# Patient Record
Sex: FEMALE | Race: WHITE | NOT HISPANIC OR LATINO | Employment: OTHER | URBAN - METROPOLITAN AREA
[De-identification: names, ages, dates, MRNs, and addresses within clinical notes are randomized per-mention and may not be internally consistent; named-entity substitution may affect disease eponyms.]

---

## 2018-12-17 ENCOUNTER — TELEPHONE (OUTPATIENT)
Dept: NEUROLOGY | Facility: CLINIC | Age: 73
End: 2018-12-17

## 2018-12-17 NOTE — TELEPHONE ENCOUNTER
Called pt's daughter and schedule appointment for Feb. 20 at 9:20am. Patient has been placed on the wait list.

## 2018-12-17 NOTE — TELEPHONE ENCOUNTER
----- Message from Mary Ann Huerta sent at 12/17/2018  3:40 PM CST -----  Contact: Glenny Judge MD  Good afternoon, Dr. Judge would like to refer the following patient to Dr. Ponce in the Neurology department. The patients diagnosis is parkinsons. I have scanned the patients referral and records into media manager. I spoke with the patient's daughter and she confirmed that she does not want to see a different neurologist at an earlier time. She would prefer to see Dr. Ponce if possible. Please contact the patient's daughter at [phone] in order to schedule.   If there are any further questions in regards to the patient, please contact Dr. John referral coordinator, Antonietta, at [phone]. Also, my extension is [phone].   Please let me know if I can help schedule in any way.  Thank you,   Mary Ann

## 2019-01-30 ENCOUNTER — TELEPHONE (OUTPATIENT)
Dept: NEUROLOGY | Facility: CLINIC | Age: 74
End: 2019-01-30

## 2019-01-30 NOTE — TELEPHONE ENCOUNTER
----- Message from Sebastián Arita sent at 1/30/2019  8:26 AM CST -----  Needs Advice    Reason for call: Jens is calling to confirm if the doctor knows of a PT that does [facility] [employer] therapy services        Communication Preference: Jens chin/  Care @ [phone]    Additional Information:

## 2019-02-20 ENCOUNTER — OFFICE VISIT (OUTPATIENT)
Dept: NEUROLOGY | Facility: CLINIC | Age: 74
End: 2019-02-20
Payer: MEDICARE

## 2019-02-20 VITALS
DIASTOLIC BLOOD PRESSURE: 63 MMHG | HEART RATE: 74 BPM | HEIGHT: 63 IN | SYSTOLIC BLOOD PRESSURE: 106 MMHG | WEIGHT: 127.44 LBS | BODY MASS INDEX: 22.58 KG/M2

## 2019-02-20 DIAGNOSIS — F02.818 LEWY BODY DEMENTIA WITH BEHAVIORAL DISTURBANCE: Primary | ICD-10-CM

## 2019-02-20 DIAGNOSIS — G31.83 LEWY BODY DEMENTIA WITH BEHAVIORAL DISTURBANCE: Primary | ICD-10-CM

## 2019-02-20 PROCEDURE — 99204 OFFICE O/P NEW MOD 45 MIN: CPT | Mod: S$GLB,,, | Performed by: PSYCHIATRY & NEUROLOGY

## 2019-02-20 PROCEDURE — 99204 PR OFFICE/OUTPT VISIT, NEW, LEVL IV, 45-59 MIN: ICD-10-PCS | Mod: S$GLB,,, | Performed by: PSYCHIATRY & NEUROLOGY

## 2019-02-20 PROCEDURE — 99999 PR PBB SHADOW E&M-EST. PATIENT-LVL III: CPT | Mod: PBBFAC,,, | Performed by: PSYCHIATRY & NEUROLOGY

## 2019-02-20 PROCEDURE — 99999 PR PBB SHADOW E&M-EST. PATIENT-LVL III: ICD-10-PCS | Mod: PBBFAC,,, | Performed by: PSYCHIATRY & NEUROLOGY

## 2019-02-20 RX ORDER — ASPIRIN 81 MG/1
81 TABLET ORAL DAILY
Status: ON HOLD | COMMUNITY
End: 2024-02-16 | Stop reason: HOSPADM

## 2019-02-20 RX ORDER — PNV NO.95/FERROUS FUM/FOLIC AC 28MG-0.8MG
1000 TABLET ORAL 2 TIMES DAILY
Status: ON HOLD | COMMUNITY
End: 2024-01-26 | Stop reason: HOSPADM

## 2019-02-20 RX ORDER — CARBIDOPA AND LEVODOPA 25; 100 MG/1; MG/1
1 TABLET ORAL 3 TIMES DAILY
Status: ON HOLD | COMMUNITY
End: 2024-02-16 | Stop reason: HOSPADM

## 2019-02-20 RX ORDER — DONEPEZIL HYDROCHLORIDE 5 MG/1
5 TABLET, FILM COATED ORAL NIGHTLY
Qty: 30 TABLET | Refills: 11 | Status: SHIPPED | OUTPATIENT
Start: 2019-02-20 | End: 2020-05-07

## 2019-02-20 RX ORDER — LANOLIN ALCOHOL/MO/W.PET/CERES
100 CREAM (GRAM) TOPICAL DAILY
Status: ON HOLD | COMMUNITY
End: 2024-02-16 | Stop reason: HOSPADM

## 2019-02-20 RX ORDER — LORATADINE 10 MG/1
10 TABLET ORAL DAILY
COMMUNITY
End: 2024-02-14

## 2019-02-20 NOTE — LETTER
February 26, 2019      Margaret Judge MD  [address] [facility] [address]           [facility]  [address]  [place] [address]  Phone: [phone]  Fax: [phone]          Patient: Annmarie Gallardo   MR Number: [id number]   YOB: 1945   Date of Visit: 2/20/2019       Dear Dr. Margaret Judge:    Thank you for referring Annmarie Gallardo to me for evaluation. Attached you will find relevant portions of my assessment and plan of care.    If you have questions, please do not hesitate to call me. I look forward to following Annmarie Gallardo along with you.    Sincerely,    Bharat Ponce MD    Enclosure  CC:  No Recipients    If you would like to receive this communication electronically, please contact [email or web address] or [phone] to request more information on [employer] access.    For providers and/or their staff who would like to refer a patient to [facility], please contact us through our one-stop-shop provider referral line, [facility], at [phone].    If you feel you have received this communication in error or would no longer like to receive these types of communications, please e-mail [email or web address]

## 2019-02-20 NOTE — PROGRESS NOTES
"Name: Annmarie Gallardo  MRN: [id number]   CSN: [id number]      Date: 2019    Referring physician:  Margaret Judge MD  [facility] [facility]  [place] [id number]    Chief Complaint / Interval History: No chief complaint on file.      History of Present Illness (HPI):  74 yo with presumed PD, previously seen by Dr. Judge at [facility].  A circuitous history - she has trouble describing exactly what is going on.  Daughter (Yamileth) is here to help.    Symptoms started about 5 years ago, first noted cognitive issues.  Would be worse when anxious, and difficulty following conversation. Then started to "shuffle" and moving more slow in the last year.  Now more stiff in the last 6 months and was 'not using her left side".  Started on CD/LD in December with good results, but now plateaued.  Memory got worse and then she got even more confused on namenda.    Family says brain scans show more "mini-strokes" and "dementia".  No disc to review.      Memory has progressed significantly.  She worked until 2018, had started to develop issues at work, getting confused, having her hours cut, not able to drive to and from.        Had 5 children, one .    Nonmotor/Premotor ROS:  Hyposmia (HENT)?Yes  RBD/sleep issues (Constitutional)?Yes  Depression/anxiety (Psychiatric)?Yes  Fatigue (Constitutional)?Yes  Constipation (GI)?No  Urinary issues ()?Yes  Sexual dysfunction ()?N/A  Orthostasis (Cardiovascular)?No  Leg swelling (Cardiovascular)? No  Falls (Musculoskeletal)?No  Cognitive impairment (Neurologic)?Yes  Psychoses (Psychiatric)?Yes  Pain/Paresthesia (Neurologic)?No  Visual changes (Eyes)?No  Moles / skin changes (Skin)?No  Stridor / SOB (Pulm)?No  Bruising (Heme)?No    Past Medical History: The patient  has no past medical history on file.    Social History: The patient  reports that  has never smoked. she has never used smokeless tobacco. She reports that she does not drink alcohol or use drugs.    Family " "History: Their family history is not on file.    Allergies: Patient has no known allergies.     Meds:   Current Outpatient Medications on File Prior to Visit   Medication Sig Dispense Refill    aspirin (ECOTRIN) 81 MG EC tablet Take 81 mg by mouth once daily.      carbidopa-levodopa  mg (SINEMET)  mg per tablet Take 1 tablet by mouth 3 (three) times daily.      cyanocobalamin (VITAMIN B-12) 1000 MCG tablet Take 100 mcg by mouth once daily.      LISINOPRIL ORAL Take by mouth.      loratadine (CLARITIN) 10 mg tablet Take 10 mg by mouth once daily.      OMEPRAZOLE ORAL Take by mouth.      vitamin E 1000 UNIT capsule Take 1,000 Units by mouth 2 (two) times daily.       No current facility-administered medications on file prior to visit.        Exam:  /63   Pulse 74   Ht 5' 3" (1.6 m)   Wt 57.8 kg (127 lb 6.8 oz)   BMI 22.57 kg/m²     Constitutional  Well-developed, well-nourished, appears stated age   Ophthalmoscopic  No papilledema with no hemorrhages or exudates bilaterally   Cardiovascular  Radial pulses 2+ and symmetric, no LE edema bilaterally   Neurological    * Mental status  MOCA =      - Orientation  Oriented to person, place, time, and situation     - Memory   Intact recent and remote     - Attention/concentration  Attentive, vigilant during exam     - Language  Naming & repetition intact, +2-step commands     - Fund of knowledge  Aware of current events     - Executive  Well-organized thoughts     - Other     * Cranial nerves       - CN II  PERRL, visual fields full to confrontation     - CN III, IV, VI  Extraocular movements full, normal pursuits and saccades     - CN V  Sensation V1 - V3 intact     - CN VII  Face strong and symmetric bilaterally     - CN VIII  Hearing intact bilaterally     - CN IX, X  Palate raises midline and symmetric     - CN XI  SCM and trapezius 5/5 bilaterally     - CN XII  Tongue midline   * Motor  Muscle bulk normal, strength 5/5 throughout   * Sensory  "  Intact to temperature and vibration throughout   * Coordination  No dysmetria with finger-to-nose or heel-to-shin   * Gait  See below.   * Deep tendon reflexes  3+ and symmetric throughout   Babinski downgoing bilaterally   * Specialized movement exam  ++ hypophonic speech.    + facial masking.   Sym B mild cogwheel rigidity.     Sym B mod bradykinesia.   No tremor with rest, posture, kinesis, or intention.    No other dystonia, chorea, athetosis, myoclonus, or tics.   No motor impersistence.   Normal-based gait.   Minimally shortened stride length.   ++ B abnormal arm swing.     No postural instability.      Medical Record Review:  - Lab Results:  No visits with results within 3 Month(s) from this visit.   Latest known visit with results is:   No results found for any previous visit.       - Independent visualization and interpretation of radiological images: none available    - Independent review of consultant's notes: PCP      Diagnoses:          DLB > PDD.     Medical Decision Making:  - trial of donepezil, gi symptoms discussed  - continue cd/ld  - discus with  for more assistance at home with family            Bharat Ponce MD, MPH  Division of Movement and Memory Disorders  [facility]  [phone]

## 2019-02-26 DIAGNOSIS — F02.818 LEWY BODY DEMENTIA WITH BEHAVIORAL DISTURBANCE: Primary | ICD-10-CM

## 2019-02-26 DIAGNOSIS — G31.83 LEWY BODY DEMENTIA WITH BEHAVIORAL DISTURBANCE: Primary | ICD-10-CM

## 2019-02-26 NOTE — TELEPHONE ENCOUNTER
----- Message from Nazia Juaerz sent at 2/26/2019 11:22 AM CST -----  Contact: Yamileth (daughter) @ [phone]  Pt was in to see Dr Ponce on 2-20-19.  Pt was given a prescription for donepezil (ARICEPT) 5 MG tablet.  Pt took the medication for 4 days and started having cramping and nausea.  The next day, the 5th day she vomited for about an hour and did not take the medication again.  About 36 hours after not taking the medication she was back to normal.  Pls call.

## 2019-02-28 ENCOUNTER — SOCIAL WORK (OUTPATIENT)
Dept: NEUROLOGY | Facility: CLINIC | Age: 74
End: 2019-02-28
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
## 2019-04-20 RX ORDER — METHYLPHENIDATE HYDROCHLORIDE 5 MG/1
5 TABLET ORAL DAILY
Qty: 90 TABLET | Refills: 3 | OUTPATIENT
Start: 2019-04-20

## 2019-12-09 ENCOUNTER — TELEPHONE (OUTPATIENT)
Dept: NEUROLOGY | Facility: CLINIC | Age: 74
End: 2019-12-09

## 2019-12-09 NOTE — TELEPHONE ENCOUNTER
----- Message from Nazia Juarez sent at 12/9/2019  9:54 AM CST -----  Contact: Yamileth (daughter) @ [phone]  Calling to schedule a f/u appt at the end of February but there is nothing available.  Pls call.

## 2020-01-10 ENCOUNTER — TELEPHONE (OUTPATIENT)
Dept: NEUROLOGY | Facility: CLINIC | Age: 75
End: 2020-01-10

## 2020-01-10 DIAGNOSIS — G31.83 LEWY BODY DEMENTIA WITH BEHAVIORAL DISTURBANCE: Primary | ICD-10-CM

## 2020-01-10 DIAGNOSIS — G20.A1 PARKINSON DISEASE: ICD-10-CM

## 2020-01-10 DIAGNOSIS — F02.818 LEWY BODY DEMENTIA WITH BEHAVIORAL DISTURBANCE: Primary | ICD-10-CM

## 2020-01-10 NOTE — TELEPHONE ENCOUNTER
----- Message from Yoko Ruff sent at 1/10/2020  3:12 PM CST -----  Contact: Pt Yamileth  Reason: Calling to schedule appt and also would like a referral for [employer].    Communication: [phone]

## 2020-01-14 NOTE — TELEPHONE ENCOUNTER
Offered f/u 5/11/20 at 220 pm.  Daughter is requesting [employer] orders.  [facility] and also [facility] for approval.

## 2020-01-22 ENCOUNTER — CLINICAL SUPPORT (OUTPATIENT)
Dept: REHABILITATION | Facility: HOSPITAL | Age: 75
End: 2020-01-22
Attending: PSYCHIATRY & NEUROLOGY
Payer: MEDICARE

## 2020-01-22 DIAGNOSIS — Z74.09 IMPAIRED FUNCTIONAL MOBILITY, BALANCE, GAIT, AND ENDURANCE: ICD-10-CM

## 2020-01-22 PROCEDURE — 97162 PT EVAL MOD COMPLEX 30 MIN: CPT | Mod: PO

## 2020-01-22 NOTE — PLAN OF CARE
"[facility]  Physical Therapy Neurological Rehabilitation Initial Evaluation    Name: Annmarie Gallardo  Clinic Number: [id number]    Therapy Diagnosis:   Encounter Diagnosis   Name Primary?    Impaired functional mobility, balance, gait, and endurance      Physician: Bharat Ponce MD    Physician Orders: PT Eval and Treat "Big and Loud"  Medical Diagnosis from Referral: Lewy body dementia with behavioral disturbance; parkinson's disease  Evaluation Date: 1/22/2020  Authorization Period Expiration: 1/13/2021  Plan of Care Expiration: 3/22/2020  Visit # / Visits authorized: 1/ 1    Time In: 930  Time Out: 1015  Total Billable Time: 45 minutes    Precautions: Standard and Fall    Subjective   Date of onset: 6 mos  History of current condition - Annmarie reports: Some things have gotten difficult for her. Pt speaks amously from time to time and is a poor historian. Pt's daughter waits in the waiting room and pt reports that she is now living with her. They moved in a few months ago. She was diagnosed with parkinson's about 2 years ago. I don't like to turn quick. On cold days she does not like to move as much. She reports that writing has gotten worse. Pt's daugther that is present in the waiting room is the only family around to help; her other daughter is in [place].   Per patients daughter, at conclusion of session: Her mother has a history of stroke in 2018 and was also diagnosed with PD as well as dementia in late 2018. Since then she ebbs and flows with her abilities. She and her family moved in with her mother 6 mos ago and want to be sure that her mom is safe when she is not home. She thinks her mother's balance is fairly stable but could improve so that she is more confident in leaving her while at work.     Medical History:   No past medical history on file.    Surgical History:   Annmarie Gallardo  has no past surgical history on file.    Medications:   Annmarie has a current " "medication list which includes the following prescription(s): aspirin, carbidopa-levodopa  mg, cyanocobalamin, donepezil, lisinopril, loratadine, omeprazole, and vitamin e.    Allergies:   Review of patient's allergies indicates:  No Known Allergies     Imaging, none on file    Prior Therapy: yes, per patient, unsure what she was in therapy for  Social History:  lives with their family and lives with their daughter  Falls: none   DME: none    Home Environment: SSH, no EZIO   Exercise Routine / History: none   Family Present at time of Eval: daughter in waiting room, Sue   Occupation: retired; worked for a [employer]  Prior Level of Function: ind  Current Level of Function: mod I, with increased time and increased confusion, not able to drive    Pain:  Current 0/10, worst 0/10, best 0/10       Pts goals: "to feel better"    Objective     Observation: pleasant and cooperative   Speech: fluent though tangital    Mental status: alert, oriented to person, place, and time, though often tangential and requires redirection  Appearance: Casually dressed and Well groomed  Behavior:  calm, cooperative and adequate rapport can be established  Attention Span and Concentration:  Decreased and Easily distracted    Posture Alignment :slouched posture    Dominant hand:  right     Skin integrity:  Intact    Visual/Auditory: denies changes; wears reading glasses occaisionally      ROM:   GROSS AROM/PROM  UPPER EXTREMITY  (R) UE: WFLs  (L) UE: WFLs  LOWER EXTREMITY  (R) LE: WFLs  (L) LE: WFLs       Lower Extremity Strength  Right LE  Left LE    Hip flexion:  4/5 Hip flexion: 4/5   Knee extension: 5/5 Knee extension: 4/5   Knee flexion: 4+/5 Knee flexion: 4/5   Ankle dorsiflexion:  5/5 Ankle dorsiflexion: 5/5   Ankle plantarflexion:  4/5 Ankle plantarflexion: 4/5   Hip abduction: 4/5 Hip abduction: 4/5   Hip adduction: 4/5 Hip adduction 4/5   Hip extension: 4-/5 Hip extension: 4-/5     Upper extremity strength:     Right Left " "  Shoulder Flexion: 4+/5 4-/5   Shoulder Abduction: 4/5 4/5   Shoulder Extension: 4/5 4/5   Elbow Flexion: 4/5 4/5   Elbow Extension: 4/5 4/5   Wrist Extension: 4/5 4/5   Wrist Flexion: 4/5 4/5    4-/5 4-/5     Coordination:   Rapid Alternating Movements: intact  Point to Point:    -Finger to Nose: NT   -Heel to Shin: NT    Sensation: intact  Proprioception: NT    Tone/Spasticity: none noted, not formally tested, no tremor noted    Functional Mobility (Bed mobility, transfers)  Bed mobility: I  Supine to sit: I  Sit to supine: I  Rolling: I  Transfers to bed: I  Transfers to toilet: I  Sit to stand:  I  Stand pivot:  I  Car transfers: I     Evaluation   Single Limb Stance R LE 7 sec  (<10 sec = HIGH FALL RISK)   Single Limb Stance L LE 15 sec  (<10 sec = HIGH FALL RISK)   30 second Chair Rise 13 completed with no arms (occaisional cues to stand tall)   TUG 9.2 seconds   Self selected walking speed 1.25 m/sec (6m/4.8s)   Fast walking speed 1.5 m/sec (6m/4.0s)     30 second chair rise below average score:   Age  Men  Women  75-79  <11  <10  A below average score indicates a risk for fall.    Postural control:  KIMBERLEY SENSORY TESTING:  (P= Pass, F= Fail; note any sway; hold each position for 30")  Condition 1: (firm surface/feet together/eyes open) P  Condition 2: (firm surface/feet together/eyes closed) P  Condition 3: (firm surface/feet in tandem/eyes open) F; 15 sec  Condition 4: (firm surface/feet in tandem/eyes closed) NT  Condition 5: (soft surface/feet together/eyes open) P  Condition 6: (soft surface/feet together/eyes closed) F; 7 sec  Condition 7: (Fakuda step test), measure distance varied from center starting position NT    Gait Assessment:   - AD used: none  - Assistance: ind  - Stairs: Mod I    · GAIT DEVIATIONS:   Annmarie displays the following deviations with ambulation: slight forward flexion at the hips, decreased lila, decreased arm swing   Impairments contributing to deviations: impaired motor " control, impaired balance, decreased strength in B LE    Functional Gait Assessment:   1. Gait on level surface =  3   (3) Normal: less than 5.5 sec, no A.D., no imbalance, normal gait pattern, deviates< 6in   (2) Mild impairment: 7-5.6 sec, uses A.D., mild gait deviations, or deviates 6-10 in   (1) Moderate impairment: > 7 sec, slow speed, imbalance, deviates 10-15 in.   (0) Severe impairment: needs assist, deviates >15 in, reach/touch wall  2. Change in Gait Speed = 3   (3) Normal: smooth change w/o loss of balance or gait deviation, deviates < 6 in, significant difference between speeds   (2) Mild impairment: changes speed, but demonstrates mild gait deviations, deviates 6-10 in, OR no deviations but unable to significantly speed, OR uses A.D.   (1) Moderate impairment: minor changes to speed, OR changes speed w/ significant deviations, deviates 10-15 in, OR  Changes speed , but loses balance & recovers   (0) Severe impairment: cannot change speed, deviates >15 in, or loses balance & needs assist  3. Gait with horizontal head turns  = 3   (3) Normal: no change in gait, deviates <6 in   (2) Mild impairment: slight change in speed, deviates 6-10 in, OR uses A.D.   (1) Moderate impairment: moderate change in speed, deviates 10-15 in   (0) Severe impairment: severe disruption of gait, deviates >15in  4. Gait with vertical head turns = 3   (3) Normal: no change in gait, deviates <6 in   (2) Mild impairment: slight change in speed, deviates 6-10 in OR uses A.D.   (1) Moderate impairment: moderate change in speed, deviates 10-15 in   (0) Severe impairment: severe disruption of gait, deviates >15 in  5. Gait with pivot turns = 3   (3) Normal: performs safely in 3 sec, no LOB   (2) Mild impairment: performs in >3 sec & no LOB, OR turns safely & requires several steps to regain LOB   (1) Moderate impairment: turns slow, OR requires several small steps for balance following turn & stop   (0) Severe impairment: cannot turn  safely, needs assist  6. Step over obstacle = 2   (3) Normal: steps over 2 stacked boxes w/o change in speed or LOB   (2) Mild impairment: able to step over 1 box w/o change in speed or LOB   (1) Moderate impairment: steps over 1 box but must slow down, may require VC   (0) Severe impairment: cannot perform w/o assist  7. Gait with Narrow EDGARDO = 3   (3) Normal: 10 steps no staggering   (2) Mild impairment: 7-9 steps   (1) Moderate impairment: 4-7 steps   (0) Severe impairment: < 4 steps or cannot perform w/o assist  8. Gait with eyes closed = 2   (3) Normal: < 7 sec, no A.D., no LOB, normal gait pattern, deviates <6 in   (2) Mild impairment: 7.1-9 sec, mild gait deviations, deviates 6-10 in   (1) Moderate impairment: > 9 sec, abnormal pattern, LOB, deviates 10-15 in   (0) Severe impairment: cannot perform w/o assist, LOB, deviates >15in  9. Ambulating Backwards = 2   (3) Normal: no A.D., no LOB, normal gait pattern, deviates <6in   (2) Mild impairment: uses A.D., slower speed, mild gait deviations, deviates 6-10 in   (1) Moderate impairment: slow speed, abnormal gait pattern, LOB, deviates 10-15 in   (0) Severe impairment: severe gait deviations or LOB, deviates >15in  10. Steps = 2   (3) Normal: alternating feet, no rail   (2) Mild Impairment: alternating feet, uses rail   (1) Moderate impairment: step-to, uses rail   (0) Severe impairment: cannot perform safely    Score 26/30     Score:   <22/30 fall risk   <20/30 fall risk in older adults   <18/30 fall risk in Parkinsons       Endurance Deficit: yes; pt reports inability to walk as far as she would like     PT Evaluation Completed? Yes        CMS Impairment/Limitation/Restriction for FOTO Intake Survey    Therapist reviewed FOTO scores for Annmarie [employer] on 1/22/2020.   FOTO documents entered into [employer] - see Media section.    Limitation Score: 26%  Category: Mobility    Current : CJ = at least 20% but < 40% impaired, limited or restricted  Goal: CJ = at least  20% but < 40% impaired, limited or restricted         TREATMENT   Treatment not provided due to time restrictions.     Home Exercises and Patient Education Provided    Education provided:   - LSVT Big program  - role of PT  - POC      Assessment   Annmarie is a 74 y.o. female referred to outpatient Physical Therapy with a medical diagnosis of dementia and parkinson's disease. Pt presents to PT with the following impairments leading to his/her functional decline: decreased balance, decreased strength in B LE, decreased endurance, and decreased overall mobility. Per patients daughter she is not compliant with HEP and will not participate outside of sessions; therefore pt is not appropriate for formal LSVT Big program at this time. PT educated pt and her family on this program and pt would be appropriate to teach the Big exercises to as HEP to assist with keeping movements larger for as long as possible. Pt demonstrates deficits with balance vision eliminated most, and often feels uncomfortable with this activity. Pt's daughter reports that she closes her eyes for showering and other activities at home; therefore, it would be beneficial to train these activities most.     Pt prognosis is Good.   Pt will benefit from skilled outpatient Physical Therapy to address the deficits stated above and in the chart below, provide pt/family education, and to maximize pt's level of independence.     Plan of care discussed with patient: Yes  Pt's spiritual, cultural and educational needs considered and patient is agreeable to the plan of care and goals as stated below:     Anticipated Barriers for therapy: progressive nature of PD and dementia, poor carryover due to dementia    Medical Necessity is demonstrated by the following  History  Co-morbidities and personal factors that may impact the plan of care Co-morbidities:   history of CVA and dementia    Personal Factors:   age     moderate   Examination  Body Structures and Functions,  "activity limitations and participation restrictions that may impact the plan of care Body Regions:   back  lower extremities  upper extremities  trunk    Body Systems:    gross symmetry  strength  balance  gait  transfers  transitions  motor control    Participation Restrictions:   Pt unable to be left alone at this time.    Activity limitations:   Learning and applying knowledge  no deficits    General Tasks and Commands  undertaking multiple tasks    Communication  no deficits    Mobility  lifting and carrying objects  walking  driving (bike, car, motorcycle)    Self care  washing oneself (bathing, drying, washing hands)    Domestic Life  cooking  doing house work (cleaning house, washing dishes, laundry)    Interactions/Relationships  no deficits    Life Areas  no deficits    Community and Social Life  no deficits         moderate   Clinical Presentation evolving clinical presentation with changing clinical characteristics moderate   Decision Making/ Complexity Score: moderate     Goals:  Short Term Goals = Long Term Goals: 4 weeks   1. Pt will improve SLS time to 20 seconds bilaterally in order to decrease risk for fall.   2. Pt will be able to demonstrate proper completion of LSVT Big maximal daily exercises in order to continue "big" movements without difficulty.  3. Pt will improve score on FGA to at least 29/30 in order to demonstrate improving balance.   4. Pt will improve B LE strength to at least 4+/5 in order to demonstrate improved strength.  5. Pt will improve condition 6 on [employer] sensory testing to at least 15 sec in order to demonstrate improved balance with vision eliminated such as when in the shower.    Plan   Plan of care Certification: 1/22/2020 to 3/22/2020.    Outpatient Physical Therapy 2 times weekly for 8 weeks to include the following interventions: Gait Training, Manual Therapy, Moist Heat/ Ice, Neuromuscular Re-ed, Orthotic Management and Training, Patient Education, Therapeutic Activites " and Therapeutic Exercise.     Luis Field, PT

## 2020-01-30 ENCOUNTER — CLINICAL SUPPORT (OUTPATIENT)
Dept: REHABILITATION | Facility: HOSPITAL | Age: 75
End: 2020-01-30
Attending: PSYCHIATRY & NEUROLOGY
Payer: MEDICARE

## 2020-01-30 DIAGNOSIS — Z74.09 IMPAIRED FUNCTIONAL MOBILITY, BALANCE, GAIT, AND ENDURANCE: ICD-10-CM

## 2020-01-30 PROCEDURE — 97110 THERAPEUTIC EXERCISES: CPT | Mod: PO,CQ

## 2020-01-30 NOTE — PROGRESS NOTES
"  Physical Therapy Daily Treatment Note     Name: Annmarie Gallardo  Clinic Number: [id number]    Therapy Diagnosis: No diagnosis found.  Physician: Bharat Ponce MD    Visit Date: 1/30/2020    Physician Orders: PT Eval and Treat "Big and Loud"  Medical Diagnosis from Referral: Lewy body dementia with behavioral disturbance; parkinson's disease  Evaluation Date: 1/22/2020  Authorization Period Expiration: 1/13/2021  Plan of Care Expiration: 3/22/2020  Visit # / Visits authorized: 2/ 6    Time In: 1105  Time Out: 1150  Total Billable Time: 45 minutes    Precautions: Standard and Fall    Subjective     Pt reports: " I paid my bill, I am ready to start."   She will be given an HEP today   Response to previous treatment: first appointment following initial evaluation  Functional change: ongoing    Pain: 0/10  Location: N/A     Objective     Annmarie received therapeutic exercises to develop strength, endurance, ROM, flexibility, posture and core stabilization for 45 minutes including:  X 9 min on Nu Step Recumbent stepper.  B UE/B LE on level 2.0    Sitting EOM:   2 x 30 sec of B LE HS stretches   2 x 10 reps of B LE LAQ, 3 sec hold.  VC's to hold longer and complete full ROM    Standing by ballet bar:   3 x 10 reps of B LE exercises including:    Heel raises    Toe raises    Hip abd/add    Hs curls    Hip flexion    Mini squats    Annmarie participated in neuromuscular re-education activities to improve: Balance, Coordination, Kinesthetic, Sense, Proprioception and Posture for 0 minutes. The following activities were included:      Annmarie participated in dynamic functional therapeutic activities to improve functional performance for 0  minutes, including:      Annmarie participated in gait training to improve functional mobility and safety for 0  minutes, including:        Home Exercises Provided and Patient Education Provided     Education provided:   HEP    Written Home Exercises Provided: yes.  Exercises were reviewed " "and Annmarie was able to demonstrate them prior to the end of the session.  Annmarie demonstrated fair  understanding of the education provided.     See EMR under Patient Instructions for exercises provided 1/30/2020.    Assessment     Annmarie tolerated her first tx session following initial evaluation fairly well and did not have any complaints.  Pt began cardiovascular endurance on the stepper and was educated on sitting and standing HEP.  Pt was able to demonstrate understanding, but had a hard time with carry over in the gym with instructions, so may have a hard time with HEP.  Pt requires constant VC's for exercises.  No loss of balance noted.   Annmarie is progressing well towards her goals.   Pt prognosis is Good.     Pt will continue to benefit from skilled outpatient physical therapy to address the deficits listed in the problem list box on initial evaluation, provide pt/family education and to maximize pt's level of independence in the home and community environment.     Pt's spiritual, cultural and educational needs considered and pt agreeable to plan of care and goals.     Anticipated barriers to physical therapy: progressive nature of PD and dementia, poor carryover due to dementia    Goals:  Short Term Goals = Long Term Goals: 4 weeks   1. Pt will improve SLS time to 20 seconds bilaterally in order to decrease risk for fall.   2. Pt will be able to demonstrate proper completion of LSVT Big maximal daily exercises in order to continue "big" movements without difficulty.  3. Pt will improve score on FGA to at least 29/30 in order to demonstrate improving balance.   4. Pt will improve B LE strength to at least 4+/5 in order to demonstrate improved strength.  5. Pt will improve condition 6 on [employer] sensory testing to at least 15 sec in order to demonstrate improved balance with vision eliminated such as when in the shower.       Plan   Cont with endurance, strengthening, stretching, balance and amplitude    Daphney " Tim Orosco, PTA

## 2020-01-30 NOTE — PATIENT INSTRUCTIONS
Ankle Plantar Flexion / Dorsiflexion, Standing          Stand while holding a stable object.    Rise up on toes x 20 reps.   Then rock back on heels x 20 reps.    Repeat 2 rounds of 20 each. 1-2 sessions per day.  (If this becomes easy, start to try heel raises and toe raises without holding but have support within reach.)       Partial Knee Bend        Holding on to stable object, slightly bend knees and slowly straighten.  Repeat 10 times. Do 2 sets. 1-2 sessions per day.     [email or web address]             Knee Flexion (Standing)        Stand with support. Squeeze buttocks and hold. Bend right knee without moving hip. Hold for 1-2 seconds.  Repeat with other leg.   Repeat 10 times. Do 2 sets. 1-2 sessions per day.     Hp Side Kick        Holding a chair for balance if needed, keep legs shoulder width apart and toes pointed forward. Swing a leg out to side, keeping knee straight. Do not lean. Repeat using other leg.  Repeat 10 times. Do 2 sets. 1-2 sessions per day.         KNEE: Extension, Long Arc Quads - Sitting        Raise leg until knee is straight. Hold 3-5 seconds.   Repeat 10 times. Do 2 sets. 1-2 sessions per day.    HIP: Hamstrings - Short Sitting        Rest leg on raised surface. Keep knee straight. Lift chest and lean forwards towards toes. Hold 30 seconds.  Repeat 3 times. 1-2 sessions per day.     Copyright © [employer]. All rights reserved.

## 2020-02-03 ENCOUNTER — DOCUMENTATION ONLY (OUTPATIENT)
Dept: REHABILITATION | Facility: HOSPITAL | Age: 75
End: 2020-02-03

## 2020-02-03 NOTE — PROGRESS NOTES
Face to face meeting completed with Luis Field  PT regarding current status and progress of   Annmarie Gallardo .  Raymond Aguilar, PTA

## 2020-02-03 NOTE — PROGRESS NOTES
PT/PTA met face to face to discuss pt's treatment plan and progress towards established goals.  Continue with current PT POC with focus on balance, strengthening, amplitude, and endurance.  Patient will be seen by physical therapist at least every sixth treatment or 30 days, whichever occurs first.    Daphney Orosco, PTA  02/03/2020

## 2020-02-04 ENCOUNTER — CLINICAL SUPPORT (OUTPATIENT)
Dept: REHABILITATION | Facility: HOSPITAL | Age: 75
End: 2020-02-04
Attending: PSYCHIATRY & NEUROLOGY
Payer: MEDICARE

## 2020-02-04 DIAGNOSIS — Z74.09 IMPAIRED FUNCTIONAL MOBILITY, BALANCE, GAIT, AND ENDURANCE: Primary | ICD-10-CM

## 2020-02-04 PROCEDURE — 97110 THERAPEUTIC EXERCISES: CPT | Mod: PO

## 2020-02-04 PROCEDURE — 97112 NEUROMUSCULAR REEDUCATION: CPT | Mod: PO

## 2020-02-04 NOTE — PROGRESS NOTES
"  Physical Therapy Daily Treatment Note     Name: Annmarie SADLER [facility] Number: [id number]    Therapy Diagnosis:   Encounter Diagnosis   Name Primary?    Impaired functional mobility, balance, gait, and endurance Yes     Physician: Bharat Ponce MD    Visit Date: 2/4/2020    Physician Orders: PT Eval and Treat "Big and Loud"  Medical Diagnosis from Referral: Lewy body dementia with behavioral disturbance; parkinson's disease  Evaluation Date: 1/22/2020  Authorization Period Expiration: 1/13/2021  Plan of Care Expiration: 3/22/2020  Visit # / Visits authorized: 3/ 6    Time In: 1100  Time Out: 1143  Total Billable Time: 43 minutes    Precautions: Standard and Fall    Subjective     Pt reports: I have so much other stuff to do, I didn't do any of those exercise.     She was not compliant with HEP.   Response to previous treatment: first appointment following initial evaluation  Functional change: ongoing    Pain: 0/10  Location: N/A     Objective     Annmarie received therapeutic exercises to develop strength, endurance, ROM, flexibility, posture and core stabilization for 20 minutes including:  X 8 min on Recumbent bike. level 5.0    // bars:   3 x 10 reps of B LE exercises including:    Heel raises    Toe raises    Mini squats    Annmarie participated in neuromuscular re-education activities to improve: Balance, Coordination, Kinesthetic, Sense, Proprioception and Posture for 25 minutes. The following activities were included:    Exercises for increased amplitude of movements:   1. Floor to ceiling (10x with 10 sec hold)  2. Side to side (10x with 10 sec hold)  3. Forward step and reach (8x ea side)  4. Sideways step and reach (8x ea side)  5. Backward step and reach (8x ea side)    3 laps side stepping between // bars, CGA and cues  3 laps backward walking between // bars, CGA and cues  3 laps marching with 3 sec hold between // bars, SBA  3 laps tandem ambulation with no UE support, CGA and cues    x5 " "sit<>stand from low EOM with 1 UE support, standing onto airex pad    Annmarie participated in dynamic functional therapeutic activities to improve functional performance for 0  minutes, including:      Annmarie participated in gait training to improve functional mobility and safety for 0  minutes, including:        Home Exercises Provided and Patient Education Provided     Education provided:   HEP    Written Home Exercises Provided: yes.  Exercises were reviewed and Annmarie was able to demonstrate them prior to the end of the session.  Annmarie demonstrated fair  understanding of the education provided.     See EMR under Patient Instructions for exercises provided 1/30/2020.    Assessment     Annmarie tolerated session well. She was able to complete amplitude exercises well though pt unable to correct with cues from PT once she begins attempting exercises. Pt reports non-compliance with HEP despite written instructions provided last session. She remains appropriate for skilled PT services in order to improve balance, endurance, and tolerance to activity.    Annmarie is progressing well towards her goals.   Pt prognosis is Good.     Pt will continue to benefit from skilled outpatient physical therapy to address the deficits listed in the problem list box on initial evaluation, provide pt/family education and to maximize pt's level of independence in the home and community environment.     Pt's spiritual, cultural and educational needs considered and pt agreeable to plan of care and goals.     Anticipated barriers to physical therapy: progressive nature of PD and dementia, poor carryover due to dementia    Goals:  Short Term Goals = Long Term Goals: 4 weeks   1. Pt will improve SLS time to 20 seconds bilaterally in order to decrease risk for fall. ongoing  2. Pt will be able to demonstrate proper completion of LSVT Big maximal daily exercises in order to continue "big" movements without difficulty. ongoing  3. Pt will improve " score on FGA to at least 29/30 in order to demonstrate improving balance. ongoing  4. Pt will improve B LE strength to at least 4+/5 in order to demonstrate improved strength. ongoing  5. Pt will improve condition 6 on [place] sensory testing to at least 15 sec in order to demonstrate improved balance with vision eliminated such as when in the shower. ongoing       Plan   Cont with endurance, strengthening, stretching, balance and amplitude    Luis Field, PT

## 2020-02-05 ENCOUNTER — DOCUMENTATION ONLY (OUTPATIENT)
Dept: REHABILITATION | Facility: HOSPITAL | Age: 75
End: 2020-02-05

## 2020-02-05 NOTE — PROGRESS NOTES
"  Physical Therapy Daily Treatment Note     Name: Annmarie SADLER [facility] Number: [id number]    Therapy Diagnosis:   Encounter Diagnosis   Name Primary?    Impaired functional mobility, balance, gait, and endurance Yes     Physician: Bhaart Ponec MD    Visit Date: 2/6/2020    Physician Orders: PT Eval and Treat "Big and Loud"  Medical Diagnosis from Referral: Lewy body dementia with behavioral disturbance; parkinson's disease  Evaluation Date: 1/22/2020  Authorization Period Expiration: 1/13/2021  Plan of Care Expiration: 3/22/2020  Visit # / Visits authorized: 4/ 6    Time In: 1100  Time Out: 1145  Total Billable Time: 45 minutes    Precautions: Standard and Fall    Subjective     Pt reports: "They put me in one of those machines and when I came out they told me I had Parkinson's Disease."     She was not compliant with HEP.   Response to previous treatment: first appointment following initial evaluation  Functional change: ongoing    Pain: 0/10  Location: N/A     Objective     Annmarie received therapeutic exercises to develop strength, endurance, ROM, flexibility, posture and core stabilization for 20 minutes including:  X 8 min on Recumbent bike. level 5.0    // bars:  x10 ea LE side stepping over foam roll, struggles with directions  x10 ea LE fwd stepping over foam roll, no UE support, CGA    Annmarie participated in neuromuscular re-education activities to improve: Balance, Coordination, Kinesthetic, Sense, Proprioception and Posture for 25 minutes. The following activities were included:    Exercises for increased amplitude of movements:   1. Floor to ceiling (10x with 10 sec hold)  2. Side to side (10x with 10 sec hold)  3. Forward step and reach (8x ea side)  4. Sideways step and reach (8x ea side)  5. Backward step and reach (8x ea side)  Big walking with emphasis on arm swing and larger steps x100', pt very easily distracted during this task    3 laps marching with 3 sec hold between // bars, SBA  3 " laps tandem ambulation with no UE support, CGA and cues    x5 sit<>stand from low EOM with 1 UE support, standing onto airex pad, CGA-Mamadou    Annmarie participated in dynamic functional therapeutic activities to improve functional performance for 0  minutes, including:      Annmarie participated in gait training to improve functional mobility and safety for 0  minutes, including:        Home Exercises Provided and Patient Education Provided     Education provided:   HEP    Written Home Exercises Provided: yes.  Exercises were reviewed and Annmarie was able to demonstrate them prior to the end of the session.  Annmarie demonstrated fair  understanding of the education provided.     See EMR under Patient Instructions for exercises provided 1/30/2020.    Assessment     Annmarie tolerated session well. She requires more cues and struggles more with direction following today, but able to complete all tasks with verbal and tactile cues. Pt continues to endorse no home exercise although she is a poor historian. Pt continues to tolerate exercises for bigger movements well. She remains appropriate for skilled PT services in order to improve balance, endurance, and tolerance to activity.    Annmarei is progressing well towards her goals.   Pt prognosis is Good.     Pt will continue to benefit from skilled outpatient physical therapy to address the deficits listed in the problem list box on initial evaluation, provide pt/family education and to maximize pt's level of independence in the home and community environment.     Pt's spiritual, cultural and educational needs considered and pt agreeable to plan of care and goals.     Anticipated barriers to physical therapy: progressive nature of PD and dementia, poor carryover due to dementia    Goals:  Short Term Goals = Long Term Goals: 4 weeks   1. Pt will improve SLS time to 20 seconds bilaterally in order to decrease risk for fall. ongoing  2. Pt will be able to demonstrate proper  "completion of LSVT Big maximal daily exercises in order to continue "big" movements without difficulty. ongoing  3. Pt will improve score on FGA to at least 29/30 in order to demonstrate improving balance. ongoing  4. Pt will improve B LE strength to at least 4+/5 in order to demonstrate improved strength. ongoing  5. Pt will improve condition 6 on [place] sensory testing to at least 15 sec in order to demonstrate improved balance with vision eliminated such as when in the shower. ongoing       Plan   Cont with endurance, strengthening, stretching, balance and amplitude    Luis Field, PT   "

## 2020-02-05 NOTE — PROGRESS NOTES
PT/PTA met face to face to discuss pt's treatment plan and progress towards established goals. Continue with current PT POC, including: balance, strengthening, amplitude, and endurance. Pt will be seen by physical therapist at least every 6th treatment day or every 30 days, whichever occurs first.      Gian Cabrera, PTA  2/03/20      Face to face completed on 2/3/2020.  Luis Field, PT

## 2020-02-06 ENCOUNTER — CLINICAL SUPPORT (OUTPATIENT)
Dept: REHABILITATION | Facility: HOSPITAL | Age: 75
End: 2020-02-06
Attending: PSYCHIATRY & NEUROLOGY
Payer: MEDICARE

## 2020-02-06 DIAGNOSIS — Z74.09 IMPAIRED FUNCTIONAL MOBILITY, BALANCE, GAIT, AND ENDURANCE: Primary | ICD-10-CM

## 2020-02-06 PROCEDURE — 97112 NEUROMUSCULAR REEDUCATION: CPT | Mod: PO

## 2020-02-06 PROCEDURE — 97110 THERAPEUTIC EXERCISES: CPT | Mod: PO

## 2020-02-11 ENCOUNTER — DOCUMENTATION ONLY (OUTPATIENT)
Dept: REHABILITATION | Facility: HOSPITAL | Age: 75
End: 2020-02-11

## 2020-02-11 NOTE — PROGRESS NOTES
Physical Therapy: No show/Cancellation of Visit  Date: 02/11/2020    Patient was a no show to today's PT appointment. Patient's next scheduled appointment is 2/13/2020.     Cancel: 0  No show: 1    Therapist: Luis Field, PT          
ROBERTA Bustos RN

## 2020-02-13 ENCOUNTER — DOCUMENTATION ONLY (OUTPATIENT)
Dept: REHABILITATION | Facility: HOSPITAL | Age: 75
End: 2020-02-13

## 2020-02-13 NOTE — PROGRESS NOTES
Documentation Only/ No Show      Patient: Annmarie Gallardo  Date of Session: 2/13/2020  Diagnosis: No diagnosis found.  MRN: [id number]  Annmarie Gallardo did not attend his/her scheduled therapy appointment today. Annmarie did not call to cancel nor reschedule. This is the 2nd appointment that she has not attended. Next appointment is scheduled for 2/18/20 will follow up with patient at that time. No charges have been posted today.         Daphney Orosco, PTA  02/13/2020

## 2020-02-18 ENCOUNTER — DOCUMENTATION ONLY (OUTPATIENT)
Dept: REHABILITATION | Facility: HOSPITAL | Age: 75
End: 2020-02-18

## 2020-02-18 ENCOUNTER — TELEPHONE (OUTPATIENT)
Dept: REHABILITATION | Facility: HOSPITAL | Age: 75
End: 2020-02-18

## 2020-02-18 NOTE — PROGRESS NOTES
Documentation Only/ No Show      Patient: Annmarie Gallardo  Date of Session: 2/18/2020  Diagnosis: No diagnosis found.  MRN: [id number]  Annmarie Gallardo did not attend his/her scheduled therapy appointment today. Annmarie did not call to cancel nor reschedule. This is the 3rd appointment that she has not attended. Next appointment is scheduled for 2/20/2020 will follow up with patient at that time. No charges have been posted today.     Daphney Orosco, PTA  02/18/2020

## 2020-02-20 ENCOUNTER — CLINICAL SUPPORT (OUTPATIENT)
Dept: REHABILITATION | Facility: HOSPITAL | Age: 75
End: 2020-02-20
Attending: PSYCHIATRY & NEUROLOGY
Payer: MEDICARE

## 2020-02-20 DIAGNOSIS — Z74.09 IMPAIRED FUNCTIONAL MOBILITY, BALANCE, GAIT, AND ENDURANCE: Primary | ICD-10-CM

## 2020-02-20 PROCEDURE — 97110 THERAPEUTIC EXERCISES: CPT | Mod: PO

## 2020-02-20 PROCEDURE — 97112 NEUROMUSCULAR REEDUCATION: CPT | Mod: PO

## 2020-02-20 NOTE — PLAN OF CARE
"  Physical Therapy Daily Treatment Note     Name: Annmarie Gallardo  Clinic Number: [id number]    Therapy Diagnosis:   Encounter Diagnosis   Name Primary?    Impaired functional mobility, balance, gait, and endurance Yes     Physician: Bharat Ponce MD    Visit Date: 2/20/2020    Physician Orders: PT Eval and Treat "Big and Loud"  Medical Diagnosis from Referral: Lewy body dementia with behavioral disturbance; parkinson's disease  Evaluation Date: 1/22/2020  Authorization Period Expiration: 1/13/2021  Plan of Care Expiration: 3/22/2020  Visit # / Visits authorized: 5/ 6    Time In: 1100  Time Out: 1145  Total Billable Time: 45 minutes    Precautions: Standard and Fall    Subjective     Pt reports: "This weather and all those people who paid all that money."     She was not compliant with HEP.   Response to previous treatment: first appointment following initial evaluation  Functional change: ongoing    Pain: 0/10  Location: N/A     Objective     Annmarie received therapeutic exercises to develop strength, endurance, ROM, flexibility, posture and core stabilization for 15 minutes including:  X 10 min on Recumbent bike. level 5.0    Lower Extremity Strength  Right LE  eval 2/20 Left LE  eval 2/20   Hip flexion:  4/5 4/5 Hip flexion: 4/5 4+/5   Knee extension: 5/5 5/5 Knee extension: 4/5 5/5   Knee flexion: 4+/5 5/5 Knee flexion: 4/5 5/5   Ankle dorsiflexion:  5/5 5/5 Ankle dorsiflexion: 5/5 5/5   Ankle plantarflexion:  4/5 5/5 Ankle plantarflexion: 4/5 5/5   Hip abduction: 4/5 5/5 Hip abduction: 4/5 5/5   Hip adduction: 4/5 5/5 Hip adduction 4/5 5/5   Hip extension: 4-/5 4/5 Hip extension: 4-/5 4/5       Annmarie participated in neuromuscular re-education activities to improve: Balance, Coordination, Kinesthetic, Sense, Proprioception and Posture for 30 minutes. The following activities were included:       Evaluation 2/20/2020   Single Limb Stance R LE 7 sec  (<10 sec = HIGH FALL RISK) 10 s   Single Limb Stance L LE " "15 sec  (<10 sec = HIGH FALL RISK) 14 s   30 second Chair Rise 13 completed with no arms (occaisional cues to stand tall) 13 completed with no UE support   TUG 9.2 seconds 7.3 s   Self selected walking speed 1.25 m/sec (6m/4.8s) 1.22 m/s (6m/4.9s)   Fast walking speed 1.5 m/sec (6m/4.0s) 1.3 m/s (6m/4.6s)   [facility] 26/30 27/30      [place] SENSORY TESTING:  (P= Pass, F= Fail; note any sway; hold each position for 30")  Condition 1: (firm surface/feet together/eyes open) P  Condition 2: (firm surface/feet together/eyes closed) P  Condition 3: (firm surface/feet in tandem/eyes open) P  Condition 4: (firm surface/feet in tandem/eyes closed) F; 10 sec  Condition 5: (soft surface/feet together/eyes open) P  Condition 6: (soft surface/feet together/eyes closed) P  Condition 7: (Fakuda step test), measure distance varied from center starting position NT    Functional Gait Assessment:   1. Gait on level surface =  3   (3) Normal: less than 5.5 sec, no A.D., no imbalance, normal gait pattern, deviates< 6in   (2) Mild impairment: 7-5.6 sec, uses A.D., mild gait deviations, or deviates 6-10 in   (1) Moderate impairment: > 7 sec, slow speed, imbalance, deviates 10-15 in.   (0) Severe impairment: needs assist, deviates >15 in, reach/touch wall  2. Change in Gait Speed = 3   (3) Normal: smooth change w/o loss of balance or gait deviation, deviates < 6 in, significant difference between speeds   (2) Mild impairment: changes speed, but demonstrates mild gait deviations, deviates 6-10 in, OR no deviations but unable to significantly speed, OR uses A.D.   (1) Moderate impairment: minor changes to speed, OR changes speed w/ significant deviations, deviates 10-15 in, OR  Changes speed , but loses balance & recovers   (0) Severe impairment: cannot change speed, deviates >15 in, or loses balance & needs assist  3. Gait with horizontal head turns  = 3   (3) Normal: no change in gait, deviates <6 in   (2) Mild impairment: slight change in speed, " deviates 6-10 in, OR uses A.D.   (1) Moderate impairment: moderate change in speed, deviates 10-15 in   (0) Severe impairment: severe disruption of gait, deviates >15in  4. Gait with vertical head turns = 3   (3) Normal: no change in gait, deviates <6 in   (2) Mild impairment: slight change in speed, deviates 6-10 in OR uses A.D.   (1) Moderate impairment: moderate change in speed, deviates 10-15 in   (0) Severe impairment: severe disruption of gait, deviates >15 in  5. Gait with pivot turns = 3   (3) Normal: performs safely in 3 sec, no LOB   (2) Mild impairment: performs in >3 sec & no LOB, OR turns safely & requires several steps to regain LOB   (1) Moderate impairment: turns slow, OR requires several small steps for balance following turn & stop   (0) Severe impairment: cannot turn safely, needs assist  6. Step over obstacle = 3   (3) Normal: steps over 2 stacked boxes w/o change in speed or LOB   (2) Mild impairment: able to step over 1 box w/o change in speed or LOB   (1) Moderate impairment: steps over 1 box but must slow down, may require VC   (0) Severe impairment: cannot perform w/o assist  7. Gait with Narrow EDGARDO = 2   (3) Normal: 10 steps no staggering   (2) Mild impairment: 7-9 steps   (1) Moderate impairment: 4-7 steps   (0) Severe impairment: < 4 steps or cannot perform w/o assist  8. Gait with eyes closed = 2   (3) Normal: < 7 sec, no A.D., no LOB, normal gait pattern, deviates <6 in   (2) Mild impairment: 7.1-9 sec, mild gait deviations, deviates 6-10 in   (1) Moderate impairment: > 9 sec, abnormal pattern, LOB, deviates 10-15 in   (0) Severe impairment: cannot perform w/o assist, LOB, deviates >15in  9. Ambulating Backwards = 2   (3) Normal: no A.D., no LOB, normal gait pattern, deviates <6in   (2) Mild impairment: uses A.D., slower speed, mild gait deviations, deviates 6-10 in   (1) Moderate impairment: slow speed, abnormal gait pattern, LOB, deviates 10-15 in   (0) Severe impairment: severe gait  deviations or LOB, deviates >15in  10. Steps = 3   (3) Normal: alternating feet, no rail   (2) Mild Impairment: alternating feet, uses rail   (1) Moderate impairment: step-to, uses rail   (0) Severe impairment: cannot perform safely    Score 27/30     Score:   <22/30 fall risk   <20/30 fall risk in older adults   <18/30 fall risk in Parkinsons     Annmarie participated in dynamic functional therapeutic activities to improve functional performance for 0  minutes, including:      Annmarie participated in gait training to improve functional mobility and safety for 0  minutes, including:        Home Exercises Provided and Patient Education Provided     Education provided:   HEP    Written Home Exercises Provided: yes.  Exercises were reviewed and Annmarie was able to demonstrate them prior to the end of the session.  Annmarie demonstrated fair  understanding of the education provided.     See EMR under Patient Instructions for exercises provided 1/30/2020.    Assessment     Annmarie demonstrates minimal progress during her first month of skilled PT services. She was able to improve FGA and [place] sensory testing but overall remains stable from the start of care. Pt's comorbidity of dementia is a serious limitation of her care as she has little carry over between sessions. That being said, pt is a safe ambulator and overall has good balance. Pt to continue with skilled PT services through currently established POC in hopes of achieving established goals and assisting in establishing a home program.     Annmarie is progressing well towards her goals.   Pt prognosis is Good.     Pt will continue to benefit from skilled outpatient physical therapy to address the deficits listed in the problem list box on initial evaluation, provide pt/family education and to maximize pt's level of independence in the home and community environment.     Pt's spiritual, cultural and educational needs considered and pt agreeable to plan of care and  "goals.     Anticipated barriers to physical therapy: progressive nature of PD and dementia, poor carryover due to dementia    Goals:  Short Term Goals = Long Term Goals: 4 weeks   1. Pt will improve SLS time to 20 seconds bilaterally in order to decrease risk for fall. ongoing  2. Pt will be able to demonstrate proper completion of LSVT Big maximal daily exercises in order to continue "big" movements without difficulty. Ongoing, improving  3. Pt will improve score on FGA to at least 29/30 in order to demonstrate improving balance. Ongoing, improving  4. Pt will improve B LE strength to at least 4+/5 in order to demonstrate improved strength. Ongoing, improving  5. Pt will improve condition 6 on [place] sensory testing to at least 15 sec in order to demonstrate improved balance with vision eliminated such as when in the shower. Met 2/20/2020       Plan     Work on SLS, endurance, stretching, balance, and increasing amplitude.     Luis Field, PT     "

## 2020-02-20 NOTE — PROGRESS NOTES
"  Physical Therapy Daily Treatment Note     Name: Annmarie Gallardo  Clinic Number: [id number]    Therapy Diagnosis:   Encounter Diagnosis   Name Primary?    Impaired functional mobility, balance, gait, and endurance Yes     Physician: Bharat Ponce MD    Visit Date: 2/20/2020    Physician Orders: PT Eval and Treat "Big and Loud"  Medical Diagnosis from Referral: Lewy body dementia with behavioral disturbance; parkinson's disease  Evaluation Date: 1/22/2020  Authorization Period Expiration: 1/13/2021  Plan of Care Expiration: 3/22/2020  Visit # / Visits authorized: 5/ 6    Time In: 1100  Time Out: 1145  Total Billable Time: 45 minutes    Precautions: Standard and Fall    Subjective     Pt reports: "This weather and all those people who paid all that money."     She was not compliant with HEP.   Response to previous treatment: first appointment following initial evaluation  Functional change: ongoing    Pain: 0/10  Location: N/A     Objective     Annmarie received therapeutic exercises to develop strength, endurance, ROM, flexibility, posture and core stabilization for 15 minutes including:  X 10 min on Recumbent bike. level 5.0    Lower Extremity Strength  Right LE  eval 2/20 Left LE  eval 2/20   Hip flexion:  4/5 4/5 Hip flexion: 4/5 4+/5   Knee extension: 5/5 5/5 Knee extension: 4/5 5/5   Knee flexion: 4+/5 5/5 Knee flexion: 4/5 5/5   Ankle dorsiflexion:  5/5 5/5 Ankle dorsiflexion: 5/5 5/5   Ankle plantarflexion:  4/5 5/5 Ankle plantarflexion: 4/5 5/5   Hip abduction: 4/5 5/5 Hip abduction: 4/5 5/5   Hip adduction: 4/5 5/5 Hip adduction 4/5 5/5   Hip extension: 4-/5 4/5 Hip extension: 4-/5 4/5       Annmarie participated in neuromuscular re-education activities to improve: Balance, Coordination, Kinesthetic, Sense, Proprioception and Posture for 30 minutes. The following activities were included:       Evaluation 2/20/2020   Single Limb Stance R LE 7 sec  (<10 sec = HIGH FALL RISK) 10 s   Single Limb Stance L LE " "15 sec  (<10 sec = HIGH FALL RISK) 14 s   30 second Chair Rise 13 completed with no arms (occaisional cues to stand tall) 13 completed with no UE support   TUG 9.2 seconds 7.3 s   Self selected walking speed 1.25 m/sec (6m/4.8s) 1.22 m/s (6m/4.9s)   Fast walking speed 1.5 m/sec (6m/4.0s) 1.3 m/s (6m/4.6s)   [facility] 26/30 27/30      [place] SENSORY TESTING:  (P= Pass, F= Fail; note any sway; hold each position for 30")  Condition 1: (firm surface/feet together/eyes open) P  Condition 2: (firm surface/feet together/eyes closed) P  Condition 3: (firm surface/feet in tandem/eyes open) P  Condition 4: (firm surface/feet in tandem/eyes closed) F; 10 sec  Condition 5: (soft surface/feet together/eyes open) P  Condition 6: (soft surface/feet together/eyes closed) P  Condition 7: (Fakuda step test), measure distance varied from center starting position NT    Functional Gait Assessment:   1. Gait on level surface =  3   (3) Normal: less than 5.5 sec, no A.D., no imbalance, normal gait pattern, deviates< 6in   (2) Mild impairment: 7-5.6 sec, uses A.D., mild gait deviations, or deviates 6-10 in   (1) Moderate impairment: > 7 sec, slow speed, imbalance, deviates 10-15 in.   (0) Severe impairment: needs assist, deviates >15 in, reach/touch wall  2. Change in Gait Speed = 3   (3) Normal: smooth change w/o loss of balance or gait deviation, deviates < 6 in, significant difference between speeds   (2) Mild impairment: changes speed, but demonstrates mild gait deviations, deviates 6-10 in, OR no deviations but unable to significantly speed, OR uses A.D.   (1) Moderate impairment: minor changes to speed, OR changes speed w/ significant deviations, deviates 10-15 in, OR  Changes speed , but loses balance & recovers   (0) Severe impairment: cannot change speed, deviates >15 in, or loses balance & needs assist  3. Gait with horizontal head turns  = 3   (3) Normal: no change in gait, deviates <6 in   (2) Mild impairment: slight change in speed, " deviates 6-10 in, OR uses A.D.   (1) Moderate impairment: moderate change in speed, deviates 10-15 in   (0) Severe impairment: severe disruption of gait, deviates >15in  4. Gait with vertical head turns = 3   (3) Normal: no change in gait, deviates <6 in   (2) Mild impairment: slight change in speed, deviates 6-10 in OR uses A.D.   (1) Moderate impairment: moderate change in speed, deviates 10-15 in   (0) Severe impairment: severe disruption of gait, deviates >15 in  5. Gait with pivot turns = 3   (3) Normal: performs safely in 3 sec, no LOB   (2) Mild impairment: performs in >3 sec & no LOB, OR turns safely & requires several steps to regain LOB   (1) Moderate impairment: turns slow, OR requires several small steps for balance following turn & stop   (0) Severe impairment: cannot turn safely, needs assist  6. Step over obstacle = 3   (3) Normal: steps over 2 stacked boxes w/o change in speed or LOB   (2) Mild impairment: able to step over 1 box w/o change in speed or LOB   (1) Moderate impairment: steps over 1 box but must slow down, may require VC   (0) Severe impairment: cannot perform w/o assist  7. Gait with Narrow EDGARDO = 2   (3) Normal: 10 steps no staggering   (2) Mild impairment: 7-9 steps   (1) Moderate impairment: 4-7 steps   (0) Severe impairment: < 4 steps or cannot perform w/o assist  8. Gait with eyes closed = 2   (3) Normal: < 7 sec, no A.D., no LOB, normal gait pattern, deviates <6 in   (2) Mild impairment: 7.1-9 sec, mild gait deviations, deviates 6-10 in   (1) Moderate impairment: > 9 sec, abnormal pattern, LOB, deviates 10-15 in   (0) Severe impairment: cannot perform w/o assist, LOB, deviates >15in  9. Ambulating Backwards = 2   (3) Normal: no A.D., no LOB, normal gait pattern, deviates <6in   (2) Mild impairment: uses A.D., slower speed, mild gait deviations, deviates 6-10 in   (1) Moderate impairment: slow speed, abnormal gait pattern, LOB, deviates 10-15 in   (0) Severe impairment: severe gait  deviations or LOB, deviates >15in  10. Steps = 3   (3) Normal: alternating feet, no rail   (2) Mild Impairment: alternating feet, uses rail   (1) Moderate impairment: step-to, uses rail   (0) Severe impairment: cannot perform safely    Score 27/30     Score:   <22/30 fall risk   <20/30 fall risk in older adults   <18/30 fall risk in Parkinsons     Annmarie participated in dynamic functional therapeutic activities to improve functional performance for 0  minutes, including:      Annmarie participated in gait training to improve functional mobility and safety for 0  minutes, including:        Home Exercises Provided and Patient Education Provided     Education provided:   HEP    Written Home Exercises Provided: yes.  Exercises were reviewed and Annmarie was able to demonstrate them prior to the end of the session.  Annmarie demonstrated fair  understanding of the education provided.     See EMR under Patient Instructions for exercises provided 1/30/2020.    Assessment     Annmarie demonstrates minimal progress during her first month of skilled PT services. She was able to improve FGA and [place] sensory testing but overall remains stable from the start of care. Pt's comorbidity of dementia is a serious limitation of her care as she has little carry over between sessions. That being said, pt is a safe ambulator and overall has good balance. Pt to continue with skilled PT services through currently established POC in hopes of achieving established goals and assisting in establishing a home program.     Annmarie is progressing well towards her goals.   Pt prognosis is Good.     Pt will continue to benefit from skilled outpatient physical therapy to address the deficits listed in the problem list box on initial evaluation, provide pt/family education and to maximize pt's level of independence in the home and community environment.     Pt's spiritual, cultural and educational needs considered and pt agreeable to plan of care and  "goals.     Anticipated barriers to physical therapy: progressive nature of PD and dementia, poor carryover due to dementia    Goals:  Short Term Goals = Long Term Goals: 4 weeks   1. Pt will improve SLS time to 20 seconds bilaterally in order to decrease risk for fall. ongoing  2. Pt will be able to demonstrate proper completion of LSVT Big maximal daily exercises in order to continue "big" movements without difficulty. Ongoing, improving  3. Pt will improve score on FGA to at least 29/30 in order to demonstrate improving balance. Ongoing, improving  4. Pt will improve B LE strength to at least 4+/5 in order to demonstrate improved strength. Ongoing, improving  5. Pt will improve condition 6 on [place] sensory testing to at least 15 sec in order to demonstrate improved balance with vision eliminated such as when in the shower. Met 2/20/2020       Plan     Work on SLS, endurance, stretching, balance, and increasing amplitude.     Luis Field, PT   "

## 2020-02-26 NOTE — PROGRESS NOTES
"  Physical Therapy Daily Treatment Note     Name: Annmarie SADLER [facility] Number: [id number]    Therapy Diagnosis:   Encounter Diagnosis   Name Primary?    Impaired functional mobility, balance, gait, and endurance Yes     Physician: Bharat Ponce MD    Visit Date: 2/27/2020    Physician Orders: PT Eval and Treat "Big and Loud"  Medical Diagnosis from Referral: Lewy body dementia with behavioral disturbance; parkinson's disease  Evaluation Date: 1/22/2020  Authorization Period Expiration: 1/13/2021  Plan of Care Expiration: 3/22/2020  Visit # / Visits authorized: 6/ 6    Time In: 1107 (pt arrives late, PT unable to accommodate)   Time Out: 1145  Total Billable Time: 38 minutes    Precautions: Standard and Fall    Subjective     Pt reports: "I was at the therapist before this, sorry I am a few minutes late."     She was not compliant with HEP.   Response to previous treatment: first appointment following initial evaluation  Functional change: ongoing    Pain: 0/10  Location: N/A     Objective     Annmarie received therapeutic exercises to develop strength, endurance, ROM, flexibility, posture and core stabilization for 10 minutes including:  X 10 min on Recumbent stepper, level 2.5    Annmarie participated in neuromuscular re-education activities to improve: Balance, Coordination, Kinesthetic, Sense, Proprioception and Posture for 28 minutes. The following activities were included:    Pilates box, white springs on lv 2 and black springs on lv 1, 2 x 10 ea LE with UE support    x20 reps of tapping lg cone, pt struggles with instructions for this activity, she requires step by step instruction to complete  2 x 30 sec modified SLS with opposing foot on lg cone, more difficulty when standing on R LE    Exercises for increased amplitude in standing: (8 reps ea side)  1. Forward reach and step with B arms fwd  2. Side step and reach with B arms extended  3. Backwards step and reach with B arms extended  4. Rock and " reach fwd, greatly struggles to follow activity  5. Sideways reach alternating sides, greatly struggles to follow activity    2 lengths of parallel bars, tandem ambulation, no UE support, SBA      Annmarie participated in dynamic functional therapeutic activities to improve functional performance for 0  minutes, including:      Annmarie participated in gait training to improve functional mobility and safety for 0  minutes, including:        Home Exercises Provided and Patient Education Provided     Education provided:   HEP    Written Home Exercises Provided: yes.  Exercises were reviewed and Annmarie was able to demonstrate them prior to the end of the session.  Annmarie demonstrated fair  understanding of the education provided.     See EMR under Patient Instructions for exercises provided 1/30/2020.    Assessment     Annmarie tolerated session well though she did demonstrate increased confusion today. She required increased time to understand what was being asked of her, but once she had an understanding was able to complete with CGA only. Pt does not take verbal cues when performing exercises well as she gets confused easily. She remains appropriate for skilled PT services.     nAnmarie is progressing well towards her goals.   Pt prognosis is Good.     Pt will continue to benefit from skilled outpatient physical therapy to address the deficits listed in the problem list box on initial evaluation, provide pt/family education and to maximize pt's level of independence in the home and community environment.     Pt's spiritual, cultural and educational needs considered and pt agreeable to plan of care and goals.     Anticipated barriers to physical therapy: progressive nature of PD and dementia, poor carryover due to dementia    Goals:  Short Term Goals = Long Term Goals: 4 weeks   1. Pt will improve SLS time to 20 seconds bilaterally in order to decrease risk for fall. ongoing  2. Pt will be able to demonstrate proper  "completion of LSVT Big maximal daily exercises in order to continue "big" movements without difficulty. Ongoing, improving  3. Pt will improve score on FGA to at least 29/30 in order to demonstrate improving balance. Ongoing, improving  4. Pt will improve B LE strength to at least 4+/5 in order to demonstrate improved strength. Ongoing, improving  5. Pt will improve condition 6 on [place] sensory testing to at least 15 sec in order to demonstrate improved balance with vision eliminated such as when in the shower. Met 2/20/2020       Plan     Work on SLS, endurance, stretching, balance, and increasing amplitude.     Luis Field, PT   "

## 2020-02-27 ENCOUNTER — CLINICAL SUPPORT (OUTPATIENT)
Dept: REHABILITATION | Facility: HOSPITAL | Age: 75
End: 2020-02-27
Attending: PSYCHIATRY & NEUROLOGY
Payer: MEDICARE

## 2020-02-27 DIAGNOSIS — Z74.09 IMPAIRED FUNCTIONAL MOBILITY, BALANCE, GAIT, AND ENDURANCE: Primary | ICD-10-CM

## 2020-02-27 PROCEDURE — 97112 NEUROMUSCULAR REEDUCATION: CPT | Mod: PO

## 2020-02-27 PROCEDURE — 97110 THERAPEUTIC EXERCISES: CPT | Mod: PO

## 2020-03-02 NOTE — PROGRESS NOTES
"  Physical Therapy Daily Treatment Note     Name: Annmarie SADLER [facility] Number: [id number]    Therapy Diagnosis:   Encounter Diagnosis   Name Primary?    Impaired functional mobility, balance, gait, and endurance Yes     Physician: Bharat Ponce MD    Visit Date: 3/3/2020    Physician Orders: PT Eval and Treat "Big and Loud"  Medical Diagnosis from Referral: Lewy body dementia with behavioral disturbance; parkinson's disease  Evaluation Date: 1/22/2020  Authorization Period Expiration: 3/19/2020  Plan of Care Expiration: 3/22/2020  Visit # / Visits authorized: 4/12    Time In: 1145   Time Out: 1230  Total Billable Time: 45 minutes    Precautions: Standard and Fall    Subjective     Pt reports: "Someone told me that when you have Parkinson's you always have a runny nose. Can I have a tissue?"     She was not compliant with HEP.   Response to previous treatment: first appointment following initial evaluation  Functional change: ongoing    Pain: 0/10  Location: N/A     Objective     Annmarie received therapeutic exercises to develop strength, endurance, ROM, flexibility, posture and core stabilization for 10 minutes including:  X 10 min on Recumbent stepper, level 3.0, pt requires cues to continue with large amplitude movements, screen turns off once    Annmarie participated in neuromuscular re-education activities to improve: Balance, Coordination, Kinesthetic, Sense, Proprioception and Posture for 35 minutes. The following activities were included:  4 laps fwd walking 1 LE on each therapad including 2 blue, 2 green, and 1 black therapad  4 laps side stepping on 5 therapads as above described colors, pt struggles with this task and requires step by step instruction  x10 reps of resisted walking with white sports cord, CGA-Mamadou, requires a visual cue of how far out to walk  x10 reps sit to stand from EOM with 4 pt fitter under B LE  Pilates box, white springs on lv 2 and black springs on lv 1, 2 x 10 ea LE with UE " "support    Annmarie participated in dynamic functional therapeutic activities to improve functional performance for 0  minutes, including:      Annmarie participated in gait training to improve functional mobility and safety for 0  minutes, including:        Home Exercises Provided and Patient Education Provided     Education provided:   HEP    Written Home Exercises Provided: yes.  Exercises were reviewed and Annmarie was able to demonstrate them prior to the end of the session.  Annmarie demonstrated fair  understanding of the education provided.     See EMR under Patient Instructions for exercises provided 1/30/2020.    Assessment     Annmarie tolerated session fairly. She continues to be greatly limited by her dementia and understanding of the tasks at hand. She continually get easily confused and requires step by step instructions on both therapad activities for balance today. PT progress limited by this dementia. She remains appropriate for skilled PT services.     Annmarie is progressing well towards her goals.   Pt prognosis is Good.     Pt will continue to benefit from skilled outpatient physical therapy to address the deficits listed in the problem list box on initial evaluation, provide pt/family education and to maximize pt's level of independence in the home and community environment.     Pt's spiritual, cultural and educational needs considered and pt agreeable to plan of care and goals.     Anticipated barriers to physical therapy: progressive nature of PD and dementia, poor carryover due to dementia    Goals:  Short Term Goals = Long Term Goals: 4 weeks   1. Pt will improve SLS time to 20 seconds bilaterally in order to decrease risk for fall. ongoing  2. Pt will be able to demonstrate proper completion of LSVT Big maximal daily exercises in order to continue "big" movements without difficulty. Ongoing, improving  3. Pt will improve score on FGA to at least 29/30 in order to demonstrate improving balance. " Ongoing, improving  4. Pt will improve B LE strength to at least 4+/5 in order to demonstrate improved strength. Ongoing, improving  5. Pt will improve condition 6 on [place] sensory testing to at least 15 sec in order to demonstrate improved balance with vision eliminated such as when in the shower. Met 2/20/2020       Plan     Work on SLS, endurance, stretching, balance, and increasing amplitude.     Luis Field, PT

## 2020-03-03 ENCOUNTER — CLINICAL SUPPORT (OUTPATIENT)
Dept: REHABILITATION | Facility: HOSPITAL | Age: 75
End: 2020-03-03
Attending: PSYCHIATRY & NEUROLOGY
Payer: MEDICARE

## 2020-03-03 DIAGNOSIS — Z74.09 IMPAIRED FUNCTIONAL MOBILITY, BALANCE, GAIT, AND ENDURANCE: Primary | ICD-10-CM

## 2020-03-03 PROCEDURE — 97110 THERAPEUTIC EXERCISES: CPT | Mod: PO

## 2020-03-03 PROCEDURE — 97112 NEUROMUSCULAR REEDUCATION: CPT | Mod: PO

## 2020-03-05 ENCOUNTER — CLINICAL SUPPORT (OUTPATIENT)
Dept: REHABILITATION | Facility: HOSPITAL | Age: 75
End: 2020-03-05
Attending: PSYCHIATRY & NEUROLOGY
Payer: MEDICARE

## 2020-03-05 DIAGNOSIS — Z74.09 IMPAIRED FUNCTIONAL MOBILITY, BALANCE, GAIT, AND ENDURANCE: ICD-10-CM

## 2020-03-05 PROCEDURE — 97112 NEUROMUSCULAR REEDUCATION: CPT | Mod: PO,CQ

## 2020-03-05 NOTE — PROGRESS NOTES
"  Physical Therapy Daily Treatment Note     Name: Annmarie SADLER [facility] Number: [id number]    Therapy Diagnosis:   Encounter Diagnosis   Name Primary?    Impaired functional mobility, balance, gait, and endurance      Physician: Bharat Ponce MD    Visit Date: 3/5/2020    Physician Orders: PT Eval and Treat "Big and Loud"  Medical Diagnosis from Referral: Lewy body dementia with behavioral disturbance; parkinson's disease  Evaluation Date: 1/22/2020  Authorization Period Expiration: 3/19/2020  Plan of Care Expiration: 3/22/2020  Visit # / Visits authorized: 5/12    Time In: 1015   Time Out: 1045  Total Billable Time: 30 minutes    Precautions: Standard and Fall    Subjective     Pt reports: she has to leave 15 min early to get to another appointment today.     She was not compliant with HEP.   Response to previous treatment: first appointment following initial evaluation  Functional change: ongoing    Pain: 0/10  Location: N/A     Objective     Annmarie received therapeutic exercises to develop strength, endurance, ROM, flexibility, posture and core stabilization for 0  minutes including:    X 10 min on Recumbent stepper, level 3.0, pt requires cues to continue with large amplitude movements, screen turns off once      Annmarie participated in neuromuscular re-education activities to improve: Balance, Coordination, Kinesthetic, Sense, Proprioception and Posture for 30 minutes. The following activities were included:    Tandem walking x 3 laps   Rocker board fwd/bwd/lateral--- difficulty maintaining feet placement 2 x 30 sec each direction  Cone tapping x 20 reps to increase hip flexion   Airex lateral steps x 2 laps   Step ups bwd x 20 reps     Annmarie participated in dynamic functional therapeutic activities to improve functional performance for 0  minutes, including:      Annmarie participated in gait training to improve functional mobility and safety for 0  minutes, including:        Home Exercises Provided and " "Patient Education Provided     Education provided:   HEP    Written Home Exercises Provided: yes.  Exercises were reviewed and Annmarie was able to demonstrate them prior to the end of the session.  Annmarie demonstrated fair  understanding of the education provided.     See EMR under Patient Instructions for exercises provided 1/30/2020.    Assessment     Annmarie tolerated session with needing to leave 15 min early due to having another appointment to get to. Today's session was focused on balance and gait speed due to limit of time. Annmarie requires consistent cueing during balance exercises and gait to increase stride length.     Annmarie is progressing well towards her goals.   Pt prognosis is Good.     Pt will continue to benefit from skilled outpatient physical therapy to address the deficits listed in the problem list box on initial evaluation, provide pt/family education and to maximize pt's level of independence in the home and community environment.     Pt's spiritual, cultural and educational needs considered and pt agreeable to plan of care and goals.     Anticipated barriers to physical therapy: progressive nature of PD and dementia, poor carryover due to dementia    Goals:  Short Term Goals = Long Term Goals: 4 weeks   1. Pt will improve SLS time to 20 seconds bilaterally in order to decrease risk for fall. ongoing  2. Pt will be able to demonstrate proper completion of LSVT Big maximal daily exercises in order to continue "big" movements without difficulty. Ongoing, improving  3. Pt will improve score on FGA to at least 29/30 in order to demonstrate improving balance. Ongoing, improving  4. Pt will improve B LE strength to at least 4+/5 in order to demonstrate improved strength. Ongoing, improving  5. Pt will improve condition 6 on [place] sensory testing to at least 15 sec in order to demonstrate improved balance with vision eliminated such as when in the shower. Met 2/20/2020       Plan     Work on SLS, " endurance, stretching, balance, and increasing amplitude.     Jennifer Quick, PTA

## 2020-03-06 ENCOUNTER — DOCUMENTATION ONLY (OUTPATIENT)
Dept: REHABILITATION | Facility: HOSPITAL | Age: 75
End: 2020-03-06

## 2020-03-06 NOTE — PROGRESS NOTES
PT/PTA met face to face to discuss pt's treatment plan and progress towards established goals.  Continue with current PT POC with focus on balance.  Patient will be seen by physical therapist at least every sixth treatment or 30 days, whichever occurs first.    Jennifer Quick, PTA  03/06/2020

## 2020-03-10 ENCOUNTER — CLINICAL SUPPORT (OUTPATIENT)
Dept: REHABILITATION | Facility: HOSPITAL | Age: 75
End: 2020-03-10
Attending: PSYCHIATRY & NEUROLOGY
Payer: MEDICARE

## 2020-03-10 DIAGNOSIS — Z74.09 IMPAIRED FUNCTIONAL MOBILITY, BALANCE, GAIT, AND ENDURANCE: Primary | ICD-10-CM

## 2020-03-10 PROCEDURE — 97110 THERAPEUTIC EXERCISES: CPT | Mod: PO

## 2020-03-10 PROCEDURE — 97112 NEUROMUSCULAR REEDUCATION: CPT | Mod: PO

## 2020-03-10 NOTE — PROGRESS NOTES
"  Physical Therapy Daily Treatment Note     Name: Annmarie SADLER [facility] Number: [id number]    Therapy Diagnosis:   Encounter Diagnosis   Name Primary?    Impaired functional mobility, balance, gait, and endurance Yes     Physician: Bharat Ponce MD    Visit Date: 3/10/2020    Physician Orders: PT Eval and Treat "Big and Loud"  Medical Diagnosis from Referral: Lewy body dementia with behavioral disturbance; parkinson's disease  Evaluation Date: 1/22/2020  Authorization Period Expiration: 3/19/2020  Plan of Care Expiration: 3/22/2020  Visit # / Visits authorized: 6/12    Time In: 1100  Time Out: 1145  Total Billable Time: 45 minutes    Precautions: Standard and Fall    Subjective     Pt reports: She is doing well today, nothing new to report.      She was not compliant with HEP.   Response to previous treatment: first appointment following initial evaluation  Functional change: ongoing    Pain: 0/10  Location: N/A     Objective     Annmarie received therapeutic exercises to develop strength, endurance, ROM, flexibility, posture and core stabilization for 10  minutes including:    X 10 min on Recumbent stepper, level 3.0    Annmarie participated in neuromuscular re-education activities to improve: Balance, Coordination, Kinesthetic, Sense, Proprioception and Posture for 35 minutes. The following activities were included:    Tandem walking x 3 laps, no UE support, CGA and cues for completion  Rocker board A/P and L/R --- difficulty maintaining feet placement 2 x 30 sec each direction, pt very hesitant to release UEs from // bars, Mamadou  Cone tapping x 20 reps ea LE, CGA to Mamadou, no UE support  Airex lateral steps x10 ea direction, CGA and mod cues  Step up onto airex, x10 reps, cues for competion, no UE support, CGA  2 x 30 sec modified SLS with opposing foot on bosu, occ UE support, CGA    Annmarie participated in dynamic functional therapeutic activities to improve functional performance for 0  minutes, " "including:      Annmarie participated in gait training to improve functional mobility and safety for 0  minutes, including:        Home Exercises Provided and Patient Education Provided     Education provided:   HEP    Written Home Exercises Provided: yes.  Exercises were reviewed and Annmarie was able to demonstrate them prior to the end of the session.  Annmarie demonstrated fair  understanding of the education provided.     See EMR under Patient Instructions for exercises provided 1/30/2020.    Assessment     Annmarie tolerated session well today. She continues to require nearly consistent cueing during balance exercises and this decreased cognition continues to limit her progress in skilled PT services. She continues to be appropriate for skilled PT services.     Annmarie is progressing well towards her goals.   Pt prognosis is Good.     Pt will continue to benefit from skilled outpatient physical therapy to address the deficits listed in the problem list box on initial evaluation, provide pt/family education and to maximize pt's level of independence in the home and community environment.     Pt's spiritual, cultural and educational needs considered and pt agreeable to plan of care and goals.     Anticipated barriers to physical therapy: progressive nature of PD and dementia, poor carryover due to dementia    Goals:  Short Term Goals = Long Term Goals: 4 weeks   1. Pt will improve SLS time to 20 seconds bilaterally in order to decrease risk for fall. ongoing  2. Pt will be able to demonstrate proper completion of LSVT Big maximal daily exercises in order to continue "big" movements without difficulty. Ongoing, improving  3. Pt will improve score on FGA to at least 29/30 in order to demonstrate improving balance. Ongoing, improving  4. Pt will improve B LE strength to at least 4+/5 in order to demonstrate improved strength. Ongoing, improving  5. Pt will improve condition 6 on [place] sensory testing to at least 15 sec in " order to demonstrate improved balance with vision eliminated such as when in the shower. Met 2/20/2020       Plan     Work on SLS, endurance, stretching, balance, and increasing amplitude.     Luis Field, PT

## 2020-03-12 ENCOUNTER — CLINICAL SUPPORT (OUTPATIENT)
Dept: REHABILITATION | Facility: HOSPITAL | Age: 75
End: 2020-03-12
Attending: PSYCHIATRY & NEUROLOGY
Payer: MEDICARE

## 2020-03-12 DIAGNOSIS — Z74.09 IMPAIRED FUNCTIONAL MOBILITY, BALANCE, GAIT, AND ENDURANCE: Primary | ICD-10-CM

## 2020-03-12 PROCEDURE — 97112 NEUROMUSCULAR REEDUCATION: CPT | Mod: PO

## 2020-03-12 PROCEDURE — 97110 THERAPEUTIC EXERCISES: CPT | Mod: PO

## 2020-03-12 NOTE — PROGRESS NOTES
"  Physical Therapy Daily Treatment Note     Name: Annmarie SADLER [facility] Number: [id number]    Therapy Diagnosis:   Encounter Diagnosis   Name Primary?    Impaired functional mobility, balance, gait, and endurance Yes     Physician: Bharat Ponce MD    Visit Date: 3/12/2020    Physician Orders: PT Eval and Treat "Big and Loud"  Medical Diagnosis from Referral: Lewy body dementia with behavioral disturbance; parkinson's disease  Evaluation Date: 1/22/2020  Authorization Period Expiration: 3/19/2020  Plan of Care Expiration: 3/22/2020  Visit # / Visits authorized: 7/12    Time In: 1100  Time Out: 1145  Total Billable Time: 45 minutes    Precautions: Standard and Fall    Subjective     Pt reports: She is doing well today. Her knees are bothering her today, reports that it is arthritis and not to worry about it.    She was not compliant with HEP.   Response to previous treatment: first appointment following initial evaluation  Functional change: ongoing    Pain: 4/10  Location: B knees     Objective     Annmarie received therapeutic exercises to develop strength, endurance, ROM, flexibility, posture and core stabilization for 10  minutes including:    X 10 min on Recumbent stepper, level 3.0    Annmarie participated in neuromuscular re-education activities to improve: Balance, Coordination, Kinesthetic, Sense, Proprioception and Posture for 35 minutes. The following activities were included:    2 airex foam:  x20 chest press, CGA, 2.2 lb ball  x20 shoulder flexion, 2.2 lb ball   x10 ea direction; oblique twists grabbing and catching 2.2 lb ball    Tandem walking, fwd and bwd x 3 laps, no UE support, CGA and cues for completion    x10 sit<>stand from EOM on airex, cues to prevent knees pushing into mat    2 laps ambulation 100' with L/R head turns, cues for completion  1 lap ambulation 100' with vision eliminated, cues for completion  1 lap ambulation 100' with up/down head turns, cues for completion    Annmarie " "participated in dynamic functional therapeutic activities to improve functional performance for 0  minutes, including:      Annmarie participated in gait training to improve functional mobility and safety for 0  minutes, including:        Home Exercises Provided and Patient Education Provided     Education provided:   HEP    Written Home Exercises Provided: yes.  Exercises were reviewed and Annmarie was able to demonstrate them prior to the end of the session.  Annmarie demonstrated fair  understanding of the education provided.     See EMR under Patient Instructions for exercises provided 1/30/2020.    Assessment     Annmarie tolerated session well today. She continues to be most limited by cognition during sessions. Pt does well with new balance tasks today and continues to endorse not completing home program. She continues to be appropriate for skilled PT services.     Annmarie is progressing well towards her goals.   Pt prognosis is Good.     Pt will continue to benefit from skilled outpatient physical therapy to address the deficits listed in the problem list box on initial evaluation, provide pt/family education and to maximize pt's level of independence in the home and community environment.     Pt's spiritual, cultural and educational needs considered and pt agreeable to plan of care and goals.     Anticipated barriers to physical therapy: progressive nature of PD and dementia, poor carryover due to dementia    Goals:  Short Term Goals = Long Term Goals: 4 weeks   1. Pt will improve SLS time to 20 seconds bilaterally in order to decrease risk for fall. ongoing  2. Pt will be able to demonstrate proper completion of LSVT Big maximal daily exercises in order to continue "big" movements without difficulty. Ongoing, improving  3. Pt will improve score on FGA to at least 29/30 in order to demonstrate improving balance. Ongoing, improving  4. Pt will improve B LE strength to at least 4+/5 in order to demonstrate improved " strength. Ongoing, improving  5. Pt will improve condition 6 on [place] sensory testing to at least 15 sec in order to demonstrate improved balance with vision eliminated such as when in the shower. Met 2/20/2020       Plan     Work on SLS, endurance, stretching, balance, and increasing amplitude.     Luis Field, PT

## 2020-03-19 ENCOUNTER — TELEPHONE (OUTPATIENT)
Dept: REHABILITATION | Facility: HOSPITAL | Age: 75
End: 2020-03-19

## 2020-03-19 NOTE — TELEPHONE ENCOUNTER
Postponed Appointments    Patient: Annmarie SADLER [employer]  Date: 3/19/2020  Diagnosis: No diagnosis found.  MRN: [id number]    Spoke with patient's daughter, Yamileth,  concerning [facility] therapy [facility] following updates regarding COVID-19 closely and taking every precaution to ensure the safety of our patients, staff and community. She has already been quarintined in taking her own precautions regarding the virus, but would like continued updates from therapy. All questions answered and patient verbalized understanding to all.    3/19/2020

## 2020-03-31 ENCOUNTER — TELEPHONE (OUTPATIENT)
Dept: REHABILITATION | Facility: HOSPITAL | Age: 75
End: 2020-03-31

## 2020-03-31 NOTE — TELEPHONE ENCOUNTER
Therapy Postponed / COVID-19 pandemic   Patient Check-In Courtesy Call    Courtesy call to check in with patient today to ensure they are doing their home exercise program and to answer any questions. Annmarie Gallardo's daughter stated she had a fall two days ago. Ms. Carson stated she was really concerned that Ms. Anguiano may have fractured a bone but she seems to be doing better. Pt fell due to getting tripped from behind while trying to walk the block with her gradkids. Pt's daughter endorses that they will keep close watch on her mobility and encourage safe ambulation around the house.  No new  exercise recommendations given today.     Luis Field, PT  03/31/2020

## 2020-04-08 ENCOUNTER — TELEPHONE (OUTPATIENT)
Dept: REHABILITATION | Facility: HOSPITAL | Age: 75
End: 2020-04-08

## 2020-04-08 NOTE — TELEPHONE ENCOUNTER
Attempted to call Yamileth, pt's daughter, no answer and unable to leave voicemail at this time due to inbox full. Call was intended to be a weekly courtesy call due to interruption in PT services. Will attempt again next week.     Luis Field, PT

## 2020-04-15 ENCOUNTER — TELEPHONE (OUTPATIENT)
Dept: REHABILITATION | Facility: HOSPITAL | Age: 75
End: 2020-04-15

## 2020-04-15 NOTE — TELEPHONE ENCOUNTER
Attempted to call Yamileth, pt's daughter, for the second week, no answer and unable to leave voicemail at this time due to inbox full. Call was intended to be a weekly courtesy call due to interruption in PT services. Will make final attempt next week.      Luis Field, PT

## 2020-04-22 ENCOUNTER — TELEPHONE (OUTPATIENT)
Dept: REHABILITATION | Facility: HOSPITAL | Age: 75
End: 2020-04-22

## 2020-04-22 NOTE — TELEPHONE ENCOUNTER
Therapy Postponed / COVID-19 pandemic   Patient Check-In Courtesy Call    Courtesy call to check in with patient today to ensure they are doing their home exercise program and to answer any questions. Annmarie Gallardo's daughter, Yamileth, stated Annmarie is stiffening up and getting very docile. Pt is still in pain since her fall, but had an xray and nothing is broken. PT encourages Yamileth to have patient continue to walk and move as this will be best for her mobility. Yamileth inquires about home health, and PT explains that this is a different type of therapy and that virtual visits are not appropriate for Ms. Anguiano at this time. PT will be in touch when it is time to schedule in person visits.      Luis Field, PT  04/22/2020

## 2020-05-05 ENCOUNTER — TELEPHONE (OUTPATIENT)
Dept: NEUROLOGY | Facility: CLINIC | Age: 75
End: 2020-05-05

## 2020-05-07 ENCOUNTER — OFFICE VISIT (OUTPATIENT)
Dept: NEUROLOGY | Facility: CLINIC | Age: 75
End: 2020-05-07
Payer: MEDICARE

## 2020-05-07 DIAGNOSIS — G20.A1 PARKINSON DISEASE: Primary | ICD-10-CM

## 2020-05-07 PROCEDURE — 99442 PR PHYSICIAN TELEPHONE EVALUATION 11-20 MIN: CPT | Mod: 95,,, | Performed by: PSYCHIATRY & NEUROLOGY

## 2020-05-07 PROCEDURE — 99442 PR PHYSICIAN TELEPHONE EVALUATION 11-20 MIN: ICD-10-PCS | Mod: 95,,, | Performed by: PSYCHIATRY & NEUROLOGY
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
## 2020-05-07 NOTE — LETTER
May 12, 2020      Raymond Bess MD  [address]  [facility] [address]           [facility]  [address]  [facility] [address]  Phone: [phone]  Fax: [phone]          Patient: Annmarie Gallardo   MR Number: [id number]   YOB: 1945   Date of Visit: 5/7/2020       Dear Dr. Raymond Bess:    Thank you for referring Annmarie Gallardo to me for evaluation. Attached you will find relevant portions of my assessment and plan of care.    If you have questions, please do not hesitate to call me. I look forward to following Annmarie Gallardo along with you.    Sincerely,    Bharat Ponce MD    Enclosure  CC:  No Recipients    If you would like to receive this communication electronically, please contact [email or web address] or [phone] to request more information on [facility] access.    For providers and/or their staff who would like to refer a patient to [facility], please contact us through our one-stop-shop provider referral line, [facility], at [phone].    If you feel you have received this communication in error or would no longer like to receive these types of communications, please e-mail [email or web address]

## 2020-05-14 ENCOUNTER — TELEPHONE (OUTPATIENT)
Dept: NEUROLOGY | Facility: CLINIC | Age: 75
End: 2020-05-14

## 2020-06-11 ENCOUNTER — TELEPHONE (OUTPATIENT)
Dept: NEUROLOGY | Facility: CLINIC | Age: 75
End: 2020-06-11

## 2020-06-11 NOTE — TELEPHONE ENCOUNTER
----- Message from Pete Lance sent at 6/11/2020  3:23 PM CDT -----  Contact: daughter   Pt daughter says that the medication her mom currently on seems to not work very well for her she's asking  what should she do? Please contact pt daughter         Contact info

## 2020-06-18 ENCOUNTER — TELEPHONE (OUTPATIENT)
Dept: NEUROLOGY | Facility: CLINIC | Age: 75
End: 2020-06-18

## 2020-06-18 NOTE — TELEPHONE ENCOUNTER
"----- Message from Ava Hernandez sent at 6/18/2020  2:27 PM CDT -----  Regarding: Update after Rx change  Contact: Yamileth  Patient Assist    Name of caller:  Daughter   Provider name: Rosario Nicholson MD   Contact Preference:  [phone]  Is this regarding current patient or new patient?: current   What is the nature of the call?    - pts daughter calling to discuss the   carbidopa-levodopa  mg (SINEMET)  mg per tablet  The Rx was increased due to memory changes. The pt is better at one tab,   but they still feel that its not the same, Please contact her and advise and   discuss this further.   Pt sensitive to other medications, which cause hallucination, etc.     Additional Notes:   "Thank you for all that you do for our patients'"          "

## 2020-06-23 NOTE — TELEPHONE ENCOUNTER
----- Message from Benton Lagos sent at 5/14/2020 11:19 AM CDT -----  Contact: Daughter - Yamileth  Daughter states the medication change is not helping and some day pt's condition is worse Daughter ask for a call    Contact info   [phone]   Propranolol Pregnancy And Lactation Text: This medication is Pregnancy Category C and it isn't known if it is safe during pregnancy. It is excreted in breast milk.

## 2020-06-23 NOTE — TELEPHONE ENCOUNTER
Returned call to pts daughter.     Daughter says since being quartantined pt has started to hallucinate more, less lucid, more unsettled. She is sleeping better at night. Patient feels keep captive and is upset about this.    She is taking cd/ld 1.5  AM, PM and  1 tab at lunch-- when she took 1.5 mid day she was constantly crying.    Says both Aricept and Namenda made her extremely paranoid.

## 2020-07-16 ENCOUNTER — DOCUMENTATION ONLY (OUTPATIENT)
Dept: REHABILITATION | Facility: HOSPITAL | Age: 75
End: 2020-07-16

## 2020-07-16 NOTE — PROGRESS NOTES
"  OUTPATIENT PHYSICAL THERAPY DISCHARGE SUMMARY     Name: Annmarie Gallardo  Clinic Number: [id number]    Therapy Diagnosis:        Encounter Diagnosis   Name Primary?    Impaired functional mobility, balance, gait, and endurance Yes     Physician: Bharat Ponce MD    Treatment Orders: PT Eval and Treat  No past medical history on file.    Initial visit: 2020  Date of Last visit: 3/12/2020  Date of Discharge Note:  2020  Total Visits Received: 7  Total No Show: 3  Total Cancelled Visits: 0  ASSESSMENT   Status Towards Goals Met:     Goals:  Short Term Goals = Long Term Goals: 4 weeks   1. Pt will improve SLS time to 20 seconds bilaterally in order to decrease risk for fall. ongoing  2. Pt will be able to demonstrate proper completion of LSVT Big maximal daily exercises in order to continue "big" movements without difficulty. Ongoing, improving  3. Pt will improve score on FGA to at least 29/30 in order to demonstrate improving balance. Ongoing, improving  4. Pt will improve B LE strength to at least 4+/5 in order to demonstrate improved strength. Ongoing, improving  5. Pt will improve condition 6 on [place] sensory testing to at least 15 sec in order to demonstrate improved balance with vision eliminated such as when in the shower. Met 2020    Goals Not achieved and why: Pt has dementia, little carry over between sessions. Pt has not returned to care since COVID19 pandemic and will need a new referral if she intends to return to therapy.        Discharge reason : No benefit from P.T. noted and POC has     PLAN   This patient is discharged from Outpatient Physical Therapy Services.     Luis Field, PT  2020      "

## 2020-07-22 ENCOUNTER — PATIENT MESSAGE (OUTPATIENT)
Dept: NEUROLOGY | Facility: CLINIC | Age: 75
End: 2020-07-22

## 2021-04-16 ENCOUNTER — PATIENT MESSAGE (OUTPATIENT)
Dept: RESEARCH | Facility: HOSPITAL | Age: 76
End: 2021-04-16

## 2021-05-24 ENCOUNTER — TELEPHONE (OUTPATIENT)
Dept: NEUROLOGY | Facility: CLINIC | Age: 76
End: 2021-05-24

## 2021-05-24 NOTE — TELEPHONE ENCOUNTER
----- Message from Lindsay Montemayor sent at 5/24/2021  3:35 PM CDT -----  Regarding: Appointment  Contact: Yamileth/daughter [phone]  Calling in regards to appointment for Parkinson's during the week of 6/7 and 6/14. Please call and schedule

## 2023-11-11 ENCOUNTER — HOSPITAL ENCOUNTER (OUTPATIENT)
Facility: HOSPITAL | Age: 78
Discharge: HOME | End: 2023-11-14
Attending: EMERGENCY MEDICINE | Admitting: STUDENT IN AN ORGANIZED HEALTH CARE EDUCATION/TRAINING PROGRAM
Payer: MEDICARE

## 2023-11-11 DIAGNOSIS — F02.C0 SEVERE LEWY BODY DEMENTIA, UNSPECIFIED WHETHER BEHAVIORAL, PSYCHOTIC, OR MOOD DISTURBANCE OR ANXIETY: Primary | ICD-10-CM

## 2023-11-11 DIAGNOSIS — L89.153 PRESSURE INJURY OF SACRAL REGION, STAGE 3: ICD-10-CM

## 2023-11-11 DIAGNOSIS — R07.9 CHEST PAIN: ICD-10-CM

## 2023-11-11 DIAGNOSIS — G31.83 SEVERE LEWY BODY DEMENTIA, UNSPECIFIED WHETHER BEHAVIORAL, PSYCHOTIC, OR MOOD DISTURBANCE OR ANXIETY: Primary | ICD-10-CM

## 2023-11-11 LAB
ALBUMIN SERPL BCP-MCNC: 3.2 G/DL (ref 3.5–5.2)
ALP SERPL-CCNC: 67 U/L (ref 55–135)
ALT SERPL W/O P-5'-P-CCNC: 7 U/L (ref 10–44)
ANION GAP SERPL CALC-SCNC: 11 MMOL/L (ref 8–16)
AST SERPL-CCNC: 18 U/L (ref 10–40)
BASOPHILS # BLD AUTO: 0.07 K/UL (ref 0–0.2)
BASOPHILS NFR BLD: 0.8 % (ref 0–1.9)
BILIRUB SERPL-MCNC: 0.4 MG/DL (ref 0.1–1)
BUN SERPL-MCNC: 31 MG/DL (ref 8–23)
BUN SERPL-MCNC: 42 MG/DL (ref 6–30)
CALCIUM SERPL-MCNC: 9.5 MG/DL (ref 8.7–10.5)
CHLORIDE SERPL-SCNC: 108 MMOL/L (ref 95–110)
CHLORIDE SERPL-SCNC: 108 MMOL/L (ref 95–110)
CO2 SERPL-SCNC: 24 MMOL/L (ref 23–29)
CREAT SERPL-MCNC: 0.9 MG/DL (ref 0.5–1.4)
CREAT SERPL-MCNC: 1 MG/DL (ref 0.5–1.4)
DIFFERENTIAL METHOD: ABNORMAL
EOSINOPHIL # BLD AUTO: 0.3 K/UL (ref 0–0.5)
EOSINOPHIL NFR BLD: 2.8 % (ref 0–8)
ERYTHROCYTE [DISTWIDTH] IN BLOOD BY AUTOMATED COUNT: 13.4 % (ref 11.5–14.5)
EST. GFR  (NO RACE VARIABLE): >60 ML/MIN/1.73 M^2
GLUCOSE SERPL-MCNC: 85 MG/DL (ref 70–110)
GLUCOSE SERPL-MCNC: 90 MG/DL (ref 70–110)
HCT VFR BLD AUTO: 39.6 % (ref 37–48.5)
HCT VFR BLD CALC: 38 %PCV (ref 36–54)
HGB BLD-MCNC: 12.3 G/DL (ref 12–16)
IMM GRANULOCYTES # BLD AUTO: 0.07 K/UL (ref 0–0.04)
IMM GRANULOCYTES NFR BLD AUTO: 0.8 % (ref 0–0.5)
LYMPHOCYTES # BLD AUTO: 1.3 K/UL (ref 1–4.8)
LYMPHOCYTES NFR BLD: 14 % (ref 18–48)
MCH RBC QN AUTO: 30.2 PG (ref 27–31)
MCHC RBC AUTO-ENTMCNC: 31.1 G/DL (ref 32–36)
MCV RBC AUTO: 97 FL (ref 82–98)
MONOCYTES # BLD AUTO: 0.8 K/UL (ref 0.3–1)
MONOCYTES NFR BLD: 8.4 % (ref 4–15)
NEUTROPHILS # BLD AUTO: 6.8 K/UL (ref 1.8–7.7)
NEUTROPHILS NFR BLD: 73.2 % (ref 38–73)
NRBC BLD-RTO: 0 /100 WBC
PLATELET # BLD AUTO: 280 K/UL (ref 150–450)
PMV BLD AUTO: 10.3 FL (ref 9.2–12.9)
POC IONIZED CALCIUM: 1.05 MMOL/L (ref 1.06–1.42)
POC TCO2 (MEASURED): 26 MMOL/L (ref 23–29)
POTASSIUM BLD-SCNC: 5.8 MMOL/L (ref 3.5–5.1)
POTASSIUM SERPL-SCNC: 4.7 MMOL/L (ref 3.5–5.1)
PROT SERPL-MCNC: 6.7 G/DL (ref 6–8.4)
RBC # BLD AUTO: 4.07 M/UL (ref 4–5.4)
SAMPLE: ABNORMAL
SODIUM BLD-SCNC: 140 MMOL/L (ref 136–145)
SODIUM SERPL-SCNC: 143 MMOL/L (ref 136–145)
WBC # BLD AUTO: 9.27 K/UL (ref 3.9–12.7)

## 2023-11-11 PROCEDURE — 99285 EMERGENCY DEPT VISIT HI MDM: CPT

## 2023-11-11 PROCEDURE — 85025 COMPLETE CBC W/AUTO DIFF WBC: CPT | Performed by: EMERGENCY MEDICINE

## 2023-11-11 PROCEDURE — 80053 COMPREHEN METABOLIC PANEL: CPT | Performed by: EMERGENCY MEDICINE

## 2023-11-11 PROCEDURE — 87389 HIV-1 AG W/HIV-1&-2 AB AG IA: CPT | Performed by: PHYSICIAN ASSISTANT

## 2023-11-11 PROCEDURE — 86803 HEPATITIS C AB TEST: CPT | Performed by: PHYSICIAN ASSISTANT

## 2023-11-11 PROCEDURE — 82330 ASSAY OF CALCIUM: CPT

## 2023-11-11 PROCEDURE — 80047 BASIC METABLC PNL IONIZED CA: CPT

## 2023-11-12 PROBLEM — K21.9 GASTROESOPHAGEAL REFLUX DISEASE WITHOUT ESOPHAGITIS: Status: ACTIVE | Noted: 2023-11-12

## 2023-11-12 PROBLEM — F02.C0: Status: ACTIVE | Noted: 2023-11-12

## 2023-11-12 PROBLEM — R53.81 DEBILITY: Status: ACTIVE | Noted: 2020-01-22

## 2023-11-12 PROBLEM — G31.83: Status: ACTIVE | Noted: 2023-11-12

## 2023-11-12 PROBLEM — Z75.8 PROBLEM RELATED TO DISCHARGE PLANNING: Status: ACTIVE | Noted: 2023-11-12

## 2023-11-12 PROBLEM — G20.A1 PARKINSON'S DISEASE: Status: ACTIVE | Noted: 2023-11-12

## 2023-11-12 PROBLEM — N18.30 CKD (CHRONIC KIDNEY DISEASE), STAGE III: Status: ACTIVE | Noted: 2023-11-12

## 2023-11-12 PROBLEM — I10 HTN (HYPERTENSION): Status: ACTIVE | Noted: 2023-11-12

## 2023-11-12 LAB
ALBUMIN SERPL BCP-MCNC: 2.7 G/DL (ref 3.5–5.2)
ALP SERPL-CCNC: 56 U/L (ref 55–135)
ALT SERPL W/O P-5'-P-CCNC: <5 U/L (ref 10–44)
ANION GAP SERPL CALC-SCNC: 9 MMOL/L (ref 8–16)
AST SERPL-CCNC: 12 U/L (ref 10–40)
BASOPHILS # BLD AUTO: 0.03 K/UL (ref 0–0.2)
BASOPHILS NFR BLD: 0.4 % (ref 0–1.9)
BILIRUB SERPL-MCNC: 0.4 MG/DL (ref 0.1–1)
BUN SERPL-MCNC: 25 MG/DL (ref 8–23)
CALCIUM SERPL-MCNC: 9.1 MG/DL (ref 8.7–10.5)
CHLORIDE SERPL-SCNC: 110 MMOL/L (ref 95–110)
CO2 SERPL-SCNC: 24 MMOL/L (ref 23–29)
CREAT SERPL-MCNC: 0.8 MG/DL (ref 0.5–1.4)
DIFFERENTIAL METHOD: ABNORMAL
EOSINOPHIL # BLD AUTO: 0.3 K/UL (ref 0–0.5)
EOSINOPHIL NFR BLD: 4 % (ref 0–8)
ERYTHROCYTE [DISTWIDTH] IN BLOOD BY AUTOMATED COUNT: 13.3 % (ref 11.5–14.5)
EST. GFR  (NO RACE VARIABLE): >60 ML/MIN/1.73 M^2
GLUCOSE SERPL-MCNC: 97 MG/DL (ref 70–110)
HCT VFR BLD AUTO: 35.4 % (ref 37–48.5)
HCV AB SERPL QL IA: NORMAL
HGB BLD-MCNC: 11.1 G/DL (ref 12–16)
HIV 1+2 AB+HIV1 P24 AG SERPL QL IA: NORMAL
IMM GRANULOCYTES # BLD AUTO: 0.08 K/UL (ref 0–0.04)
IMM GRANULOCYTES NFR BLD AUTO: 1.1 % (ref 0–0.5)
LYMPHOCYTES # BLD AUTO: 1 K/UL (ref 1–4.8)
LYMPHOCYTES NFR BLD: 13.9 % (ref 18–48)
MAGNESIUM SERPL-MCNC: 1.9 MG/DL (ref 1.6–2.6)
MCH RBC QN AUTO: 29.9 PG (ref 27–31)
MCHC RBC AUTO-ENTMCNC: 31.4 G/DL (ref 32–36)
MCV RBC AUTO: 95 FL (ref 82–98)
MONOCYTES # BLD AUTO: 0.6 K/UL (ref 0.3–1)
MONOCYTES NFR BLD: 8.8 % (ref 4–15)
NEUTROPHILS # BLD AUTO: 5.2 K/UL (ref 1.8–7.7)
NEUTROPHILS NFR BLD: 71.8 % (ref 38–73)
NRBC BLD-RTO: 0 /100 WBC
PLATELET # BLD AUTO: 244 K/UL (ref 150–450)
PMV BLD AUTO: 9.8 FL (ref 9.2–12.9)
POTASSIUM SERPL-SCNC: 4 MMOL/L (ref 3.5–5.1)
PROT SERPL-MCNC: 5.5 G/DL (ref 6–8.4)
RBC # BLD AUTO: 3.71 M/UL (ref 4–5.4)
SODIUM SERPL-SCNC: 143 MMOL/L (ref 136–145)
WBC # BLD AUTO: 7.18 K/UL (ref 3.9–12.7)

## 2023-11-12 PROCEDURE — 63600175 PHARM REV CODE 636 W HCPCS: Performed by: NURSE PRACTITIONER

## 2023-11-12 PROCEDURE — G0378 HOSPITAL OBSERVATION PER HR: HCPCS

## 2023-11-12 PROCEDURE — 85025 COMPLETE CBC W/AUTO DIFF WBC: CPT | Performed by: NURSE PRACTITIONER

## 2023-11-12 PROCEDURE — 83735 ASSAY OF MAGNESIUM: CPT | Performed by: NURSE PRACTITIONER

## 2023-11-12 PROCEDURE — 25000003 PHARM REV CODE 250: Performed by: STUDENT IN AN ORGANIZED HEALTH CARE EDUCATION/TRAINING PROGRAM

## 2023-11-12 PROCEDURE — 80053 COMPREHEN METABOLIC PANEL: CPT | Performed by: NURSE PRACTITIONER

## 2023-11-12 PROCEDURE — 96372 THER/PROPH/DIAG INJ SC/IM: CPT | Performed by: NURSE PRACTITIONER

## 2023-11-12 PROCEDURE — 36415 COLL VENOUS BLD VENIPUNCTURE: CPT | Performed by: NURSE PRACTITIONER

## 2023-11-12 PROCEDURE — 63600175 PHARM REV CODE 636 W HCPCS: Performed by: STUDENT IN AN ORGANIZED HEALTH CARE EDUCATION/TRAINING PROGRAM

## 2023-11-12 PROCEDURE — 25000003 PHARM REV CODE 250: Performed by: NURSE PRACTITIONER

## 2023-11-12 RX ORDER — CARBIDOPA AND LEVODOPA 25; 100 MG/1; MG/1
1 TABLET ORAL 2 TIMES DAILY
Status: DISCONTINUED | OUTPATIENT
Start: 2023-11-12 | End: 2023-11-12

## 2023-11-12 RX ORDER — TALC
6 POWDER (GRAM) TOPICAL NIGHTLY PRN
Status: DISCONTINUED | OUTPATIENT
Start: 2023-11-12 | End: 2023-11-14 | Stop reason: HOSPADM

## 2023-11-12 RX ORDER — IBUPROFEN 200 MG
16 TABLET ORAL
Status: DISCONTINUED | OUTPATIENT
Start: 2023-11-12 | End: 2023-11-14 | Stop reason: HOSPADM

## 2023-11-12 RX ORDER — ENOXAPARIN SODIUM 100 MG/ML
40 INJECTION SUBCUTANEOUS EVERY 24 HOURS
Status: DISCONTINUED | OUTPATIENT
Start: 2023-11-12 | End: 2023-11-14 | Stop reason: HOSPADM

## 2023-11-12 RX ORDER — LANOLIN ALCOHOL/MO/W.PET/CERES
1000 CREAM (GRAM) TOPICAL DAILY
Status: DISCONTINUED | OUTPATIENT
Start: 2023-11-12 | End: 2023-11-14 | Stop reason: HOSPADM

## 2023-11-12 RX ORDER — AMOXICILLIN 250 MG
1 CAPSULE ORAL 2 TIMES DAILY
Status: DISCONTINUED | OUTPATIENT
Start: 2023-11-12 | End: 2023-11-14 | Stop reason: HOSPADM

## 2023-11-12 RX ORDER — PANTOPRAZOLE SODIUM 20 MG/1
20 TABLET, DELAYED RELEASE ORAL DAILY
Status: DISCONTINUED | OUTPATIENT
Start: 2023-11-12 | End: 2023-11-14 | Stop reason: HOSPADM

## 2023-11-12 RX ORDER — SODIUM CHLORIDE, SODIUM LACTATE, POTASSIUM CHLORIDE, CALCIUM CHLORIDE 600; 310; 30; 20 MG/100ML; MG/100ML; MG/100ML; MG/100ML
INJECTION, SOLUTION INTRAVENOUS CONTINUOUS
Status: DISCONTINUED | OUTPATIENT
Start: 2023-11-12 | End: 2023-11-14 | Stop reason: HOSPADM

## 2023-11-12 RX ORDER — GLUCAGON 1 MG
1 KIT INJECTION
Status: DISCONTINUED | OUTPATIENT
Start: 2023-11-12 | End: 2023-11-14 | Stop reason: HOSPADM

## 2023-11-12 RX ORDER — SODIUM CHLORIDE 0.9 % (FLUSH) 0.9 %
10 SYRINGE (ML) INJECTION EVERY 12 HOURS PRN
Status: DISCONTINUED | OUTPATIENT
Start: 2023-11-12 | End: 2023-11-14 | Stop reason: HOSPADM

## 2023-11-12 RX ORDER — PNV NO.95/FERROUS FUM/FOLIC AC 28MG-0.8MG
1000 TABLET ORAL 2 TIMES DAILY
Status: DISCONTINUED | OUTPATIENT
Start: 2023-11-12 | End: 2023-11-14 | Stop reason: HOSPADM

## 2023-11-12 RX ORDER — NALOXONE HCL 0.4 MG/ML
0.02 VIAL (ML) INJECTION
Status: DISCONTINUED | OUTPATIENT
Start: 2023-11-12 | End: 2023-11-14 | Stop reason: HOSPADM

## 2023-11-12 RX ORDER — CARBIDOPA AND LEVODOPA 25; 100 MG/1; MG/1
1 TABLET ORAL 3 TIMES DAILY
Status: DISCONTINUED | OUTPATIENT
Start: 2023-11-12 | End: 2023-11-14 | Stop reason: HOSPADM

## 2023-11-12 RX ORDER — CETIRIZINE HYDROCHLORIDE 10 MG/1
10 TABLET ORAL DAILY
Status: DISCONTINUED | OUTPATIENT
Start: 2023-11-12 | End: 2023-11-14 | Stop reason: HOSPADM

## 2023-11-12 RX ORDER — ONDANSETRON 2 MG/ML
4 INJECTION INTRAMUSCULAR; INTRAVENOUS EVERY 8 HOURS PRN
Status: DISCONTINUED | OUTPATIENT
Start: 2023-11-12 | End: 2023-11-14 | Stop reason: HOSPADM

## 2023-11-12 RX ORDER — ACETAMINOPHEN 500 MG
1000 TABLET ORAL EVERY 8 HOURS PRN
Status: DISCONTINUED | OUTPATIENT
Start: 2023-11-12 | End: 2023-11-14 | Stop reason: HOSPADM

## 2023-11-12 RX ORDER — ASPIRIN 81 MG/1
81 TABLET ORAL DAILY
Status: DISCONTINUED | OUTPATIENT
Start: 2023-11-12 | End: 2023-11-14 | Stop reason: HOSPADM

## 2023-11-12 RX ORDER — TAMSULOSIN HYDROCHLORIDE 0.4 MG/1
0.4 CAPSULE ORAL DAILY
Status: DISCONTINUED | OUTPATIENT
Start: 2023-11-12 | End: 2023-11-14 | Stop reason: HOSPADM

## 2023-11-12 RX ORDER — ACETAMINOPHEN 325 MG/1
650 TABLET ORAL EVERY 6 HOURS PRN
Status: DISCONTINUED | OUTPATIENT
Start: 2023-11-12 | End: 2023-11-14 | Stop reason: HOSPADM

## 2023-11-12 RX ORDER — IBUPROFEN 200 MG
24 TABLET ORAL
Status: DISCONTINUED | OUTPATIENT
Start: 2023-11-12 | End: 2023-11-14 | Stop reason: HOSPADM

## 2023-11-12 RX ADMIN — SENNOSIDES AND DOCUSATE SODIUM 1 TABLET: 8.6; 5 TABLET ORAL at 08:11

## 2023-11-12 RX ADMIN — PANTOPRAZOLE SODIUM 20 MG: 20 TABLET, DELAYED RELEASE ORAL at 08:11

## 2023-11-12 RX ADMIN — Medication 1000 UNITS: at 08:11

## 2023-11-12 RX ADMIN — ASPIRIN 81 MG: 81 TABLET, COATED ORAL at 09:11

## 2023-11-12 RX ADMIN — CYANOCOBALAMIN TAB 1000 MCG 1000 MCG: 1000 TAB at 08:11

## 2023-11-12 RX ADMIN — CARBIDOPA AND LEVODOPA 1 TABLET: 25; 100 TABLET ORAL at 08:11

## 2023-11-12 RX ADMIN — CARBIDOPA AND LEVODOPA 1 SPLIT TABLET: 25; 100 TABLET ORAL at 08:11

## 2023-11-12 RX ADMIN — CARBIDOPA AND LEVODOPA 1 SPLIT TABLET: 25; 100 TABLET ORAL at 03:11

## 2023-11-12 RX ADMIN — CARBIDOPA AND LEVODOPA 1 TABLET: 25; 100 TABLET ORAL at 03:11

## 2023-11-12 RX ADMIN — CETIRIZINE HYDROCHLORIDE 10 MG: 10 TABLET, FILM COATED ORAL at 08:11

## 2023-11-12 RX ADMIN — SODIUM CHLORIDE, POTASSIUM CHLORIDE, SODIUM LACTATE AND CALCIUM CHLORIDE: 600; 310; 30; 20 INJECTION, SOLUTION INTRAVENOUS at 01:11

## 2023-11-12 RX ADMIN — ENOXAPARIN SODIUM 40 MG: 40 INJECTION SUBCUTANEOUS at 05:11

## 2023-11-12 RX ADMIN — TAMSULOSIN HYDROCHLORIDE 0.4 MG: 0.4 CAPSULE ORAL at 08:11

## 2023-11-12 NOTE — HPI
Annmarie Gallardo is a 78 y.o. with a PMHx of parkinson's, lewy-body dementia, HTN, GERD, and CKD3 who presents because she was unable to be admitted to NH and daughter cannot care for her. Pt previously resided at a NH in [place] near her other daughter and was brought to [place] to be closer to her daughter, Yamileth. The plan was the patient was to be admitted at [facility] today. Per daughter patient was unable to be admitted due to some missing paperwork, and the paperwork cannot be completed until Monday 11/13/23. The pt requires total care and her daughter states she cannot provide the care at home which is why she brought her here. Pt is nonverbal and bedbound at baseline.     In the ED, VSS, afebrile. CBC and CMP unremarkable. HIV and Hep C non-reactive. Social work consulted in the ED.  contacted [facility] ([phone]) and spoke to EVERARDO Bergeron  who stated  daughter needed to come on Monday to complete admission process.

## 2023-11-12 NOTE — ED NOTES
Received report from EVERARDO Ken. Pt resting in bed with eyes open. Chest rise and fall noted. NAD. Bed locked in lowest position. SR up x 2. Call light within reach. VSS.

## 2023-11-12 NOTE — ASSESSMENT & PLAN NOTE
Presents with her daughter. Per daughter she was unable to complete the patient's admission at [facility] due to some missing paperwork and cannot complete the paperwork until Monday 11/13/23. Pt is bedbound and requires total care and her daughter states she cannot provide the care at home which is why she brought her here.   -Social work consulted in the ED who contacted [facility] ([phone]) and spoke to EVERARDO Bergeron. Pt daughter Yamileth will need to contact admission on Monday and bring appropriate paperwork and financial records to facilitate transfer.

## 2023-11-12 NOTE — ASSESSMENT & PLAN NOTE
-Chronic problem. No longer on antihypertensives.   -Monitor BP closely.  Will utilize p.r.n. blood pressure medication only if patient's blood pressure greater than 180/110 and she develops symptoms such as worsening chest pain or shortness of breath.

## 2023-11-12 NOTE — PLAN OF CARE
Problem: Adult Inpatient Plan of Care  Goal: Plan of Care Review  Outcome: Ongoing, Progressing     Problem: Skin Injury Risk Increased  Goal: Skin Health and Integrity  Outcome: Ongoing, Not Progressing  Intervention: Optimize Skin Protection  Flowsheets (Taken 11/12/2023 8583)  Pressure Reduction Techniques:   weight shift assistance provided   pressure points protected   positioned off wounds  Pressure Reduction Devices:   foam padding utilized   positioning supports utilized  Skin Protection: adhesive use limited  Head of Bed (HOB) Positioning: HOB at 30-45 degrees

## 2023-11-12 NOTE — ED PROVIDER NOTES
"Encounter Date: 11/11/2023       History     Chief Complaint   Patient presents with    Wound Check     Pt transferring from facility in [place] to [facility], pt's daughter informed pt had sacral ulcer. [facility] will not accept pt currently due to paperwork so "she has no where to go right now." Pt wheelchair bound, nonverbal at baseline. Hx parkinson's, dementia.     78-year-old female, history of Lewy body dementia, Parkinson's disease, bed-bound, dependent in all of her ADLs, brought in by EMS from [facility] where patient was supposed to be admitted.  Patient has been living at a healthcare facility in [place] and was actually under the care of hospice there but her daughter decided to move her here to [place] as she does not qualify for [place] Medicaid.  They had made all of the arrangements to have the patient admitted [facility] but when they arrived there tonight, [facility] states that patient's daughter did not sign the final paperwork that was needed and so the patient can not be admitted.  Apparently this paperwork will not be available until Monday.  Daughter confirms that the goals at this stage of the patient's illness are comfort focused, patient is DNR DNI.  Daughter plans to re-enroll patient in hospice care here once she is admitted formally [facility].    The history is provided by a relative. The history is limited by the condition of the patient.     Review of patient's allergies indicates:   Allergen Reactions    Garlic     Namenda [memantine]     Oxybutynin      History reviewed. No pertinent past medical history.  History reviewed. No pertinent surgical history.  History reviewed. No pertinent family history.  Social History     Tobacco Use    Smoking status: Never    Smokeless tobacco: Never   Substance Use Topics    Alcohol use: No    Drug use: No     Review of Systems    Physical Exam     Initial Vitals [11/11/23 2058]   BP Pulse " Resp Temp SpO2   126/75 82 14 98.1 °F (36.7 °C) 99 %      MAP       --         Physical Exam    Nursing note and vitals reviewed.  Constitutional: She is not diaphoretic. She has a sickly appearance. No distress.   Eyes: Conjunctivae are normal.   Neck: Neck supple.   Cardiovascular:  Normal rate.           Pulmonary/Chest: No respiratory distress.   Abdominal: Abdomen is soft. She exhibits no distension. There is no abdominal tenderness. There is no rebound and no guarding.   Musculoskeletal:         General: No edema.      Cervical back: Neck supple.     Neurological: She is alert.   Upper extremities contracted, lower extremity stiff and extended  Does not follow commands, intermittently verbal   Skin: Skin is warm.         ED Course   Procedures  Labs Reviewed   CBC W/ AUTO DIFFERENTIAL - Abnormal; Notable for the following components:       Result Value    MCHC 31.1 (*)     Immature Granulocytes 0.8 (*)     Immature Grans (Abs) 0.07 (*)     Gran % 73.2 (*)     Lymph % 14.0 (*)     All other components within normal limits   COMPREHENSIVE METABOLIC PANEL - Abnormal; Notable for the following components:    BUN 31 (*)     Albumin 3.2 (*)     ALT 7 (*)     All other components within normal limits   ISTAT PROCEDURE - Abnormal; Notable for the following components:    POC BUN 42 (*)     POC Potassium 5.8 (*)     POC Ionized Calcium 1.05 (*)     All other components within normal limits   HIV 1 / 2 ANTIBODY    Narrative:     Release to patient->Immediate   HEPATITIS C ANTIBODY    Narrative:     Release to patient->Immediate   ISTAT CHEM8          Imaging Results    None          Medications   sodium chloride 0.9% flush 10 mL (has no administration in time range)   naloxone 0.4 mg/mL injection 0.02 mg (has no administration in time range)   glucose chewable tablet 16 g (has no administration in time range)   glucose chewable tablet 24 g (has no administration in time range)   glucagon (human recombinant) injection 1  mg (has no administration in time range)   enoxaparin injection 40 mg (has no administration in time range)   acetaminophen tablet 650 mg (has no administration in time range)   acetaminophen tablet 1,000 mg (has no administration in time range)   ondansetron injection 4 mg (has no administration in time range)   melatonin tablet 6 mg (has no administration in time range)   dextrose 10% bolus 125 mL 125 mL (has no administration in time range)   dextrose 10% bolus 250 mL 250 mL (has no administration in time range)   aspirin EC tablet 81 mg (81 mg Oral Given 11/12/23 0900)   tamsulosin 24 hr capsule 0.4 mg (0.4 mg Oral Given 11/12/23 0838)   cyanocobalamin tablet 1,000 mcg (1,000 mcg Oral Given 11/12/23 0839)   pantoprazole EC tablet 20 mg (20 mg Oral Given 11/12/23 0839)   vitamin E capsule 1,000 Units (1,000 Units Oral Given 11/12/23 0840)   cetirizine tablet 10 mg (10 mg Oral Given 11/12/23 0839)   senna-docusate 8.6-50 mg per tablet 1 tablet (1 tablet Oral Given 11/12/23 0839)   lactated ringers infusion ( Intravenous New Bag 11/12/23 1300)   carbidopa-levodopa  mg per tablet 1 tablet (has no administration in time range)     And   carbidopa-levodopa 12.5-50 mg split tablet 1 split tablet (has no administration in time range)     Medical Decision Making  End-stage Parkinson's and Lewy body dementia patient, dependent in all ADLs, goals comfort focused only, needs admission to [facility] while awaiting placement at [facility].    -patient's daughter states that she will not be able to provide the care that patient needs at home    -will plan to admit to the [facility] here  -will consult ED SW    Amount and/or Complexity of Data Reviewed  Labs: ordered. Decision-making details documented in ED Course.                               Clinical Impression:   Final diagnoses:  [G31.83, F02.C0] Severe Lewy body dementia, unspecified whether behavioral, psychotic, or mood disturbance or anxiety (Primary)         ED Disposition Condition    Observation Stable                Iqra Paul MD  11/12/23 6296

## 2023-11-12 NOTE — H&P
"[facility] - Intensive Care ([address] Medicine  History & Physical    Patient Name: Annmarie Gallardo  MRN: [id number]  Patient Class: OP- Observation  Admission Date: 11/11/2023  Attending Physician: Kamala Evans MD   Primary Care Provider: Raymond Bess MD         Patient information was obtained from relative(s), past medical records and ER records.     Subjective:     Principal Problem:Problem related to discharge planning    Chief Complaint:   Chief Complaint   Patient presents with    Wound Check     Pt transferring from facility in [place] to [facility], pt's daughter informed pt had sacral ulcer. [facility] will not accept pt currently due to paperwork so "she has no where to go right now." Pt wheelchair bound, nonverbal at baseline. Hx parkinson's, dementia.        HPI: Annmarie Gallardo is a 78 y.o. with a PMHx of parkinson's, lewy-body dementia, HTN, GERD, and CKD3 who presents because she was unable to be admitted to NH and daughter cannot care for her. Pt previously resided at a NH in [place] near her other daughter and was brought to [place] to be closer to her daughter, Yamileth. The plan was the patient was to be admitted at [facility] today. Per daughter patient was unable to be admitted due to some missing paperwork, and the paperwork cannot be completed until Monday 11/13/23. The pt requires total care and her daughter states she cannot provide the care at home which is why she brought her here. Pt is nonverbal and bedbound at baseline.     In the ED, VSS, afebrile. CBC and CMP unremarkable. HIV and Hep C non-reactive. Social work consulted in the ED.  contacted [facility] ([phone]) and spoke to EVERARDO Bergeron  who stated  daughter needed to come on Monday to complete admission process.      History reviewed. No pertinent past medical history.    History reviewed. No pertinent surgical history.    Review of patient's allergies " indicates:  No Known Allergies    No current facility-administered medications on file prior to encounter.     Current Outpatient Medications on File Prior to Encounter   Medication Sig    aspirin (ECOTRIN) 81 MG EC tablet Take 81 mg by mouth once daily.    carbidopa-levodopa  mg (SINEMET)  mg per tablet Take 1 tablet by mouth 3 (three) times daily.    cyanocobalamin (VITAMIN B-12) 1000 MCG tablet Take 100 mcg by mouth once daily.    LISINOPRIL ORAL Take by mouth.    loratadine (CLARITIN) 10 mg tablet Take 10 mg by mouth once daily.    OMEPRAZOLE ORAL Take by mouth.    vitamin E 1000 UNIT capsule Take 1,000 Units by mouth 2 (two) times daily.     Family History    None       Tobacco Use    Smoking status: Never    Smokeless tobacco: Never   Substance and Sexual Activity    Alcohol use: No    Drug use: No    Sexual activity: Not on file     Review of Systems   Unable to perform ROS: Dementia     Objective:     Vital Signs (Most Recent):  Temp: 98.1 °F (36.7 °C) (11/11/23 2058)  Pulse: 72 (11/12/23 0028)  Resp: 15 (11/12/23 0028)  BP: (!) 156/91 (11/12/23 0028)  SpO2: 98 % (11/12/23 0028) Vital Signs (24h Range):  Temp:  [98.1 °F (36.7 °C)] 98.1 °F (36.7 °C)  Pulse:  [71-82] 72  Resp:  [14-20] 15  SpO2:  [98 %-99 %] 98 %  BP: (126-159)/() 156/91     Weight: 57.8 kg (127 lb 6.8 oz)  Body mass index is 22.57 kg/m².     Physical Exam  Vitals and nursing note reviewed.   Constitutional:       General: She is awake. She is not in acute distress.     Appearance: She is underweight. She is not ill-appearing, toxic-appearing or diaphoretic.   HENT:      Head: Normocephalic and atraumatic.      Nose: Nose normal.      Mouth/Throat:      Mouth: Mucous membranes are dry.      Pharynx: Oropharynx is clear.   Eyes:      Extraocular Movements: Extraocular movements intact.      Pupils: Pupils are equal, round, and reactive to light.   Neck:      Trachea: Trachea normal.   Cardiovascular:      Rate and Rhythm:  Normal rate and regular rhythm.      Pulses: Normal pulses.      Heart sounds: Normal heart sounds.   Pulmonary:      Effort: Pulmonary effort is normal. No respiratory distress.      Breath sounds: Normal breath sounds. No wheezing, rhonchi or rales.   Abdominal:      General: Abdomen is flat. Bowel sounds are normal. There is no distension.      Palpations: Abdomen is soft.      Tenderness: There is no abdominal tenderness. There is no guarding.   Musculoskeletal:      Cervical back: Full passive range of motion without pain and normal range of motion.      Right lower leg: No edema.      Left lower leg: No edema.      Right foot: Foot drop present.      Left foot: Foot drop present.      Comments: Contractions noted to BUE and BLE   Skin:     General: Skin is warm and dry.      Capillary Refill: Capillary refill takes less than 2 seconds.      Coloration: Skin is pale.      Findings: Wound present.      Comments: Wound noted sacral area.   Neurological:      Mental Status: She is alert. Mental status is at baseline.      Comments: Alert but mostly nonverbal. Will occasionally whisper yes or no. Unable to follow commands.    Psychiatric:         Speech: She is noncommunicative.         Cognition and Memory: Cognition is impaired.              CRANIAL NERVES     CN III, IV, VI   Pupils are equal, round, and reactive to light.         Significant Labs: All pertinent labs within the past 24 hours have been reviewed.  BMP:   Recent Labs   Lab 11/11/23  2244   GLU 85      K 4.7      CO2 24   BUN 31*   CREATININE 0.9   CALCIUM 9.5     CBC:   Recent Labs   Lab 11/11/23  2244 11/11/23  2254   WBC 9.27  --    HGB 12.3  --    HCT 39.6 38     --        Significant Imaging: I have reviewed all pertinent imaging results/findings within the past 24 hours.  Assessment/Plan:     * Problem related to discharge planning  Presents with her daughter. Per daughter she was unable to complete the patient's admission  at [facility] due to some missing paperwork and cannot complete the paperwork until Monday 11/13/23. Pt is bedbound and requires total care and her daughter states she cannot provide the care at home which is why she brought her here.   -Social work consulted in the ED who contacted [facility] ([phone]) and spoke to EVERARDO Bergeron. Pt daughter Yamileth will need to contact admission on Monday and bring appropriate paperwork and financial records to facilitate transfer.    HTN (hypertension)  -Chronic problem. No longer on antihypertensives.   -Monitor BP closely.  Will utilize p.r.n. blood pressure medication only if patient's blood pressure greater than 180/110 and she develops symptoms such as worsening chest pain or shortness of breath.    Severe Lewy body dementia  Dementia is controlled currently. Continue home dementia meds and non-pharmacologic interventions to prevent delirium (Activity during day, opening blinds, providing glasses/hearing aids, and up in chair during daytime). Use PRN anti-psychotics to prevent behavior of self harm during sundowning, and avoid narcotics and benzos unless absolutely necessary. PRN anti-psychotics prescribed to avoid self harm behaviors.    Parkinson's disease  Chronic issue, continue home sinemet.    Gastroesophageal reflux disease without esophagitis  Chronic issue, continue PPI.     CKD (chronic kidney disease), stage III  Creatine stable for now. BMP reviewed- noted Estimated Creatinine Clearance: 41.9 mL/min (based on SCr of 0.9 mg/dL). according to latest data. Based on current GFR, CKD stage is stage 3 - GFR 30-59.  Monitor UOP and serial BMP and adjust therapy as needed. Renally dose meds. Avoid nephrotoxic medications and procedures.    Debility  Pt is bed bound and nonverbal at baseline per daughter. Has a sacral wound prior to admission.  -Turn Q2H  -Consult wound care.      VTE Risk Mitigation (From admission, onward)           Ordered      enoxaparin injection 40 mg  Daily         11/12/23 0157     IP VTE HIGH RISK PATIENT  Once         11/12/23 0157     Place sequential compression device  Until discontinued         11/12/23 0157                         On 11/12/2023, patient should be placed in hospital observation services under my care in collaboration with Gena Avendano DO.          Milla Townsend NP  Department of Hospital Medicine  [facility] - Intensive Care ([place] [id number])    N/A  Family history non-contributory to current problem

## 2023-11-12 NOTE — PLAN OF CARE
"    Gloria received a secure chat from Dr. Paul. Pt was brought in by her daughter because pt can not admit to [facility] until Monday 11- due to missing documents and bank statement.   As per secure chat , pt's daughter informed  that she is unable to care for pt.     Gloria informed Dr. Paul that if pt is medically cleared , we can not admit a medically cleared pt just for  NH placement.    Gloria reached out to [facility] ([phone] Elyssa) EVERARDO Bergeron  stated pt did not complete "any" documents, and  [facility] can not admit pt without the proper documentation.  Elyssa stated pt and her daughter must reach out to admission on Monday 11- and speak to admission. She stated there is nothing she can do to assist pt, as admission is closed until 11-.    Gloria reached out to pt's daughter  Yamileth Logiudice ( [phone]). Yamileth stated pt's was transferring  from [facility],  and the [facility] submitted a transfer request, and as per Yamileth, to [facility], and pt was accepted .      Gloria asked Yamileth if she completed of the necessary paper work for admission into [facility]?  Yamileth stated no, she stated she spoke to [facility] this week,  and she informed [facility] that she would present with pt on  11-.      Yamileth  also  stated on  11-, she was  told  by [facility]) that pt's financial records were  needed. ) Yamileth stated she intended to bring the records, when she presented with pt on  11-.  Yamileth also stated  [facility] ([facility]) did not inform her that pt's financials needed prior  admission to [facility]    Yamileth stated she believes there was a mix  up in admission . She stated Rosario from admission lost both of her grandparents in on month, which may have caused Rosario to lose focus and forget to inform staff she and pt were presenting on 11-)         Gloria informed Yamileth that " [facility] will not admit any pt without the proper documentation and she will have  to  reach out to [facility] on Monday 11-.      [place] LMSW  Case Management  Emergency Department  [phone]

## 2023-11-12 NOTE — ASSESSMENT & PLAN NOTE
Pt is bed bound and nonverbal at baseline per daughter. Has a sacral wound prior to admission.  -Turn Q2H  -Consult wound care.

## 2023-11-12 NOTE — ED TRIAGE NOTES
"Annmarie Gallardo, a 78 y.o. female presents to the ED w/ complaint of     Wound Check (Pt transferring from facility in [place] to [facility], pt's daughter informed pt had sacral ulcer. [facility] will not accept pt currently due to paperwork so "she has no where to go right now." Hx parkinson's, dementia.)       Triage note:  Chief Complaint   Patient presents with    Wound Check     Pt transferring from facility in [place] to [facility], pt's daughter informed pt had sacral ulcer. [facility] will not accept pt currently due to paperwork so "she has no where to go right now." Pt wheelchair bound, nonverbal at baseline. Hx parkinson's, dementia.     Review of patient's allergies indicates:  No Known Allergies  No past medical history on file.  " Full range of motion of upper and lower extremities, no joint tenderness/swelling.

## 2023-11-12 NOTE — PLAN OF CARE
Annmarie Gallardo is a 78 y.o. with a PMHx of parkinson's, lewy-body dementia, HTN, GERD, and CKD3 who presents because she was unable to be admitted to NH and daughter cannot care for her.  Vital signs stable, blood work unremarkable.  Patient awaiting placement.       Recent Labs   Lab 11/12/23  0452   WBC 7.18   RBC 3.71*   HGB 11.1*   HCT 35.4*      MCV 95   MCH 29.9   MCHC 31.4*             Component Ref Range & Units 04:52 1 d ago   Sodium 136 - 145 mmol/L 143  143    Potassium 3.5 - 5.1 mmol/L 4.0  4.7    Chloride 95 - 110 mmol/L 110  108    CO2 23 - 29 mmol/L 24  24    Glucose 70 - 110 mg/dL 97  85    BUN 8 - 23 mg/dL 25 High   31 High     Creatinine 0.5 - 1.4 mg/dL 0.8  0.9    Calcium 8.7 - 10.5 mg/dL 9.1  9.5    Total Protein 6.0 - 8.4 g/dL 5.5 Low   6.7    Albumin 3.5 - 5.2 g/dL 2.7 Low   3.2 Low     Total Bilirubin 0.1 - 1.0 mg/dL 0.4  0.4 CM    Comment: For infants and newborns, interpretation of results should be based   on gestational age, weight and in agreement with clinical   observations.     Premature Infant recommended reference ranges:   Up to 24 hours.............<8.0 mg/dL   Up to 48 hours............<12.0 mg/dL   3-5 days..................<15.0 mg/dL   6-29 days.................<15.0 mg/dL    Alkaline Phosphatase 55 - 135 U/L 56  67    AST 10 - 40 U/L 12  18    ALT 10 - 44 U/L <5 Low   7 Low     eGFR >60 mL/min/1.73 m^2 >60.0  >60.0    Anion Gap 8 - 16 mmol/L 9  11    Resulting Agency  [facility]        All the home medication restarted.  Started patient on maintenance fluid.  Discussed with daughter has no active issues.

## 2023-11-12 NOTE — ASSESSMENT & PLAN NOTE
Creatine stable for now. BMP reviewed- noted Estimated Creatinine Clearance: 41.9 mL/min (based on SCr of 0.9 mg/dL). according to latest data. Based on current GFR, CKD stage is stage 3 - GFR 30-59.  Monitor UOP and serial BMP and adjust therapy as needed. Renally dose meds. Avoid nephrotoxic medications and procedures.

## 2023-11-12 NOTE — SUBJECTIVE & OBJECTIVE
History reviewed. No pertinent past medical history.    History reviewed. No pertinent surgical history.    Review of patient's allergies indicates:  No Known Allergies    No current facility-administered medications on file prior to encounter.     Current Outpatient Medications on File Prior to Encounter   Medication Sig    aspirin (ECOTRIN) 81 MG EC tablet Take 81 mg by mouth once daily.    carbidopa-levodopa  mg (SINEMET)  mg per tablet Take 1 tablet by mouth 3 (three) times daily.    cyanocobalamin (VITAMIN B-12) 1000 MCG tablet Take 100 mcg by mouth once daily.    LISINOPRIL ORAL Take by mouth.    loratadine (CLARITIN) 10 mg tablet Take 10 mg by mouth once daily.    OMEPRAZOLE ORAL Take by mouth.    vitamin E 1000 UNIT capsule Take 1,000 Units by mouth 2 (two) times daily.     Family History    None       Tobacco Use    Smoking status: Never    Smokeless tobacco: Never   Substance and Sexual Activity    Alcohol use: No    Drug use: No    Sexual activity: Not on file     Review of Systems   Unable to perform ROS: Dementia     Objective:     Vital Signs (Most Recent):  Temp: 98.1 °F (36.7 °C) (11/11/23 2058)  Pulse: 72 (11/12/23 0028)  Resp: 15 (11/12/23 0028)  BP: (!) 156/91 (11/12/23 0028)  SpO2: 98 % (11/12/23 0028) Vital Signs (24h Range):  Temp:  [98.1 °F (36.7 °C)] 98.1 °F (36.7 °C)  Pulse:  [71-82] 72  Resp:  [14-20] 15  SpO2:  [98 %-99 %] 98 %  BP: (126-159)/() 156/91     Weight: 57.8 kg (127 lb 6.8 oz)  Body mass index is 22.57 kg/m².     Physical Exam  Vitals and nursing note reviewed.   Constitutional:       General: She is awake. She is not in acute distress.     Appearance: She is underweight. She is not ill-appearing, toxic-appearing or diaphoretic.   HENT:      Head: Normocephalic and atraumatic.      Nose: Nose normal.      Mouth/Throat:      Mouth: Mucous membranes are dry.      Pharynx: Oropharynx is clear.   Eyes:      Extraocular Movements: Extraocular movements intact.       Pupils: Pupils are equal, round, and reactive to light.   Neck:      Trachea: Trachea normal.   Cardiovascular:      Rate and Rhythm: Normal rate and regular rhythm.      Pulses: Normal pulses.      Heart sounds: Normal heart sounds.   Pulmonary:      Effort: Pulmonary effort is normal. No respiratory distress.      Breath sounds: Normal breath sounds. No wheezing, rhonchi or rales.   Abdominal:      General: Abdomen is flat. Bowel sounds are normal. There is no distension.      Palpations: Abdomen is soft.      Tenderness: There is no abdominal tenderness. There is no guarding.   Musculoskeletal:      Cervical back: Full passive range of motion without pain and normal range of motion.      Right lower leg: No edema.      Left lower leg: No edema.      Right foot: Foot drop present.      Left foot: Foot drop present.      Comments: Contractions noted to BUE and BLE   Skin:     General: Skin is warm and dry.      Capillary Refill: Capillary refill takes less than 2 seconds.      Coloration: Skin is pale.      Findings: Wound present.      Comments: Wound noted sacral area.   Neurological:      Mental Status: She is alert. Mental status is at baseline.      Comments: Alert but mostly nonverbal. Will occasionally whisper yes or no. Unable to follow commands.    Psychiatric:         Speech: She is noncommunicative.         Cognition and Memory: Cognition is impaired.              CRANIAL NERVES     CN III, IV, VI   Pupils are equal, round, and reactive to light.         Significant Labs: All pertinent labs within the past 24 hours have been reviewed.  BMP:   Recent Labs   Lab 11/11/23  2244   GLU 85      K 4.7      CO2 24   BUN 31*   CREATININE 0.9   CALCIUM 9.5     CBC:   Recent Labs   Lab 11/11/23  2244 11/11/23  2254   WBC 9.27  --    HGB 12.3  --    HCT 39.6 38     --        Significant Imaging: I have reviewed all pertinent imaging results/findings within the past 24 hours.

## 2023-11-12 NOTE — NURSING
Nurses Note -- 4 Eyes      11/12/2023   2:53 AM      Skin assessed during: Admit      [] No Altered Skin Integrity Present    []Prevention Measures Documented      [x] Yes- Altered Skin Integrity Present or Discovered   [] LDA Added if Not in Epic (Describe Wound)   [x] New Altered Skin Integrity was Present on Admit and Documented in LDA   [] Wound Image Taken    Wound Care Consulted? Yes    Attending Nurse:  Maria Alejandra Brooks RN    Second RN/Staff Member:  Radha Nye RN

## 2023-11-12 NOTE — PLAN OF CARE
"[facility] - Intensive Care ([facility]-)  Initial Discharge Assessment       Primary Care Provider: Raymond Bess MD    Admission Diagnosis: Severe Lewy body dementia, unspecified whether behavioral, psychotic, or mood disturbance or anxiety [G31.83, F02.C0]    Admission Date: 11/11/2023  Expected Discharge Date: 11/13/2023    Transition of Care Barriers: (P) Social    Payor: HUMANA MANAGED MEDICARE / Plan: HUMANA MEDICARE HMO / Product Type: Capitation /     Extended Emergency Contact Information  Primary Emergency Contact: Yamileth Gallardo  Mobile Phone: [phone]  Relation: Daughter   needed? No    Discharge Plan A: (P) New Nursing Home placement - care home care facility  Discharge Plan B: (P) Home with family      [id number] [place] - [address]  [address] [address]  Phone: [phone] Fax: [phone]      Initial Assessment (most recent)       Adult Discharge Assessment - 11/12/23 1400          Discharge Assessment    Assessment Type Discharge Planning Assessment (P)      Confirmed/corrected address, phone number and insurance Yes (P)      Confirmed Demographics Correct on Facesheet (P)      Source of Information patient;family (P)      If unable to respond/provide information was family/caregiver contacted? Yes (P)      Contact Name/Number Yamileth Gallardo (Daughter) [phone] (P)      When was your last doctors appointment? -- (P)    "No idea"    Does patient/caregiver understand observation status Yes (P)      Communicated MELCHOR with patient/caregiver Yes (P)      Reason For Admission "no where for her to go" (P)      People in Home -- (P)    [facility] Patient owns a home in [place], patient has not resided at her home since July 2020.    Facility Arrived From: [place], [place] (P)      Do you expect to return to your current " living situation? No (P)      Do you have help at home or someone to help you manage your care at home? No (P)      Prior to hospitilization cognitive status: Not Oriented to Person;Not Oriented to Time;Not Oriented to Place (P)      Current cognitive status: Not Oriented to Person;Not Oriented to Place;Not Oriented to Time (P)      Walking or Climbing Stairs ambulation difficulty, dependent;transferring difficulty, dependent;stair climbing difficulty, dependent (P)      Mobility Management bedbound (P)      Dressing/Bathing bathing difficulty, dependent;dressing difficulty, dependent (P)      Dressing/Bathing Management totally dependent (P)      Do you have any problems with: Errands/Grocery;Needs other help (P)      Specify other help totally dependent (P)      Equipment Currently Used at Home hospital bed (P)    Bedbound NH resident 3+ years    Readmission within 30 days? No (P)      Patient currently being followed by outpatient case management? No (P)      Do you currently have service(s) that help you manage your care at home? No (P)      Do you take prescription medications? Yes (P)      Do you have prescription coverage? Yes (P)      Coverage HUMANA MANAGED MEDICARE - HUMANA MEDICARE HMO (P)      Do you have any problems affording any of your prescribed medications? TBD (P)      Is the patient taking medications as prescribed? yes (P)      Who is going to help you get home at discharge? Yamileth Gallardo (Daughter) [phone] (P)      How do you get to doctors appointments? health plan transportation (P)      Are you on dialysis? No (P)      Do you take coumadin? No (P)      DME Needed Upon Discharge  -- (P)    TBD    Discharge Plan discussed with: Patient;Adult children (P)    Yamileth Gallardo (Daughter) [phone]    Transition of Care Barriers Social (P)      Discharge Plan A New Nursing Home placement - [facility] care facility (P)      Discharge Plan B Home with family (P)         Physical Activity     On average, how many days per week do you engage in moderate to strenuous exercise (like a brisk walk)? 0 days (P)      On average, how many minutes do you engage in exercise at this level? 0 min (P)         Financial Resource Strain    How hard is it for you to pay for the very basics like food, housing, medical care, and heating? Somewhat hard (P)         Housing Stability    In the last 12 months, was there a time when you were not able to pay the mortgage or rent on time? No (P)      In the last 12 months, how many places have you lived? 2 (P)      In the last 12 months, was there a time when you did not have a steady place to sleep or slept in a shelter (including now)? No (P)         Transportation Needs    In the past 12 months, has lack of transportation kept you from medical appointments or from getting medications? No (P)      In the past 12 months, has lack of transportation kept you from meetings, work, or from getting things needed for daily living? No (P)         Food Insecurity    Within the past 12 months, you worried that your food would run out before you got the money to buy more. Never true (P)      Within the past 12 months, the food you bought just didn't last and you didn't have money to get more. Never true (P)         Social Connections    In a typical week, how many times do you talk on the phone with family, friends, or neighbors? Three times a week (P)      How often do you get together with friends or relatives? Three times a week (P)      How often do you attend Restoration or Zoroastrian services? Never (P)      Do you belong to any clubs or organizations such as Restoration groups, unions, fraternal or athletic groups, or school groups? No (P)      Are you , , , , never , or living with a partner?  (P)         Alcohol Use    Q1: How often do you have a drink containing alcohol? Never (P)      Q2: How many drinks containing alcohol do you have on a  typical day when you are drinking? Patient does not drink (P)      Q3: How often do you have six or more drinks on one occasion? Never (P)                  Discharge Plan A and Plan B have been determined by review of patient's clinical status, future medical and therapeutic needs, and coverage/benefits for post-acute care in coordination with multidisciplinary team members.                   NA Mathias, LMSW  [facility]  Case Management  Ext. [phone]

## 2023-11-12 NOTE — NURSING
Pt arrived to room via stretcher and in no immediate distress. Pt currently sleeping, unable to be immediately aroused by voice or touch. Pulses assessed and chest rise and fall noted. VS stable. Bed alarm set, bed locked and in lowest position, side rails up x 4.

## 2023-11-12 NOTE — ED NOTES
Nurses Note -- 4 Eyes      11/11/2023   10:41 PM      Skin assessed during: Admit    Kathy  [] No Altered Skin Integrity Present    []Prevention Measures Documented      [x] Yes- Altered Skin Integrity Present or Discovered   [x] LDA Added if Not in Epic (Describe Wound)   [x] New Altered Skin Integrity was Present on Admit and Documented in LDA   [x] Wound Image Taken    Wound Care Consulted? Yes    Attending Nurse:  EVERARDO Ken    Second RN/Staff Member:  EVERARDO Mart

## 2023-11-12 NOTE — ED NOTES
I-STAT Chem-8+ Results:   Value Reference Range   Sodium 140 136-145 mmol/L   Potassium  5.8 3.5-5.1 mmol/L   Chloride 108  mmol/L   Ionized Calcium 1.05 1.06-1.42 mmol/L   CO2 (measured) 26 23-29 mmol/L   Glucose 90  mg/dL   BUN 42 6-30 mg/dL   Creatinine 1.0 0.5-1.4 mg/dL   Hematocrit 38 36-54%

## 2023-11-13 LAB
ALBUMIN SERPL BCP-MCNC: 2.4 G/DL (ref 3.5–5.2)
ALP SERPL-CCNC: 54 U/L (ref 55–135)
ALT SERPL W/O P-5'-P-CCNC: <5 U/L (ref 10–44)
ANION GAP SERPL CALC-SCNC: 8 MMOL/L (ref 8–16)
AST SERPL-CCNC: 11 U/L (ref 10–40)
BILIRUB SERPL-MCNC: 0.3 MG/DL (ref 0.1–1)
BUN SERPL-MCNC: 27 MG/DL (ref 8–23)
CALCIUM SERPL-MCNC: 8.2 MG/DL (ref 8.7–10.5)
CHLORIDE SERPL-SCNC: 108 MMOL/L (ref 95–110)
CO2 SERPL-SCNC: 24 MMOL/L (ref 23–29)
CREAT SERPL-MCNC: 0.9 MG/DL (ref 0.5–1.4)
EST. GFR  (NO RACE VARIABLE): >60 ML/MIN/1.73 M^2
GLUCOSE SERPL-MCNC: 88 MG/DL (ref 70–110)
POTASSIUM SERPL-SCNC: 4 MMOL/L (ref 3.5–5.1)
PROT SERPL-MCNC: 4.9 G/DL (ref 6–8.4)
SODIUM SERPL-SCNC: 140 MMOL/L (ref 136–145)

## 2023-11-13 PROCEDURE — 63600175 PHARM REV CODE 636 W HCPCS: Performed by: NURSE PRACTITIONER

## 2023-11-13 PROCEDURE — 36415 COLL VENOUS BLD VENIPUNCTURE: CPT | Performed by: NURSE PRACTITIONER

## 2023-11-13 PROCEDURE — 25000003 PHARM REV CODE 250: Performed by: STUDENT IN AN ORGANIZED HEALTH CARE EDUCATION/TRAINING PROGRAM

## 2023-11-13 PROCEDURE — 25000003 PHARM REV CODE 250: Performed by: NURSE PRACTITIONER

## 2023-11-13 PROCEDURE — 80053 COMPREHEN METABOLIC PANEL: CPT | Performed by: NURSE PRACTITIONER

## 2023-11-13 PROCEDURE — 96372 THER/PROPH/DIAG INJ SC/IM: CPT | Performed by: NURSE PRACTITIONER

## 2023-11-13 PROCEDURE — G0378 HOSPITAL OBSERVATION PER HR: HCPCS

## 2023-11-13 RX ORDER — TAMSULOSIN HYDROCHLORIDE 0.4 MG/1
0.4 CAPSULE ORAL DAILY
Qty: 30 CAPSULE | Refills: 2 | Status: ON HOLD | OUTPATIENT
Start: 2023-11-14 | End: 2024-02-16 | Stop reason: HOSPADM

## 2023-11-13 RX ADMIN — ENOXAPARIN SODIUM 40 MG: 40 INJECTION SUBCUTANEOUS at 04:11

## 2023-11-13 RX ADMIN — CARBIDOPA AND LEVODOPA 1 SPLIT TABLET: 25; 100 TABLET ORAL at 08:11

## 2023-11-13 RX ADMIN — Medication 1000 UNITS: at 08:11

## 2023-11-13 RX ADMIN — SENNOSIDES AND DOCUSATE SODIUM 1 TABLET: 8.6; 5 TABLET ORAL at 08:11

## 2023-11-13 RX ADMIN — CARBIDOPA AND LEVODOPA 1 TABLET: 25; 100 TABLET ORAL at 08:11

## 2023-11-13 RX ADMIN — PANTOPRAZOLE SODIUM 20 MG: 20 TABLET, DELAYED RELEASE ORAL at 08:11

## 2023-11-13 RX ADMIN — TAMSULOSIN HYDROCHLORIDE 0.4 MG: 0.4 CAPSULE ORAL at 08:11

## 2023-11-13 RX ADMIN — ASPIRIN 81 MG: 81 TABLET, COATED ORAL at 08:11

## 2023-11-13 RX ADMIN — CYANOCOBALAMIN TAB 1000 MCG 1000 MCG: 1000 TAB at 08:11

## 2023-11-13 RX ADMIN — CETIRIZINE HYDROCHLORIDE 10 MG: 10 TABLET, FILM COATED ORAL at 08:11

## 2023-11-13 RX ADMIN — CARBIDOPA AND LEVODOPA 1 TABLET: 25; 100 TABLET ORAL at 04:11

## 2023-11-13 NOTE — PROGRESS NOTES
[facility] - Intensive Care ([address] Medicine  Progress Note    Patient Name: Annmarie Gallardo  MRN: [id number]  Patient Class: OP- Observation   Admission Date: 11/11/2023  Length of Stay: 0 days  Attending Physician: Kamala Evans MD  Primary Care Provider: Raymond Bess MD        Subjective:     Principal Problem:Problem related to discharge planning        HPI:  Annmarie Gallardo is a 78 y.o. with a PMHx of parkinson's, lewy-body dementia, HTN, GERD, and CKD3 who presents because she was unable to be admitted to NH and daughter cannot care for her. Pt previously resided at a NH in [place] near her other daughter and was brought to [place] to be closer to her daughter, Yamileth. The plan was the patient was to be admitted at [facility] today. Per daughter patient was unable to be admitted due to some missing paperwork, and the paperwork cannot be completed until Monday 11/13/23. The pt requires total care and her daughter states she cannot provide the care at home which is why she brought her here. Pt is nonverbal and bedbound at baseline.     In the ED, VSS, afebrile. CBC and CMP unremarkable. HIV and Hep C non-reactive. Social work consulted in the ED.  contacted [facility] ([phone]) and spoke to EVERARDO Bergeron  who stated  daughter needed to come on Monday to complete admission process.    Overview/Hospital Course:  Annmarie Gallardo is a 78 y.o. with a PMHx of parkinson's, lewy-body dementia, HTN, GERD, and CKD3 who presents because she was unable to be admitted to NH and daughter cannot care for her. Pt previously resided at a NH in [place] near her other daughter and was brought to [place] to be closer to her daughter, Yamileth. The plan was the patient was to be admitted at [facility] today. Per daughter patient was unable to be admitted due to some missing paperwork, and the paperwork cannot be completed until Monday 11/13/23. The pt requires total care  and her daughter states she cannot provide the care at home which is why she brought her here. Pt is nonverbal and bedbound at baseline.      In the ED, VSS, afebrile. CBC and CMP unremarkable. HIV and Hep C non-reactive. Social work consulted in the ED.  contacted [facility] ([phone]) and spoke to EVERARDO Bergeron  who stated  daughter needed to come on Monday to complete admission process.       Interval history:  No overnight events.  Awaiting placement.  No active complaints    Review of Systems   Unable to perform ROS: Dementia     Objective:     Vital Signs (Most Recent):  Temp: 97.9 °F (36.6 °C) (11/13/23 1646)  Pulse: 70 (11/13/23 1646)  Resp: 16 (11/13/23 1646)  BP: 133/67 (11/13/23 1646)  SpO2: 96 % (11/13/23 1646) Vital Signs (24h Range):  Temp:  [97.7 °F (36.5 °C)-98 °F (36.7 °C)] 97.9 °F (36.6 °C)  Pulse:  [63-88] 70  Resp:  [16-20] 16  SpO2:  [93 %-97 %] 96 %  BP: ()/(50-67) 133/67     Weight: 52.8 kg (116 lb 6.5 oz)  Body mass index is 20.62 kg/m².     Physical Exam  Vitals and nursing note reviewed.   Constitutional:       General: She is awake. She is not in acute distress.     Appearance: She is underweight. She is not ill-appearing, toxic-appearing or diaphoretic.   HENT:      Head: Normocephalic and atraumatic.      Nose: Nose normal.      Mouth/Throat:      Mouth: Mucous membranes are dry.      Pharynx: Oropharynx is clear.   Eyes:      Extraocular Movements: Extraocular movements intact.      Pupils: Pupils are equal, round, and reactive to light.   Neck:      Trachea: Trachea normal.   Cardiovascular:      Rate and Rhythm: Normal rate and regular rhythm.      Pulses: Normal pulses.      Heart sounds: Normal heart sounds.   Pulmonary:      Effort: Pulmonary effort is normal. No respiratory distress.      Breath sounds: Normal breath sounds. No wheezing, rhonchi or rales.   Abdominal:      General: Abdomen is flat. Bowel sounds are normal. There is no distension.       Palpations: Abdomen is soft.      Tenderness: There is no abdominal tenderness. There is no guarding.   Musculoskeletal:      Cervical back: Full passive range of motion without pain and normal range of motion.      Right lower leg: No edema.      Left lower leg: No edema.      Right foot: Foot drop present.      Left foot: Foot drop present.      Comments: Contractions noted to BUE and BLE   Skin:     General: Skin is warm and dry.      Capillary Refill: Capillary refill takes less than 2 seconds.      Coloration: Skin is pale.      Findings: Wound present.      Comments: Wound noted sacral area.   Neurological:      Mental Status: She is alert. Mental status is at baseline.      Comments: Alert but mostly nonverbal. Will occasionally whisper yes or no. Unable to follow commands.    Psychiatric:         Speech: She is noncommunicative.         Cognition and Memory: Cognition is impaired.              CRANIAL NERVES     CN III, IV, VI   Pupils are equal, round, and reactive to light.         Significant Labs: All pertinent labs within the past 24 hours have been reviewed.  BMP:   Recent Labs   Lab 11/12/23  0452 11/13/23  0207   GLU 97 88    140   K 4.0 4.0    108   CO2 24 24   BUN 25* 27*   CREATININE 0.8 0.9   CALCIUM 9.1 8.2*   MG 1.9  --        CBC:   Recent Labs   Lab 11/11/23  2244 11/11/23  2254 11/12/23  0452   WBC 9.27  --  7.18   HGB 12.3  --  11.1*   HCT 39.6 38 35.4*     --  244         Significant Imaging: I have reviewed all pertinent imaging results/findings within the past 24 hours.    Assessment/Plan:      * Problem related to discharge planning  Presents with her daughter. Per daughter she was unable to complete the patient's admission at [facility] due to some missing paperwork and cannot complete the paperwork until Monday 11/13/23. Pt is bedbound and requires total care and her daughter states she cannot provide the care at home which is why she brought her here.    -Social work consulted in the ED who contacted [facility] ([phone]) and spoke to EVERARDO Bergeron. Pt daughter Yamileth will need to contact admission  and bring appropriate paperwork and financial records to facilitate transfer.    HTN (hypertension)  -Chronic problem. No longer on antihypertensives.   -Monitor BP closely.  Will utilize p.r.n. blood pressure medication only if patient's blood pressure greater than 180/110 and she develops symptoms such as worsening chest pain or shortness of breath.    Severe Lewy body dementia  Dementia is controlled currently. Continue home dementia meds and non-pharmacologic interventions to prevent delirium (Activity during day, opening blinds, providing glasses/hearing aids, and up in chair during daytime). Use PRN anti-psychotics to prevent behavior of self harm during sundowning, and avoid narcotics and benzos unless absolutely necessary. PRN anti-psychotics prescribed to avoid self harm behaviors.    Parkinson's disease  Chronic issue, continue home sinemet.    Gastroesophageal reflux disease without esophagitis  Chronic issue, continue PPI.     CKD (chronic kidney disease), stage III  Creatine stable for now. BMP reviewed- noted Estimated Creatinine Clearance: 41.9 mL/min (based on SCr of 0.9 mg/dL). according to latest data. Based on current GFR, CKD stage is stage 3 - GFR 30-59.  Monitor UOP and serial BMP and adjust therapy as needed. Renally dose meds. Avoid nephrotoxic medications and procedures.    Debility  Pt is bed bound and nonverbal at baseline per daughter. Has a sacral wound prior to admission.  -Turn Q2H  -wound care on board      VTE Risk Mitigation (From admission, onward)           Ordered     enoxaparin injection 40 mg  Daily         11/12/23 0157     IP VTE HIGH RISK PATIENT  Once         11/12/23 0157     Place sequential compression device  Until discontinued         11/12/23 0157                    Discharge Planning   MELCHOR: 11/13/2023      Code Status: DNR   Is the patient medically ready for discharge?:     Reason for patient still in hospital (select all that apply): Pending disposition  Discharge Plan A: (P) New Nursing Home placement - nursing home care facility                  Kamala Evans MD  Department of Hospital Medicine   [facility] - Intensive Care ([place]-16)

## 2023-11-13 NOTE — HOSPITAL COURSE
Annmarie Gallardo is a 78 y.o. with a PMHx of parkinson's, lewy-body dementia, HTN, GERD, and CKD3 who presents because she was unable to be admitted to NH and daughter cannot care for her. Pt previously resided at a NH in [place] near her other daughter and was brought to [place] to be closer to her daughter, Yamileth. The plan was the patient was to be admitted at [facility] today. Per daughter patient was unable to be admitted due to some missing paperwork, and the paperwork cannot be completed until Monday 11/13/23. The pt requires total care and her daughter states she cannot provide the care at home which is why she brought her here. Pt is nonverbal and bedbound at baseline.      In the ED, VSS, afebrile. CBC and CMP unremarkable. HIV and Hep C non-reactive. Social work consulted in the ED.  contacted [facility] ([phone]) and spoke to EVERARDO Bergeron  who stated  daughter needed to come after weekend to complete admission process.  Hospital course was unremarkable.    Annmarie Gallardo was deemed appropriate for discharge.  At the time of discharge, the plan of care was discussed with patient/family, who were agreeable and amenable.  All medications were verbally reviewed and discussed with the patient/family.  They endorsed understanding and compliance.  Informed them that these changes will be available on their discharge paperwork, as well.  Outpatient follow-ups were scheduled, or if unable to be scheduled ambulatory referrals were placed, and ER return precautions were given.  All questions were answered to the patient/family's satisfaction.  She was subsequently discharged in stable condition.

## 2023-11-13 NOTE — CONSULTS
[facility] - Intensive Care ([place] [address])  Wound Care    Patient Name:  Annmarie Gallardo   MRN:  [id number]  Date: 11/13/2023  Diagnosis: Problem related to discharge planning    History:     History reviewed. No pertinent past medical history.    Social History     Socioeconomic History    Marital status:    Tobacco Use    Smoking status: Never    Smokeless tobacco: Never   Substance and Sexual Activity    Alcohol use: No    Drug use: No     Social Determinants of Health     Financial Resource Strain: Medium Risk (11/12/2023)    Overall Financial Resource Strain (CARDIA)     Difficulty of Paying Living Expenses: Somewhat hard   Food Insecurity: No Food Insecurity (11/12/2023)    Hunger Vital Sign     Worried About Running Out of Food in the Last Year: Never true     Ran Out of Food in the Last Year: Never true   Transportation Needs: No Transportation Needs (11/12/2023)    PRAPARE - Transportation     Lack of Transportation (Medical): No     Lack of Transportation (Non-Medical): No   Physical Activity: Inactive (11/12/2023)    Exercise Vital Sign     Days of Exercise per Week: 0 days     Minutes of Exercise per Session: 0 min   Social Connections: Socially Isolated (11/12/2023)    Social Connection and Isolation Panel [NHANES]     Frequency of Communication with Friends and Family: Three times a week     Frequency of Social Gatherings with Friends and Family: Three times a week     Attends Religion Services: Never     Active Member of Clubs or Organizations: No     Marital Status:    Housing Stability: Low Risk  (11/12/2023)    Housing Stability Vital Sign     Unable to Pay for Housing in the Last Year: No     Number of Places Lived in the Last Year: 2     Unstable Housing in the Last Year: No       Precautions:     Allergies as of 11/11/2023    (No Known Allergies)       [facility] Assessment Details/Treatment   Patient seen for wound care consultation to sacrum and right toe.   Reviewed chart for this  encounter.   See Flow Sheet for findings.      Per chart review. Annmarie Gallardo is a 78 y.o. with a PMHx of parkinson's, lewy-body dementia, HTN, GERD, and CKD3 who presents because she was unable to be admitted to NH and daughter cannot care for her. Wound etiology likely stemming from debility. Wound care cleansed wound with soap and water. Mepilex applied to protect against friction and shearing. Mepilex applied to the right toe. Wound care to recommend a waffle mattress. Heels clear at this time.       RECOMMENDATIONS  Recommendations made to primary team for above plan via secured chat. Wound Care to follow up. Orders placed.     Discussed POC with patient and primary RN.   See EMR for orders & patient education.  Discussed POC with primary team.  RAMIRO Notified.     Nursing to continue care.  Nursing to maintain pressure injury prevention interventions.  Contact wound care for any further questions.       11/13/23 1500   WOCN Assessment   WOCN Total Time (mins) 30   Visit Date 11/13/23   Visit Time 1500   Consult Type New   WOCN Speciality Wound   Intervention assessed;changed;applied;chart review;coordination of care;orders   Teaching on-going        Altered Skin Integrity 11/12/23 0250 Right anterior Toe, second Partial thickness tissue loss. Shallow open ulcer with a red or pink wound bed, without slough. Intact or Open/Ruptured Serum-filled blister.   Date First Assessed/Time First Assessed: 11/12/23 0250   Altered Skin Integrity Present on Admission - Did Patient arrive to the hospital with altered skin?: yes  Side: Right  Orientation: anterior  Location: Toe, second  Description of Altered Skin I...   Wound Image    Dressing Appearance Dry;Intact;Clean   Drainage Amount None   Appearance Intact;Black;Yellow   Dressing Applied;Foam        Altered Skin Integrity 11/12/23 0254 midline Sacral spine Full thickness tissue loss with exposed bone, tendon, or muscle. Often includes undermining and tunneling. May  extend into muscle and/or supporting structures.   Date First Assessed/Time First Assessed: 11/12/23 0254   Altered Skin Integrity Present on Admission - Did Patient arrive to the hospital with altered skin?: yes  Orientation: midline  Location: Sacral spine  Description of Altered Skin Integrity: Ful...   Wound Image    Description of Altered Skin Integrity Full thickness tissue loss with exposed bone, tendon, or muscle. Often includes undermining and tunneling. May extend into muscle and/or supporting structures.   Dressing Appearance Dry;Intact;Clean   Drainage Amount Small   Drainage Characteristics/Odor Serosanguineous   Appearance Pink;Red   Tissue loss description Full thickness   Wound Length (cm) 4 cm   Wound Width (cm) 4 cm   Wound Depth (cm) 0.3 cm   Wound Volume (cm^3) 4.8 cm^3   Wound Surface Area (cm^2) 16 cm^2   Care Cleansed with:;Soap and water   Dressing Applied;Foam   Dressing Change Due 11/18/23 11/13/2023

## 2023-11-13 NOTE — ASSESSMENT & PLAN NOTE
Pt is bed bound and nonverbal at baseline per daughter. Has a sacral wound prior to admission.  -Turn Q2H  -wound care on board

## 2023-11-13 NOTE — PLAN OF CARE
NURSING HOME ORDERS    11/13/2023  [facility]  KRISTYN TORRES - INTENSIVE CARE ([facility]  [address]  [facility] [address]  Dept: [phone]  Loc: [phone]     Admit to Nursing Home:      Diagnoses:  Active Hospital Problems    Diagnosis  POA    *Problem related to discharge planning [Z02.9]  Not Applicable    CKD (chronic kidney disease), stage III [N18.30]  Yes    Gastroesophageal reflux disease without esophagitis [K21.9]  Yes    Parkinson's disease [G20.A1]  Yes    Severe Lewy body dementia [G31.83, F02.C0]  Yes    HTN (hypertension) [I10]  Yes    Debility [R53.81]  Yes      Resolved Hospital Problems   No resolved problems to display.       Patient is homebound due to:  Problem related to discharge planning    Allergies:  Review of patient's allergies indicates:   Allergen Reactions    Garlic     Namenda [memantine]     Oxybutynin        Vitals:  Routine    Diet: regular diet    Activities:   Activity as tolerated    Goals of Care Treatment Preferences:  Code Status: DNR          Nursing Precautions:  Aspiration  and Fall    Consults:   PT and OT to evaluate and treat as needed.                       Medications: Discontinue all previous medication orders, if any. See new list below.     Medication List        START taking these medications      tamsulosin 0.4 mg Cap  Commonly known as: FLOMAX  Take 1 capsule (0.4 mg total) by mouth once daily.  Start taking on: November 14, 2023            CONTINUE taking these medications      aspirin 81 MG EC tablet  Commonly known as: ECOTRIN  Take 81 mg by mouth once daily.     carbidopa-levodopa  mg  mg per tablet  Commonly known as: SINEMET  Take 1 tablet by mouth 3 (three) times daily.     cyanocobalamin 1000 MCG tablet  Commonly known as: VITAMIN B-12  Take 100 mcg by mouth once daily.     LISINOPRIL ORAL  Take by mouth.     loratadine 10 mg tablet  Commonly known as: CLARITIN  Take 10 mg by mouth once daily.      OMEPRAZOLE ORAL  Take by mouth.     vitamin E 1000 UNIT capsule  Take 1,000 Units by mouth 2 (two) times daily.                Immunizations Administered as of 11/13/2023       No immunizations on file.                  _________________________________  Kamala Evans MD  11/13/2023

## 2023-11-13 NOTE — Clinical Note
CHW met with patient/family at bedside. Patient experience rounding completed and reviewed the following.     Do you know your discharge plan? Yes or No,    If yes, what is the plan? (Home, Home Health, Rehab, SNF, LTAC, or Other)     Have you discussed your needs and preferences with your SW/CM? Yes or No     If you are discharging home, do you have help at home? Yes or No  Do you think you will need help additional at home at discharge? Yes or No     Do you currently have difficulty keeping up with bills, affording medicine or buying food? Yes or No    Assigned SW/CM notified of any patient/family needs or concerns. Appropriate resources provided to address patient's needs.

## 2023-11-13 NOTE — SUBJECTIVE & OBJECTIVE
Interval history:  No overnight events.  Awaiting placement.  No active complaints    Review of Systems   Unable to perform ROS: Dementia     Objective:     Vital Signs (Most Recent):  Temp: 97.9 °F (36.6 °C) (11/13/23 1646)  Pulse: 70 (11/13/23 1646)  Resp: 16 (11/13/23 1646)  BP: 133/67 (11/13/23 1646)  SpO2: 96 % (11/13/23 1646) Vital Signs (24h Range):  Temp:  [97.7 °F (36.5 °C)-98 °F (36.7 °C)] 97.9 °F (36.6 °C)  Pulse:  [63-88] 70  Resp:  [16-20] 16  SpO2:  [93 %-97 %] 96 %  BP: ()/(50-67) 133/67     Weight: 52.8 kg (116 lb 6.5 oz)  Body mass index is 20.62 kg/m².     Physical Exam  Vitals and nursing note reviewed.   Constitutional:       General: She is awake. She is not in acute distress.     Appearance: She is underweight. She is not ill-appearing, toxic-appearing or diaphoretic.   HENT:      Head: Normocephalic and atraumatic.      Nose: Nose normal.      Mouth/Throat:      Mouth: Mucous membranes are dry.      Pharynx: Oropharynx is clear.   Eyes:      Extraocular Movements: Extraocular movements intact.      Pupils: Pupils are equal, round, and reactive to light.   Neck:      Trachea: Trachea normal.   Cardiovascular:      Rate and Rhythm: Normal rate and regular rhythm.      Pulses: Normal pulses.      Heart sounds: Normal heart sounds.   Pulmonary:      Effort: Pulmonary effort is normal. No respiratory distress.      Breath sounds: Normal breath sounds. No wheezing, rhonchi or rales.   Abdominal:      General: Abdomen is flat. Bowel sounds are normal. There is no distension.      Palpations: Abdomen is soft.      Tenderness: There is no abdominal tenderness. There is no guarding.   Musculoskeletal:      Cervical back: Full passive range of motion without pain and normal range of motion.      Right lower leg: No edema.      Left lower leg: No edema.      Right foot: Foot drop present.      Left foot: Foot drop present.      Comments: Contractions noted to BUE and BLE   Skin:     General: Skin is  warm and dry.      Capillary Refill: Capillary refill takes less than 2 seconds.      Coloration: Skin is pale.      Findings: Wound present.      Comments: Wound noted sacral area.   Neurological:      Mental Status: She is alert. Mental status is at baseline.      Comments: Alert but mostly nonverbal. Will occasionally whisper yes or no. Unable to follow commands.    Psychiatric:         Speech: She is noncommunicative.         Cognition and Memory: Cognition is impaired.              CRANIAL NERVES     CN III, IV, VI   Pupils are equal, round, and reactive to light.         Significant Labs: All pertinent labs within the past 24 hours have been reviewed.  BMP:   Recent Labs   Lab 11/12/23  0452 11/13/23  0207   GLU 97 88    140   K 4.0 4.0    108   CO2 24 24   BUN 25* 27*   CREATININE 0.8 0.9   CALCIUM 9.1 8.2*   MG 1.9  --        CBC:   Recent Labs   Lab 11/11/23  2244 11/11/23  2254 11/12/23  0452   WBC 9.27  --  7.18   HGB 12.3  --  11.1*   HCT 39.6 38 35.4*     --  244         Significant Imaging: I have reviewed all pertinent imaging results/findings within the past 24 hours.

## 2023-11-13 NOTE — PLAN OF CARE
Discharge Plan A and Plan B have been determined by review of patient's clinical status, future medical and therapeutic needs, and coverage/benefits for post-acute care in coordination with multidisciplinary team members.    11/13/23 1657   Post-Acute Status   Post-Acute Authorization Placement   Post-Acute Placement Status Pending post-acute provider review/more information requested   Coverage HU[employer] MANAGED MEDICARE - [employer] MEDICARE HMO   Discharge Plan   Discharge Plan A New Nursing Home placement - senior living care facility   Discharge Plan B Home with family     JAMIE phoned patient's daughter to notify that patient medical cleared to dc to NH. Per patient's daughter, documents (bank statements, award letter) have been sent to SW and [facility] for patient's admission to NH. SW confirmed documentation received. SW asked patient's daughter if any additional information has been requested by [facility]. Per daughter, NH requesting payment, daughter states that she does not know how much payment is. Daughter states that she has not received admission paperwork from [facility].     SW phoned [facility] to request update on docs needed for patient's dc to NH Today. Per Rosario, 142, PASRR, $775 and admission paperwork needed prior to admission to NH. [facility] also requesting clarity regarding bank statement transactions. JAMIE consulted w/  leadership Sugar regarding dc barriers. JAMIE participated in conference call w/ patient's daughter and Sugar regarding dc plan. Patient's daughter confirmed that she will reach out to [facility] to satisfy all requirements of patient admitting to NH by tomorrow.  leadership advised daughter of financial responsibility associated w/ patient remaining in hospital at [facility] ready for dc status. Daughter verbalized understanding.    Patient's dc to NH pending daughter's completing admission paperwork, payment of $775 and clarity regarding bank accounts.      SW will  continue to follow.                NA Mathias, LMSW  [facility]  Case Management  Ext. [phone]

## 2023-11-13 NOTE — ASSESSMENT & PLAN NOTE
Presents with her daughter. Per daughter she was unable to complete the patient's admission at [facility] due to some missing paperwork and cannot complete the paperwork until Monday 11/13/23. Pt is bedbound and requires total care and her daughter states she cannot provide the care at home which is why she brought her here.   -Social work consulted in the ED who contacted [facility] ([phone]) and spoke to EVERARDO Bergeron. Pt daughter Yamileth will need to contact admission  and bring appropriate paperwork and financial records to facilitate transfer.

## 2023-11-14 VITALS
RESPIRATION RATE: 20 BRPM | TEMPERATURE: 98 F | SYSTOLIC BLOOD PRESSURE: 142 MMHG | HEART RATE: 88 BPM | OXYGEN SATURATION: 92 % | WEIGHT: 116.38 LBS | DIASTOLIC BLOOD PRESSURE: 71 MMHG | HEIGHT: 63 IN | BODY MASS INDEX: 20.62 KG/M2

## 2023-11-14 PROBLEM — L89.153 PRESSURE INJURY OF SACRAL REGION, STAGE 3: Status: ACTIVE | Noted: 2023-11-14

## 2023-11-14 LAB
ALBUMIN SERPL BCP-MCNC: 2.6 G/DL (ref 3.5–5.2)
ALP SERPL-CCNC: 55 U/L (ref 55–135)
ALT SERPL W/O P-5'-P-CCNC: <5 U/L (ref 10–44)
ANION GAP SERPL CALC-SCNC: 9 MMOL/L (ref 8–16)
AST SERPL-CCNC: 13 U/L (ref 10–40)
BASOPHILS # BLD AUTO: 0.03 K/UL (ref 0–0.2)
BASOPHILS NFR BLD: 0.5 % (ref 0–1.9)
BILIRUB SERPL-MCNC: 0.5 MG/DL (ref 0.1–1)
BUN SERPL-MCNC: 18 MG/DL (ref 8–23)
CALCIUM SERPL-MCNC: 8.9 MG/DL (ref 8.7–10.5)
CHLORIDE SERPL-SCNC: 106 MMOL/L (ref 95–110)
CO2 SERPL-SCNC: 24 MMOL/L (ref 23–29)
CREAT SERPL-MCNC: 0.7 MG/DL (ref 0.5–1.4)
DIFFERENTIAL METHOD: ABNORMAL
EOSINOPHIL # BLD AUTO: 0.2 K/UL (ref 0–0.5)
EOSINOPHIL NFR BLD: 3.3 % (ref 0–8)
ERYTHROCYTE [DISTWIDTH] IN BLOOD BY AUTOMATED COUNT: 13 % (ref 11.5–14.5)
EST. GFR  (NO RACE VARIABLE): >60 ML/MIN/1.73 M^2
GLUCOSE SERPL-MCNC: 77 MG/DL (ref 70–110)
HCT VFR BLD AUTO: 35.4 % (ref 37–48.5)
HGB BLD-MCNC: 11 G/DL (ref 12–16)
IMM GRANULOCYTES # BLD AUTO: 0.04 K/UL (ref 0–0.04)
IMM GRANULOCYTES NFR BLD AUTO: 0.6 % (ref 0–0.5)
LYMPHOCYTES # BLD AUTO: 0.8 K/UL (ref 1–4.8)
LYMPHOCYTES NFR BLD: 12.1 % (ref 18–48)
MCH RBC QN AUTO: 29.6 PG (ref 27–31)
MCHC RBC AUTO-ENTMCNC: 31.1 G/DL (ref 32–36)
MCV RBC AUTO: 95 FL (ref 82–98)
MONOCYTES # BLD AUTO: 0.5 K/UL (ref 0.3–1)
MONOCYTES NFR BLD: 7.3 % (ref 4–15)
NEUTROPHILS # BLD AUTO: 4.9 K/UL (ref 1.8–7.7)
NEUTROPHILS NFR BLD: 76.2 % (ref 38–73)
NRBC BLD-RTO: 0 /100 WBC
PLATELET # BLD AUTO: 240 K/UL (ref 150–450)
PMV BLD AUTO: 9.9 FL (ref 9.2–12.9)
POTASSIUM SERPL-SCNC: 4.2 MMOL/L (ref 3.5–5.1)
PROT SERPL-MCNC: 5.4 G/DL (ref 6–8.4)
RBC # BLD AUTO: 3.71 M/UL (ref 4–5.4)
SODIUM SERPL-SCNC: 139 MMOL/L (ref 136–145)
WBC # BLD AUTO: 6.45 K/UL (ref 3.9–12.7)

## 2023-11-14 PROCEDURE — 80053 COMPREHEN METABOLIC PANEL: CPT | Performed by: NURSE PRACTITIONER

## 2023-11-14 PROCEDURE — 25000003 PHARM REV CODE 250: Performed by: STUDENT IN AN ORGANIZED HEALTH CARE EDUCATION/TRAINING PROGRAM

## 2023-11-14 PROCEDURE — 99204 OFFICE O/P NEW MOD 45 MIN: CPT | Mod: GW,,, | Performed by: NURSE PRACTITIONER

## 2023-11-14 PROCEDURE — 63600175 PHARM REV CODE 636 W HCPCS: Performed by: STUDENT IN AN ORGANIZED HEALTH CARE EDUCATION/TRAINING PROGRAM

## 2023-11-14 PROCEDURE — G0378 HOSPITAL OBSERVATION PER HR: HCPCS

## 2023-11-14 PROCEDURE — 25000003 PHARM REV CODE 250: Performed by: NURSE PRACTITIONER

## 2023-11-14 PROCEDURE — 99204 PR OFFICE/OUTPT VISIT, NEW, LEVL IV, 45-59 MIN: ICD-10-PCS | Mod: GW,,, | Performed by: NURSE PRACTITIONER

## 2023-11-14 PROCEDURE — 85025 COMPLETE CBC W/AUTO DIFF WBC: CPT | Performed by: NURSE PRACTITIONER

## 2023-11-14 PROCEDURE — 36415 COLL VENOUS BLD VENIPUNCTURE: CPT | Performed by: NURSE PRACTITIONER

## 2023-11-14 RX ADMIN — SENNOSIDES AND DOCUSATE SODIUM 1 TABLET: 8.6; 5 TABLET ORAL at 10:11

## 2023-11-14 RX ADMIN — SODIUM CHLORIDE, POTASSIUM CHLORIDE, SODIUM LACTATE AND CALCIUM CHLORIDE: 600; 310; 30; 20 INJECTION, SOLUTION INTRAVENOUS at 03:11

## 2023-11-14 RX ADMIN — TAMSULOSIN HYDROCHLORIDE 0.4 MG: 0.4 CAPSULE ORAL at 10:11

## 2023-11-14 RX ADMIN — CARBIDOPA AND LEVODOPA 1 TABLET: 25; 100 TABLET ORAL at 10:11

## 2023-11-14 RX ADMIN — Medication 1000 UNITS: at 10:11

## 2023-11-14 RX ADMIN — CARBIDOPA AND LEVODOPA 1 TABLET: 25; 100 TABLET ORAL at 03:11

## 2023-11-14 RX ADMIN — PANTOPRAZOLE SODIUM 20 MG: 20 TABLET, DELAYED RELEASE ORAL at 10:11

## 2023-11-14 RX ADMIN — CYANOCOBALAMIN TAB 1000 MCG 1000 MCG: 1000 TAB at 10:11

## 2023-11-14 RX ADMIN — CETIRIZINE HYDROCHLORIDE 10 MG: 10 TABLET, FILM COATED ORAL at 10:11

## 2023-11-14 RX ADMIN — ASPIRIN 81 MG: 81 TABLET, COATED ORAL at 10:11

## 2023-11-14 NOTE — PLAN OF CARE
Problem: Adult Inpatient Plan of Care  Goal: Plan of Care Review  Outcome: Ongoing, Progressing  Goal: Patient-Specific Goal (Individualized)  Outcome: Ongoing, Progressing  Goal: Absence of Hospital-Acquired Illness or Injury  Outcome: Ongoing, Progressing  Goal: Optimal Comfort and Wellbeing  Outcome: Ongoing, Progressing  Goal: Readiness for Transition of Care  Outcome: Ongoing, Progressing     Pt alert in response to voice, disoriented x 4, with minimal verbiage. Medications given as ordered. Bed locked and in lowest position, purewick in place, LR cont. @ 50mL/hr. Side rails up x 2, bed alarm set, call light within reach.    Problem: Impaired Wound Healing  Goal: Optimal Wound Healing  Outcome: Ongoing, Progressing     Wound care orders in place. Wound care performed. Pt repositioned off wounds. Waffle overlay applied to bed.     Problem: Skin Injury Risk Increased  Goal: Skin Health and Integrity  Outcome: Ongoing, Progressing     Problem: Fall Injury Risk  Goal: Absence of Fall and Fall-Related Injury  Outcome: Ongoing, Progressing

## 2023-11-14 NOTE — PLAN OF CARE
JAMIE completed the LOCET via phone. SW faxed \[facility]\"" to obtain the 142 for NH admission.                 NA Mathias, LMSW  [facility]  Case Management  Ext. [phone]

## 2023-11-14 NOTE — ASSESSMENT & PLAN NOTE
- consult received for evaluation of skin injury.  - pt presents because she was unable to be admitted to NH and daughter cannot care for her.   - pt was transferred from a facility in [place]. Pt is bed bound and incontinent.  - full thickness sacral injury with pink, yellow, and moist wound bed.  - wound appears to have a component of moisture dermatitis as well. Initial media files on admit show macerated wound edges.  - upon assessment, pt found soiled in urine. Female external urinary catheter wasn't suctioning properly. Foam dressing and pads changed.  - florencia surface. Waffle overlay deflated underneath patient. RN to replace with a new waffle.  - wedge for offloading.  - turn q2h.  - current documentation shows a caio score of 12. Nutrition sub scale score of 3. Recommend dietary consult to assist with optimizing nutrition to promote wound healing.   - nursing to maintain pressure injury prevention measures and continue wound care per orders.

## 2023-11-14 NOTE — SUBJECTIVE & OBJECTIVE
Scheduled Meds:   aspirin  81 mg Oral Daily    carbidopa-levodopa  mg  1 tablet Oral TID    And    carbidopa-levodopa 12.5-50 mg  1 split tablet Oral QHS    cetirizine  10 mg Oral Daily    cyanocobalamin  1,000 mcg Oral Daily    enoxparin  40 mg Subcutaneous Daily    pantoprazole  20 mg Oral Daily    senna-docusate 8.6-50 mg  1 tablet Oral BID    tamsulosin  0.4 mg Oral Daily    vitamin E  1,000 Units Oral BID     Continuous Infusions:   lactated ringers 50 mL/hr at 11/14/23 0348     PRN Meds:acetaminophen, acetaminophen, dextrose 10%, dextrose 10%, glucagon (human recombinant), glucose, glucose, melatonin, naloxone, ondansetron, sodium chloride 0.9%    Review of patient's allergies indicates:   Allergen Reactions    Garlic     Namenda [memantine]     Oxybutynin         History reviewed. No pertinent past medical history.  History reviewed. No pertinent surgical history.    Family History    None       Tobacco Use    Smoking status: Never    Smokeless tobacco: Never   Substance and Sexual Activity    Alcohol use: No    Drug use: No    Sexual activity: Not on file     Review of Systems   Skin:  Positive for wound.       Objective:     Vital Signs (Most Recent):  Temp: 97.8 °F (36.6 °C) (11/14/23 1208)  Pulse: 63 (11/14/23 1208)  Resp: 18 (11/14/23 1208)  BP: 117/64 (11/14/23 1208)  SpO2: (!) 94 % (11/14/23 1208) Vital Signs (24h Range):  Temp:  [97.5 °F (36.4 °C)-98.3 °F (36.8 °C)] 97.8 °F (36.6 °C)  Pulse:  [63-76] 63  Resp:  [16-18] 18  SpO2:  [94 %-96 %] 94 %  BP: (111-174)/(59-80) 117/64     Weight: 52.8 kg (116 lb 6.5 oz)  Body mass index is 20.62 kg/m².     Physical Exam  Constitutional:       Appearance: Normal appearance.   Skin:     General: Skin is warm and dry.      Findings: Lesion present.   Neurological:      Mental Status: She is alert.          Laboratory:  All pertinent labs reviewed within the last 24 hours.    Diagnostic Results:  None

## 2023-11-14 NOTE — HPI
Annmarie Gallardo is a 78 year old female with a PMHx of parkinson's, lewy-body dementia, HTN, GERD, and CKD3 who presents because she was unable to be admitted to NH and daughter cannot care for her. Pt previously resided at a NH in [place] near her other daughter and was brought to [place] to be closer to her daughter, Yamileth. The plan was the patient was to be admitted at [facility] today. Per daughter patient was unable to be admitted due to some missing paperwork, and the paperwork cannot be completed until Monday 11/13/23. The pt requires total care and her daughter states she cannot provide the care at home which is why she brought her here. Pt is nonverbal and bedbound at baseline.      In the ED, VSS, afebrile. CBC and CMP unremarkable. HIV and Hep C non-reactive. Social work consulted in the ED.  contacted [facility] ([phone]) and spoke to EVERARDO Bergeron  who stated daughter needed to come on Monday to complete admission process. Patient admitted to hospital medicine service for further management. Skin integrity RAMIRO consulted for evaluation of skin injury.

## 2023-11-14 NOTE — PLAN OF CARE
NURSING HOME ORDERS    11/14/2023  [facility]  KRISTYN TORRES - INTENSIVE CARE ([facility]  [address]  [facility] [address]  Dept: [phone]  Loc: [phone]     Admit to Nursing Home:      Diagnoses:  Active Hospital Problems    Diagnosis  POA    *Problem related to discharge planning [Z02.9]  Not Applicable    CKD (chronic kidney disease), stage III [N18.30]  Yes    Gastroesophageal reflux disease without esophagitis [K21.9]  Yes    Parkinson's disease [G20.A1]  Yes    Severe Lewy body dementia [G31.83, F02.C0]  Yes    HTN (hypertension) [I10]  Yes    Debility [R53.81]  Yes      Resolved Hospital Problems   No resolved problems to display.       Patient is homebound due to:  Problem related to discharge planning    Allergies:  Review of patient's allergies indicates:   Allergen Reactions    Garlic     Namenda [memantine]     Oxybutynin        Vitals:  Routine    Diet: regular diet    Activities:   Activity as tolerated    Goals of Care Treatment Preferences:  Code Status: DNR      Labs:      Nursing Precautions:  Aspiration  and Fall    Consults:    PT and OT to evaluate and treat as needed.                                             Medications: Discontinue all previous medication orders, if any. See new list below.     Medication List        START taking these medications      tamsulosin 0.4 mg Cap  Commonly known as: FLOMAX  Take 1 capsule (0.4 mg total) by mouth once daily.            CONTINUE taking these medications      aspirin 81 MG EC tablet  Commonly known as: ECOTRIN  Take 81 mg by mouth once daily.     carbidopa-levodopa  mg  mg per tablet  Commonly known as: SINEMET  Take 1 tablet by mouth 3 (three) times daily.     cyanocobalamin 1000 MCG tablet  Commonly known as: VITAMIN B-12  Take 100 mcg by mouth once daily.     LISINOPRIL ORAL  Take by mouth.     loratadine 10 mg tablet  Commonly known as: CLARITIN  Take 10 mg by mouth once daily.     OMEPRAZOLE  ORAL  Take by mouth.     vitamin E 1000 UNIT capsule  Take 1,000 Units by mouth 2 (two) times daily.                Immunizations Administered as of 11/14/2023       No immunizations on file.                _________________________________  Kamala Evans MD  11/14/2023

## 2023-11-14 NOTE — CONSULTS
[facility] - Intensive Care ([place] [address])  Skin Integrity RAMIRO  Consult Note    Patient Name: Annmarie Gallardo  MRN: [id number]  Admission Date: 11/11/2023  Hospital Length of Stay: 0 days  Attending Physician: Kamala Evans MD  Primary Care Provider: Raymond Bess MD     Inpatient consult to Skin Integrity  Practitioner  Consult performed by: Zara Pereyra NP  Consult ordered by: Kamala Evans MD        Subjective:     History of Present Illness:  Annmarie Gallardo is a 78 year old female with a PMHx of parkinson's, lewy-body dementia, HTN, GERD, and CKD3 who presents because she was unable to be admitted to NH and daughter cannot care for her. Pt previously resided at a NH in [place] near her other daughter and was brought to [place] to be closer to her daughter, Yamileth. The plan was the patient was to be admitted at [facility] today. Per daughter patient was unable to be admitted due to some missing paperwork, and the paperwork cannot be completed until Monday 11/13/23. The pt requires total care and her daughter states she cannot provide the care at home which is why she brought her here. Pt is nonverbal and bedbound at baseline.      In the ED, VSS, afebrile. CBC and CMP unremarkable. HIV and Hep C non-reactive. Social work consulted in the ED.  contacted [facility] ([phone]) and spoke to EVERARDO Bergeron  who stated daughter needed to come on Monday to complete admission process. Patient admitted to hospital medicine service for further management. Skin integrity RAMIRO consulted for evaluation of skin injury.    Scheduled Meds:   aspirin  81 mg Oral Daily    carbidopa-levodopa  mg  1 tablet Oral TID    And    carbidopa-levodopa 12.5-50 mg  1 split tablet Oral QHS    cetirizine  10 mg Oral Daily    cyanocobalamin  1,000 mcg Oral Daily    enoxparin  40 mg Subcutaneous Daily    pantoprazole  20 mg Oral Daily    senna-docusate 8.6-50 mg  1 tablet Oral BID     tamsulosin  0.4 mg Oral Daily    vitamin E  1,000 Units Oral BID     Continuous Infusions:   lactated ringers 50 mL/hr at 11/14/23 0348     PRN Meds:acetaminophen, acetaminophen, dextrose 10%, dextrose 10%, glucagon (human recombinant), glucose, glucose, melatonin, naloxone, ondansetron, sodium chloride 0.9%    Review of patient's allergies indicates:   Allergen Reactions    Garlic     Namenda [memantine]     Oxybutynin         History reviewed. No pertinent past medical history.  History reviewed. No pertinent surgical history.    Family History    None       Tobacco Use    Smoking status: Never    Smokeless tobacco: Never   Substance and Sexual Activity    Alcohol use: No    Drug use: No    Sexual activity: Not on file     Review of Systems   Skin:  Positive for wound.       Objective:     Vital Signs (Most Recent):  Temp: 97.8 °F (36.6 °C) (11/14/23 1208)  Pulse: 63 (11/14/23 1208)  Resp: 18 (11/14/23 1208)  BP: 117/64 (11/14/23 1208)  SpO2: (!) 94 % (11/14/23 1208) Vital Signs (24h Range):  Temp:  [97.5 °F (36.4 °C)-98.3 °F (36.8 °C)] 97.8 °F (36.6 °C)  Pulse:  [63-76] 63  Resp:  [16-18] 18  SpO2:  [94 %-96 %] 94 %  BP: (111-174)/(59-80) 117/64     Weight: 52.8 kg (116 lb 6.5 oz)  Body mass index is 20.62 kg/m².     Physical Exam  Constitutional:       Appearance: Normal appearance.   Skin:     General: Skin is warm and dry.      Findings: Lesion present.   Neurological:      Mental Status: She is alert.          Laboratory:  All pertinent labs reviewed within the last 24 hours.    Diagnostic Results:  None      Assessment/Plan:         RAMIRO Skin Integrity Evaluation      Skin Integrity RAMIRO evaluation of patient as part of the comprehensive skin care team.     She has been admitted for 3 days. Skin injury was noted on 11/12/23. POA yes.    Sacrum      Orthopedic  Pressure injury of sacral region, stage 3  - consult received for evaluation of skin injury.  - pt presents because she was unable to be admitted to NH  and daughter cannot care for her.   - pt was transferred from a facility in [place]. Pt is bed bound and incontinent.  - full thickness sacral injury with pink, yellow, and moist wound bed.  - wound appears to have a component of moisture dermatitis as well. Initial media files on admit show macerated wound edges.  - upon assessment, pt found soiled in urine. Female external urinary catheter wasn't suctioning properly. Foam dressing and pads changed.  - florencia surface. Waffle overlay deflated underneath patient. RN to replace with a new waffle.  - wedge for offloading.  - turn q2h.  - current documentation shows a caio score of 12. Nutrition sub scale score of 3. Recommend dietary consult to assist with optimizing nutrition to promote wound healing.   - nursing to maintain pressure injury prevention measures and continue wound care per orders.         Thank you for your consult. I will sign off. Please contact us if you have any additional questions.      Zara Pereyra NP  Skin Integrity RAMIRO  [facility] - Intensive Care ([facility]

## 2023-11-14 NOTE — PLAN OF CARE
SW placed [facility] orders for patient transport to [facility]. Patient/family notified and agreeable to dc plan. Report to be called to [phone]  310A.    JAMIE will continue to follow.                 NA Mathias, LMSW  [facility]  Case Management  Ext. [phone]

## 2023-11-14 NOTE — PLAN OF CARE
Discharge Plan A and Plan B have been determined by review of patient's clinical status, future medical and therapeutic needs, and coverage/benefits for post-acute care in coordination with multidisciplinary team members.    11/14/23 1256   Post-Acute Status   Post-Acute Authorization Placement   Post-Acute Placement Status Pending post-acute provider review/more information requested   Coverage [employer] MANAGED MEDICARE - [employer] MEDICARE HMO   Discharge Plan   Discharge Plan A New Nursing Home placement - snf care facility   Discharge Plan B Home with family     JAMIE phoned patient's daughter to f/u on status of submitting requesting information to [facility]. Per Yamileth (daughter), all requirements have been satisfied on patient/family's end regarding placement. JAMIE advised Yamileth of next steps, JAMIE to confirm w/ [facility] and arrange transport to [facility] once admissions ready to receive patient.    JAMIE phoned [facility] for update. Per admissions, patient accepted to admit to NH Today pending review of NH orders and receipt of 142, PASRR. JAMIE requested that MD place updated NH orders. Patient's dc to [facility] pending post-acute provider's review of requested information.      3:37pm  JAMIE phoned [facility] to f/u on status of patient's dc to SNF Today. Per Rosario, [facility] accepts patient for admission Today. Transport orders to follow.     JAMIE will continue to follow.                  NA Mathias, SW  [facility]  Case Management  Ext. [phone]

## 2023-11-14 NOTE — PLAN OF CARE
NURSING HOME ORDERS    11/14/2023  [facility]  KRISTYN TORRES - INTENSIVE CARE ([facility]-16)  [address]  [facility] [address]  Dept: [phone]  Loc: [phone]     Admit to Nursing Home:      Diagnoses:  Active Hospital Problems    Diagnosis  POA    *Problem related to discharge planning [Z02.9]  Not Applicable    Pressure injury of sacral region, stage 3 [L89.153]  Yes    CKD (chronic kidney disease), stage III [N18.30]  Yes    Gastroesophageal reflux disease without esophagitis [K21.9]  Yes    Parkinson's disease [G20.A1]  Yes    Severe Lewy body dementia [G31.83, F02.C0]  Yes    HTN (hypertension) [I10]  Yes    Debility [R53.81]  Yes      Resolved Hospital Problems   No resolved problems to display.       Patient is homebound due to:  Problem related to discharge planning    Allergies:  Review of patient's allergies indicates:   Allergen Reactions    Garlic     Namenda [memantine]     Oxybutynin        Vitals:  Routine    Diet: regular diet    Activities:   Activity as tolerated    Goals of Care Treatment Preferences:  Code Status: DNR        Nursing Precautions:  Aspiration , Fall, and Pressure ulcer prevention    Consults:   PT to evaluate and treat-   and OT to evaluate and treat-    Miscellaneous Care: Wound Care: yes:                Pressure injury of sacral region, stage 3  - consult received for evaluation of skin injury.  - pt presents because she was unable to be admitted to NH and daughter cannot care for her.   - pt was transferred from a facility in [place]. Pt is bed bound and incontinent.  - full thickness sacral injury with pink, yellow, and moist wound bed.  - wound appears to have a component of moisture dermatitis as well. Initial media files on admit show macerated wound edges.  - upon assessment, pt found soiled in urine. Female external urinary catheter wasn't suctioning properly. Foam dressing and pads changed.  - florencia surface. Waffle overlay deflated  underneath patient. RN to replace with a new waffle.  - wedge for offloading.  - turn q2h.  - current documentation shows a caio score of 12. Nutrition sub scale score of 3. Recommend dietary consult to assist with optimizing nutrition to promote wound healing.   - nursing to maintain pressure injury prevention measures and continue wound care per orders.             Medications: Discontinue all previous medication orders, if any. See new list below.     Medication List        START taking these medications      tamsulosin 0.4 mg Cap  Commonly known as: FLOMAX  Take 1 capsule (0.4 mg total) by mouth once daily.            CONTINUE taking these medications      aspirin 81 MG EC tablet  Commonly known as: ECOTRIN  Take 81 mg by mouth once daily.     carbidopa-levodopa  mg  mg per tablet  Commonly known as: SINEMET  Take 1 tablet by mouth 3 (three) times daily.     cyanocobalamin 1000 MCG tablet  Commonly known as: VITAMIN B-12  Take 100 mcg by mouth once daily.     LISINOPRIL ORAL  Take by mouth.     loratadine 10 mg tablet  Commonly known as: CLARITIN  Take 10 mg by mouth once daily.     OMEPRAZOLE ORAL  Take by mouth.     vitamin E 1000 UNIT capsule  Take 1,000 Units by mouth 2 (two) times daily.                Immunizations Administered as of 11/14/2023       No immunizations on file.                _________________________________  Kamala Evans MD  11/14/2023

## 2023-11-15 NOTE — NURSING
Patient discharge via stretcher/ Transport. Patient alert. Respirations full even and non labored. NADN. Rep[ort called to facility,

## 2023-11-15 NOTE — PLAN OF CARE
Giacomo Suero - Intensive Care ([facility]-16)  Discharge Final Note    Primary Care Provider: Raymond Bess MD    Expected Discharge Date: 11/14/2023    Final Discharge Note (most recent)       Final Note - 11/15/23 0912          Final Note    Assessment Type Final Discharge Note     Anticipated Discharge Disposition Intermediate Care Facility for intermediate or Supportive Care   [facility]    What phone number can be called within the next 1-3 days to see how you are doing after discharge? [phone] (P)         Post-Acute Status    Post-Acute Authorization Placement (P)      Post-Acute Placement Status Set-up Complete/Auth obtained (P)      Coverage [employer] MANAGED MEDICARE - [employer] MEDICARE HMO (P)                      Important Message from Medicare             Contact Info       Raymond Bess MD   Specialty: Family Medicine   Relationship: PCP - General    [address] [place] [address]  [address] [facility] [place] [address]   Phone: [phone]       Next Steps: Follow up    Raymond Bess MD   Specialty: Family Medicine   Relationship: PCP - General    [address] [place] [address]  3rd Floor  [place] [address]   Phone: [phone]       Next Steps: Follow up          Patient dc to [facility].                Luis Schmitz MSW, LMSW  [facility]  Case Management  Ext. [phone]

## 2023-11-17 NOTE — DISCHARGE SUMMARY
[facility] - Intensive Care ([address] Medicine  Discharge Summary      Patient Name: Annmarie Gallardo  MRN: [id number]  [facility]: [id number]  Patient Class: OP- Observation  Admission Date: 11/11/2023  Hospital Length of Stay: 0 days  Discharge Date and Time: 11/14/2023  5:21 PM  Attending Physician: Margaret att. providers found   Discharging Provider: Kamala Evans MD  Primary Care Provider: Raymond Bess MD  [facility] Medicine Team: [facility] Kamala Evans MD  Primary Care Team: [facility]    HPI:   Annmarie Gallardo is a 78 y.o. with a PMHx of parkinson's, lewy-body dementia, HTN, GERD, and CKD3 who presents because she was unable to be admitted to NH and daughter cannot care for her. Pt previously resided at a NH in [place] near her other daughter and was brought to [place] to be closer to her daughter, Yamileth. The plan was the patient was to be admitted at [facility] today. Per daughter patient was unable to be admitted due to some missing paperwork, and the paperwork cannot be completed until Monday 11/13/23. The pt requires total care and her daughter states she cannot provide the care at home which is why she brought her here. Pt is nonverbal and bedbound at baseline.     In the ED, VSS, afebrile. CBC and CMP unremarkable. HIV and Hep C non-reactive. Social work consulted in the ED.  contacted [facility] ([phone]) and spoke to EVERARDO Bergeron  who stated  daughter needed to come on Monday to complete admission process.    * No surgery found *      Hospital Course:   Annmarie Gallardo is a 78 y.o. with a PMHx of parkinson's, lewy-body dementia, HTN, GERD, and CKD3 who presents because she was unable to be admitted to NH and daughter cannot care for her. Pt previously resided at a NH in [place] near her other daughter and was brought to [place] to be closer to her daughter, Yamileth. The plan was the patient was to be admitted at [facility] today. Per  daughter patient was unable to be admitted due to some missing paperwork, and the paperwork cannot be completed until Monday 11/13/23. The pt requires total care and her daughter states she cannot provide the care at home which is why she brought her here. Pt is nonverbal and bedbound at baseline.      In the ED, VSS, afebrile. CBC and CMP unremarkable. HIV and Hep C non-reactive. Social work consulted in the ED.  contacted [facility] ([phone]) and spoke to EVERARDO Bergeron  who stated  daughter needed to come after weekend to complete admission process.  Hospital course was unremarkable.    Annmarie Gallardo was deemed appropriate for discharge.  At the time of discharge, the plan of care was discussed with patient/family, who were agreeable and amenable.  All medications were verbally reviewed and discussed with the patient/family.  They endorsed understanding and compliance.  Informed them that these changes will be available on their discharge paperwork, as well.  Outpatient follow-ups were scheduled, or if unable to be scheduled ambulatory referrals were placed, and ER return precautions were given.  All questions were answered to the patient/family's satisfaction.  She was subsequently discharged in stable condition.          Goals of Care Treatment Preferences:  Code Status: DNR      Consults:   Consults (From admission, onward)          Status Ordering Provider     Inpatient consult to Skin Integrity  Practitioner  Once        Provider:  Zara Pereyra NP    Completed PIERCE HEBERT            No new Assessment & Plan notes have been filed under this hospital service since the last note was generated.  Service: Hospital Medicine    Final Active Diagnoses:    Diagnosis Date Noted POA    PRINCIPAL PROBLEM:  Problem related to discharge planning [Z02.9] 11/12/2023 Not Applicable    Pressure injury of sacral region, stage 3 [L89.153] 11/14/2023 Yes    CKD (chronic kidney  "disease), stage III [N18.30] 11/12/2023 Yes    Gastroesophageal reflux disease without esophagitis [K21.9] 11/12/2023 Yes    Parkinson's disease [G20.A1] 11/12/2023 Yes    Severe Lewy body dementia [G31.83, F02.C0] 11/12/2023 Yes    HTN (hypertension) [I10] 11/12/2023 Yes    Debility [R53.81] 01/22/2020 Yes      Problems Resolved During this Admission:       Discharged Condition: good    Disposition: Home or Self Care    Follow Up:   Follow-up Information       Raymond Bess MD Follow up.    Specialty: Family Medicine  Contact information:  [address] [facility]  [address] [place] [id number] [phone]               Raymond Bess MD Follow up.    Specialty: Family Medicine  Contact information:  [address] [place] [id number] [phone]                           Patient Instructions:   No discharge procedures on file.    Significant Diagnostic Studies: Labs: BMP: No results for input(s): "GLU", "NA", "K", "CL", "CO2", "BUN", "CREATININE", "CALCIUM", "MG" in the last 48 hours., CMP No results for input(s): "NA", "K", "CL", "CO2", "GLU", "BUN", "CREATININE", "CALCIUM", "PROT", "ALBUMIN", "BILITOT", "ALKPHOS", "AST", "ALT", "ANIONGAP", "ESTGFRAFRICA", "EGFRNONAA" in the last 48 hours., and CBC No results for input(s): "WBC", "HGB", "HCT", "PLT" in the last 48 hours.    Pending Diagnostic Studies:       None           Medications:  Reconciled Home Medications:      Medication List        START taking these medications      tamsulosin 0.4 mg Cap  Commonly known as: FLOMAX  Take 1 capsule (0.4 mg total) by mouth once daily.            CONTINUE taking these medications      aspirin 81 MG EC tablet  Commonly known as: ECOTRIN  Take 81 mg by mouth once daily.     carbidopa-levodopa  mg  mg per tablet  Commonly known as: SINEMET  Take 1 tablet by mouth 3 (three) times daily.     cyanocobalamin 1000 MCG tablet  Commonly known as: VITAMIN B-12  Take 100 mcg by mouth once daily.     LISINOPRIL " ORAL  Take by mouth.     loratadine 10 mg tablet  Commonly known as: CLARITIN  Take 10 mg by mouth once daily.     OMEPRAZOLE ORAL  Take by mouth.     vitamin E 1000 UNIT capsule  Take 1,000 Units by mouth 2 (two) times daily.              Indwelling Lines/Drains at time of discharge:   Lines/Drains/Airways       None                   Time spent on the discharge of patient: 39 minutes         Kamala Evans MD  Department of Hospital Medicine  [facility] - Intensive Care ([place]-16)

## 2023-12-01 ENCOUNTER — HOSPITAL ENCOUNTER (INPATIENT)
Facility: HOSPITAL | Age: 78
LOS: 26 days | Discharge: NURSING, REHAB OR LONG-TERM CARE | DRG: 264 | End: 2023-12-27
Attending: EMERGENCY MEDICINE | Admitting: FAMILY MEDICINE
Payer: MEDICARE

## 2023-12-01 DIAGNOSIS — L89.150 PRESSURE INJURY OF SACRAL REGION, UNSTAGEABLE: ICD-10-CM

## 2023-12-01 DIAGNOSIS — R07.9 CHEST PAIN: ICD-10-CM

## 2023-12-01 DIAGNOSIS — T17.908A ASPIRATION INTO AIRWAY: ICD-10-CM

## 2023-12-01 DIAGNOSIS — L89.154 STAGE IV PRESSURE ULCER OF SACRAL REGION: Primary | ICD-10-CM

## 2023-12-01 DIAGNOSIS — G31.83 SEVERE LEWY BODY DEMENTIA WITHOUT BEHAVIORAL DISTURBANCE, PSYCHOTIC DISTURBANCE, MOOD DISTURBANCE, OR ANXIETY: ICD-10-CM

## 2023-12-01 DIAGNOSIS — F02.C0 SEVERE LEWY BODY DEMENTIA WITHOUT BEHAVIORAL DISTURBANCE, PSYCHOTIC DISTURBANCE, MOOD DISTURBANCE, OR ANXIETY: ICD-10-CM

## 2023-12-01 PROBLEM — G20.A1 PARKINSON'S DISEASE: Chronic | Status: ACTIVE | Noted: 2023-11-12

## 2023-12-01 PROBLEM — R53.2 FUNCTIONAL QUADRIPLEGIA: Chronic | Status: ACTIVE | Noted: 2023-12-01

## 2023-12-01 PROBLEM — Z66 DNR (DO NOT RESUSCITATE): Chronic | Status: ACTIVE | Noted: 2023-12-01

## 2023-12-01 PROBLEM — N18.30 CKD (CHRONIC KIDNEY DISEASE), STAGE III: Chronic | Status: ACTIVE | Noted: 2023-11-12

## 2023-12-01 PROBLEM — E87.0 HYPERNATREMIA: Status: ACTIVE | Noted: 2023-12-01

## 2023-12-01 PROBLEM — E43 SEVERE PROTEIN-CALORIE MALNUTRITION: Chronic | Status: ACTIVE | Noted: 2023-12-01

## 2023-12-01 PROBLEM — D53.9 MACROCYTIC ANEMIA: Chronic | Status: ACTIVE | Noted: 2023-12-01

## 2023-12-01 LAB
ALBUMIN SERPL BCP-MCNC: 2.9 G/DL (ref 3.5–5.2)
ALLENS TEST: NORMAL
ALP SERPL-CCNC: 83 U/L (ref 55–135)
ALT SERPL W/O P-5'-P-CCNC: <5 U/L (ref 10–44)
ANION GAP SERPL CALC-SCNC: 13 MMOL/L (ref 8–16)
AST SERPL-CCNC: 12 U/L (ref 10–40)
BASOPHILS # BLD AUTO: 0.02 K/UL (ref 0–0.2)
BASOPHILS NFR BLD: 0.2 % (ref 0–1.9)
BILIRUB SERPL-MCNC: 1 MG/DL (ref 0.1–1)
BUN SERPL-MCNC: 33 MG/DL (ref 8–23)
CALCIUM SERPL-MCNC: 9 MG/DL (ref 8.7–10.5)
CHLORIDE SERPL-SCNC: 112 MMOL/L (ref 95–110)
CO2 SERPL-SCNC: 23 MMOL/L (ref 23–29)
CREAT SERPL-MCNC: 1 MG/DL (ref 0.5–1.4)
CRP SERPL-MCNC: 171.9 MG/L (ref 0–8.2)
DIFFERENTIAL METHOD: ABNORMAL
EOSINOPHIL # BLD AUTO: 0 K/UL (ref 0–0.5)
EOSINOPHIL NFR BLD: 0.3 % (ref 0–8)
ERYTHROCYTE [DISTWIDTH] IN BLOOD BY AUTOMATED COUNT: 13 % (ref 11.5–14.5)
ERYTHROCYTE [SEDIMENTATION RATE] IN BLOOD BY PHOTOMETRIC METHOD: 106 MM/HR (ref 0–36)
EST. GFR  (NO RACE VARIABLE): 57.3 ML/MIN/1.73 M^2
GLUCOSE SERPL-MCNC: 89 MG/DL (ref 70–110)
HCT VFR BLD AUTO: 28.7 % (ref 37–48.5)
HGB BLD-MCNC: 8.5 G/DL (ref 12–16)
IMM GRANULOCYTES # BLD AUTO: 0.09 K/UL (ref 0–0.04)
IMM GRANULOCYTES NFR BLD AUTO: 0.7 % (ref 0–0.5)
LACTATE SERPL-SCNC: 1.5 MMOL/L (ref 0.5–2.2)
LDH SERPL L TO P-CCNC: 2.19 MMOL/L (ref 0.5–2.2)
LYMPHOCYTES # BLD AUTO: 0.6 K/UL (ref 1–4.8)
LYMPHOCYTES NFR BLD: 4.5 % (ref 18–48)
MAGNESIUM SERPL-MCNC: 2.3 MG/DL (ref 1.6–2.6)
MCH RBC QN AUTO: 30.2 PG (ref 27–31)
MCHC RBC AUTO-ENTMCNC: 29.6 G/DL (ref 32–36)
MCV RBC AUTO: 102 FL (ref 82–98)
MONOCYTES # BLD AUTO: 1 K/UL (ref 0.3–1)
MONOCYTES NFR BLD: 7.7 % (ref 4–15)
NEUTROPHILS # BLD AUTO: 11.1 K/UL (ref 1.8–7.7)
NEUTROPHILS NFR BLD: 86.6 % (ref 38–73)
NRBC BLD-RTO: 0 /100 WBC
PLATELET # BLD AUTO: 177 K/UL (ref 150–450)
PMV BLD AUTO: 10.3 FL (ref 9.2–12.9)
POTASSIUM SERPL-SCNC: 4.2 MMOL/L (ref 3.5–5.1)
PROCALCITONIN SERPL IA-MCNC: 0.19 NG/ML
PROT SERPL-MCNC: 6.4 G/DL (ref 6–8.4)
RBC # BLD AUTO: 2.81 M/UL (ref 4–5.4)
SAMPLE: NORMAL
SITE: NORMAL
SODIUM SERPL-SCNC: 148 MMOL/L (ref 136–145)
WBC # BLD AUTO: 12.86 K/UL (ref 3.9–12.7)

## 2023-12-01 PROCEDURE — 87070 CULTURE OTHR SPECIMN AEROBIC: CPT | Performed by: EMERGENCY MEDICINE

## 2023-12-01 PROCEDURE — 84145 PROCALCITONIN (PCT): CPT | Performed by: EMERGENCY MEDICINE

## 2023-12-01 PROCEDURE — 87186 SC STD MICRODIL/AGAR DIL: CPT | Performed by: EMERGENCY MEDICINE

## 2023-12-01 PROCEDURE — 80053 COMPREHEN METABOLIC PANEL: CPT | Performed by: EMERGENCY MEDICINE

## 2023-12-01 PROCEDURE — 12000002 HC ACUTE/MED SURGE SEMI-PRIVATE ROOM

## 2023-12-01 PROCEDURE — 83605 ASSAY OF LACTIC ACID: CPT

## 2023-12-01 PROCEDURE — 99222 PR INITIAL HOSPITAL CARE,LEVL II: ICD-10-PCS | Mod: GC,,, | Performed by: SURGERY

## 2023-12-01 PROCEDURE — 96374 THER/PROPH/DIAG INJ IV PUSH: CPT

## 2023-12-01 PROCEDURE — 87077 CULTURE AEROBIC IDENTIFY: CPT | Performed by: EMERGENCY MEDICINE

## 2023-12-01 PROCEDURE — 99900035 HC TECH TIME PER 15 MIN (STAT)

## 2023-12-01 PROCEDURE — 63600175 PHARM REV CODE 636 W HCPCS: Performed by: EMERGENCY MEDICINE

## 2023-12-01 PROCEDURE — 85025 COMPLETE CBC W/AUTO DIFF WBC: CPT | Performed by: EMERGENCY MEDICINE

## 2023-12-01 PROCEDURE — 87040 BLOOD CULTURE FOR BACTERIA: CPT | Performed by: EMERGENCY MEDICINE

## 2023-12-01 PROCEDURE — 83735 ASSAY OF MAGNESIUM: CPT | Performed by: EMERGENCY MEDICINE

## 2023-12-01 PROCEDURE — 83605 ASSAY OF LACTIC ACID: CPT | Performed by: EMERGENCY MEDICINE

## 2023-12-01 PROCEDURE — 86140 C-REACTIVE PROTEIN: CPT | Performed by: EMERGENCY MEDICINE

## 2023-12-01 PROCEDURE — 94761 N-INVAS EAR/PLS OXIMETRY MLT: CPT

## 2023-12-01 PROCEDURE — 25000003 PHARM REV CODE 250: Performed by: EMERGENCY MEDICINE

## 2023-12-01 PROCEDURE — 85652 RBC SED RATE AUTOMATED: CPT | Performed by: EMERGENCY MEDICINE

## 2023-12-01 PROCEDURE — 99222 1ST HOSP IP/OBS MODERATE 55: CPT | Mod: GC,,, | Performed by: SURGERY

## 2023-12-01 PROCEDURE — 63600175 PHARM REV CODE 636 W HCPCS: Performed by: STUDENT IN AN ORGANIZED HEALTH CARE EDUCATION/TRAINING PROGRAM

## 2023-12-01 PROCEDURE — 99285 EMERGENCY DEPT VISIT HI MDM: CPT | Mod: 25

## 2023-12-01 RX ORDER — ONDANSETRON 4 MG/1
8 TABLET, ORALLY DISINTEGRATING ORAL EVERY 8 HOURS PRN
Status: DISCONTINUED | OUTPATIENT
Start: 2023-12-01 | End: 2023-12-27 | Stop reason: HOSPADM

## 2023-12-01 RX ORDER — MAG HYDROX/ALUMINUM HYD/SIMETH 200-200-20
30 SUSPENSION, ORAL (FINAL DOSE FORM) ORAL 4 TIMES DAILY PRN
Status: DISCONTINUED | OUTPATIENT
Start: 2023-12-01 | End: 2023-12-27 | Stop reason: HOSPADM

## 2023-12-01 RX ORDER — ACETAMINOPHEN 325 MG/1
650 TABLET ORAL EVERY 8 HOURS PRN
Status: DISCONTINUED | OUTPATIENT
Start: 2023-12-01 | End: 2023-12-08

## 2023-12-01 RX ORDER — ACETAMINOPHEN 325 MG/1
650 TABLET ORAL EVERY 4 HOURS PRN
Status: DISCONTINUED | OUTPATIENT
Start: 2023-12-01 | End: 2023-12-08

## 2023-12-01 RX ORDER — TALC
6 POWDER (GRAM) TOPICAL NIGHTLY PRN
Status: DISCONTINUED | OUTPATIENT
Start: 2023-12-01 | End: 2023-12-27 | Stop reason: HOSPADM

## 2023-12-01 RX ORDER — CARBIDOPA AND LEVODOPA 25; 100 MG/1; MG/1
1 TABLET ORAL 3 TIMES DAILY
Status: DISCONTINUED | OUTPATIENT
Start: 2023-12-01 | End: 2023-12-27 | Stop reason: HOSPADM

## 2023-12-01 RX ORDER — SODIUM CHLORIDE 0.9 % (FLUSH) 0.9 %
1-10 SYRINGE (ML) INJECTION EVERY 12 HOURS PRN
Status: DISCONTINUED | OUTPATIENT
Start: 2023-12-01 | End: 2023-12-27 | Stop reason: HOSPADM

## 2023-12-01 RX ORDER — PANTOPRAZOLE SODIUM 20 MG/1
20 TABLET, DELAYED RELEASE ORAL DAILY
Status: DISCONTINUED | OUTPATIENT
Start: 2023-12-02 | End: 2023-12-27 | Stop reason: HOSPADM

## 2023-12-01 RX ORDER — ENOXAPARIN SODIUM 100 MG/ML
40 INJECTION SUBCUTANEOUS EVERY 24 HOURS
Status: DISCONTINUED | OUTPATIENT
Start: 2023-12-01 | End: 2023-12-09

## 2023-12-01 RX ORDER — ASPIRIN 81 MG/1
81 TABLET ORAL DAILY
Status: DISCONTINUED | OUTPATIENT
Start: 2023-12-02 | End: 2023-12-27 | Stop reason: HOSPADM

## 2023-12-01 RX ORDER — NALOXONE HCL 0.4 MG/ML
0.02 VIAL (ML) INJECTION
Status: DISCONTINUED | OUTPATIENT
Start: 2023-12-01 | End: 2023-12-27 | Stop reason: HOSPADM

## 2023-12-01 RX ORDER — SIMETHICONE 80 MG
1 TABLET,CHEWABLE ORAL 4 TIMES DAILY PRN
Status: DISCONTINUED | OUTPATIENT
Start: 2023-12-01 | End: 2023-12-27 | Stop reason: HOSPADM

## 2023-12-01 RX ORDER — TAMSULOSIN HYDROCHLORIDE 0.4 MG/1
0.4 CAPSULE ORAL DAILY
Status: DISCONTINUED | OUTPATIENT
Start: 2023-12-02 | End: 2023-12-27 | Stop reason: HOSPADM

## 2023-12-01 RX ORDER — LANOLIN ALCOHOL/MO/W.PET/CERES
1000 CREAM (GRAM) TOPICAL DAILY
Status: DISCONTINUED | OUTPATIENT
Start: 2023-12-02 | End: 2023-12-27 | Stop reason: HOSPADM

## 2023-12-01 RX ORDER — AMOXICILLIN 250 MG
1 CAPSULE ORAL 2 TIMES DAILY
Status: DISCONTINUED | OUTPATIENT
Start: 2023-12-01 | End: 2023-12-27 | Stop reason: HOSPADM

## 2023-12-01 RX ORDER — POLYETHYLENE GLYCOL 3350 17 G/17G
17 POWDER, FOR SOLUTION ORAL DAILY PRN
Status: DISCONTINUED | OUTPATIENT
Start: 2023-12-01 | End: 2023-12-27 | Stop reason: HOSPADM

## 2023-12-01 RX ORDER — SODIUM CHLORIDE 0.9 % (FLUSH) 0.9 %
10 SYRINGE (ML) INJECTION
Status: DISCONTINUED | OUTPATIENT
Start: 2023-12-01 | End: 2023-12-27 | Stop reason: HOSPADM

## 2023-12-01 RX ORDER — CETIRIZINE HYDROCHLORIDE 10 MG/1
10 TABLET ORAL DAILY
Status: DISCONTINUED | OUTPATIENT
Start: 2023-12-02 | End: 2023-12-27 | Stop reason: HOSPADM

## 2023-12-01 RX ADMIN — SODIUM CHLORIDE, POTASSIUM CHLORIDE, SODIUM LACTATE AND CALCIUM CHLORIDE 500 ML: 600; 310; 30; 20 INJECTION, SOLUTION INTRAVENOUS at 08:12

## 2023-12-01 RX ADMIN — PIPERACILLIN AND TAZOBACTAM 4.5 G: 4; .5 INJECTION, POWDER, LYOPHILIZED, FOR SOLUTION INTRAVENOUS; PARENTERAL at 07:12

## 2023-12-01 RX ADMIN — VANCOMYCIN HYDROCHLORIDE 1250 MG: 1.25 INJECTION, POWDER, LYOPHILIZED, FOR SOLUTION INTRAVENOUS at 06:12

## 2023-12-01 NOTE — ED PROVIDER NOTES
Encounter Date: 12/1/2023       History     Chief Complaint   Patient presents with    altered skin integrity     Patient arrived via ems from [facility] (nursing home). PCP sent patient to be evaluated for sacral pressure sore, possible need for IV antibiotics     HPI  Annmarie Gallardo is a 78-year-old female with a history of Parkinson's with extreme dyskinesia, hypertension, CKD stage 4, hyperlipidemia and allergies presenting via EMS from [facility] with sacral pressure sore and concern for infected wound.  Patient was nonverbal due to Lewy body dementia and advanced Parkinson's, much of the history is obtained through medical records, [facility] records and EMS records.  She is unable to provide history.  No family available at bedside initially however her daughter was present later on and I was able to obtain history of events.  She is an ongoing small sacral ulcer that developed greater than 1 month ago in [place] at a nursing facility.  She moved here and was transferred to [facility] after being evaluated at [facility] for a decubitus ulcer.  This now has worsened per the daughter as well as [facility] nursing facility.  She was sent to the ED for wound evaluation.    Review of patient's allergies indicates:   Allergen Reactions    Diamox sequels [acetazolamide]     Garlic     Namenda [memantine]     Oxybutynin      Past Medical History:   Diagnosis Date    HTN (hypertension)     Parkinson's disease     Vitamin D deficiency      No past surgical history on file.  History reviewed. No pertinent family history.  Social History     Tobacco Use    Smoking status: Never    Smokeless tobacco: Never   Substance Use Topics    Alcohol use: No    Drug use: No     Review of Systems  All other systems reviewed and were negative; see HPI also for additional ROS.    Physical Exam     Initial Vitals [12/01/23 1607]   BP Pulse Resp Temp SpO2   110/68 85 18 99.4 °F (37.4 °C)  96 %      MAP       --         Physical Exam    Nursing note and vitals reviewed.      Gen/Constitutional:  Noninteractive, chronically ill-appearing, not diaphoretic or toxic appearing  Head: Normocephalic, Atraumatic  Neck: supple, no masses or LAD, no JVD  Eyes: PERRLA, conjunctiva clear  Ears, Nose and Throat: No rhinorrhea or stridor.  Cardiac:  Regular rate, Reg Rhythm, No murmur  Pulmonary: CTA Bilat, no wheezes, rhonchi, rales.  No increased work of breathing.  GI: Abdomen soft, non-tender, non-distended; no rebound or guarding  : No CVA tenderness.  Musculoskeletal: Extremities warm, well perfused, no erythema, no edema  Skin: No rashes, cyanosis or jaundice.  There is a large decubitus ulcer on the sacrum, please see imaging below.  There is discharge, drainage and a tract that is purulent discharge.  Tender to palpation with surrounding erythema and edema.  Neuro: Alert and disoriented, does not speak secondary to dementia; No focal motor or sensory deficits.  She does not follow commands, does not answer questions, no tremor or rigidity.  Psych:  Noninteractive            ED Course   Procedures  Labs Reviewed   CBC W/ AUTO DIFFERENTIAL - Abnormal; Notable for the following components:       Result Value    WBC 12.86 (*)     RBC 2.81 (*)     Hemoglobin 8.5 (*)     Hematocrit 28.7 (*)      (*)     MCHC 29.6 (*)     Immature Granulocytes 0.7 (*)     Gran # (ANC) 11.1 (*)     Immature Grans (Abs) 0.09 (*)     Lymph # 0.6 (*)     Gran % 86.6 (*)     Lymph % 4.5 (*)     All other components within normal limits   COMPREHENSIVE METABOLIC PANEL - Abnormal; Notable for the following components:    Sodium 148 (*)     Chloride 112 (*)     BUN 33 (*)     Albumin 2.9 (*)     ALT <5 (*)     eGFR 57.3 (*)     All other components within normal limits   SEDIMENTATION RATE - Abnormal; Notable for the following components:    Sed Rate 106 (*)     All other components within normal limits   C-REACTIVE PROTEIN -  Abnormal; Notable for the following components:    .9 (*)     All other components within normal limits   CULTURE, BLOOD   CULTURE, BLOOD   CULTURE, AEROBIC  (SPECIFY SOURCE)   LACTIC ACID, PLASMA   MAGNESIUM   PROCALCITONIN   URINALYSIS, REFLEX TO URINE CULTURE   ISTAT LACTATE     EKG Readings: (Independently Interpreted)   Initial Reading: No STEMI. Previous EKG: Compared with most recent EKG Rhythm: Normal Sinus Rhythm. Heart Rate: 83. ST Segments: Non-Specific ST Segment Depression.       Imaging Results              X-Ray Chest AP Portable (Final result)  Result time 12/01/23 18:42:30      Final result by Franc Montague MD (12/01/23 18:42:30)                   Impression:      Bilateral diffuse nonspecific interstitial coarsening which can be seen with pulmonary edema, interstitial type pneumonia or chronic interstitial lung changes.  No consolidative process.    No radiodense foreign body projected over the imaged chest.      Electronically signed by: Franc Montague MD  Date:    12/01/2023  Time:    18:42               Narrative:    EXAMINATION:  XR CHEST AP PORTABLE    CLINICAL HISTORY:  Unspecified foreign body in respiratory tract, part unspecified causing other injury, initial encounter    TECHNIQUE:  Single frontal view of the chest was performed.    COMPARISON:  None    FINDINGS:  Monitoring leads overlie the chest.  Patient is slightly rotated.    Trachea is slightly deviated towards the right secondary to the arch but remains patent.  No radiodense foreign body projected over the imaged lower neck, chest or upper abdomen.    Mediastinal structures are midline.  There is calcification and tortuosity of the aorta.  Pulmonary vasculature and hilar contours are within normal limits.  Heart size is normal.    Skin fold projects over the right lung base.  Slight nonspecific elevation of the right hemidiaphragm.  Bilateral mild diffuse nonspecific interstitial coarsening.  Few scattered linear  opacities throughout the lungs consistent with mild platelike scarring versus atelectasis.  The lungs are otherwise well expanded without consolidation, pleural effusion or pneumothorax.    No acute osseous process seen.  PA and lateral views can be obtained.                                    X-Rays:   Independently Interpreted Readings:   Chest X-Ray: Bilateral interstitial findings concerning for pneumonia versus pulmonary edema, no pneumothorax or free air     Medications   sodium chloride 0.9% flush 10 mL (has no administration in time range)   melatonin tablet 6 mg (has no administration in time range)   lactated ringers bolus 500 mL (500 mLs Intravenous New Bag 12/1/23 2054)   vancomycin - pharmacy to dose (has no administration in time range)   cefTRIAXone (ROCEPHIN) 1 g in dextrose 5 % in water (D5W) 100 mL IVPB (MB+) (has no administration in time range)   vancomycin 750 mg in dextrose 5 % (D5W) 250 mL IVPB (Vial-Mate) (750 mg Intravenous Trough Due As Scheduled Before Dose 12/3/23 1700)   aspirin EC tablet 81 mg (has no administration in time range)   carbidopa-levodopa  mg per tablet 1 tablet (1 tablet Oral Not Given 12/1/23 2130)     And   carbidopa-levodopa 12.5-50 mg split tablet 1 split tablet (1 split tablet Oral Not Given 12/1/23 2130)   cetirizine tablet 10 mg (has no administration in time range)   cyanocobalamin tablet 1,000 mcg (has no administration in time range)   enoxaparin injection 40 mg (has no administration in time range)   pantoprazole EC tablet 20 mg (has no administration in time range)   senna-docusate 8.6-50 mg per tablet 1 tablet (1 tablet Oral Not Given 12/1/23 2130)   tamsulosin 24 hr capsule 0.4 mg (has no administration in time range)   sodium chloride 0.9% flush 1-10 mL (has no administration in time range)   ondansetron disintegrating tablet 8 mg (has no administration in time range)   polyethylene glycol packet 17 g (has no administration in time range)    acetaminophen tablet 650 mg (has no administration in time range)   simethicone chewable tablet 80 mg (has no administration in time range)   aluminum-magnesium hydroxide-simethicone 200-200-20 mg/5 mL suspension 30 mL (has no administration in time range)   acetaminophen tablet 650 mg (has no administration in time range)   naloxone 0.4 mg/mL injection 0.02 mg (has no administration in time range)   vancomycin 1,250 mg in dextrose 5 % (D5W) 250 mL IVPB (Vial-Mate) (0 mg Intravenous Stopped 12/1/23 1932)   piperacillin-tazobactam (ZOSYN) 4.5 g in dextrose 5 % in water (D5W) 100 mL IVPB (MB+) (0 g Intravenous Stopped 12/1/23 2012)     Medical Decision Making  Annmarie Gallardo is a 78-year-old female with a history of Parkinson's with extreme dyskinesia, hypertension, CKD stage 4, hyperlipidemia and allergies presenting via EMS from [facility] with sacral pressure sore and concern for infected wound.     Amount and/or Complexity of Data Reviewed  Independent Historian: caregiver and EMS     Details: Patient's daughter, EMS and medical records were used to obtain history.  Patient not able to provide any history secondary to Lewy body dementia and Parkinson's.  External Data Reviewed: notes.     Details: [facility]: 12/1/23 - notes depicting worsening sacral decubitus with discharge and purulence.  Labs: ordered.     Details: Leukocytosis with left shift, elevated CRP and ESR  Radiology: ordered and independent interpretation performed.  ECG/medicine tests: ordered and independent interpretation performed.  Discussion of management or test interpretation with external provider(s): Hospital medicine    Risk  OTC drugs.  Prescription drug management.  Drug therapy requiring intensive monitoring for toxicity.  Decision regarding hospitalization.    Emergent evaluation of patient transferred from nursing home with concern for sacral decubitus ulcer.  She is nontoxic appearing but nonverbal secondary  to Lewy body dementia and Parkinson's that is advanced age.  She has food in her mouth left from the nursing facility, I am concerned about aspiration.  ECG obtained with no signs of ischemia or STEMI on my read.  Placed on cardiac and telemetry monitoring including pulse oximetry.  She has a dressing on her sacrum which I removed with the nursing at bedside.  She is a large 6 x 6 cm area of decubitus ulcer with tract draining purulent discharge that is foul-smelling.  She was surrounding erythema and edema.  I am concerned about extension into the sacral bone area.  Will treat with broad-spectrum antibiotics to include Zosyn and vancomycin.  She does have surrounding erythema concerning for cellulitis in addition to sacral wound.  Laboratory workup shows leukocytosis with left shift, lactate of 2.2.  She does not appear septic.  Chest x-ray obtained which shows interstitial infiltrates bilateral lower lobes, suspicious for developing pneumonia or aspiration/edema.  Will continue broad-spectrum antibiotics.  Please see imaging above for decubitus ulcer.  I did obtain aerobic culture which is sent to the lab.  Discussed case with hospital medicine will admit to inpatient status for severe worsening of decubitus ulcer.  I discussed these findings with the patient's daughter who is at bedside and she is agreeable to plan for admission, further evaluation and treatment and management.                                  Clinical Impression:  Final diagnoses:  [T17.908A] Aspiration into airway  [L89.154] Stage IV pressure ulcer of sacral region (Primary)  [G31.83, F02.C0] Severe Lewy body dementia without behavioral disturbance, psychotic disturbance, mood disturbance, or anxiety          ED Disposition Condition    Admit Stable               Den Quiroga DO, FAAEM  Emergency Staff Physician   Dept of Emergency Medicine   [facility]  Spectralink: [id number]        Disclaimer: This note has been generated using  voice-recognition software. There may be typographical errors that have been missed during proof-reading.       Den Quiroga,   12/02/23 0004

## 2023-12-02 ENCOUNTER — ANESTHESIA EVENT (OUTPATIENT)
Dept: SURGERY | Facility: HOSPITAL | Age: 78
DRG: 264 | End: 2023-12-02
Payer: MEDICARE

## 2023-12-02 LAB
ANION GAP SERPL CALC-SCNC: 10 MMOL/L (ref 8–16)
BUN SERPL-MCNC: 28 MG/DL (ref 8–23)
CALCIUM SERPL-MCNC: 9.1 MG/DL (ref 8.7–10.5)
CHLORIDE SERPL-SCNC: 114 MMOL/L (ref 95–110)
CO2 SERPL-SCNC: 23 MMOL/L (ref 23–29)
CREAT SERPL-MCNC: 0.8 MG/DL (ref 0.5–1.4)
EST. GFR  (NO RACE VARIABLE): >60 ML/MIN/1.73 M^2
GLUCOSE SERPL-MCNC: 107 MG/DL (ref 70–110)
POTASSIUM SERPL-SCNC: 3.7 MMOL/L (ref 3.5–5.1)
SODIUM SERPL-SCNC: 147 MMOL/L (ref 136–145)

## 2023-12-02 PROCEDURE — 25000003 PHARM REV CODE 250: Performed by: FAMILY MEDICINE

## 2023-12-02 PROCEDURE — 36415 COLL VENOUS BLD VENIPUNCTURE: CPT | Performed by: STUDENT IN AN ORGANIZED HEALTH CARE EDUCATION/TRAINING PROGRAM

## 2023-12-02 PROCEDURE — 80048 BASIC METABOLIC PNL TOTAL CA: CPT | Performed by: STUDENT IN AN ORGANIZED HEALTH CARE EDUCATION/TRAINING PROGRAM

## 2023-12-02 PROCEDURE — 94761 N-INVAS EAR/PLS OXIMETRY MLT: CPT

## 2023-12-02 PROCEDURE — 51798 US URINE CAPACITY MEASURE: CPT

## 2023-12-02 PROCEDURE — 63600175 PHARM REV CODE 636 W HCPCS: Performed by: STUDENT IN AN ORGANIZED HEALTH CARE EDUCATION/TRAINING PROGRAM

## 2023-12-02 PROCEDURE — 21400001 HC TELEMETRY ROOM

## 2023-12-02 PROCEDURE — 63600175 PHARM REV CODE 636 W HCPCS: Performed by: FAMILY MEDICINE

## 2023-12-02 PROCEDURE — 25000003 PHARM REV CODE 250: Performed by: STUDENT IN AN ORGANIZED HEALTH CARE EDUCATION/TRAINING PROGRAM

## 2023-12-02 PROCEDURE — 92610 EVALUATE SWALLOWING FUNCTION: CPT

## 2023-12-02 RX ADMIN — ENOXAPARIN SODIUM 40 MG: 40 INJECTION SUBCUTANEOUS at 05:12

## 2023-12-02 RX ADMIN — CARBIDOPA AND LEVODOPA 1 TABLET: 25; 100 TABLET ORAL at 09:12

## 2023-12-02 RX ADMIN — CEFTRIAXONE 1 G: 1 INJECTION, POWDER, FOR SOLUTION INTRAMUSCULAR; INTRAVENOUS at 06:12

## 2023-12-02 RX ADMIN — CARBIDOPA AND LEVODOPA 1 SPLIT TABLET: 25; 100 TABLET ORAL at 09:12

## 2023-12-02 RX ADMIN — VANCOMYCIN HYDROCHLORIDE 750 MG: 750 INJECTION, POWDER, LYOPHILIZED, FOR SOLUTION INTRAVENOUS at 05:12

## 2023-12-02 RX ADMIN — SENNOSIDES AND DOCUSATE SODIUM 1 TABLET: 8.6; 5 TABLET ORAL at 09:12

## 2023-12-02 RX ADMIN — CARBIDOPA AND LEVODOPA 1 TABLET: 25; 100 TABLET ORAL at 02:12

## 2023-12-02 NOTE — SUBJECTIVE & OBJECTIVE
Past Medical History:   Diagnosis Date    HTN (hypertension)     Parkinson's disease     Vitamin D deficiency        No past surgical history on file.    Review of patient's allergies indicates:   Allergen Reactions    Diamox sequels [acetazolamide]     Garlic     Namenda [memantine]     Oxybutynin        No current facility-administered medications on file prior to encounter.     Current Outpatient Medications on File Prior to Encounter   Medication Sig    aspirin (ECOTRIN) 81 MG EC tablet Take 81 mg by mouth once daily.    carbidopa-levodopa  mg (SINEMET)  mg per tablet Take 1 tablet by mouth 3 (three) times daily.    cyanocobalamin (VITAMIN B-12) 1000 MCG tablet Take 100 mcg by mouth once daily.    LISINOPRIL ORAL Take by mouth.    loratadine (CLARITIN) 10 mg tablet Take 10 mg by mouth once daily.    OMEPRAZOLE ORAL Take by mouth.    tamsulosin (FLOMAX) 0.4 mg Cap Take 1 capsule (0.4 mg total) by mouth once daily.    vitamin E 1000 UNIT capsule Take 1,000 Units by mouth 2 (two) times daily.     Family History    None       Tobacco Use    Smoking status: Never    Smokeless tobacco: Never   Substance and Sexual Activity    Alcohol use: No    Drug use: No    Sexual activity: Not on file     Review of Systems   Unable to perform ROS: Patient nonverbal     Objective:     Vital Signs (Most Recent):  Temp: 99.8 °F (37.7 °C) (12/01/23 2051)  Pulse: 75 (12/01/23 2051)  Resp: 15 (12/01/23 2051)  BP: (!) 113/58 (12/01/23 2051)  SpO2: 97 % (12/01/23 2051) Vital Signs (24h Range):  Temp:  [98.9 °F (37.2 °C)-99.8 °F (37.7 °C)] 99.8 °F (37.7 °C)  Pulse:  [70-85] 75  Resp:  [15-26] 15  SpO2:  [96 %-97 %] 97 %  BP: (106-130)/(56-68) 113/58     Weight: 52.6 kg (116 lb)  Body mass index is 20.55 kg/m².     Physical Exam  Vitals and nursing note reviewed.   Constitutional:       General: She is not in acute distress.     Appearance: She is well-developed. She is ill-appearing (chronically). She is not diaphoretic.    HENT:      Head: Normocephalic and atraumatic.   Eyes:      General: No scleral icterus.     Conjunctiva/sclera: Conjunctivae normal.   Neck:      Vascular: No JVD.   Cardiovascular:      Rate and Rhythm: Normal rate and regular rhythm.   Pulmonary:      Effort: Pulmonary effort is normal. No respiratory distress.   Musculoskeletal:      Right lower leg: No edema.      Left lower leg: No edema.   Skin:     Coloration: Skin is pale. Skin is not jaundiced.      Findings: Lesion present.   Neurological:      Mental Status: She is alert.      Motor: No abnormal muscle tone.      Comments: Does not attempt to track or answer questions, does not follow commands, no tremor, UE rigidity   Psychiatric:         Mood and Affect: Mood normal.         Behavior: Behavior normal.                      Significant Labs: All pertinent labs within the past 24 hours have been reviewed.  BMP:   Recent Labs   Lab 12/01/23  1731   GLU 89   *   K 4.2   *   CO2 23   BUN 33*   CREATININE 1.0   CALCIUM 9.0   MG 2.3     CBC:   Recent Labs   Lab 12/01/23  1731   WBC 12.86*   HGB 8.5*   HCT 28.7*          Significant Imaging: I have reviewed all pertinent imaging results/findings within the past 24 hours.

## 2023-12-02 NOTE — ASSESSMENT & PLAN NOTE
Due to Parkinson's and debility. Will continue dietary supplements as recommended by RD during previous hospitalization.   - SLP     Quality 110: Preventive Care And Screening: Influenza Immunization: Influenza immunization was not ordered or administered, reason not given Quality 226: Preventive Care And Screening: Tobacco Use: Screening And Cessation Intervention: Patient screened for tobacco use and is an ex/non-smoker Detail Level: Zone

## 2023-12-02 NOTE — ASSESSMENT & PLAN NOTE
78 year old F with above history presenting with purulent drainage from sacral wound. Non ambulatory, nonverbal, noninteractive with infected sacral decubitus ulcer. Unable to reach daughter who had left bedside per nurse. Given extent of probing, ulcer would likely be best suited for OR debridement. Could unroof eschar at bedside at the very least, however no one available in person or phone to provide consent for this and patient winces at manipulation.     - Plan for sacral wound debridement in OR tomorrow  - Discussed plan with daughter by phone, will obtain consent  - NPO at midnight 12/3/23  - Okay for DVT ppx, please avoid other anticoagulation/antiplatelets  - Agree with wound care consult  - Remainder of care per primary

## 2023-12-02 NOTE — NURSING
Patient admitted to room 47330 for ED. Patient arrived on unit with ED staff nurse via stretcher upon admits patient presents somnolent and nonverbal VSS patient placed in bed assessment done. Call light within reach.

## 2023-12-02 NOTE — HPI
Annmarie Gallardo is a 78 y.o. with a PMHx of parkinson's, lewy-body dementia with debility/functional quadriplegia, HTN, GERD, CKD3, and sacral decubitus ulcer who presents today from her nursing home for evaluation of sacral decubitus ulcer.     Due to her dementia and nonverbal status, she is unable to provide a history. No family available at bedside during my exam.

## 2023-12-02 NOTE — SUBJECTIVE & OBJECTIVE
Interval History: Patient awake and speaking - unintelligible this morning. Daughter at bedside. Reports patient has been less alert this week.    Review of Systems   Unable to perform ROS: Dementia     Objective:     Vital Signs (Most Recent):  Temp: 97.5 °F (36.4 °C) (12/02/23 0808)  Pulse: 81 (12/02/23 1103)  Resp: 18 (12/02/23 0808)  BP: (!) 142/67 (12/02/23 0808)  SpO2: 95 % (12/02/23 0808) Vital Signs (24h Range):  Temp:  [97.5 °F (36.4 °C)-99.8 °F (37.7 °C)] 97.5 °F (36.4 °C)  Pulse:  [65-85] 81  Resp:  [14-26] 18  SpO2:  [95 %-98 %] 95 %  BP: (106-168)/(55-74) 142/67     Weight: 52.6 kg (115 lb 15.4 oz)  Body mass index is 20.54 kg/m².    Intake/Output Summary (Last 24 hours) at 12/2/2023 1129  Last data filed at 12/2/2023 0745  Gross per 24 hour   Intake 100 ml   Output --   Net 100 ml         Physical Exam  Vitals and nursing note reviewed.   Constitutional:       General: She is not in acute distress.     Appearance: She is well-developed. She is ill-appearing (chronically). She is not diaphoretic.   HENT:      Head: Normocephalic and atraumatic.   Eyes:      General: No scleral icterus.     Conjunctiva/sclera: Conjunctivae normal.   Neck:      Vascular: No JVD.   Cardiovascular:      Rate and Rhythm: Normal rate and regular rhythm.      Heart sounds: Normal heart sounds.   Pulmonary:      Effort: Pulmonary effort is normal. No respiratory distress.   Abdominal:      General: There is no distension.      Palpations: Abdomen is soft.      Tenderness: There is abdominal tenderness.   Musculoskeletal:      Right lower leg: No edema.      Left lower leg: No edema.   Skin:     Coloration: Skin is pale. Skin is not jaundiced.      Findings: Lesion present.   Neurological:      Mental Status: She is alert.      Motor: No abnormal muscle tone.      Comments: Does not attempt to track or answer questions, does not follow commands, no tremor, UE rigidity   Psychiatric:      Comments: Unable to evaluate              Significant Labs: All pertinent labs within the past 24 hours have been reviewed.  CBC:   Recent Labs   Lab 12/01/23  1731   WBC 12.86*   HGB 8.5*   HCT 28.7*        CMP:   Recent Labs   Lab 12/01/23  1731 12/02/23  0447   * 147*   K 4.2 3.7   * 114*   CO2 23 23   GLU 89 107   BUN 33* 28*   CREATININE 1.0 0.8   CALCIUM 9.0 9.1   PROT 6.4  --    ALBUMIN 2.9*  --    BILITOT 1.0  --    ALKPHOS 83  --    AST 12  --    ALT <5*  --    ANIONGAP 13 10       Significant Imaging: I have reviewed all pertinent imaging results/findings within the past 24 hours.

## 2023-12-02 NOTE — NURSING
Dr. Sequeira and IMB notified that pt had minimal urine output. Bladder scan performed at bedside with a 230 ml reading. Next reading due at 2045. No distress noted. Will continue to monitor.

## 2023-12-02 NOTE — CLINICAL REVIEW
IP Sepsis Screen (most recent)       Sepsis Screen (IP) - 12/01/23 1917       Is the patient's history or complaint suggestive of a possible infection? Yes  -LW    Are there at least two of the following signs and symptoms present? Yes  -LW    Sepsis signs/symptoms - Tachypnea Tachypnea     >20  -LW    Sepsis signs/symptoms - WBC WBC < 4,000 or WBC > 12,000  -LW    Are any of the following organ dysfunction criteria present and not considered to be due to a chronic condition? Yes  -LW    Organ Dysfunction Criteria Lactate > 2.0  -LW    Initiate Sepsis Protocol No  -LW    Reason sepsis not considered Pt. receiving appropriate management  -LW              User Key  (r) = Recorded By, (t) = Taken By, (c) = Cosigned By      Initials Name    Iona Rodriguez RN

## 2023-12-02 NOTE — ASSESSMENT & PLAN NOTE
Presents for evaluation of sacral decubitus ulcer. Purulent on exam. Mild leukocytosis, otherwise does not meet sepsis criteria.   - continue vanc and ceftriaxone (12/1)  - surgery consulted, plan for OR debridement 12/3 or 12/4

## 2023-12-02 NOTE — PLAN OF CARE
[facility] - Emergency Dept  Initial Discharge Assessment       Primary Care Provider: Raymond Bess MD    Admission Diagnosis: No admission diagnoses are documented for this encounter.    Admission Date: 12/1/2023  Expected Discharge Date:     Transition of Care Barriers: (P) None    Payor: [employer] MANAGED MEDICARE / Plan: [employer] MEDICARE HMO / Product Type: Capitation /     Extended Emergency Contact Information  Primary Emergency Contact: Yamileth Gallardo  Mobile Phone: [phone]  Relation: Daughter   needed? No    Discharge Plan A: (P) Return to nursing home  Discharge Plan B: (P) Other (hospice ?)      [id number] [place] - [address]  [address] [address]  Phone: [phone] Fax: [phone]      Initial Assessment (most recent)       Adult Discharge Assessment - 12/01/23 1854          Discharge Assessment    Assessment Type Discharge Planning Assessment (P)      Confirmed/corrected address, phone number and insurance Yes (P)      Confirmed Demographics Correct on Facesheet (P)      Source of Information patient;family;health record (P)      Communicated MELCHOR with patient/caregiver Yes (P)      People in Home facility resident (P)      Do you expect to return to your current living situation? Yes (P)      Prior to hospitilization cognitive status: Not Oriented to Time (P)      Current cognitive status: Not Oriented to Time (P)      Readmission within 30 days? Yes (P)      Patient currently being followed by outpatient case management? No (P)      Do you take prescription medications? Yes (P)      Do you have prescription coverage? Yes (P)      Do you have any problems affording any of your prescribed medications? No (P)      Is the patient taking medications as prescribed? yes (P)      How do you get to doctors appointments? health plan transportation (P)      DME Needed Upon Discharge  wound care supplies (P)      Discharge Plan discussed with:  Adult children;Patient (P)      Transition of Care Barriers None (P)      Discharge Plan A Return to nursing home (P)      Discharge Plan B Other (P)    hospice ?       Physical Activity    On average, how many days per week do you engage in moderate to strenuous exercise (like a brisk walk)? 0 days (P)      On average, how many minutes do you engage in exercise at this level? 0 min (P)         Financial Resource Strain    How hard is it for you to pay for the very basics like food, housing, medical care, and heating? Not hard at all (P)         Housing Stability    In the last 12 months, was there a time when you were not able to pay the mortgage or rent on time? No (P)      In the last 12 months, how many places have you lived? 2 (P)      In the last 12 months, was there a time when you did not have a steady place to sleep or slept in a shelter (including now)? No (P)         Transportation Needs    In the past 12 months, has lack of transportation kept you from medical appointments or from getting medications? No (P)      In the past 12 months, has lack of transportation kept you from meetings, work, or from getting things needed for daily living? No (P)         Food Insecurity    Within the past 12 months, you worried that your food would run out before you got the money to buy more. Never true (P)      Within the past 12 months, the food you bought just didn't last and you didn't have money to get more. Never true (P)         Stress    Do you feel stress - tense, restless, nervous, or anxious, or unable to sleep at night because your mind is troubled all the time - these days? Not at all (P)         Social Connections    In a typical week, how many times do you talk on the phone with family, friends, or neighbors? Never (P)      How often do you get together with friends or relatives? Three times a week (P)      How often do you attend Oriental orthodox or Roman Catholic services? Never (P)      Do you belong to any clubs or  organizations such as Sabianism groups, unions, fraternal or athletic groups, or school groups? No (P)      How often do you attend meetings of the clubs or organizations you belong to? Never (P)         Alcohol Use    Q1: How often do you have a drink containing alcohol? Never (P)      Q2: How many drinks containing alcohol do you have on a typical day when you are drinking? Patient does not drink (P)      Q3: How often do you have six or more drinks on one occasion? Never (P)

## 2023-12-02 NOTE — SUBJECTIVE & OBJECTIVE
Interval History: No acute events overnight. Talked to daughter by phone this morning.     Medications:  Continuous Infusions:  Scheduled Meds:   aspirin  81 mg Oral Daily    carbidopa-levodopa  mg  1 tablet Oral TID    And    carbidopa-levodopa 12.5-50 mg  1 split tablet Oral QHS    cefTRIAXone (ROCEPHIN) IVPB  1 g Intravenous Q24H    cetirizine  10 mg Oral Daily    cyanocobalamin  1,000 mcg Oral Daily    enoxparin  40 mg Subcutaneous Daily    pantoprazole  20 mg Oral Daily    senna-docusate 8.6-50 mg  1 tablet Oral BID    tamsulosin  0.4 mg Oral Daily    vancomycin (VANCOCIN) IV (PEDS and ADULTS)  15 mg/kg Intravenous Q24H     PRN Meds:acetaminophen, acetaminophen, aluminum-magnesium hydroxide-simethicone, melatonin, naloxone, ondansetron, polyethylene glycol, simethicone, sodium chloride 0.9%, sodium chloride 0.9%, Pharmacy to dose Vancomycin consult **AND** vancomycin - pharmacy to dose     Review of patient's allergies indicates:   Allergen Reactions    Diamox sequels [acetazolamide]     Garlic     Namenda [memantine]     Oxybutynin      Objective:     Vital Signs (Most Recent):  Temp: 97.5 °F (36.4 °C) (12/02/23 0808)  Pulse: 72 (12/02/23 0808)  Resp: 18 (12/02/23 0808)  BP: (!) 142/67 (12/02/23 0808)  SpO2: 95 % (12/02/23 0808) Vital Signs (24h Range):  Temp:  [97.5 °F (36.4 °C)-99.8 °F (37.7 °C)] 97.5 °F (36.4 °C)  Pulse:  [65-85] 72  Resp:  [14-26] 18  SpO2:  [95 %-98 %] 95 %  BP: (106-168)/(55-74) 142/67     Weight: 52.6 kg (115 lb 15.4 oz)  Body mass index is 20.54 kg/m².    Intake/Output - Last 3 Shifts         11/30 0700  12/01 0659 12/01 0700 12/02 0659 12/02 0700 12/03 0659    IV Piggyback  100     Total Intake(mL/kg)  100 (1.9)     Net  +100                     Physical Exam  Vitals and nursing note reviewed. Exam conducted with a chaperone present.   Constitutional:       Comments: Somnolent, non-interactive/responsive. Does wince to painful stimuli.    HENT:      Head: Normocephalic and  atraumatic.   Cardiovascular:      Rate and Rhythm: Normal rate and regular rhythm.   Pulmonary:      Effort: Pulmonary effort is normal. No respiratory distress.   Abdominal:      General: Abdomen is flat.      Palpations: Abdomen is soft.   Musculoskeletal:         General: No swelling or signs of injury.   Skin:     Coloration: Skin is not jaundiced.      Comments: unstageable sacral decubitus ulcer with foul smelling purulent output, probes right inferior laterally into buttock. Unable to determine full extent of cavity. Copious drainage easily expressible. Eschar overlying cavity.    Neurological:      Mental Status: She is disoriented.      Comments: Contracted, stiff          Significant Labs:  I have reviewed all pertinent lab results within the past 24 hours.  CBC:   Recent Labs   Lab 12/01/23  1731   WBC 12.86*   RBC 2.81*   HGB 8.5*   HCT 28.7*      *   MCH 30.2   MCHC 29.6*     CMP:   Recent Labs   Lab 12/01/23  1731 12/02/23  0447   GLU 89 107   CALCIUM 9.0 9.1   ALBUMIN 2.9*  --    PROT 6.4  --    * 147*   K 4.2 3.7   CO2 23 23   * 114*   BUN 33* 28*   CREATININE 1.0 0.8   ALKPHOS 83  --    ALT <5*  --    AST 12  --    BILITOT 1.0  --        Significant Diagnostics:  I have reviewed all pertinent imaging results/findings within the past 24 hours.

## 2023-12-02 NOTE — ASSESSMENT & PLAN NOTE
Per previous documentation and discussions with patient's family. Clarified with daughter Yamileth at bedside (shares POA with other daughter) - patient is DNR.

## 2023-12-02 NOTE — ASSESSMENT & PLAN NOTE
Presents for evaluation of sacral decubitus ulcer. Purulent on exam. Mild leukocytosis, otherwise does not meet sepsis criteria. Will start Vancomycin and Ceftriaxone and consult General Surgery as she may need debridement.

## 2023-12-02 NOTE — PLAN OF CARE
12/01/23 1858   Readmission   Why were you readmitted? Alarmed about signs/symptoms;New medical problem   When you left the hospital where did you go? Nursing Home   Did patient/caregiver refused recommended DC plan? No   Did you try to manage your symptoms your self? No   Did you have  a follow-up appointment on discharge? Yes   Was this a planned readmission? No

## 2023-12-02 NOTE — PT/OT/SLP EVAL
Speech Language Pathology Evaluation  Bedside Swallow    Patient Name:  Annmarie Gallardo   MRN:  [id number]  [id number]    Admitting Diagnosis: Pressure injury of sacral region, unstageable    Recommendations:                 General Recommendations:   diet follow up  Diet recommendations:  Minced & Moist Diet - IDDSI Level 5, Thin liquids - IDDSI Level 0   Aspiration Precautions:   Alternating bites/sips,   Assistance with meals,   Check for pocketing/oral residue,   Feed only when awake/alert,   Frequent oral care,   HOB to 90 degrees,   Meds crushed in puree,   Monitor for s/s of aspiration,   and Strict aspiration precautions   General Precautions: Standard, aspiration  Communication strategies:  none    Assessment:     Annmarie Gallardo is a 78 y.o. female with an SLP diagnosis of Dysphagia.  ST will continue to follow.    History:     Past Medical History:   Diagnosis Date    HTN (hypertension)     Parkinson's disease     Vitamin D deficiency        No past surgical history on file.    MD note 12/2: HPI:Annmarie Gallardo is a 78 y.o. with a PMHx of parkinson's, lewy-body dementia with debility/functional quadriplegia, HTN, GERD, CKD3, and sacral decubitus ulcer who presents today from her nursing home for evaluation of sacral decubitus ulcer.   Due to her dementia and nonverbal status, she is unable to provide a history. No family available at bedside during my exam.   Overview/Hospital Course:  Plan to go to OR for debridement 12/3 or 12/4 with general surgery. Continue IV abx.  Interval History: Patient awake and speaking - unintelligible this morning. Daughter at bedside. Reports patient has been less alert this week.    Chest X-Rays: Bilateral diffuse nonspecific interstitial coarsening which can be seen with pulmonary edema, interstitial type pneumonia or chronic interstitial lung changes.  No consolidative process.  No radiodense foreign body projected over the imaged chest.    Prior diet: soft and  bite sized at time of SLP evaluation      Subjective     Patient awake with open mouth posture upon SLP entry. Lunch tray present at bedside.  Patient with unintelligible vocalizations.     Objective:     Oral Musculature Evaluation  Oral Musculature: unable to assess due to poor participation/comprehension  Dentition: present and adequate  Voice Prior to PO Intake: low intensity  Oral Musculature Comments: open mouth posture at rest    Bedside Swallow Eval:   Consistencies Assessed:  Thin liquids sips of water and sprite via teaspoon and cup  Puree bites of yogurt (whole container)  Soft solids bites of chopped macaroni and cheese fed by clinician       Oral Phase:   Patient unable to siphon liquid via straw  Prolonged mastication  Slow oral transit time    Pharyngeal Phase:   no overt clinical signs/symptoms of aspiration  no overt clinical signs/symptoms of pharyngeal dysphagia    Compensatory Strategies  None    Treatment: SLP provided patient education on SLP role, goals, and recommendations. Patient would benefit from ongoing reinforcement. No family present at bedside. White board updated.    Goals:   Multidisciplinary Problems       SLP Goals          Problem: SLP    Goal Priority Disciplines Outcome   SLP Goal     SLP Ongoing, Progressing   Description: Short Term Goals:   1. Pt will participate in an ongoing assessment to determine the least restrictive and safest diet with possible updated goals to follow pending results.                         Plan:     Patient to be seen:  3 x/week   Plan of Care expires:  12/31/23  Plan of Care reviewed with:  patient   SLP Follow-Up:  Yes       Discharge recommendations:   (likey no post acute needs)   Barriers to Discharge:  None per SLP discipline    Time Tracking:     SLP Treatment Date:   12/02/23  Speech Start Time:  1248  Speech Stop Time:  1305     Speech Total Time (min):  17 min    Billable Minutes: Eval Swallow and Oral Function 17    12/02/2023

## 2023-12-02 NOTE — PLAN OF CARE
Problem: SLP  Goal: SLP Goal  Description: Short Term Goals:   1. Pt will participate in an ongoing assessment to determine the least restrictive and safest diet with possible updated goals to follow pending results.  Outcome: Ongoing, Progressing   Bedside swallow study completed. Recommend: Minced and moist (IDDSI Level 5) diet, thin liquids, assistance with meals, feed at a slow rate, check for pocketing, and standard aspiration precautions. ST will continue to follow to assess tolerance of diet.

## 2023-12-02 NOTE — ASSESSMENT & PLAN NOTE
Creatine stable for now. BMP reviewed- noted Estimated Creatinine Clearance: 47.2 mL/min (based on SCr of 0.8 mg/dL). according to latest data. Based on current GFR, CKD stage is stage 3 - GFR 30-59.  Monitor UOP and serial BMP and adjust therapy as needed. Renally dose meds. Avoid nephrotoxic medications and procedures.

## 2023-12-02 NOTE — NURSING
Nurses Note -- 4 Eyes      12/01/2023  2782      Skin assessed during: Admit      [] No Altered Skin Integrity Present    []Prevention Measures Documented      [x] Yes- Altered Skin Integrity Present or Discovered   [x] LDA Added if Not in Epic (Describe Wound)   [x] New Altered Skin Integrity was Present on Admit and Documented in LDA   [x] Wound Image Taken    Wound Care Consulted? Yes    Attending Nurse:  Jenifer Mast RN/Staff Member:   Ameena JORDAN

## 2023-12-02 NOTE — ED NOTES
Pt. Undressed and placed in hospital gown.  Cardiac monitor, BP Cuff, and Oxygen Sensor applied to finger.  External Catheter applied.  Pillow placed for comfort.  Blankets applied.

## 2023-12-02 NOTE — SUBJECTIVE & OBJECTIVE
No current facility-administered medications on file prior to encounter.     Current Outpatient Medications on File Prior to Encounter   Medication Sig    aspirin (ECOTRIN) 81 MG EC tablet Take 81 mg by mouth once daily.    carbidopa-levodopa  mg (SINEMET)  mg per tablet Take 1 tablet by mouth 3 (three) times daily.    cyanocobalamin (VITAMIN B-12) 1000 MCG tablet Take 100 mcg by mouth once daily.    LISINOPRIL ORAL Take by mouth.    loratadine (CLARITIN) 10 mg tablet Take 10 mg by mouth once daily.    OMEPRAZOLE ORAL Take by mouth.    tamsulosin (FLOMAX) 0.4 mg Cap Take 1 capsule (0.4 mg total) by mouth once daily.    vitamin E 1000 UNIT capsule Take 1,000 Units by mouth 2 (two) times daily.       Review of patient's allergies indicates:   Allergen Reactions    Diamox sequels [acetazolamide]     Garlic     Namenda [memantine]     Oxybutynin        Past Medical History:   Diagnosis Date    HTN (hypertension)     Parkinson's disease     Vitamin D deficiency      No past surgical history on file.  Family History    None       Tobacco Use    Smoking status: Never    Smokeless tobacco: Never   Substance and Sexual Activity    Alcohol use: No    Drug use: No    Sexual activity: Not on file     Review of Systems   Unable to perform ROS: Dementia     Objective:     Vital Signs (Most Recent):  Temp: 99.8 °F (37.7 °C) (12/01/23 2051)  Pulse: 75 (12/01/23 2051)  Resp: 15 (12/01/23 2051)  BP: (!) 113/58 (12/01/23 2051)  SpO2: 97 % (12/01/23 2051) Vital Signs (24h Range):  Temp:  [98.9 °F (37.2 °C)-99.8 °F (37.7 °C)] 99.8 °F (37.7 °C)  Pulse:  [70-85] 75  Resp:  [15-26] 15  SpO2:  [96 %-97 %] 97 %  BP: (106-130)/(56-68) 113/58     Weight: 52.6 kg (116 lb)  Body mass index is 20.55 kg/m².     Physical Exam  Vitals and nursing note reviewed. Exam conducted with a chaperone present.   Constitutional:       Comments: Somnolent, non-interactive/responsive. Does wince to painful stimuli.    HENT:      Head: Normocephalic  and atraumatic.   Cardiovascular:      Rate and Rhythm: Normal rate and regular rhythm.   Pulmonary:      Effort: Pulmonary effort is normal. No respiratory distress.   Abdominal:      General: Abdomen is flat.      Palpations: Abdomen is soft.   Musculoskeletal:         General: No swelling or signs of injury.   Skin:     Coloration: Skin is not jaundiced.      Comments: unstageable sacral decubitus ulcer with foul smelling purulent output, probes right inferior laterally into buttock. Unable to determine full extent of cavity. Copious drainage easily expressible. Eschar overlying cavity.    Neurological:      Mental Status: She is disoriented.      Comments: Contracted, stiff            CBC:   Recent Labs   Lab 12/01/23  1731   WBC 12.86*   RBC 2.81*   HGB 8.5*   HCT 28.7*      *   MCH 30.2   MCHC 29.6*       Significant Diagnostics:  I have reviewed all pertinent imaging results/findings within the past 24 hours.

## 2023-12-02 NOTE — ED NOTES
Took report from Sally MCGEE, and assumed care of pt at this time. Pt resting comfortably and independently repositioned in stretcher with bed locked in lowest position for safety. NAD noted at this time. Respirations even and unlabored and visible chest rise noted.  Patient offered bathroom assistance and denies need at this time. Pt instructed to call if assistance is needed. Pt on continuous cardiac, BP, and O2 monitoring. Call light within reach. Family at bedside. No needs at this time. Will continue to monitor.  Family updated on plan of care.

## 2023-12-02 NOTE — HOSPITAL COURSE
Pt presenting with purulent drainage from sacral wound . S/p debridement 12/3 with general surgery. Patient febrile with leukocytosis overnight 12/3.  Wound cultures with Proteus mirabilis.  Patient treated with ceftriaxone based on sensitivities to treat soft tissue infection.  Patient given IV fluids given poor oral intake.  Patient initially denied LTAC on peer to peer.     Has experienced recurrent intermittent fevers since 11/23, septic workup obtained and started on broad spectrum abx. Workup only positive for gram positive cocci in one bottle, anticipate contaminant. IV antibiotics were discontinued, repeat blood cx drawn. Given recurrent infections and persistent medical deterioration, discussed w daughter (POA) about establishing goals of care conversations w palliative team, who is in agreement. Discussions are ongoing while placement to LTAC is pending

## 2023-12-02 NOTE — HPI
78 y.o. with a PMHx of parkinson's, lewy-body dementia with debility/functional quadriplegia, HTN, GERD, CKD3, and sacral decubitus ulcer who presents today from her nursing home for evaluation of sacral decubitus ulcer. She is nonverbal and not interactive. Daughter previously at bedside per nurse, however gone during our encounter. Did attempt to contact her and was unable to reach her by cellphone.     On arrival to ED she . Labs demonstrate leukocytosis of 12.8, elevated CRP, normal lactate and procalcitonin. No imaging has been obtained.

## 2023-12-02 NOTE — PROGRESS NOTES
"Pharmacokinetic Initial Assessment: IV Vancomycin    Assessment/Plan:    Initiate intravenous vancomycin with loading dose of 1250 mg once, done in ED, followed by a maintenance dose of vancomycin 750 mg IV every 24 hours.  Desired empiric serum trough concentration is 10 to 20 mcg/mL.  Draw vancomycin trough level 60 min prior to third dose on 12/03/2023 at 1700.  Pharmacy will continue to follow and monitor vancomycin.      Please contact pharmacy at extension [phone] with any questions regarding this assessment.     Thank you for the consult,   Latasha Costa       Patient brief summary:  Annmarie Gallardo is a 78 y.o. female initiated on antimicrobial therapy with IV Vancomycin for treatment of suspected skin & soft tissue infection.    Drug Allergies:   Review of patient's allergies indicates:   Allergen Reactions    Diamox sequels [acetazolamide]     Garlic     Namenda [memantine]     Oxybutynin        Actual Body Weight:   52.6 kg    Renal Function:   Estimated Creatinine Clearance: 37.7 mL/min (based on SCr of 1 mg/dL).     CBC (last 72 hours):  Recent Labs   Lab Result Units 12/01/23  1731   WBC K/uL 12.86*   Hemoglobin g/dL 8.5*   Hematocrit % 28.7*   Platelets K/uL 177   Gran % % 86.6*   Lymph % % 4.5*   Mono % % 7.7   Eosinophil % % 0.3   Basophil % % 0.2   Differential Method  Automated       Metabolic Panel (last 72 hours):  Recent Labs   Lab Result Units 12/01/23  1731   Sodium mmol/L 148*   Potassium mmol/L 4.2   Chloride mmol/L 112*   CO2 mmol/L 23   Glucose mg/dL 89   BUN mg/dL 33*   Creatinine mg/dL 1.0   Albumin g/dL 2.9*   Total Bilirubin mg/dL 1.0   Alkaline Phosphatase U/L 83   AST U/L 12   ALT U/L <5*   Magnesium mg/dL 2.3       Drug levels (last 3 results):  No results for input(s): "VANCOMYCINRA", "VANCORANDOM", "VANCOMYCINPE", "VANCOPEAK", "VANCOMYCINTR", "VANCOTROUGH" in the last 72 hours.    Microbiologic Results:  Microbiology Results (last 7 days)       Procedure Component Value " Units Date/Time    Aerobic culture [[id number]] Collected: 12/01/23 1853    Order Status: Sent Specimen: Wound from Sacrum Updated: 12/01/23 1903    Blood culture x two cultures. Draw prior to antibiotics. [[id number]] Collected: 12/01/23 1731    Order Status: Sent Specimen: Blood from Peripheral, Antecubital, Left Updated: 12/01/23 1758    Blood culture x two cultures. Draw prior to antibiotics. [[id number]] Collected: 12/01/23 1731    Order Status: Sent Specimen: Blood from Peripheral, Antecubital, Right Updated: 12/01/23 1758

## 2023-12-02 NOTE — H&P
[facility]  [facility] & Physical    Patient Name: Annmarie Gallardo  MRN: [id number]  Patient Class: IP- Inpatient  Admission Date: 12/1/2023  Attending Physician: Franc Fernandez MD   Primary Care Provider: Raymond Bess MD         Patient information was obtained from ER records.     Subjective:     Principal Problem:Pressure injury of sacral region, unstageable    Chief Complaint:   Chief Complaint   Patient presents with    altered skin integrity     Patient arrived via ems from [facility] (nursing home). PCP sent patient to be evaluated for sacral pressure sore, possible need for IV antibiotics        HPI:   Annmarie Gallardo is a 78 y.o. with a PMHx of parkinson's, lewy-body dementia with debility/functional quadriplegia, HTN, GERD, CKD3, and sacral decubitus ulcer who presents today from her nursing home for evaluation of sacral decubitus ulcer.     Due to her dementia and nonverbal status, she is unable to provide a history. No family available at bedside during my exam.     Past Medical History:   Diagnosis Date    HTN (hypertension)     Parkinson's disease     Vitamin D deficiency        No past surgical history on file.    Review of patient's allergies indicates:   Allergen Reactions    Diamox sequels [acetazolamide]     Garlic     Namenda [memantine]     Oxybutynin        No current facility-administered medications on file prior to encounter.     Current Outpatient Medications on File Prior to Encounter   Medication Sig    aspirin (ECOTRIN) 81 MG EC tablet Take 81 mg by mouth once daily.    carbidopa-levodopa  mg (SINEMET)  mg per tablet Take 1 tablet by mouth 3 (three) times daily.    cyanocobalamin (VITAMIN B-12) 1000 MCG tablet Take 100 mcg by mouth once daily.    LISINOPRIL ORAL Take by mouth.    loratadine (CLARITIN) 10 mg tablet Take 10 mg by mouth once daily.    OMEPRAZOLE ORAL Take by mouth.    tamsulosin (FLOMAX) 0.4 mg Cap Take 1  capsule (0.4 mg total) by mouth once daily.    vitamin E 1000 UNIT capsule Take 1,000 Units by mouth 2 (two) times daily.     Family History    None       Tobacco Use    Smoking status: Never    Smokeless tobacco: Never   Substance and Sexual Activity    Alcohol use: No    Drug use: No    Sexual activity: Not on file     Review of Systems   Unable to perform ROS: Patient nonverbal     Objective:     Vital Signs (Most Recent):  Temp: 99.8 °F (37.7 °C) (12/01/23 2051)  Pulse: 75 (12/01/23 2051)  Resp: 15 (12/01/23 2051)  BP: (!) 113/58 (12/01/23 2051)  SpO2: 97 % (12/01/23 2051) Vital Signs (24h Range):  Temp:  [98.9 °F (37.2 °C)-99.8 °F (37.7 °C)] 99.8 °F (37.7 °C)  Pulse:  [70-85] 75  Resp:  [15-26] 15  SpO2:  [96 %-97 %] 97 %  BP: (106-130)/(56-68) 113/58     Weight: 52.6 kg (116 lb)  Body mass index is 20.55 kg/m².     Physical Exam  Vitals and nursing note reviewed.   Constitutional:       General: She is not in acute distress.     Appearance: She is well-developed. She is ill-appearing (chronically). She is not diaphoretic.   HENT:      Head: Normocephalic and atraumatic.   Eyes:      General: No scleral icterus.     Conjunctiva/sclera: Conjunctivae normal.   Neck:      Vascular: No JVD.   Cardiovascular:      Rate and Rhythm: Normal rate and regular rhythm.   Pulmonary:      Effort: Pulmonary effort is normal. No respiratory distress.   Musculoskeletal:      Right lower leg: No edema.      Left lower leg: No edema.   Skin:     Coloration: Skin is pale. Skin is not jaundiced.      Findings: Lesion present.   Neurological:      Mental Status: She is alert.      Motor: No abnormal muscle tone.      Comments: Does not attempt to track or answer questions, does not follow commands, no tremor, UE rigidity   Psychiatric:         Mood and Affect: Mood normal.         Behavior: Behavior normal.                      Significant Labs: All pertinent labs within the past 24 hours have been reviewed.  BMP:   Recent Labs    Lab 12/01/23  1731   GLU 89   *   K 4.2   *   CO2 23   BUN 33*   CREATININE 1.0   CALCIUM 9.0   MG 2.3     CBC:   Recent Labs   Lab 12/01/23  1731   WBC 12.86*   HGB 8.5*   HCT 28.7*          Significant Imaging: I have reviewed all pertinent imaging results/findings within the past 24 hours.  Assessment/Plan:     * Pressure injury of sacral region, unstageable  Presents for evaluation of sacral decubitus ulcer. Purulent on exam. Mild leukocytosis, otherwise does not meet sepsis criteria. Will start Vancomycin and Ceftriaxone and consult General Surgery as she may need debridement.       Hypernatremia  Mild at 148, and patient appears dry on exam. Will give IVF and monitor.       Macrocytic anemia  Chronic, and likely anemia of chronic disease. Currently without indication for transfusion.     Severe protein-calorie malnutrition  Due to Parkinson's and debility. Will continue dietary supplements as recommended by RD during previous hospitalization.     Functional quadriplegia  Due to Parkinsons' and dementia. Continue frequent turns and pressure ulcer care/prevention.       DNR (do not resuscitate)  Per previous documentation and discussions with patient's family.       Severe Lewy body dementia  Monitor with delirium precautions.     Parkinson's disease  Continue home Sinemet.      CKD (chronic kidney disease), stage III  Renal function relatively around baseline. Will give IVF and encourage appropriate PO intake.       VTE Risk Mitigation (From admission, onward)           Ordered     enoxaparin injection 40 mg  Daily         12/01/23 2123     IP VTE HIGH RISK PATIENT  Once         12/01/23 2123     Place sequential compression device  Until discontinued         12/01/23 2123     Reason for No Pharmacological VTE Prophylaxis  Once        Question:  Reasons:  Answer:  Physician Provided (leave comment)    12/01/23 2123                               Pharmacokinetic Initial Assessment: IV  "Vancomycin    Assessment/Plan:    Initiate intravenous vancomycin with loading dose of 1250 mg once, done in ED, followed by a maintenance dose of vancomycin 750 mg IV every 24 hours.  Desired empiric serum trough concentration is 10 to 20 mcg/mL.  Draw vancomycin trough level 60 min prior to third dose on 12/03/2023 at 1700.  Pharmacy will continue to follow and monitor vancomycin.      Please contact pharmacy at extension [phone] with any questions regarding this assessment.     Thank you for the consult,   Latasha Costa       Patient brief summary:  Annmarie Gallardo is a 78 y.o. female initiated on antimicrobial therapy with IV Vancomycin for treatment of suspected skin & soft tissue infection.    Drug Allergies:   Review of patient's allergies indicates:   Allergen Reactions    Diamox sequels [acetazolamide]     Garlic     Namenda [memantine]     Oxybutynin        Actual Body Weight:   52.6 kg    Renal Function:   Estimated Creatinine Clearance: 37.7 mL/min (based on SCr of 1 mg/dL).     CBC (last 72 hours):  Recent Labs   Lab Result Units 12/01/23  1731   WBC K/uL 12.86*   Hemoglobin g/dL 8.5*   Hematocrit % 28.7*   Platelets K/uL 177   Gran % % 86.6*   Lymph % % 4.5*   Mono % % 7.7   Eosinophil % % 0.3   Basophil % % 0.2   Differential Method  Automated       Metabolic Panel (last 72 hours):  Recent Labs   Lab Result Units 12/01/23  1731   Sodium mmol/L 148*   Potassium mmol/L 4.2   Chloride mmol/L 112*   CO2 mmol/L 23   Glucose mg/dL 89   BUN mg/dL 33*   Creatinine mg/dL 1.0   Albumin g/dL 2.9*   Total Bilirubin mg/dL 1.0   Alkaline Phosphatase U/L 83   AST U/L 12   ALT U/L <5*   Magnesium mg/dL 2.3       Drug levels (last 3 results):  No results for input(s): "VANCOMYCINRA", "VANCORANDOM", "VANCOMYCINPE", "VANCOPEAK", "VANCOMYCINTR", "VANCOTROUGH" in the last 72 hours.    Microbiologic Results:  Microbiology Results (last 7 days)       Procedure Component Value Units Date/Time    Aerobic culture " [[id number]] Collected: 12/01/23 1853    Order Status: Sent Specimen: Wound from Sacrum Updated: 12/01/23 1903    Blood culture x two cultures. Draw prior to antibiotics. [[id number]] Collected: 12/01/23 1731    Order Status: Sent Specimen: Blood from Peripheral, Antecubital, Left Updated: 12/01/23 1758    Blood culture x two cultures. Draw prior to antibiotics. [[id number]] Collected: 12/01/23 1731    Order Status: Sent Specimen: Blood from Peripheral, Antecubital, Right Updated: 12/01/23 1758              Modesto Foster MD  Department of Hospital Medicine  [facility] - Emergency Dept

## 2023-12-02 NOTE — ED NOTES
Telemetry Verification    Box Number: [id number]  Rate: 82   Rhythm: Normal sinus  Monitor Tech: War Room

## 2023-12-02 NOTE — PLAN OF CARE
Initial Discharge Planning Case Management Assessment:    Patient admitted on 12-1-23  Chart reviewed today  Care plan discussed with ER treatment team,    attending Dr Kimber SOTO at home: at NH  Current dispo: pending, IV abx ?  Lives at [facility]  Transportation: non emergent amb  Consults following: case mgt  Case management  to follow

## 2023-12-02 NOTE — PROGRESS NOTES
[facility] - Intensive Care ([place] [id number])  General Surgery  Progress Note    Subjective:     History of Present Illness:  78 y.o. with a PMHx of parkinson's, lewy-body dementia with debility/functional quadriplegia, HTN, GERD, CKD3, and sacral decubitus ulcer who presents today from her nursing home for evaluation of sacral decubitus ulcer. She is nonverbal and not interactive. Daughter previously at bedside per nurse, however gone during our encounter. Did attempt to contact her and was unable to reach her by cellphone.     On arrival to ED she . Labs demonstrate leukocytosis of 12.8, elevated CRP, normal lactate and procalcitonin. No imaging has been obtained.     Post-Op Info:  Procedure(s) (LRB):  DEBRIDEMENT, WOUND, SACRUM (N/A)         Interval History: No acute events overnight. Talked to daughter by phone this morning.     Medications:  Continuous Infusions:  Scheduled Meds:   aspirin  81 mg Oral Daily    carbidopa-levodopa  mg  1 tablet Oral TID    And    carbidopa-levodopa 12.5-50 mg  1 split tablet Oral QHS    cefTRIAXone (ROCEPHIN) IVPB  1 g Intravenous Q24H    cetirizine  10 mg Oral Daily    cyanocobalamin  1,000 mcg Oral Daily    enoxparin  40 mg Subcutaneous Daily    pantoprazole  20 mg Oral Daily    senna-docusate 8.6-50 mg  1 tablet Oral BID    tamsulosin  0.4 mg Oral Daily    vancomycin (VANCOCIN) IV (PEDS and ADULTS)  15 mg/kg Intravenous Q24H     PRN Meds:acetaminophen, acetaminophen, aluminum-magnesium hydroxide-simethicone, melatonin, naloxone, ondansetron, polyethylene glycol, simethicone, sodium chloride 0.9%, sodium chloride 0.9%, Pharmacy to dose Vancomycin consult **AND** vancomycin - pharmacy to dose     Review of patient's allergies indicates:   Allergen Reactions    Diamox sequels [acetazolamide]     Garlic     Namenda [memantine]     Oxybutynin      Objective:     Vital Signs (Most Recent):  Temp: 97.5 °F (36.4 °C) (12/02/23 0808)  Pulse: 72 (12/02/23 0808)  Resp: 18 (12/02/23  0808)  BP: (!) 142/67 (12/02/23 0808)  SpO2: 95 % (12/02/23 0808) Vital Signs (24h Range):  Temp:  [97.5 °F (36.4 °C)-99.8 °F (37.7 °C)] 97.5 °F (36.4 °C)  Pulse:  [65-85] 72  Resp:  [14-26] 18  SpO2:  [95 %-98 %] 95 %  BP: (106-168)/(55-74) 142/67     Weight: 52.6 kg (115 lb 15.4 oz)  Body mass index is 20.54 kg/m².    Intake/Output - Last 3 Shifts         11/30 0700  12/01 0659 12/01 0700 12/02 0659 12/02 0700 12/03 0659    IV Piggyback  100     Total Intake(mL/kg)  100 (1.9)     Net  +100                     Physical Exam  Vitals and nursing note reviewed. Exam conducted with a chaperone present.   Constitutional:       Comments: Somnolent, non-interactive/responsive. Does wince to painful stimuli.    HENT:      Head: Normocephalic and atraumatic.   Cardiovascular:      Rate and Rhythm: Normal rate and regular rhythm.   Pulmonary:      Effort: Pulmonary effort is normal. No respiratory distress.   Abdominal:      General: Abdomen is flat.      Palpations: Abdomen is soft.   Musculoskeletal:         General: No swelling or signs of injury.   Skin:     Coloration: Skin is not jaundiced.      Comments: unstageable sacral decubitus ulcer with foul smelling purulent output, probes right inferior laterally into buttock. Unable to determine full extent of cavity. Copious drainage easily expressible. Eschar overlying cavity.    Neurological:      Mental Status: She is disoriented.      Comments: Contracted, stiff          Significant Labs:  I have reviewed all pertinent lab results within the past 24 hours.  CBC:   Recent Labs   Lab 12/01/23  1731   WBC 12.86*   RBC 2.81*   HGB 8.5*   HCT 28.7*      *   MCH 30.2   MCHC 29.6*     CMP:   Recent Labs   Lab 12/01/23  1731 12/02/23  0447   GLU 89 107   CALCIUM 9.0 9.1   ALBUMIN 2.9*  --    PROT 6.4  --    * 147*   K 4.2 3.7   CO2 23 23   * 114*   BUN 33* 28*   CREATININE 1.0 0.8   ALKPHOS 83  --    ALT <5*  --    AST 12  --    BILITOT 1.0  --         Significant Diagnostics:  I have reviewed all pertinent imaging results/findings within the past 24 hours.  Assessment/Plan:     * Pressure injury of sacral region, unstageable  78 year old F with above history presenting with purulent drainage from sacral wound. Non ambulatory, nonverbal, noninteractive with infected sacral decubitus ulcer. Unable to reach daughter who had left bedside per nurse. Given extent of probing, ulcer would likely be best suited for OR debridement. Could unroof eschar at bedside at the very least, however no one available in person or phone to provide consent for this and patient winces at manipulation.     - Plan for sacral wound debridement in OR tomorrow  - Discussed plan with daughter by phone, will obtain consent  - NPO at midnight 12/3/23  - Okay for DVT ppx, please avoid other anticoagulation/antiplatelets  - Agree with wound care consult  - Remainder of care per primary           Jacobo Paniagua MD  General Surgery  [facility] - Intensive Care ([place])

## 2023-12-02 NOTE — ASSESSMENT & PLAN NOTE
Due to Parkinson's and debility. Will continue dietary supplements as recommended by RD during previous hospitalization.

## 2023-12-02 NOTE — PLAN OF CARE
Patient somnolence in bed this shift no facial grimaces from pain wound care done patient turned and repositioned q2hr and prn. Abt therapy continued patient remains afebrile no injuries noted this shift. Safety measures maintained. will cont to monitor

## 2023-12-02 NOTE — ANESTHESIA PREPROCEDURE EVALUATION
**Paper Consent in Chart**    [facility]  Anesthesia Pre-Operative Evaluation         Patient Name: Annmarie Gallardo  YOB: 1945  MRN: [id number]    SUBJECTIVE:     Pre-operative evaluation for Procedure(s) (LRB):  DEBRIDEMENT, WOUND, SACRUM (N/A)     12/02/2023    Annmarie Gallardo is a 78 y.o. female w/ a significant PMHx of parkinson's, lewy-body dementia with debility/functional quadriplegia, HTN, GERD, CKD3, and sacral decubitus ulcer who presents today from her nursing home for evaluation of sacral decubitus ulcer.     Patient now presents for the above procedure(s).    LDA:        Peripheral IV - Single Lumen 12/01/23 1739 22 G Anterior;Distal;Left Forearm (Active)   Site Assessment Clean;Dry;Intact 12/02/23 0800   Extremity Assessment Distal to IV No warmth;No swelling;No redness;No abnormal discoloration 12/01/23 2249   Line Status Saline locked 12/02/23 0400   Dressing Status Clean;Dry;Intact 12/02/23 0400   Dressing Intervention Integrity maintained 12/02/23 0400   Dressing Change Due 12/05/23 12/01/23 2249   Site Change Due 12/05/23 12/01/23 2249   Number of days: 0       Prev airway: None documented.    Drips: None documented.    Patient Active Problem List   Diagnosis    Debility    Problem related to discharge planning    CKD (chronic kidney disease), stage III    Gastroesophageal reflux disease without esophagitis    Parkinson's disease    Severe Lewy body dementia    HTN (hypertension)    Pressure injury of sacral region, unstageable    DNR (do not resuscitate)    Functional quadriplegia    Hypernatremia    Severe protein-calorie malnutrition    Macrocytic anemia       Review of patient's allergies indicates:   Allergen Reactions    Diamox sequels [acetazolamide]     Garlic     Namenda [memantine]     Oxybutynin        Current Inpatient Medications:   aspirin  81 mg Oral Daily    carbidopa-levodopa  mg  1 tablet Oral TID    And     carbidopa-levodopa 12.5-50 mg  1 split tablet Oral QHS    cefTRIAXone (ROCEPHIN) IVPB  1 g Intravenous Q24H    cetirizine  10 mg Oral Daily    cyanocobalamin  1,000 mcg Oral Daily    enoxparin  40 mg Subcutaneous Daily    pantoprazole  20 mg Oral Daily    senna-docusate 8.6-50 mg  1 tablet Oral BID    tamsulosin  0.4 mg Oral Daily    vancomycin (VANCOCIN) IV (PEDS and ADULTS)  15 mg/kg Intravenous Q24H       No current facility-administered medications on file prior to encounter.     Current Outpatient Medications on File Prior to Encounter   Medication Sig Dispense Refill    aspirin (ECOTRIN) 81 MG EC tablet Take 81 mg by mouth once daily.      carbidopa-levodopa  mg (SINEMET)  mg per tablet Take 1 tablet by mouth 3 (three) times daily.      cyanocobalamin (VITAMIN B-12) 1000 MCG tablet Take 100 mcg by mouth once daily.      LISINOPRIL ORAL Take by mouth.      loratadine (CLARITIN) 10 mg tablet Take 10 mg by mouth once daily.      OMEPRAZOLE ORAL Take by mouth.      tamsulosin (FLOMAX) 0.4 mg Cap Take 1 capsule (0.4 mg total) by mouth once daily. 30 capsule 2    vitamin E 1000 UNIT capsule Take 1,000 Units by mouth 2 (two) times daily.         No past surgical history on file.    Social History:  Tobacco Use: Low Risk  (12/1/2023)    Patient History     Smoking Tobacco Use: Never     Smokeless Tobacco Use: Never     Passive Exposure: Not on file      Alcohol Use: Not At Risk (12/1/2023)    AUDIT-C     Frequency of Alcohol Consumption: Never     Average Number of Drinks: Patient does not drink     Frequency of Binge Drinking: Never        OBJECTIVE:     Vital Signs Range (Last 24H):  Temp:  [36.4 °C (97.5 °F)-37.7 °C (99.8 °F)]   Pulse:  [65-85]   Resp:  [14-26]   BP: (106-168)/(55-76)   SpO2:  [95 %-100 %]       Significant Labs:  Lab Results   Component Value Date    WBC 12.86 (H) 12/01/2023    HGB 8.5 (L) 12/01/2023    HCT 28.7 (L) 12/01/2023     12/01/2023    ALT <5 (L)  12/01/2023    AST 12 12/01/2023     (H) 12/02/2023    K 3.7 12/02/2023     (H) 12/02/2023    CREATININE 0.8 12/02/2023    BUN 28 (H) 12/02/2023    CO2 23 12/02/2023       Diagnostic Studies: No relevant studies.    EKG:   No results found for this or any previous visit.    2D ECHO:  TTE:  No results found for this or any previous visit.    ODALYS:  No results found for this or any previous visit.    ASSESSMENT/PLAN:        Pre-op Assessment    I have reviewed the Patient Summary Reports.     I have reviewed the Nursing Notes. I have reviewed the NPO Status.   I have reviewed the Medications.     Review of Systems  Anesthesia Hx:  No problems with previous Anesthesia   Neg history of prior surgery.          Denies Family Hx of Anesthesia complications.    Denies Personal Hx of Anesthesia complications.                    Hematology/Oncology:  Hematology Normal   Oncology Normal                                   EENT/Dental:  EENT/Dental Normal           Cardiovascular:  Exercise tolerance: good   Hypertension       Denies Angina.          Functional Capacity good / => 4 METS            Carotoid Artery Disease               Pulmonary:  Pulmonary Normal                       Renal/:  Chronic Renal Disease                Hepatic/GI:     GERD, well controlled             Neurological:  Neurology Normal            Denies Pain                                 Endocrine:  Endocrine Normal            Dermatological:  Patient has unstageable severe pressure ulcer on admission from nursing home. Being debrided today. Severely malnourished with cachexia   Psych:  Psychiatric History        Phobia and Claustrophobia.        Physical Exam  General: Confusion  Hx dementia  Airway:  Mallampati: II / II  Mouth Opening: Normal  TM Distance: Normal  Tongue: Normal  Neck ROM: Normal ROM    Dental:  Intact      Anesthesia Plan  Type of Anesthesia, risks & benefits discussed:    Anesthesia Type: Gen ETT  Intra-op Monitoring  Plan: Standard ASA Monitors  Post Op Pain Control Plan: multimodal analgesia and IV/PO Opioids PRN  Induction:  IV  Airway Plan: Direct, Post-Induction  Informed Consent: Informed consent signed with the Patient representative and all parties understand the risks and agree with anesthesia plan.  All questions answered.   ASA Score: 3  Day of Surgery Review of History & Physical: H&P Update referred to the surgeon/provider.    Ready For Surgery From Anesthesia Perspective.     .

## 2023-12-02 NOTE — PROGRESS NOTES
[facility] - Int[facility] ([address] Medicine  Progress Note    Patient Name: Annmarie Gallardo  MRN: [id number]  Patient Class: IP- Inpatient   Admission Date: 12/1/2023  Length of Stay: 1 days  Attending Physician: Gordy Smith DO  Primary Care Provider: Raymond Bess MD        Subjective:     Principal Problem:Pressure injury of sacral region, unstageable        HPI:  Annmarie Gallardo is a 78 y.o. with a PMHx of parkinson's, lewy-body dementia with debility/functional quadriplegia, HTN, GERD, CKD3, and sacral decubitus ulcer who presents today from her nursing home for evaluation of sacral decubitus ulcer.     Due to her dementia and nonverbal status, she is unable to provide a history. No family available at bedside during my exam.     Overview/Hospital Course:  Plan to go to OR for debridement 12/3 or 12/4 with general surgery. Continue IV abx.    Interval History: Patient awake and speaking - unintelligible this morning. Daughter at bedside. Reports patient has been less alert this week.    Review of Systems   Unable to perform ROS: Dementia     Objective:     Vital Signs (Most Recent):  Temp: 97.5 °F (36.4 °C) (12/02/23 0808)  Pulse: 81 (12/02/23 1103)  Resp: 18 (12/02/23 0808)  BP: (!) 142/67 (12/02/23 0808)  SpO2: 95 % (12/02/23 0808) Vital Signs (24h Range):  Temp:  [97.5 °F (36.4 °C)-99.8 °F (37.7 °C)] 97.5 °F (36.4 °C)  Pulse:  [65-85] 81  Resp:  [14-26] 18  SpO2:  [95 %-98 %] 95 %  BP: (106-168)/(55-74) 142/67     Weight: 52.6 kg (115 lb 15.4 oz)  Body mass index is 20.54 kg/m².    Intake/Output Summary (Last 24 hours) at 12/2/2023 1129  Last data filed at 12/2/2023 0745  Gross per 24 hour   Intake 100 ml   Output --   Net 100 ml         Physical Exam  Vitals and nursing note reviewed.   Constitutional:       General: She is not in acute distress.     Appearance: She is well-developed. She is ill-appearing (chronically). She is not diaphoretic.   HENT:      Head: Normocephalic and  atraumatic.   Eyes:      General: No scleral icterus.     Conjunctiva/sclera: Conjunctivae normal.   Neck:      Vascular: No JVD.   Cardiovascular:      Rate and Rhythm: Normal rate and regular rhythm.      Heart sounds: Normal heart sounds.   Pulmonary:      Effort: Pulmonary effort is normal. No respiratory distress.   Abdominal:      General: There is no distension.      Palpations: Abdomen is soft.      Tenderness: There is abdominal tenderness.   Musculoskeletal:      Right lower leg: No edema.      Left lower leg: No edema.   Skin:     Coloration: Skin is pale. Skin is not jaundiced.      Findings: Lesion present.   Neurological:      Mental Status: She is alert.      Motor: No abnormal muscle tone.      Comments: Does not attempt to track or answer questions, does not follow commands, no tremor, UE rigidity   Psychiatric:      Comments: Unable to evaluate             Significant Labs: All pertinent labs within the past 24 hours have been reviewed.  CBC:   Recent Labs   Lab 12/01/23  1731   WBC 12.86*   HGB 8.5*   HCT 28.7*        CMP:   Recent Labs   Lab 12/01/23  1731 12/02/23  0447   * 147*   K 4.2 3.7   * 114*   CO2 23 23   GLU 89 107   BUN 33* 28*   CREATININE 1.0 0.8   CALCIUM 9.0 9.1   PROT 6.4  --    ALBUMIN 2.9*  --    BILITOT 1.0  --    ALKPHOS 83  --    AST 12  --    ALT <5*  --    ANIONGAP 13 10       Significant Imaging: I have reviewed all pertinent imaging results/findings within the past 24 hours.    Assessment/Plan:      * Pressure injury of sacral region, unstageable  Presents for evaluation of sacral decubitus ulcer. Purulent on exam. Mild leukocytosis, otherwise does not meet sepsis criteria.   - continue vanc and ceftriaxone (12/1)  - surgery consulted, plan for OR debridement 12/3 or 12/4      Macrocytic anemia  Chronic, and likely anemia of chronic disease. Currently without indication for transfusion.     Severe protein-calorie malnutrition  Due to Parkinson's and  debility. Will continue dietary supplements as recommended by RD during previous hospitalization.   - SLP      Hypernatremia  Mild at 148 > 147 after IVF    Functional quadriplegia  Due to Parkinsons' and dementia. Continue frequent turns and pressure ulcer care/prevention.       DNR (do not resuscitate)  Per previous documentation and discussions with patient's family. Clarified with daughter Yamileth at bedside (shares POA with other daughter) - patient is DNR.       Severe Lewy body dementia  Monitor with delirium precautions.   - palliative care consult on monday    Parkinson's disease  Continue home Sinemet.      CKD (chronic kidney disease), stage III  Creatine stable for now. BMP reviewed- noted Estimated Creatinine Clearance: 47.2 mL/min (based on SCr of 0.8 mg/dL). according to latest data. Based on current GFR, CKD stage is stage 3 - GFR 30-59.  Monitor UOP and serial BMP and adjust therapy as needed. Renally dose meds. Avoid nephrotoxic medications and procedures.        VTE Risk Mitigation (From admission, onward)           Ordered     enoxaparin injection 40 mg  Daily         12/01/23 2123     IP VTE HIGH RISK PATIENT  Once         12/01/23 2123     Place sequential compression device  Until discontinued         12/01/23 2123     Reason for No Pharmacological VTE Prophylaxis  Once        Question:  Reasons:  Answer:  Physician Provided (leave comment)    12/01/23 2123                    Discharge Planning   MELCHOR: 12/5/2023     Code Status: DNR   Is the patient medically ready for discharge?: No    Reason for patient still in hospital (select all that apply): Patient trending condition and Treatment  Discharge Plan A: Return to nursing home                  Aleta Sequeira MD  Department of Hospital Medicine   [facility] - Intensive Care ([place])

## 2023-12-02 NOTE — ASSESSMENT & PLAN NOTE
78 year old F with above history presenting with purulent drainage from sacral wound. Non ambulatory, nonverbal, noninteractive with infected sacral decubitus ulcer. Unable to reach daughter who had left bedside per nurse. Given extent of probing, ulcer would likely be best suited for OR debridement. Could unroof eschar at bedside at the very least, however no one available in person or phone to provide consent for this and patient winces at manipulation.     - Recommend palliative care consult to discuss hospice options  - Will try to re-engage family tomorrow   - Agree with wound care consult  - Would consult speech and swallow, SLP prior to oral intake  - Remainder of care per primary

## 2023-12-03 ENCOUNTER — ANESTHESIA (OUTPATIENT)
Dept: SURGERY | Facility: HOSPITAL | Age: 78
DRG: 264 | End: 2023-12-03
Payer: MEDICARE

## 2023-12-03 PROBLEM — L89.154 STAGE IV PRESSURE ULCER OF SACRAL REGION: Status: ACTIVE | Noted: 2023-12-03

## 2023-12-03 PROBLEM — A41.9 SEPSIS WITHOUT ACUTE ORGAN DYSFUNCTION: Status: ACTIVE | Noted: 2023-12-03

## 2023-12-03 LAB
ALBUMIN SERPL BCP-MCNC: 2.3 G/DL (ref 3.5–5.2)
ALP SERPL-CCNC: 106 U/L (ref 55–135)
ALT SERPL W/O P-5'-P-CCNC: <5 U/L (ref 10–44)
ANION GAP SERPL CALC-SCNC: 12 MMOL/L (ref 8–16)
AST SERPL-CCNC: 14 U/L (ref 10–40)
BACTERIA SPEC AEROBE CULT: ABNORMAL
BASOPHILS # BLD AUTO: 0.12 K/UL (ref 0–0.2)
BASOPHILS NFR BLD: 0.7 % (ref 0–1.9)
BILIRUB SERPL-MCNC: 0.6 MG/DL (ref 0.1–1)
BUN SERPL-MCNC: 21 MG/DL (ref 8–23)
CALCIUM SERPL-MCNC: 8.6 MG/DL (ref 8.7–10.5)
CHLORIDE SERPL-SCNC: 109 MMOL/L (ref 95–110)
CO2 SERPL-SCNC: 23 MMOL/L (ref 23–29)
CREAT SERPL-MCNC: 0.8 MG/DL (ref 0.5–1.4)
DIFFERENTIAL METHOD: ABNORMAL
EOSINOPHIL # BLD AUTO: 0.3 K/UL (ref 0–0.5)
EOSINOPHIL NFR BLD: 1.4 % (ref 0–8)
ERYTHROCYTE [DISTWIDTH] IN BLOOD BY AUTOMATED COUNT: 12.7 % (ref 11.5–14.5)
EST. GFR  (NO RACE VARIABLE): >60 ML/MIN/1.73 M^2
GLUCOSE SERPL-MCNC: 107 MG/DL (ref 70–110)
HCT VFR BLD AUTO: 35.3 % (ref 37–48.5)
HGB BLD-MCNC: 10.9 G/DL (ref 12–16)
IMM GRANULOCYTES # BLD AUTO: 0.41 K/UL (ref 0–0.04)
IMM GRANULOCYTES NFR BLD AUTO: 2.3 % (ref 0–0.5)
LYMPHOCYTES # BLD AUTO: 0.9 K/UL (ref 1–4.8)
LYMPHOCYTES NFR BLD: 4.8 % (ref 18–48)
MCH RBC QN AUTO: 29.4 PG (ref 27–31)
MCHC RBC AUTO-ENTMCNC: 30.9 G/DL (ref 32–36)
MCV RBC AUTO: 95 FL (ref 82–98)
MONOCYTES # BLD AUTO: 1.1 K/UL (ref 0.3–1)
MONOCYTES NFR BLD: 5.9 % (ref 4–15)
NEUTROPHILS # BLD AUTO: 15.3 K/UL (ref 1.8–7.7)
NEUTROPHILS NFR BLD: 84.9 % (ref 38–73)
NRBC BLD-RTO: 0 /100 WBC
PLATELET # BLD AUTO: 273 K/UL (ref 150–450)
PMV BLD AUTO: 10.8 FL (ref 9.2–12.9)
POTASSIUM SERPL-SCNC: 3.5 MMOL/L (ref 3.5–5.1)
PROT SERPL-MCNC: 6 G/DL (ref 6–8.4)
RBC # BLD AUTO: 3.71 M/UL (ref 4–5.4)
SODIUM SERPL-SCNC: 144 MMOL/L (ref 136–145)
WBC # BLD AUTO: 17.99 K/UL (ref 3.9–12.7)

## 2023-12-03 PROCEDURE — D9220A PRA ANESTHESIA: Mod: CRNA,,, | Performed by: STUDENT IN AN ORGANIZED HEALTH CARE EDUCATION/TRAINING PROGRAM

## 2023-12-03 PROCEDURE — 25000003 PHARM REV CODE 250: Performed by: STUDENT IN AN ORGANIZED HEALTH CARE EDUCATION/TRAINING PROGRAM

## 2023-12-03 PROCEDURE — 37000008 HC ANESTHESIA 1ST 15 MINUTES: Performed by: SURGERY

## 2023-12-03 PROCEDURE — 36415 COLL VENOUS BLD VENIPUNCTURE: CPT | Performed by: STUDENT IN AN ORGANIZED HEALTH CARE EDUCATION/TRAINING PROGRAM

## 2023-12-03 PROCEDURE — 71000033 HC RECOVERY, INTIAL HOUR: Performed by: SURGERY

## 2023-12-03 PROCEDURE — 63600175 PHARM REV CODE 636 W HCPCS: Performed by: STUDENT IN AN ORGANIZED HEALTH CARE EDUCATION/TRAINING PROGRAM

## 2023-12-03 PROCEDURE — D9220A PRA ANESTHESIA: ICD-10-PCS | Mod: CRNA,,, | Performed by: STUDENT IN AN ORGANIZED HEALTH CARE EDUCATION/TRAINING PROGRAM

## 2023-12-03 PROCEDURE — 37000009 HC ANESTHESIA EA ADD 15 MINS: Performed by: SURGERY

## 2023-12-03 PROCEDURE — 36000706: Performed by: SURGERY

## 2023-12-03 PROCEDURE — D9220A PRA ANESTHESIA: ICD-10-PCS | Mod: ANES,,, | Performed by: ANESTHESIOLOGY

## 2023-12-03 PROCEDURE — 36000707: Performed by: SURGERY

## 2023-12-03 PROCEDURE — 11043 DBRDMT MUSC&/FSCA 1ST 20/<: CPT | Mod: ,,, | Performed by: SURGERY

## 2023-12-03 PROCEDURE — 11043 PR DEBRIDEMENT, SKIN, SUB-Q TISSUE,MUSCLE,=<20 SQ CM: ICD-10-PCS | Mod: ,,, | Performed by: SURGERY

## 2023-12-03 PROCEDURE — 80053 COMPREHEN METABOLIC PANEL: CPT | Performed by: STUDENT IN AN ORGANIZED HEALTH CARE EDUCATION/TRAINING PROGRAM

## 2023-12-03 PROCEDURE — D9220A PRA ANESTHESIA: Mod: ANES,,, | Performed by: ANESTHESIOLOGY

## 2023-12-03 PROCEDURE — 71000016 HC POSTOP RECOV ADDL HR: Performed by: SURGERY

## 2023-12-03 PROCEDURE — 71000015 HC POSTOP RECOV 1ST HR: Performed by: SURGERY

## 2023-12-03 PROCEDURE — 85025 COMPLETE CBC W/AUTO DIFF WBC: CPT | Performed by: STUDENT IN AN ORGANIZED HEALTH CARE EDUCATION/TRAINING PROGRAM

## 2023-12-03 PROCEDURE — 11046 PR DEB MUSC/FASCIA ADD-ON: ICD-10-PCS | Mod: ,,, | Performed by: SURGERY

## 2023-12-03 PROCEDURE — 11046 DBRDMT MUSC&/FSCA EA ADDL: CPT | Mod: ,,, | Performed by: SURGERY

## 2023-12-03 PROCEDURE — 21400001 HC TELEMETRY ROOM

## 2023-12-03 RX ORDER — PROPOFOL 10 MG/ML
VIAL (ML) INTRAVENOUS CONTINUOUS PRN
Status: DISCONTINUED | OUTPATIENT
Start: 2023-12-03 | End: 2023-12-03

## 2023-12-03 RX ORDER — ONDANSETRON 2 MG/ML
INJECTION INTRAMUSCULAR; INTRAVENOUS
Status: DISCONTINUED | OUTPATIENT
Start: 2023-12-03 | End: 2023-12-03

## 2023-12-03 RX ORDER — LIDOCAINE HYDROCHLORIDE 20 MG/ML
INJECTION INTRAVENOUS
Status: DISCONTINUED | OUTPATIENT
Start: 2023-12-03 | End: 2023-12-03

## 2023-12-03 RX ORDER — HALOPERIDOL 5 MG/ML
0.5 INJECTION INTRAMUSCULAR EVERY 10 MIN PRN
Status: DISCONTINUED | OUTPATIENT
Start: 2023-12-03 | End: 2023-12-03 | Stop reason: HOSPADM

## 2023-12-03 RX ORDER — PROPOFOL 10 MG/ML
VIAL (ML) INTRAVENOUS
Status: DISCONTINUED | OUTPATIENT
Start: 2023-12-03 | End: 2023-12-03

## 2023-12-03 RX ORDER — MUPIROCIN 20 MG/G
OINTMENT TOPICAL 2 TIMES DAILY
Status: DISPENSED | OUTPATIENT
Start: 2023-12-03 | End: 2023-12-08

## 2023-12-03 RX ORDER — FENTANYL CITRATE 50 UG/ML
25 INJECTION, SOLUTION INTRAMUSCULAR; INTRAVENOUS EVERY 5 MIN PRN
Status: DISCONTINUED | OUTPATIENT
Start: 2023-12-03 | End: 2023-12-03 | Stop reason: HOSPADM

## 2023-12-03 RX ORDER — PHENYLEPHRINE HYDROCHLORIDE 10 MG/ML
INJECTION INTRAVENOUS
Status: DISCONTINUED | OUTPATIENT
Start: 2023-12-03 | End: 2023-12-03

## 2023-12-03 RX ORDER — ONDANSETRON 2 MG/ML
4 INJECTION INTRAMUSCULAR; INTRAVENOUS DAILY PRN
Status: DISCONTINUED | OUTPATIENT
Start: 2023-12-03 | End: 2023-12-03 | Stop reason: HOSPADM

## 2023-12-03 RX ADMIN — GLYCOPYRROLATE 0.2 MG: 0.2 INJECTION, SOLUTION INTRAMUSCULAR; INTRAVENOUS at 02:12

## 2023-12-03 RX ADMIN — Medication 10 MG: at 02:12

## 2023-12-03 RX ADMIN — PROPOFOL 50 MCG/KG/MIN: 10 INJECTION, EMULSION INTRAVENOUS at 02:12

## 2023-12-03 RX ADMIN — PHENYLEPHRINE HYDROCHLORIDE 100 MCG: 10 INJECTION INTRAVENOUS at 02:12

## 2023-12-03 RX ADMIN — CEFTRIAXONE 1 G: 1 INJECTION, POWDER, FOR SOLUTION INTRAMUSCULAR; INTRAVENOUS at 05:12

## 2023-12-03 RX ADMIN — ENOXAPARIN SODIUM 40 MG: 40 INJECTION SUBCUTANEOUS at 04:12

## 2023-12-03 RX ADMIN — PHENYLEPHRINE HYDROCHLORIDE 50 MCG: 10 INJECTION INTRAVENOUS at 02:12

## 2023-12-03 RX ADMIN — CARBIDOPA AND LEVODOPA 1 TABLET: 25; 100 TABLET ORAL at 04:12

## 2023-12-03 RX ADMIN — LIDOCAINE HYDROCHLORIDE 60 MG: 20 INJECTION INTRAVENOUS at 02:12

## 2023-12-03 RX ADMIN — SODIUM CHLORIDE, SODIUM LACTATE, POTASSIUM CHLORIDE, AND CALCIUM CHLORIDE: .6; .31; .03; .02 INJECTION, SOLUTION INTRAVENOUS at 02:12

## 2023-12-03 RX ADMIN — ONDANSETRON 4 MG: 2 INJECTION INTRAMUSCULAR; INTRAVENOUS at 02:12

## 2023-12-03 RX ADMIN — SODIUM CHLORIDE, POTASSIUM CHLORIDE, SODIUM LACTATE AND CALCIUM CHLORIDE 1000 ML: 600; 310; 30; 20 INJECTION, SOLUTION INTRAVENOUS at 04:12

## 2023-12-03 NOTE — CONSULTS
RD consulted for wound care. Recommendations provided below for best optimization of nutrition status at this time.    Recommendations   When medically feasible, rec'd advancing diet per Speech recommendations  Add Jerome BID for promotion of wound healing  Rec'd addition of MVI along w/ Vitamin C for promotion of wound healing  RD following

## 2023-12-03 NOTE — SUBJECTIVE & OBJECTIVE
Interval History: Patient awake but less interactive this morning.     Review of Systems  Objective:     Vital Signs (Most Recent):  Temp: 98 °F (36.7 °C) (12/03/23 0724)  Pulse: 71 (12/03/23 0724)  Resp: 16 (12/03/23 0724)  BP: 109/61 (12/03/23 0724)  SpO2: 96 % (12/03/23 0724) Vital Signs (24h Range):  Temp:  [97.5 °F (36.4 °C)-100.4 °F (38 °C)] 98 °F (36.7 °C)  Pulse:  [66-82] 71  Resp:  [16-20] 16  SpO2:  [93 %-100 %] 96 %  BP: (105-142)/(56-76) 109/61     Weight: 52.6 kg (115 lb 15.4 oz)  Body mass index is 20.54 kg/m².    Intake/Output Summary (Last 24 hours) at 12/3/2023 1119  Last data filed at 12/3/2023 0745  Gross per 24 hour   Intake 0 ml   Output 400 ml   Net -400 ml         Physical Exam  Vitals and nursing note reviewed.   Constitutional:       General: She is not in acute distress.     Appearance: She is well-developed. She is ill-appearing (chronically). She is not diaphoretic.   HENT:      Head: Normocephalic and atraumatic.   Eyes:      General: No scleral icterus.     Conjunctiva/sclera: Conjunctivae normal.   Neck:      Vascular: No JVD.   Cardiovascular:      Rate and Rhythm: Normal rate and regular rhythm.      Heart sounds: Normal heart sounds.   Pulmonary:      Effort: Pulmonary effort is normal. No respiratory distress.   Abdominal:      General: There is no distension.      Palpations: Abdomen is soft.      Tenderness: There is abdominal tenderness.   Musculoskeletal:      Right lower leg: No edema.      Left lower leg: No edema.   Skin:     Coloration: Skin is pale. Skin is not jaundiced.      Findings: Lesion present.   Neurological:      Mental Status: She is alert.      Motor: No abnormal muscle tone.      Comments: Does not attempt to track or answer questions, does not follow commands, no tremor, UE rigidity   Psychiatric:      Comments: Unable to evaluate             Significant Labs: All pertinent labs within the past 24 hours have been reviewed.  CBC:   Recent Labs   Lab  12/01/23 1731 12/03/23  0511   WBC 12.86* 17.99*   HGB 8.5* 10.9*   HCT 28.7* 35.3*    273     CMP:   Recent Labs   Lab 12/01/23  1731 12/02/23  0447 12/03/23  0511   * 147* 144   K 4.2 3.7 3.5   * 114* 109   CO2 23 23 23   GLU 89 107 107   BUN 33* 28* 21   CREATININE 1.0 0.8 0.8   CALCIUM 9.0 9.1 8.6*   PROT 6.4  --  6.0   ALBUMIN 2.9*  --  2.3*   BILITOT 1.0  --  0.6   ALKPHOS 83  --  106   AST 12  --  14   ALT <5*  --  <5*   ANIONGAP 13 10 12     Microbiology Results (last 7 days)       Procedure Component Value Units Date/Time    Blood culture x two cultures. Draw prior to antibiotics. [[id number]] Collected: 12/01/23 1731    Order Status: Completed Specimen: Blood from Peripheral, Antecubital, Right Updated: 12/02/23 1812     Blood Culture, Routine No Growth to date      No Growth to date    Narrative:      Aerobic and anaerobic    Blood culture x two cultures. Draw prior to antibiotics. [[id number]] Collected: 12/01/23 1731    Order Status: Completed Specimen: Blood from Peripheral, Antecubital, Left Updated: 12/02/23 1812     Blood Culture, Routine No Growth to date      No Growth to date    Narrative:      Aerobic and anaerobic    Aerobic culture [[id number]]  (Abnormal) Collected: 12/01/23 1853    Order Status: Completed Specimen: Wound from Sacrum Updated: 12/02/23 1230     Aerobic Bacterial Culture GRAM NEGATIVE RIKA  Moderate  Identification and susceptibility pending              Significant Imaging: I have reviewed all pertinent imaging results/findings within the past 24 hours.

## 2023-12-03 NOTE — TRANSFER OF CARE
"Anesthesia Transfer of Care Note    Patient: Annmarie Gallardo    Procedure(s) Performed: Procedure(s) (LRB):  DEBRIDEMENT, WOUND, SACRUM (N/A)    Patient location: PACU    Anesthesia Type: general    Transport from OR: Transported from OR on 6-10 L/min O2 by face mask with adequate spontaneous ventilation    Post pain: adequate analgesia    Post assessment: no apparent anesthetic complications    Post vital signs: stable    Level of consciousness: responds to stimulation    Nausea/Vomiting: no nausea/vomiting    Complications: none    Transfer of care protocol was followed      Last vitals: Visit Vitals  BP (!) 145/62 (BP Location: Right arm, Patient Position: Lying)   Pulse 78   Temp 36.2 °C (97.2 °F)   Resp 20   Ht 5' 3" (1.6 m)   Wt 52.6 kg (115 lb 15.4 oz)   SpO2 100%   Breastfeeding No   BMI 20.54 kg/m²     "

## 2023-12-03 NOTE — ASSESSMENT & PLAN NOTE
Presents for evaluation of sacral decubitus ulcer. Purulent on exam. Mild leukocytosis, otherwise does not meet sepsis criteria. Wound culture growing gram negative rods  - continue vanc and ceftriaxone (12/1)  - surgery consulted, plan for OR debridement 12/3

## 2023-12-03 NOTE — BRIEF OP NOTE
[facility] - Intensive Care ([place] [address])  Brief Operative Note    SUMMARY     Surgery Date: 12/3/2023     Surgeon(s) and Role:     * Stevo Patterson MD - Primary     * Jacobo Paniagua MD - Resident - Assisting        Pre-op Diagnosis:  Stage IV pressure ulcer of sacral region [L89.154]    Post-op Diagnosis:  Post-Op Diagnosis Codes:     * Stage IV pressure ulcer of sacral region [L89.154]    Procedure(s) (LRB):  DEBRIDEMENT, WOUND, SACRUM (N/A)    Anesthesia: Local MAC    Implants:  * No implants in log *    Operative Findings: Sacral decubitus ulcer; performed debridement of skin, soft tissue, and muscle    Estimated Blood Loss: 10 mL         Specimens:   Specimen (24h ago, onward)      None            [id number]

## 2023-12-03 NOTE — ASSESSMENT & PLAN NOTE
Patient has hypernatremia which is controlled. The hypernatremia is due to Dehydration. The patient's sodium results have been reviewed and are listed below.  Recent Labs   Lab 12/03/23  0511

## 2023-12-03 NOTE — ASSESSMENT & PLAN NOTE
Febrile with new leukocytosis on 12/3. Known infected wound.    This patient does have evidence of infective focus  My overall impression is sepsis.  Source: Skin and Soft Tissue (location sacral)  Antibiotics given-   Antibiotics (72h ago, onward)      Start     Stop Route Frequency Ordered    12/02/23 1800  vancomycin 750 mg in dextrose 5 % (D5W) 250 mL IVPB (Vial-Mate)         -- IV Every 24 hours (non-standard times) 12/01/23 2028 12/02/23 0600  cefTRIAXone (ROCEPHIN) 1 g in dextrose 5 % in water (D5W) 100 mL IVPB (MB+)         -- IV Every 24 hours (non-standard times) 12/01/23 2018 12/01/23 2117  vancomycin - pharmacy to dose  (vancomycin IVPB (PEDS and ADULTS))        See Hyperspace for full Linked Orders Report.    -- IV pharmacy to manage frequency 12/01/23 2018          Latest lactate reviewed-  Recent Labs   Lab 12/01/23  1731   LACTATE 1.5     Organ dysfunction indicated by  n/a    Fluid challenge Not needed - patient is not hypotensive      Post- resuscitation assessment No - Post resuscitation assessment not needed       Will Start Pressors- Levophed for MAP of 65  Source control achieved by: to OR 12/3 or 12/4

## 2023-12-03 NOTE — NURSING
Pts son at bedside. Educated on not feeding pt while she's asleep and only giving her soft foods when she does eat.

## 2023-12-03 NOTE — ASSESSMENT & PLAN NOTE
Due to Parkinson's and debility. Will continue dietary supplements as recommended by RD during previous hospitalization.   - SLP

## 2023-12-03 NOTE — NURSING
Dr. Sequeira made aware that pt had no urine output from the 7AM until now. Pt off the unit for surgery at this time.

## 2023-12-03 NOTE — OP NOTE
DATE: 12/3/23.    PREOPERATIVE DIAGNOSIS:  Sacral decubitus ulcer.    POSTOPERATIVE DIAGNOSIS:  Sacral decubitus ulcer.    PROCEDURE:  Excisional debridement of sacral decubitus ulcer including skin, subcutaneous tissue, muscle and fascia (11 x 8 x 3 cm).    ATTENDING SURGEON: Stevo Patterson MD.    RESIDENT: Jacobo Paniagua MD.    ANESTHESIA:  General.    INDICATION:  Patient is a 78-year-old woman with a history of a sacral decubitus ulcer that she developed in nursing home care.  We were consulted for evaluation for debridement and recommended operative debridement.    PROCEDURE IN DETAIL:  Patient was taken to the operating room and placed in the right lateral decubitus position.  Monitored anesthesia care was induced.  Time-out was performed.  Necrotic skin and subcutaneous tissues was sharply taken from the sacral area exposing a large abscess cavity which was evacuated.  We then performed excisional debridement of necrotic subcutaneous tissue, fascia, and muscle.  This was performed without difficulties the level of healthy bleeding tissue.  Wound extended to the sacral fascia.  Hemostasis was confirmed.  Wound measured 11 x 8 x 3 cm.  Wound was dressed with wet-to-dry dressings.  Patient was awakened from anesthesia and transferred to the PACU in good condition.  All needle and sponge counts were correct at the conclusion of the case.  I was present for the procedure in its entirety.    EBL: 10 mL.    FINDINGS: Stage IV ulcer debrided with excision. Wound measured 11x8x3 cm.

## 2023-12-03 NOTE — PROGRESS NOTES
[facility] ([address] Medicine  Progress Note    Patient Name: Annmarie Gallardo  MRN: [id number]  Patient Class: IP- Inpatient   Admission Date: 12/1/2023  Length of Stay: 2 days  Attending Physician: Gordy Smith DO  Primary Care Provider: Raymond Bess MD        Subjective:     Principal Problem:Pressure injury of sacral region, unstageable        HPI:  Annmarie Gallardo is a 78 y.o. with a PMHx of parkinson's, lewy-body dementia with debility/functional quadriplegia, HTN, GERD, CKD3, and sacral decubitus ulcer who presents today from her nursing home for evaluation of sacral decubitus ulcer.     Due to her dementia and nonverbal status, she is unable to provide a history. No family available at bedside during my exam.     Overview/Hospital Course:  Plan to go to OR for debridement 12/3 with general surgery. Patient febrile with leukocytosis overnight 12/3. Continue IV abx.     Interval History: Patient awake but less interactive this morning.     Review of Systems  Objective:     Vital Signs (Most Recent):  Temp: 98 °F (36.7 °C) (12/03/23 0724)  Pulse: 71 (12/03/23 0724)  Resp: 16 (12/03/23 0724)  BP: 109/61 (12/03/23 0724)  SpO2: 96 % (12/03/23 0724) Vital Signs (24h Range):  Temp:  [97.5 °F (36.4 °C)-100.4 °F (38 °C)] 98 °F (36.7 °C)  Pulse:  [66-82] 71  Resp:  [16-20] 16  SpO2:  [93 %-100 %] 96 %  BP: (105-142)/(56-76) 109/61     Weight: 52.6 kg (115 lb 15.4 oz)  Body mass index is 20.54 kg/m².    Intake/Output Summary (Last 24 hours) at 12/3/2023 1119  Last data filed at 12/3/2023 0745  Gross per 24 hour   Intake 0 ml   Output 400 ml   Net -400 ml         Physical Exam  Vitals and nursing note reviewed.   Constitutional:       General: She is not in acute distress.     Appearance: She is well-developed. She is ill-appearing (chronically). She is not diaphoretic.   HENT:      Head: Normocephalic and atraumatic.   Eyes:      General: No scleral icterus.     Conjunctiva/sclera:  Conjunctivae normal.   Neck:      Vascular: No JVD.   Cardiovascular:      Rate and Rhythm: Normal rate and regular rhythm.      Heart sounds: Normal heart sounds.   Pulmonary:      Effort: Pulmonary effort is normal. No respiratory distress.   Abdominal:      General: There is no distension.      Palpations: Abdomen is soft.      Tenderness: There is abdominal tenderness.   Musculoskeletal:      Right lower leg: No edema.      Left lower leg: No edema.   Skin:     Coloration: Skin is pale. Skin is not jaundiced.      Findings: Lesion present.   Neurological:      Mental Status: She is alert.      Motor: No abnormal muscle tone.      Comments: Does not attempt to track or answer questions, does not follow commands, no tremor, UE rigidity   Psychiatric:      Comments: Unable to evaluate             Significant Labs: All pertinent labs within the past 24 hours have been reviewed.  CBC:   Recent Labs   Lab 12/01/23 1731 12/03/23  0511   WBC 12.86* 17.99*   HGB 8.5* 10.9*   HCT 28.7* 35.3*    273     CMP:   Recent Labs   Lab 12/01/23 1731 12/02/23  0447 12/03/23  0511   * 147* 144   K 4.2 3.7 3.5   * 114* 109   CO2 23 23 23   GLU 89 107 107   BUN 33* 28* 21   CREATININE 1.0 0.8 0.8   CALCIUM 9.0 9.1 8.6*   PROT 6.4  --  6.0   ALBUMIN 2.9*  --  2.3*   BILITOT 1.0  --  0.6   ALKPHOS 83  --  106   AST 12  --  14   ALT <5*  --  <5*   ANIONGAP 13 10 12     Microbiology Results (last 7 days)       Procedure Component Value Units Date/Time    Blood culture x two cultures. Draw prior to antibiotics. [[id number]] Collected: 12/01/23 1731    Order Status: Completed Specimen: Blood from Peripheral, Antecubital, Right Updated: 12/02/23 1812     Blood Culture, Routine No Growth to date      No Growth to date    Narrative:      Aerobic and anaerobic    Blood culture x two cultures. Draw prior to antibiotics. [[id number]] Collected: 12/01/23 1731    Order Status: Completed Specimen: Blood from Peripheral,  Antecubital, Left Updated: 12/02/23 1812     Blood Culture, Routine No Growth to date      No Growth to date    Narrative:      Aerobic and anaerobic    Aerobic culture [[id number]]  (Abnormal) Collected: 12/01/23 1853    Order Status: Completed Specimen: Wound from Sacrum Updated: 12/02/23 1230     Aerobic Bacterial Culture GRAM NEGATIVE RIKA  Moderate  Identification and susceptibility pending              Significant Imaging: I have reviewed all pertinent imaging results/findings within the past 24 hours.    Assessment/Plan:      * Pressure injury of sacral region, unstageable  Presents for evaluation of sacral decubitus ulcer. Purulent on exam. Mild leukocytosis, otherwise does not meet sepsis criteria. Wound culture growing gram negative rods  - continue vanc and ceftriaxone (12/1)  - surgery consulted, plan for OR debridement 12/3      Sepsis without acute organ dysfunction  Febrile with new leukocytosis on 12/3. Known infected wound.    This patient does have evidence of infective focus  My overall impression is sepsis.  Source: Skin and Soft Tissue (location sacral)  Antibiotics given-   Antibiotics (72h ago, onward)      Start     Stop Route Frequency Ordered    12/02/23 1800  vancomycin 750 mg in dextrose 5 % (D5W) 250 mL IVPB (Vial-Mate)         -- IV Every 24 hours (non-standard times) 12/01/23 2028 12/02/23 0600  cefTRIAXone (ROCEPHIN) 1 g in dextrose 5 % in water (D5W) 100 mL IVPB (MB+)         -- IV Every 24 hours (non-standard times) 12/01/23 2018 12/01/23 2117  vancomycin - pharmacy to dose  (vancomycin IVPB (PEDS and ADULTS))        See Hyperspace for full Linked Orders Report.    -- IV pharmacy to manage frequency 12/01/23 2018          Latest lactate reviewed-  Recent Labs   Lab 12/01/23  1731   LACTATE 1.5     Organ dysfunction indicated by  n/a    Fluid challenge Not needed - patient is not hypotensive      Post- resuscitation assessment No - Post resuscitation assessment not needed        Will Start Pressors- Levophed for MAP of 65  Source control achieved by: to OR 12/3 or 12/4        Macrocytic anemia  Chronic, and likely anemia of chronic disease. Currently without indication for transfusion.     Severe protein-calorie malnutrition  Due to Parkinson's and debility. Will continue dietary supplements as recommended by RD during previous hospitalization.   - SLP      Hypernatremia  Patient has hypernatremia which is controlled. The hypernatremia is due to Dehydration. The patient's sodium results have been reviewed and are listed below.  Recent Labs   Lab 12/03/23  0511            Functional quadriplegia  Due to Parkinsons' and dementia. Continue frequent turns and pressure ulcer care/prevention.       DNR (do not resuscitate)  Per previous documentation and discussions with patient's family. Clarified with daughter Yamileth at bedside (shares POA with other daughter) - patient is DNR.       Severe Lewy body dementia  Monitor with delirium precautions.   - palliative care consult on monday    Parkinson's disease  Continue home Sinemet.      CKD (chronic kidney disease), stage III  Creatine stable for now. BMP reviewed- noted Estimated Creatinine Clearance: 47.2 mL/min (based on SCr of 0.8 mg/dL). according to latest data. Based on current GFR, CKD stage is stage 3 - GFR 30-59.  Monitor UOP and serial BMP and adjust therapy as needed. Renally dose meds. Avoid nephrotoxic medications and procedures.        VTE Risk Mitigation (From admission, onward)           Ordered     enoxaparin injection 40 mg  Daily         12/01/23 2123     IP VTE HIGH RISK PATIENT  Once         12/01/23 2123     Place sequential compression device  Until discontinued         12/01/23 2123     Reason for No Pharmacological VTE Prophylaxis  Once        Question:  Reasons:  Answer:  Physician Provided (leave comment)    12/01/23 2123                    Discharge Planning   MELCHOR: 12/5/2023     Code Status: DNR   Is the  patient medically ready for discharge?: No    Reason for patient still in hospital (select all that apply): Patient trending condition, Treatment, and Pending disposition  Discharge Plan A: Return to nursing home                  Aleta Sequeira MD  Department of Hospital Medicine   [facility] - Intensive Care ([place]

## 2023-12-03 NOTE — PROGRESS NOTES
VANCOMYCIN DOSING BY PHARMACY DISCONTINUATION NOTE    Annmarie Gallardo is a 78 y.o. female who had been consulted for vancomycin dosing.    The pharmacy consult for vancomycin dosing has been discontinued.     Vancomycin Dosing by Pharmacy Consult will sign-off. Please reconsult if necessary. Thank you for allowing us to participate in this patient's care.     Thank you for the consult,   Praveen Sow, Pharm.D.  Inpatient Pharmacist  [facility] [phone]

## 2023-12-04 LAB
ALBUMIN SERPL BCP-MCNC: 2.3 G/DL (ref 3.5–5.2)
ALP SERPL-CCNC: 76 U/L (ref 55–135)
ALT SERPL W/O P-5'-P-CCNC: 5 U/L (ref 10–44)
ANION GAP SERPL CALC-SCNC: 11 MMOL/L (ref 8–16)
AST SERPL-CCNC: 15 U/L (ref 10–40)
BASOPHILS # BLD AUTO: 0.02 K/UL (ref 0–0.2)
BASOPHILS NFR BLD: 0.1 % (ref 0–1.9)
BILIRUB SERPL-MCNC: 0.4 MG/DL (ref 0.1–1)
BUN SERPL-MCNC: 23 MG/DL (ref 8–23)
CALCIUM SERPL-MCNC: 8.7 MG/DL (ref 8.7–10.5)
CHLORIDE SERPL-SCNC: 110 MMOL/L (ref 95–110)
CO2 SERPL-SCNC: 19 MMOL/L (ref 23–29)
CREAT SERPL-MCNC: 0.8 MG/DL (ref 0.5–1.4)
DIFFERENTIAL METHOD: ABNORMAL
EOSINOPHIL # BLD AUTO: 0 K/UL (ref 0–0.5)
EOSINOPHIL NFR BLD: 0.2 % (ref 0–8)
ERYTHROCYTE [DISTWIDTH] IN BLOOD BY AUTOMATED COUNT: 12.7 % (ref 11.5–14.5)
EST. GFR  (NO RACE VARIABLE): >60 ML/MIN/1.73 M^2
GLUCOSE SERPL-MCNC: 114 MG/DL (ref 70–110)
HCT VFR BLD AUTO: 41.4 % (ref 37–48.5)
HGB BLD-MCNC: 12.6 G/DL (ref 12–16)
IMM GRANULOCYTES # BLD AUTO: 0.59 K/UL (ref 0–0.04)
IMM GRANULOCYTES NFR BLD AUTO: 4 % (ref 0–0.5)
LYMPHOCYTES # BLD AUTO: 0.9 K/UL (ref 1–4.8)
LYMPHOCYTES NFR BLD: 6.1 % (ref 18–48)
MCH RBC QN AUTO: 29.4 PG (ref 27–31)
MCHC RBC AUTO-ENTMCNC: 30.4 G/DL (ref 32–36)
MCV RBC AUTO: 97 FL (ref 82–98)
MONOCYTES # BLD AUTO: 0.8 K/UL (ref 0.3–1)
MONOCYTES NFR BLD: 5.5 % (ref 4–15)
NEUTROPHILS # BLD AUTO: 12.5 K/UL (ref 1.8–7.7)
NEUTROPHILS NFR BLD: 84.1 % (ref 38–73)
NRBC BLD-RTO: 0 /100 WBC
PLATELET # BLD AUTO: 223 K/UL (ref 150–450)
PMV BLD AUTO: 11.3 FL (ref 9.2–12.9)
POTASSIUM SERPL-SCNC: 4.3 MMOL/L (ref 3.5–5.1)
PROT SERPL-MCNC: 6.2 G/DL (ref 6–8.4)
RBC # BLD AUTO: 4.28 M/UL (ref 4–5.4)
SODIUM SERPL-SCNC: 140 MMOL/L (ref 136–145)
WBC # BLD AUTO: 14.84 K/UL (ref 3.9–12.7)

## 2023-12-04 PROCEDURE — 25000003 PHARM REV CODE 250: Performed by: STUDENT IN AN ORGANIZED HEALTH CARE EDUCATION/TRAINING PROGRAM

## 2023-12-04 PROCEDURE — 92526 ORAL FUNCTION THERAPY: CPT

## 2023-12-04 PROCEDURE — 85025 COMPLETE CBC W/AUTO DIFF WBC: CPT | Performed by: STUDENT IN AN ORGANIZED HEALTH CARE EDUCATION/TRAINING PROGRAM

## 2023-12-04 PROCEDURE — 99233 PR SUBSEQUENT HOSPITAL CARE,LEVL III: ICD-10-PCS | Mod: ,,, | Performed by: INTERNAL MEDICINE

## 2023-12-04 PROCEDURE — 99233 SBSQ HOSP IP/OBS HIGH 50: CPT | Mod: ,,, | Performed by: INTERNAL MEDICINE

## 2023-12-04 PROCEDURE — 21400001 HC TELEMETRY ROOM

## 2023-12-04 PROCEDURE — 36415 COLL VENOUS BLD VENIPUNCTURE: CPT | Performed by: STUDENT IN AN ORGANIZED HEALTH CARE EDUCATION/TRAINING PROGRAM

## 2023-12-04 PROCEDURE — 80053 COMPREHEN METABOLIC PANEL: CPT | Performed by: STUDENT IN AN ORGANIZED HEALTH CARE EDUCATION/TRAINING PROGRAM

## 2023-12-04 PROCEDURE — 63600175 PHARM REV CODE 636 W HCPCS: Performed by: STUDENT IN AN ORGANIZED HEALTH CARE EDUCATION/TRAINING PROGRAM

## 2023-12-04 RX ADMIN — MUPIROCIN: 20 OINTMENT TOPICAL at 10:12

## 2023-12-04 RX ADMIN — SENNOSIDES AND DOCUSATE SODIUM 1 TABLET: 8.6; 5 TABLET ORAL at 10:12

## 2023-12-04 RX ADMIN — CYANOCOBALAMIN TAB 1000 MCG 1000 MCG: 1000 TAB at 08:12

## 2023-12-04 RX ADMIN — MUPIROCIN: 20 OINTMENT TOPICAL at 08:12

## 2023-12-04 RX ADMIN — ACETAMINOPHEN 650 MG: 325 TABLET ORAL at 02:12

## 2023-12-04 RX ADMIN — CARBIDOPA AND LEVODOPA 1 SPLIT TABLET: 25; 100 TABLET ORAL at 10:12

## 2023-12-04 RX ADMIN — CARBIDOPA AND LEVODOPA 1 TABLET: 25; 100 TABLET ORAL at 03:12

## 2023-12-04 RX ADMIN — THERA TABS 1 TABLET: TAB at 08:12

## 2023-12-04 RX ADMIN — ASPIRIN 81 MG: 81 TABLET, COATED ORAL at 08:12

## 2023-12-04 RX ADMIN — CARBIDOPA AND LEVODOPA 1 TABLET: 25; 100 TABLET ORAL at 09:12

## 2023-12-04 RX ADMIN — CEFTRIAXONE 1 G: 1 INJECTION, POWDER, FOR SOLUTION INTRAMUSCULAR; INTRAVENOUS at 05:12

## 2023-12-04 RX ADMIN — ENOXAPARIN SODIUM 40 MG: 40 INJECTION SUBCUTANEOUS at 04:12

## 2023-12-04 RX ADMIN — PANTOPRAZOLE SODIUM 20 MG: 20 TABLET, DELAYED RELEASE ORAL at 08:12

## 2023-12-04 RX ADMIN — CARBIDOPA AND LEVODOPA 1 TABLET: 25; 100 TABLET ORAL at 08:12

## 2023-12-04 RX ADMIN — TAMSULOSIN HYDROCHLORIDE 0.4 MG: 0.4 CAPSULE ORAL at 08:12

## 2023-12-04 RX ADMIN — SENNOSIDES AND DOCUSATE SODIUM 1 TABLET: 8.6; 5 TABLET ORAL at 08:12

## 2023-12-04 RX ADMIN — CETIRIZINE HYDROCHLORIDE 10 MG: 10 TABLET, FILM COATED ORAL at 08:12

## 2023-12-04 NOTE — PROGRESS NOTES
[facility] ([address] Medicine  Progress Note    Patient Name: Annmaire Gallardo  MRN: [id number]  Patient Class: IP- Inpatient   Admission Date: 12/1/2023  Length of Stay: 3 days  Attending Physician: Mandie Coyle MD  Primary Care Provider: Raymond Bess MD        Subjective:     Principal Problem:Pressure injury of sacral region, unstageable        HPI:  Annmarie Gallardo is a 78 y.o. with a PMHx of parkinson's, lewy-body dementia with debility/functional quadriplegia, HTN, GERD, CKD3, and sacral decubitus ulcer who presents today from her nursing home for evaluation of sacral decubitus ulcer.     Due to her dementia and nonverbal status, she is unable to provide a history. No family available at bedside during my exam.     Overview/Hospital Course:  Pt presenting with purulent drainage from sacral wound . S/p debridement 12/3 with general surgery. Patient febrile with leukocytosis overnight 12/3. Continue IV abx.     Interval History: NAEON. S/p debridement 12/3. Seen by GS today , DVT ppx resumed     Review of Systems  Objective:     Vital Signs (Most Recent):  Temp: 98.5 °F (36.9 °C) (12/04/23 1132)  Pulse: 69 (12/04/23 1132)  Resp: 18 (12/04/23 1132)  BP: 112/63 (12/04/23 1132)  SpO2: 96 % (12/04/23 1132) Vital Signs (24h Range):  Temp:  [97.2 °F (36.2 °C)-99 °F (37.2 °C)] 98.5 °F (36.9 °C)  Pulse:  [42-86] 69  Resp:  [16-22] 18  SpO2:  [94 %-100 %] 96 %  BP: ()/(55-75) 112/63     Weight: 52.6 kg (115 lb 15.4 oz)  Body mass index is 20.54 kg/m².    Intake/Output Summary (Last 24 hours) at 12/4/2023 1241  Last data filed at 12/4/2023 0903  Gross per 24 hour   Intake 352.98 ml   Output 10 ml   Net 342.98 ml           Physical Exam  Vitals and nursing note reviewed.   Constitutional:       General: She is not in acute distress.     Appearance: She is well-developed. She is ill-appearing (chronically). She is not diaphoretic.   HENT:      Head: Normocephalic and atraumatic.    Eyes:      General: No scleral icterus.     Conjunctiva/sclera: Conjunctivae normal.   Neck:      Vascular: No JVD.   Cardiovascular:      Rate and Rhythm: Normal rate and regular rhythm.      Heart sounds: Normal heart sounds.   Pulmonary:      Effort: Pulmonary effort is normal. No respiratory distress.   Abdominal:      General: There is no distension.      Palpations: Abdomen is soft.      Tenderness: There is abdominal tenderness.   Musculoskeletal:      Right lower leg: No edema.      Left lower leg: No edema.   Skin:     Coloration: Skin is pale. Skin is not jaundiced.      Findings: Lesion present.   Neurological:      Mental Status: She is alert.      Motor: No abnormal muscle tone.      Comments: Does not attempt to track or answer questions, does not follow commands, no tremor, UE rigidity   Psychiatric:      Comments: Unable to evaluate             Significant Labs: All pertinent labs within the past 24 hours have been reviewed.  CBC:   Recent Labs   Lab 12/03/23  0511 12/04/23  0520   WBC 17.99* 14.84*   HGB 10.9* 12.6   HCT 35.3* 41.4    223       CMP:   Recent Labs   Lab 12/03/23  0511 12/04/23  0520    140   K 3.5 4.3    110   CO2 23 19*    114*   BUN 21 23   CREATININE 0.8 0.8   CALCIUM 8.6* 8.7   PROT 6.0 6.2   ALBUMIN 2.3* 2.3*   BILITOT 0.6 0.4   ALKPHOS 106 76   AST 14 15   ALT <5* 5*   ANIONGAP 12 11       Microbiology Results (last 7 days)       Procedure Component Value Units Date/Time    Blood culture x two cultures. Draw prior to antibiotics. [[id number]] Collected: 12/01/23 1731    Order Status: Completed Specimen: Blood from Peripheral, Antecubital, Right Updated: 12/03/23 1812     Blood Culture, Routine No Growth to date      No Growth to date      No Growth to date    Narrative:      Aerobic and anaerobic    Blood culture x two cultures. Draw prior to antibiotics. [[id number]] Collected: 12/01/23 1731    Order Status: Completed Specimen: Blood from  Peripheral, Antecubital, Left Updated: 12/03/23 1812     Blood Culture, Routine No Growth to date      No Growth to date      No Growth to date    Narrative:      Aerobic and anaerobic    Aerobic culture [[id number]]  (Abnormal)  (Susceptibility) Collected: 12/01/23 1853    Order Status: Completed Specimen: Wound from Sacrum Updated: 12/03/23 1328     Aerobic Bacterial Culture PROTEUS MIRABILIS  Moderate              Significant Imaging: I have reviewed all pertinent imaging results/findings within the past 24 hours.    Assessment/Plan:      * Pressure injury of sacral region, unstageable  Presents for evaluation of sacral decubitus ulcer. Purulent on exam. Mild leukocytosis, otherwise does not meet sepsis criteria. Wound culture growing gram negative rods  - continue ceftriaxone (12/1)  - surgery consulted, s/p debridement 12/3  - dressing changed today       Sepsis without acute organ dysfunction  Febrile with new leukocytosis on 12/3. Known infected wound.    This patient does have evidence of infective focus  My overall impression is sepsis.  Source: Skin and Soft Tissue (location sacral)  Antibiotics given-   Antibiotics (72h ago, onward)      Start     Stop Route Frequency Ordered    12/03/23 2100  mupirocin 2 % ointment         12/08/23 2059 Nasl 2 times daily 12/03/23 1735    12/02/23 0600  cefTRIAXone (ROCEPHIN) 1 g in dextrose 5 % in water (D5W) 100 mL IVPB (MB+)         -- IV Every 24 hours (non-standard times) 12/01/23 2018          Latest lactate reviewed-  Recent Labs   Lab 12/01/23  1731   LACTATE 1.5       Organ dysfunction indicated by  n/a    Fluid challenge Not needed - patient is not hypotensive      Post- resuscitation assessment No - Post resuscitation assessment not needed       Will Start Pressors- Levophed for MAP of 65  Source control achieved by: to OR 12/3 or 12/4        Macrocytic anemia  Chronic, and likely anemia of chronic disease. Currently without indication for transfusion.      Severe protein-calorie malnutrition  Due to Parkinson's and debility. Will continue dietary supplements as recommended by RD during previous hospitalization.   - SLP      Hypernatremia  Patient has hypernatremia which is controlled. The hypernatremia is due to Dehydration. The patient's sodium results have been reviewed and are listed below.  Recent Labs   Lab 12/04/23  0520              Functional quadriplegia  Due to Parkinsons' and dementia. Continue frequent turns and pressure ulcer care/prevention.       DNR (do not resuscitate)  Per previous documentation and discussions with patient's family. Clarified with daughter Yamileth at bedside (shares POA with other daughter) - patient is DNR. Palliative consulted       Severe Lewy body dementia  Monitor with delirium precautions.   - palliative care consulted    Parkinson's disease  Continue home Sinemet.      CKD (chronic kidney disease), stage III  Creatine stable for now. BMP reviewed- noted Estimated Creatinine Clearance: 47.2 mL/min (based on SCr of 0.8 mg/dL). according to latest data. Based on current GFR, CKD stage is stage 3 - GFR 30-59.  Monitor UOP and serial BMP and adjust therapy as needed. Renally dose meds. Avoid nephrotoxic medications and procedures.        VTE Risk Mitigation (From admission, onward)           Ordered     enoxaparin injection 40 mg  Daily         12/01/23 2123     IP VTE HIGH RISK PATIENT  Once         12/01/23 2123     Place sequential compression device  Until discontinued         12/01/23 2123     Reason for No Pharmacological VTE Prophylaxis  Once        Question:  Reasons:  Answer:  Physician Provided (leave comment)    12/01/23 2123                    Discharge Planning   MELCHOR: 12/5/2023     Code Status: DNR   Is the patient medically ready for discharge?: No    Reason for patient still in hospital (select all that apply): Treatment  Discharge Plan A: Return to nursing home                  Mandie Coyle,  MD  Department of Hospital Medicine   [facility] - [facility] ([place])

## 2023-12-04 NOTE — PT/OT/SLP PROGRESS
"Speech Language Pathology Treatment    Patient Name:  Annmarie Gallardo   MRN:  [id number]  Admitting Diagnosis: Pressure injury of sacral region, unstageable    Recommendations:                 General Recommendations:  Follow-up not indicated  Diet recommendations:  Minced & Moist Diet - IDDSI Level 5, Liquid Diet Level: Thin liquids - IDDSI Level 0   Aspiration Precautions: 1 bite/sip at a time, Assistance with meals, Check for pocketing/oral residue, Eliminate distractions,   Feed only when awake/alert, HOB to 90 degrees, Meds crushed in puree, Monitor for s/s of aspiration, Remain upright 30 minutes post meal, and Strict aspiration precautions   General Precautions: Standard, fall, aspiration  Communication strategies:  provide increased time to answer    Assessment:     Annmarie Gallardo is a 78 y.o. female with oral and pharyngeal phases appearing at current functional baseline. Continue to follow strict aspiration precautions during any/all po intake given that aspiration risk is unable to be eliminated paired with the pt's comorbities. No further skilled SLP service warranted at this time.     Subjective     SLP communicated with RN prior to entry and received clearance for therapy this date.   Pt asleep upon ST entry. Pt mumbling, "you don't understand" during repositioning, no other verbal response elicited.     Pain/Comfort:  Pain Rating 1:  (grimacing though did not verbally rate)    Respiratory Status: Room air    Objective:     Has the patient been evaluated by SLP for swallowing?   Yes  Keep patient NPO? No     Pt seen bedside for ongoing SLP POC. She demonstrated difficulty achieving appropriate FÁTIMA for po trials, requiring max cues. Eventually, the pt was able to maintain a wakeful yet drowsy state. She accepted very limited trials provided moderate encouragement. Pt remains unable to siphon from straw. SLP provided controlled straw pipettes to the pt given her poor positioning. She accepted x1 " small bite of minced/moist scrambled eggs. Ongoing presence of slow oral transit time/prolonged mastication observed. Complete oral clearance was achieved without overt s/s airway threat. Current presentation and SLP recs appear to be consistent with the pt's functional baseline. No further skilled SLP service warranted at this time.     Goals:   Multidisciplinary Problems       SLP Goals       Not on file              Multidisciplinary Problems (Resolved)          Problem: SLP    Goal Priority Disciplines Outcome   SLP Goal   (Resolved)     SLP Met   Description: Short Term Goals:   1. Pt will participate in an ongoing assessment to determine the least restrictive and safest diet with possible updated goals to follow pending results.                         Plan:     Plan of Care reviewed with:  patient   SLP Follow-Up:  No       Discharge recommendations: none per SLP    Time Tracking:     SLP Treatment Date:   12/04/23  Speech Start Time:  0808  Speech Stop Time:  0816     Speech Total Time (min):  8 min    Billable Minutes: Treatment Swallowing Dysfunction 8    12/04/2023

## 2023-12-04 NOTE — ASSESSMENT & PLAN NOTE
Per previous documentation and discussions with patient's family. Clarified with daughter Yamileth at bedside (shares POA with other daughter) - patient is DNR. Palliative consulted

## 2023-12-04 NOTE — ASSESSMENT & PLAN NOTE
Febrile with new leukocytosis on 12/3. Known infected wound.    This patient does have evidence of infective focus  My overall impression is sepsis.  Source: Skin and Soft Tissue (location sacral)  Antibiotics given-   Antibiotics (72h ago, onward)    Start     Stop Route Frequency Ordered    12/03/23 2100  mupirocin 2 % ointment         12/08/23 2059 Nasl 2 times daily 12/03/23 1735    12/02/23 0600  cefTRIAXone (ROCEPHIN) 1 g in dextrose 5 % in water (D5W) 100 mL IVPB (MB+)         -- IV Every 24 hours (non-standard times) 12/01/23 2018        Latest lactate reviewed-  Recent Labs   Lab 12/01/23  1731   LACTATE 1.5       Organ dysfunction indicated by  n/a    Fluid challenge Not needed - patient is not hypotensive      Post- resuscitation assessment No - Post resuscitation assessment not needed       Will Start Pressors- Levophed for MAP of 65  Source control achieved by: to OR 12/3 or 12/4

## 2023-12-04 NOTE — SUBJECTIVE & OBJECTIVE
Interval History: NAEON. S/p debridement 12/3. Seen by GS today , DVT ppx resumed     Review of Systems  Objective:     Vital Signs (Most Recent):  Temp: 98.5 °F (36.9 °C) (12/04/23 1132)  Pulse: 69 (12/04/23 1132)  Resp: 18 (12/04/23 1132)  BP: 112/63 (12/04/23 1132)  SpO2: 96 % (12/04/23 1132) Vital Signs (24h Range):  Temp:  [97.2 °F (36.2 °C)-99 °F (37.2 °C)] 98.5 °F (36.9 °C)  Pulse:  [42-86] 69  Resp:  [16-22] 18  SpO2:  [94 %-100 %] 96 %  BP: ()/(55-75) 112/63     Weight: 52.6 kg (115 lb 15.4 oz)  Body mass index is 20.54 kg/m².    Intake/Output Summary (Last 24 hours) at 12/4/2023 1241  Last data filed at 12/4/2023 0903  Gross per 24 hour   Intake 352.98 ml   Output 10 ml   Net 342.98 ml           Physical Exam  Vitals and nursing note reviewed.   Constitutional:       General: She is not in acute distress.     Appearance: She is well-developed. She is ill-appearing (chronically). She is not diaphoretic.   HENT:      Head: Normocephalic and atraumatic.   Eyes:      General: No scleral icterus.     Conjunctiva/sclera: Conjunctivae normal.   Neck:      Vascular: No JVD.   Cardiovascular:      Rate and Rhythm: Normal rate and regular rhythm.      Heart sounds: Normal heart sounds.   Pulmonary:      Effort: Pulmonary effort is normal. No respiratory distress.   Abdominal:      General: There is no distension.      Palpations: Abdomen is soft.      Tenderness: There is abdominal tenderness.   Musculoskeletal:      Right lower leg: No edema.      Left lower leg: No edema.   Skin:     Coloration: Skin is pale. Skin is not jaundiced.      Findings: Lesion present.   Neurological:      Mental Status: She is alert.      Motor: No abnormal muscle tone.      Comments: Does not attempt to track or answer questions, does not follow commands, no tremor, UE rigidity   Psychiatric:      Comments: Unable to evaluate             Significant Labs: All pertinent labs within the past 24 hours have been reviewed.  CBC:    Recent Labs   Lab 12/03/23  0511 12/04/23  0520   WBC 17.99* 14.84*   HGB 10.9* 12.6   HCT 35.3* 41.4    223       CMP:   Recent Labs   Lab 12/03/23  0511 12/04/23  0520    140   K 3.5 4.3    110   CO2 23 19*    114*   BUN 21 23   CREATININE 0.8 0.8   CALCIUM 8.6* 8.7   PROT 6.0 6.2   ALBUMIN 2.3* 2.3*   BILITOT 0.6 0.4   ALKPHOS 106 76   AST 14 15   ALT <5* 5*   ANIONGAP 12 11       Microbiology Results (last 7 days)       Procedure Component Value Units Date/Time    Blood culture x two cultures. Draw prior to antibiotics. [[id number]] Collected: 12/01/23 1731    Order Status: Completed Specimen: Blood from Peripheral, Antecubital, Right Updated: 12/03/23 1812     Blood Culture, Routine No Growth to date      No Growth to date      No Growth to date    Narrative:      Aerobic and anaerobic    Blood culture x two cultures. Draw prior to antibiotics. [[id number]] Collected: 12/01/23 1731    Order Status: Completed Specimen: Blood from Peripheral, Antecubital, Left Updated: 12/03/23 1812     Blood Culture, Routine No Growth to date      No Growth to date      No Growth to date    Narrative:      Aerobic and anaerobic    Aerobic culture [[id number]]  (Abnormal)  (Susceptibility) Collected: 12/01/23 1853    Order Status: Completed Specimen: Wound from Sacrum Updated: 12/03/23 1328     Aerobic Bacterial Culture PROTEUS MIRABILIS  Moderate              Significant Imaging: I have reviewed all pertinent imaging results/findings within the past 24 hours.

## 2023-12-04 NOTE — ASSESSMENT & PLAN NOTE
Patient has hypernatremia which is controlled. The hypernatremia is due to Dehydration. The patient's sodium results have been reviewed and are listed below.  Recent Labs   Lab 12/04/23  0520

## 2023-12-04 NOTE — PLAN OF CARE
[facility] ([facility])  Discharge Reassessment    Primary Care Provider: Raymond Bess MD    Expected Discharge Date: 12/5/2023    Reassessment (most recent)       Discharge Reassessment - 12/04/23 1604          Discharge Reassessment    Assessment Type Discharge Planning Reassessment (P)      Did the patient's condition or plan change since previous assessment? No (P)      Discharge Plan discussed with: Adult children (P)      Communicated MELCHOR with patient/caregiver Date not available/Unable to determine (P)      Discharge Plan A Return to nursing home;Hospice/home (P)      Discharge Plan B Long-term acute care facility (LTAC) (P)      DME Needed Upon Discharge  none (P)      Transition of Care Barriers None (P)      Why the patient remains in the hospital Requires continued medical care (P)         Post-Acute Status    Discharge Delays None known at this time (P)                  CM sent referral to [facility], which chart indicates pt was with prior to admit.    CM spoke with pt's dtr Yamileth [phone].  Per notes, pt was at [facility] prior to admission.  However, dtr states pt was at [facility].  She states that [facility] is where pt got her wound.  She states that surgeon recommended LTAC, but she hasn't spoken with MD since surgery.  She states she wants to look in to hospice; pt wasn't getting hospice at [facility], but had hospice in [place].  Her sister said [facility] hospice never rec'd referral that [facility] said they sent.  CM sent referrals to [facility] and [facility].    KEYUR BenavidesN, BS, RN, CCM

## 2023-12-04 NOTE — NURSING TRANSFER
Nursing Transfer Note      12/3/2023   5:00 PM    Nurse giving handoff:Margaret [facility] PACU  Nurse receiving handoff:Lorena [facility]EE 16W    Reason patient is being transferred: post anesthesia    Transfer To: Room [address]    Transfer via bed    Transfer with cardiac monitoring    Transported by RN and DE tech    Transfer Vital Signs:  Blood Pressure:108/60  Heart Rate:81  O2:98% on RA  Temperature:98.2F  Respirations:17    Telemetry: Box Number 0987, Rate 81, and Rhythm NSR  Order for Tele Monitor? Yes    4eyes on Skin: yes    Medicines sent: none    Any special needs or follow-up needed: none    Patient belongings transferred with patient: Yes    Chart send with patient: Yes    Notified: son    Patient reassessed at: 12/3/23 at 1700 (date, time)    Upon arrival to floor: cardiac monitor applied, patient oriented to room, call bell in reach, and bed in lowest position

## 2023-12-04 NOTE — PROGRESS NOTES
[facility] ([place] [address])  General Surgery  Progress Note    Subjective:     History of Present Illness:  78 y.o. with a PMHx of parkinson's, lewy-body dementia with debility/functional quadriplegia, HTN, GERD, CKD3, and sacral decubitus ulcer who presents today from her nursing home for evaluation of sacral decubitus ulcer. She is nonverbal and not interactive. Daughter previously at bedside per nurse, however gone during our encounter. Did attempt to contact her and was unable to reach her by cellphone.     On arrival to ED she . Labs demonstrate leukocytosis of 12.8, elevated CRP, normal lactate and procalcitonin. No imaging has been obtained.     Post-Op Info:  Procedure(s) (LRB):  DEBRIDEMENT, WOUND, SACRUM (N/A)   1 Day Post-Op     Interval History: She underwent sacral wound debridement yesterday. WBC down to 14 today. She is tolerating a diet with assistance.     Medications:  Continuous Infusions:  Scheduled Meds:   aspirin  81 mg Oral Daily    carbidopa-levodopa  mg  1 tablet Oral TID    And    carbidopa-levodopa 12.5-50 mg  1 split tablet Oral QHS    cefTRIAXone (ROCEPHIN) IVPB  1 g Intravenous Q24H    cetirizine  10 mg Oral Daily    cyanocobalamin  1,000 mcg Oral Daily    enoxparin  40 mg Subcutaneous Daily    multivitamin  1 tablet Oral Daily    mupirocin   Nasal BID    pantoprazole  20 mg Oral Daily    senna-docusate 8.6-50 mg  1 tablet Oral BID    tamsulosin  0.4 mg Oral Daily     PRN Meds:acetaminophen, acetaminophen, aluminum-magnesium hydroxide-simethicone, melatonin, naloxone, ondansetron, polyethylene glycol, simethicone, sodium chloride 0.9%, sodium chloride 0.9%     Review of patient's allergies indicates:   Allergen Reactions    Diamox sequels [acetazolamide]     Garlic     Namenda [memantine]     Oxybutynin      Objective:     Vital Signs (Most Recent):  Temp: 97.6 °F (36.4 °C) (12/04/23 0801)  Pulse: (!) 45 (12/04/23 0801)  Resp: 18 (12/04/23 0801)  BP: (!) 118/59  (12/04/23 0801)  SpO2: (!) 94 % (12/04/23 0437) Vital Signs (24h Range):  Temp:  [97.2 °F (36.2 °C)-99 °F (37.2 °C)] 97.6 °F (36.4 °C)  Pulse:  [42-86] 45  Resp:  [16-22] 18  SpO2:  [94 %-100 %] 94 %  BP: ()/(55-75) 118/59     Weight: 52.6 kg (115 lb 15.4 oz)  Body mass index is 20.54 kg/m².    Intake/Output - Last 3 Shifts         12/02 0700 12/03 0659 12/03 0700 12/04 0659 12/04 0700 12/05 0659    P.O. 0 0     I.V. (mL/kg)  13 (0.2)     IV Piggyback  300     Total Intake(mL/kg) 0 (0) 313 (6)     Urine (mL/kg/hr) 400 (0.3) 0 (0)     Blood  10     Total Output 400 10     Net -400 +303                     Physical Exam  Vitals and nursing note reviewed. Exam conducted with a chaperone present.   Constitutional:       Comments: Somnolent, non-interactive/responsive. Does wince to painful stimuli.    HENT:      Head: Normocephalic and atraumatic.   Cardiovascular:      Rate and Rhythm: Normal rate and regular rhythm.   Pulmonary:      Effort: Pulmonary effort is normal. No respiratory distress.   Abdominal:      General: Abdomen is flat.      Palpations: Abdomen is soft.   Musculoskeletal:         General: No swelling or signs of injury.   Skin:     Coloration: Skin is not jaundiced.      Comments: Sacral wound s/p debridement, wound without purulence, small amount of bleeding   Neurological:      Mental Status: She is alert. Mental status is at baseline. She is disoriented.      Comments: Contracted, stiff          Significant Labs:  I have reviewed all pertinent lab results within the past 24 hours.  CBC:   Recent Labs   Lab 12/04/23  0520   WBC 14.84*   RBC 4.28   HGB 12.6   HCT 41.4      MCV 97   MCH 29.4   MCHC 30.4*     CMP:   Recent Labs   Lab 12/04/23  0520   *   CALCIUM 8.7   ALBUMIN 2.3*   PROT 6.2      K 4.3   CO2 19*      BUN 23   CREATININE 0.8   ALKPHOS 76   ALT 5*   AST 15   BILITOT 0.4       Significant Diagnostics:  I have reviewed all pertinent imaging  results/findings within the past 24 hours.  Assessment/Plan:     * Pressure injury of sacral region, unstageable  78 year old F with above history presenting with purulent drainage from sacral wound. Non ambulatory, nonverbal, noninteractive with infected sacral decubitus ulcer. Unable to reach daughter who had left bedside per nurse. Given extent of probing, ulcer would likely be best suited for OR debridement. Could unroof eschar at bedside at the very least, however no one available in person or phone to provide consent for this and patient winces at manipulation.   S/p sacral wound debridement 12/3/23    - Dressing change at bedside, no further surgical intervention recommended  - Wound care consult for further assistance, continue daily dressing changes  - Okay for DVT ppx, please avoid other anticoagulation/antiplatelets  - Remainder of care per primary           Jacobo Paniagua MD  General Surgery  [facility] - Intensive Care ([facility]-)

## 2023-12-04 NOTE — SUBJECTIVE & OBJECTIVE
Interval History: She underwent sacral wound debridement yesterday. WBC down to 14 today. She is tolerating a diet with assistance.     Medications:  Continuous Infusions:  Scheduled Meds:   aspirin  81 mg Oral Daily    carbidopa-levodopa  mg  1 tablet Oral TID    And    carbidopa-levodopa 12.5-50 mg  1 split tablet Oral QHS    cefTRIAXone (ROCEPHIN) IVPB  1 g Intravenous Q24H    cetirizine  10 mg Oral Daily    cyanocobalamin  1,000 mcg Oral Daily    enoxparin  40 mg Subcutaneous Daily    multivitamin  1 tablet Oral Daily    mupirocin   Nasal BID    pantoprazole  20 mg Oral Daily    senna-docusate 8.6-50 mg  1 tablet Oral BID    tamsulosin  0.4 mg Oral Daily     PRN Meds:acetaminophen, acetaminophen, aluminum-magnesium hydroxide-simethicone, melatonin, naloxone, ondansetron, polyethylene glycol, simethicone, sodium chloride 0.9%, sodium chloride 0.9%     Review of patient's allergies indicates:   Allergen Reactions    Diamox sequels [acetazolamide]     Garlic     Namenda [memantine]     Oxybutynin      Objective:     Vital Signs (Most Recent):  Temp: 97.6 °F (36.4 °C) (12/04/23 0801)  Pulse: (!) 45 (12/04/23 0801)  Resp: 18 (12/04/23 0801)  BP: (!) 118/59 (12/04/23 0801)  SpO2: (!) 94 % (12/04/23 0437) Vital Signs (24h Range):  Temp:  [97.2 °F (36.2 °C)-99 °F (37.2 °C)] 97.6 °F (36.4 °C)  Pulse:  [42-86] 45  Resp:  [16-22] 18  SpO2:  [94 %-100 %] 94 %  BP: ()/(55-75) 118/59     Weight: 52.6 kg (115 lb 15.4 oz)  Body mass index is 20.54 kg/m².    Intake/Output - Last 3 Shifts         12/02 0700  12/03 0659 12/03 0700  12/04 0659 12/04 0700  12/05 0659    P.O. 0 0     I.V. (mL/kg)  13 (0.2)     IV Piggyback  300     Total Intake(mL/kg) 0 (0) 313 (6)     Urine (mL/kg/hr) 400 (0.3) 0 (0)     Blood  10     Total Output 400 10     Net -400 +303                     Physical Exam  Vitals and nursing note reviewed. Exam conducted with a chaperone present.   Constitutional:       Comments: Somnolent,  non-interactive/responsive. Does wince to painful stimuli.    HENT:      Head: Normocephalic and atraumatic.   Cardiovascular:      Rate and Rhythm: Normal rate and regular rhythm.   Pulmonary:      Effort: Pulmonary effort is normal. No respiratory distress.   Abdominal:      General: Abdomen is flat.      Palpations: Abdomen is soft.   Musculoskeletal:         General: No swelling or signs of injury.   Skin:     Coloration: Skin is not jaundiced.      Comments: Sacral wound s/p debridement, wound without purulence, small amount of bleeding   Neurological:      Mental Status: She is alert. Mental status is at baseline. She is disoriented.      Comments: Contracted, stiff          Significant Labs:  I have reviewed all pertinent lab results within the past 24 hours.  CBC:   Recent Labs   Lab 12/04/23  0520   WBC 14.84*   RBC 4.28   HGB 12.6   HCT 41.4      MCV 97   MCH 29.4   MCHC 30.4*     CMP:   Recent Labs   Lab 12/04/23  0520   *   CALCIUM 8.7   ALBUMIN 2.3*   PROT 6.2      K 4.3   CO2 19*      BUN 23   CREATININE 0.8   ALKPHOS 76   ALT 5*   AST 15   BILITOT 0.4       Significant Diagnostics:  I have reviewed all pertinent imaging results/findings within the past 24 hours.

## 2023-12-04 NOTE — ASSESSMENT & PLAN NOTE
78 year old F with above history presenting with purulent drainage from sacral wound. Non ambulatory, nonverbal, noninteractive with infected sacral decubitus ulcer. Unable to reach daughter who had left bedside per nurse. Given extent of probing, ulcer would likely be best suited for OR debridement. Could unroof eschar at bedside at the very least, however no one available in person or phone to provide consent for this and patient winces at manipulation.   S/p sacral wound debridement 12/3/23    - Dressing change at bedside, no further surgical intervention recommended  - Wound care consult for further assistance, continue daily dressing changes  - Okay for DVT ppx, please avoid other anticoagulation/antiplatelets  - Remainder of care per primary

## 2023-12-04 NOTE — NURSING
Pt urinated right after she was bladder scanned. When trying to clean pt, noticed that her surgery site was bleeding and even afther reinforcing the bed with two pads, the bleeding sipped through. On call for surgery notified, came to bedside and did a dressing change.

## 2023-12-04 NOTE — ASSESSMENT & PLAN NOTE
Presents for evaluation of sacral decubitus ulcer. Purulent on exam. Mild leukocytosis, otherwise does not meet sepsis criteria. Wound culture growing gram negative rods  - continue ceftriaxone (12/1)  - surgery consulted, s/p debridement 12/3  - dressing changed today      Oxybutynin Counseling:  I discussed with the patient the risks of oxybutynin including but not limited to skin rash, drowsiness, dry mouth, difficulty urinating, and blurred vision.

## 2023-12-05 LAB
ALBUMIN SERPL BCP-MCNC: 2.2 G/DL (ref 3.5–5.2)
ALP SERPL-CCNC: 64 U/L (ref 55–135)
ALT SERPL W/O P-5'-P-CCNC: <5 U/L (ref 10–44)
ANION GAP SERPL CALC-SCNC: 10 MMOL/L (ref 8–16)
AST SERPL-CCNC: 12 U/L (ref 10–40)
BASOPHILS NFR BLD: 0 % (ref 0–1.9)
BILIRUB SERPL-MCNC: 0.3 MG/DL (ref 0.1–1)
BUN SERPL-MCNC: 20 MG/DL (ref 8–23)
CALCIUM SERPL-MCNC: 8.5 MG/DL (ref 8.7–10.5)
CHLORIDE SERPL-SCNC: 111 MMOL/L (ref 95–110)
CO2 SERPL-SCNC: 23 MMOL/L (ref 23–29)
CREAT SERPL-MCNC: 0.7 MG/DL (ref 0.5–1.4)
DIFFERENTIAL METHOD: ABNORMAL
EOSINOPHIL NFR BLD: 3 % (ref 0–8)
ERYTHROCYTE [DISTWIDTH] IN BLOOD BY AUTOMATED COUNT: 12.8 % (ref 11.5–14.5)
EST. GFR  (NO RACE VARIABLE): >60 ML/MIN/1.73 M^2
GLUCOSE SERPL-MCNC: 82 MG/DL (ref 70–110)
HCT VFR BLD AUTO: 37.4 % (ref 37–48.5)
HGB BLD-MCNC: 12 G/DL (ref 12–16)
IMM GRANULOCYTES # BLD AUTO: ABNORMAL K/UL (ref 0–0.04)
IMM GRANULOCYTES NFR BLD AUTO: ABNORMAL % (ref 0–0.5)
LYMPHOCYTES NFR BLD: 4 % (ref 18–48)
MCH RBC QN AUTO: 29.6 PG (ref 27–31)
MCHC RBC AUTO-ENTMCNC: 32.1 G/DL (ref 32–36)
MCV RBC AUTO: 92 FL (ref 82–98)
MONOCYTES NFR BLD: 6 % (ref 4–15)
MYELOCYTES NFR BLD MANUAL: 1 %
NEUTROPHILS NFR BLD: 85 % (ref 38–73)
NEUTS BAND NFR BLD MANUAL: 1 %
NRBC BLD-RTO: 0 /100 WBC
PLATELET # BLD AUTO: 249 K/UL (ref 150–450)
PLATELET BLD QL SMEAR: ABNORMAL
PMV BLD AUTO: 10.2 FL (ref 9.2–12.9)
POTASSIUM SERPL-SCNC: 4.3 MMOL/L (ref 3.5–5.1)
PROT SERPL-MCNC: 5.9 G/DL (ref 6–8.4)
RBC # BLD AUTO: 4.05 M/UL (ref 4–5.4)
SODIUM SERPL-SCNC: 144 MMOL/L (ref 136–145)
WBC # BLD AUTO: 9.89 K/UL (ref 3.9–12.7)

## 2023-12-05 PROCEDURE — 80053 COMPREHEN METABOLIC PANEL: CPT | Performed by: STUDENT IN AN ORGANIZED HEALTH CARE EDUCATION/TRAINING PROGRAM

## 2023-12-05 PROCEDURE — 36415 COLL VENOUS BLD VENIPUNCTURE: CPT | Performed by: STUDENT IN AN ORGANIZED HEALTH CARE EDUCATION/TRAINING PROGRAM

## 2023-12-05 PROCEDURE — 25000003 PHARM REV CODE 250: Performed by: STUDENT IN AN ORGANIZED HEALTH CARE EDUCATION/TRAINING PROGRAM

## 2023-12-05 PROCEDURE — 21400001 HC TELEMETRY ROOM

## 2023-12-05 PROCEDURE — 94761 N-INVAS EAR/PLS OXIMETRY MLT: CPT

## 2023-12-05 PROCEDURE — 63600175 PHARM REV CODE 636 W HCPCS: Performed by: STUDENT IN AN ORGANIZED HEALTH CARE EDUCATION/TRAINING PROGRAM

## 2023-12-05 PROCEDURE — 85007 BL SMEAR W/DIFF WBC COUNT: CPT | Performed by: STUDENT IN AN ORGANIZED HEALTH CARE EDUCATION/TRAINING PROGRAM

## 2023-12-05 PROCEDURE — 85027 COMPLETE CBC AUTOMATED: CPT | Performed by: STUDENT IN AN ORGANIZED HEALTH CARE EDUCATION/TRAINING PROGRAM

## 2023-12-05 RX ADMIN — CARBIDOPA AND LEVODOPA 1 TABLET: 25; 100 TABLET ORAL at 04:12

## 2023-12-05 RX ADMIN — ASPIRIN 81 MG: 81 TABLET, COATED ORAL at 09:12

## 2023-12-05 RX ADMIN — CARBIDOPA AND LEVODOPA 1 TABLET: 25; 100 TABLET ORAL at 09:12

## 2023-12-05 RX ADMIN — TAMSULOSIN HYDROCHLORIDE 0.4 MG: 0.4 CAPSULE ORAL at 09:12

## 2023-12-05 RX ADMIN — PANTOPRAZOLE SODIUM 20 MG: 20 TABLET, DELAYED RELEASE ORAL at 09:12

## 2023-12-05 RX ADMIN — MUPIROCIN: 20 OINTMENT TOPICAL at 09:12

## 2023-12-05 RX ADMIN — CETIRIZINE HYDROCHLORIDE 10 MG: 10 TABLET, FILM COATED ORAL at 09:12

## 2023-12-05 RX ADMIN — ENOXAPARIN SODIUM 40 MG: 40 INJECTION SUBCUTANEOUS at 04:12

## 2023-12-05 RX ADMIN — THERA TABS 1 TABLET: TAB at 09:12

## 2023-12-05 RX ADMIN — CYANOCOBALAMIN TAB 1000 MCG 1000 MCG: 1000 TAB at 09:12

## 2023-12-05 RX ADMIN — CARBIDOPA AND LEVODOPA 1 SPLIT TABLET: 25; 100 TABLET ORAL at 09:12

## 2023-12-05 RX ADMIN — CEFTRIAXONE 1 G: 1 INJECTION, POWDER, FOR SOLUTION INTRAMUSCULAR; INTRAVENOUS at 07:12

## 2023-12-05 RX ADMIN — SENNOSIDES AND DOCUSATE SODIUM 1 TABLET: 8.6; 5 TABLET ORAL at 09:12

## 2023-12-05 NOTE — PROGRESS NOTES
[facility] ([address] Medicine  Progress Note    Patient Name: Annmarie Gallardo  MRN: [id number]  Patient Class: IP- Inpatient   Admission Date: 12/1/2023  Length of Stay: 4 days  Attending Physician: Yuniel Hills MD  Primary Care Provider: Raymond Bess MD        Subjective:     Principal Problem:Pressure injury of sacral region, unstageable        HPI:  Annmarie Gallardo is a 78 y.o. with a PMHx of parkinson's, lewy-body dementia with debility/functional quadriplegia, HTN, GERD, CKD3, and sacral decubitus ulcer who presents today from her nursing home for evaluation of sacral decubitus ulcer.     Due to her dementia and nonverbal status, she is unable to provide a history. No family available at bedside during my exam.     Overview/Hospital Course:  Pt presenting with purulent drainage from sacral wound . S/p debridement 12/3 with general surgery. Patient febrile with leukocytosis overnight 12/3. Continue IV abx.     Interval History:   No events overnight. Cleared by general surgery yesterday. Pending wound care evaluation. Continue CTX.      Review of Systems   Unable to perform ROS: Dementia     Objective:     Vital Signs (Most Recent):  Temp: 98.5 °F (36.9 °C) (12/05/23 1139)  Pulse: 74 (12/05/23 1139)  Resp: 18 (12/05/23 1139)  BP: 134/70 (12/05/23 1139)  SpO2: 96 % (12/05/23 1139) Vital Signs (24h Range):  Temp:  [98 °F (36.7 °C)-99.2 °F (37.3 °C)] 98.5 °F (36.9 °C)  Pulse:  [51-75] 74  Resp:  [18] 18  SpO2:  [93 %-97 %] 96 %  BP: (108-142)/(56-92) 134/70     Weight: 52.6 kg (115 lb 15.4 oz)  Body mass index is 20.54 kg/m².    Intake/Output Summary (Last 24 hours) at 12/5/2023 1341  Last data filed at 12/5/2023 0800  Gross per 24 hour   Intake 240 ml   Output 470 ml   Net -230 ml         Physical Exam  Vitals and nursing note reviewed.   Constitutional:       General: She is not in acute distress.     Appearance: She is well-developed. She is ill-appearing (chronically).  She is not diaphoretic.   HENT:      Head: Normocephalic and atraumatic.   Eyes:      General: No scleral icterus.     Conjunctiva/sclera: Conjunctivae normal.   Neck:      Vascular: No JVD.   Cardiovascular:      Rate and Rhythm: Normal rate and regular rhythm.      Heart sounds: Normal heart sounds.   Pulmonary:      Effort: Pulmonary effort is normal. No respiratory distress.   Abdominal:      General: There is no distension.      Palpations: Abdomen is soft.      Tenderness: There is no abdominal tenderness.   Musculoskeletal:      Right lower leg: No edema.      Left lower leg: No edema.   Skin:     Coloration: Skin is pale. Skin is not jaundiced.      Findings: Lesion present.   Neurological:      Mental Status: She is alert.      Motor: No abnormal muscle tone.      Comments: Does not attempt to track or answer questions, does not follow commands, no tremor, UE rigidity   Psychiatric:      Comments: Unable to evaluate             Significant Labs: All pertinent labs within the past 24 hours have been reviewed.  CBC:   Recent Labs   Lab 12/04/23  0520 12/05/23  0443   WBC 14.84* 9.89   HGB 12.6 12.0   HCT 41.4 37.4    249     CMP:   Recent Labs   Lab 12/04/23  0520 12/05/23  0443    144   K 4.3 4.3    111*   CO2 19* 23   * 82   BUN 23 20   CREATININE 0.8 0.7   CALCIUM 8.7 8.5*   PROT 6.2 5.9*   ALBUMIN 2.3* 2.2*   BILITOT 0.4 0.3   ALKPHOS 76 64   AST 15 12   ALT 5* <5*   ANIONGAP 11 10       Significant Imaging: I have reviewed all pertinent imaging results/findings within the past 24 hours.    Assessment/Plan:      * Pressure injury of sacral region, unstageable  Presents for evaluation of sacral decubitus ulcer. Purulent on exam. Mild leukocytosis, otherwise does not meet sepsis criteria.   - Wound culture with P mirabilis  - Continue ceftriaxone (sot 12/1)  - General surgery consulted  -- s/p debridement 12/3  - Wound care consulted for recs for discharge    Sepsis without acute  organ dysfunction  Febrile with new leukocytosis on 12/3. Known infected wound.    This patient does have evidence of infective focus  My overall impression is sepsis.  Source: Skin and Soft Tissue (location sacral)  Antibiotics given-   Antibiotics (72h ago, onward)      Start     Stop Route Frequency Ordered    12/03/23 2100  mupirocin 2 % ointment         12/08/23 2059 Nasl 2 times daily 12/03/23 1735    12/02/23 0600  cefTRIAXone (ROCEPHIN) 1 g in dextrose 5 % in water (D5W) 100 mL IVPB (MB+)         -- IV Every 24 hours (non-standard times) 12/01/23 2018            Fluid challenge Not needed - patient is not hypotensive      Source control achieved by: to OR 12/3 for debridement       CKD (chronic kidney disease), stage III  Creatine stable for now. BMP reviewed- noted Estimated Creatinine Clearance: 53.9 mL/min (based on SCr of 0.7 mg/dL). according to latest data. Based on current GFR, CKD stage is stage 3 - GFR 30-59.       - Monitor UOP and serial BMP and adjust therapy as needed.    - Renally dose meds.    - Avoid nephrotoxic medications and procedures.      Macrocytic anemia  Chronic, and likely anemia of chronic disease. Currently without indication for transfusion.     Severe protein-calorie malnutrition  Due to Parkinson's and debility. Will continue dietary supplements as recommended by RD during previous hospitalization.   - SLP      Hypernatremia  Patient has hypernatremia which is controlled. The hypernatremia is due to Dehydration. The patient's sodium results have been reviewed and are listed below.  Recent Labs   Lab 12/04/23  0520              Functional quadriplegia  Due to Parkinsons' and dementia. Continue frequent turns and pressure ulcer care/prevention.       DNR (do not resuscitate)  Per previous documentation and discussions with patient's family. Clarified with daughter Yamileth at bedside (shares POA with other daughter) - patient is DNR. Palliative consulted       Severe Lewy  body dementia  Monitor with delirium precautions.   - palliative care consulted    Parkinson's disease  Continue home Sinemet.        VTE Risk Mitigation (From admission, onward)           Ordered     enoxaparin injection 40 mg  Daily         12/01/23 2123     IP VTE HIGH RISK PATIENT  Once         12/01/23 2123     Place sequential compression device  Until discontinued         12/01/23 2123     Reason for No Pharmacological VTE Prophylaxis  Once        Question:  Reasons:  Answer:  Physician Provided (leave comment)    12/01/23 2123                    Discharge Planning   MELCHOR: 12/5/2023     Code Status: DNR   Is the patient medically ready for discharge?: No    Reason for patient still in hospital (select all that apply): Patient trending condition, Laboratory test, Treatment, and Consult recommendations  Discharge Plan A: Return to nursing home, Hospice/home   Discharge Delays: None known at this time              Yuniel Hills MD  Department of Hospital Medicine   [facility] - Intensive Care ([place]-[facility])

## 2023-12-05 NOTE — ASSESSMENT & PLAN NOTE
Creatine stable for now. BMP reviewed- noted Estimated Creatinine Clearance: 53.9 mL/min (based on SCr of 0.7 mg/dL). according to latest data. Based on current GFR, CKD stage is stage 3 - GFR 30-59.       - Monitor UOP and serial BMP and adjust therapy as needed.    - Renally dose meds.    - Avoid nephrotoxic medications and procedures.

## 2023-12-05 NOTE — ASSESSMENT & PLAN NOTE
Presents for evaluation of sacral decubitus ulcer. Purulent on exam. Mild leukocytosis, otherwise does not meet sepsis criteria.   - Wound culture with P mirabilis  - Continue ceftriaxone (sot 12/1)  - General surgery consulted  -- s/p debridement 12/3  - Wound care consulted for recs for discharge

## 2023-12-05 NOTE — SUBJECTIVE & OBJECTIVE
Interval History:   No events overnight. Cleared by general surgery yesterday. Pending wound care evaluation. Continue CTX.      Review of Systems   Unable to perform ROS: Dementia     Objective:     Vital Signs (Most Recent):  Temp: 98.5 °F (36.9 °C) (12/05/23 1139)  Pulse: 74 (12/05/23 1139)  Resp: 18 (12/05/23 1139)  BP: 134/70 (12/05/23 1139)  SpO2: 96 % (12/05/23 1139) Vital Signs (24h Range):  Temp:  [98 °F (36.7 °C)-99.2 °F (37.3 °C)] 98.5 °F (36.9 °C)  Pulse:  [51-75] 74  Resp:  [18] 18  SpO2:  [93 %-97 %] 96 %  BP: (108-142)/(56-92) 134/70     Weight: 52.6 kg (115 lb 15.4 oz)  Body mass index is 20.54 kg/m².    Intake/Output Summary (Last 24 hours) at 12/5/2023 1341  Last data filed at 12/5/2023 0800  Gross per 24 hour   Intake 240 ml   Output 470 ml   Net -230 ml         Physical Exam  Vitals and nursing note reviewed.   Constitutional:       General: She is not in acute distress.     Appearance: She is well-developed. She is ill-appearing (chronically). She is not diaphoretic.   HENT:      Head: Normocephalic and atraumatic.   Eyes:      General: No scleral icterus.     Conjunctiva/sclera: Conjunctivae normal.   Neck:      Vascular: No JVD.   Cardiovascular:      Rate and Rhythm: Normal rate and regular rhythm.      Heart sounds: Normal heart sounds.   Pulmonary:      Effort: Pulmonary effort is normal. No respiratory distress.   Abdominal:      General: There is no distension.      Palpations: Abdomen is soft.      Tenderness: There is no abdominal tenderness.   Musculoskeletal:      Right lower leg: No edema.      Left lower leg: No edema.   Skin:     Coloration: Skin is pale. Skin is not jaundiced.      Findings: Lesion present.   Neurological:      Mental Status: She is alert.      Motor: No abnormal muscle tone.      Comments: Does not attempt to track or answer questions, does not follow commands, no tremor, UE rigidity   Psychiatric:      Comments: Unable to evaluate             Significant Labs:  All pertinent labs within the past 24 hours have been reviewed.  CBC:   Recent Labs   Lab 12/04/23  0520 12/05/23  0443   WBC 14.84* 9.89   HGB 12.6 12.0   HCT 41.4 37.4    249     CMP:   Recent Labs   Lab 12/04/23  0520 12/05/23 0443    144   K 4.3 4.3    111*   CO2 19* 23   * 82   BUN 23 20   CREATININE 0.8 0.7   CALCIUM 8.7 8.5*   PROT 6.2 5.9*   ALBUMIN 2.3* 2.2*   BILITOT 0.4 0.3   ALKPHOS 76 64   AST 15 12   ALT 5* <5*   ANIONGAP 11 10       Significant Imaging: I have reviewed all pertinent imaging results/findings within the past 24 hours.

## 2023-12-05 NOTE — PLAN OF CARE
Pt nonverbal. Respirations are even and unlabored on RA. Pt had decrease PO intake this shift. Turned q2h. Sacral dressing changed by wound care nurse. WC orders placed. Medications crushed and given with pudding. Safety maintained.     Problem: Adult Inpatient Plan of Care  Goal: Plan of Care Review  Outcome: Ongoing, Progressing     Problem: Impaired Wound Healing  Goal: Optimal Wound Healing  Outcome: Ongoing, Progressing     Problem: Glycemic Control Impaired (Sepsis/Septic Shock)  Goal: Blood Glucose Level Within Desired Range  Outcome: Ongoing, Progressing     Problem: Nutrition Impaired (Sepsis/Septic Shock)  Goal: Optimal Nutrition Intake  Outcome: Ongoing, Progressing      2

## 2023-12-05 NOTE — ASSESSMENT & PLAN NOTE
Febrile with new leukocytosis on 12/3. Known infected wound.    This patient does have evidence of infective focus  My overall impression is sepsis.  Source: Skin and Soft Tissue (location sacral)  Antibiotics given-   Antibiotics (72h ago, onward)      Start     Stop Route Frequency Ordered    12/03/23 2100  mupirocin 2 % ointment         12/08/23 2059 Nasl 2 times daily 12/03/23 1735    12/02/23 0600  cefTRIAXone (ROCEPHIN) 1 g in dextrose 5 % in water (D5W) 100 mL IVPB (MB+)         -- IV Every 24 hours (non-standard times) 12/01/23 2018            Fluid challenge Not needed - patient is not hypotensive      Source control achieved by: to OR 12/3 for debridement

## 2023-12-05 NOTE — PLAN OF CARE
Problem: Adult Inpatient Plan of Care  Goal: Plan of Care Review  Outcome: Ongoing, Progressing  Goal: Patient-Specific Goal (Individualized)  Outcome: Ongoing, Progressing  Goal: Optimal Comfort and Wellbeing  Outcome: Ongoing, Progressing  Goal: Readiness for Transition of Care  Outcome: Ongoing, Progressing     Problem: Fall Injury Risk  Goal: Absence of Fall and Fall-Related Injury  Outcome: Ongoing, Progressing     Problem: Adjustment to Illness (Sepsis/Septic Shock)  Goal: Optimal Coping  Outcome: Ongoing, Progressing     Problem: Infection Progression (Sepsis/Septic Shock)  Goal: Absence of Infection Signs and Symptoms  Outcome: Ongoing, Progressing     Problem: Nutrition Impaired (Sepsis/Septic Shock)  Goal: Optimal Nutrition Intake  Outcome: Ongoing, Progressing

## 2023-12-05 NOTE — PROGRESS NOTES
[facility] ([place] [address])  Wound Care    Patient Name:  Annmarie Gallardo   MRN:  [id number]  Date: 12/5/2023  Diagnosis: Pressure injury of sacral region, unstageable    History:     Past Medical History:   Diagnosis Date    HTN (hypertension)     Parkinson's disease     Vitamin D deficiency        Social History     Socioeconomic History    Marital status:    Tobacco Use    Smoking status: Never    Smokeless tobacco: Never   Substance and Sexual Activity    Alcohol use: No    Drug use: No     Social Determinants of Health     Financial Resource Strain: Low Risk  (12/1/2023)    Overall Financial Resource Strain (CARDIA)     Difficulty of Paying Living Expenses: Not hard at all   Recent Concern: Financial Resource Strain - Medium Risk (11/12/2023)    Overall Financial Resource Strain (CARDIA)     Difficulty of Paying Living Expenses: Somewhat hard   Food Insecurity: No Food Insecurity (12/1/2023)    Hunger Vital Sign     Worried About Running Out of Food in the Last Year: Never true     Ran Out of Food in the Last Year: Never true   Transportation Needs: No Transportation Needs (12/1/2023)    PRAPARE - Transportation     Lack of Transportation (Medical): No     Lack of Transportation (Non-Medical): No   Physical Activity: Inactive (12/1/2023)    Exercise Vital Sign     Days of Exercise per Week: 0 days     Minutes of Exercise per Session: 0 min   Stress: No Stress Concern Present (12/1/2023)    [employer] [place] [employer] - Occupational Stress Questionnaire     Feeling of Stress : Not at all   Social Connections: Socially Isolated (12/1/2023)    Social Connection and Isolation Panel [NHANES]     Frequency of Communication with Friends and Family: Never     Frequency of Social Gatherings with Friends and Family: Three times a week     Attends Taoism Services: Never     Active Member of Clubs or Organizations: No     Attends Club or Organization Meetings: Never     Marital Status:     Housing Stability: Low Risk  (12/1/2023)    Housing Stability Vital Sign     Unable to Pay for Housing in the Last Year: No     Number of Places Lived in the Last Year: 2     Unstable Housing in the Last Year: No       Precautions:     Allergies as of 12/01/2023 - Reviewed 12/01/2023   Allergen Reaction Noted    Diamox sequels [acetazolamide]  12/01/2023    Garlic  11/12/2023    Namenda [memantine]  11/12/2023    Oxybutynin  11/12/2023       WO Assessment Details/Treatment   Patient seen for wound care consultation to buttocks.   Reviewed chart for this encounter.   See Flow Sheet for findings.      Per chart review. Annmarie Gallardo is a 78 y.o. with a PMHx of parkinson's, lewy-body dementia with debility/functional quadriplegia, HTN, GERD, CKD3, and sacral decubitus ulcer who presents today from her nursing home for evaluation of sacral decubitus ulcer. Wound care removed old dressing. Wound care cleansed with Vashe before applying a Vashe wet to moist dressing. A foam dressing was used as a cover dressing. Supplies left at bedside.     RECOMMENDATIONS:  - Bedside nurse to perform wound care to buttocks:  Cleanse wound with hypochlorous acid (Vashe)  Pat dry.  Apply hypochlorous acid soaked gauze (Vashe wet to moist) to wound bed  Cover with an island border.   Apply this Daily.    Discussed POC with patient and primary RN.   See EMR for orders & patient education.  Discussed POC with primary team.    Nursing to continue care.  Nursing to maintain pressure injury prevention interventions.  Contact wound care for any further questions.       12/05/23 1300   WOCN Assessment   WOCN Total Time (mins) 30   Visit Date 12/05/23   Visit Time 1300   Consult Type New   WOCN Speciality Wound   Wound surgical   Intervention assessed;changed;applied;chart review;coordination of care;orders   Teaching on-going        Incision/Site 12/03/23 1448 Buttocks   Date First Assessed/Time First Assessed: 12/03/23 1442    Location: Buttocks   Wound Image    Red (%), Wound Tissue Color 60 %   Yellow (%), Wound Tissue Color 40 %   Wound Length (cm) 8 cm   Wound Width (cm) 6 cm   Wound Depth (cm) 5.2 cm   Wound Volume (cm^3) 249.6 cm^3   Wound Surface Area (cm^2) 48 cm^2   Care Cleansed with:;Antimicrobial agent   Dressing Applied;Gauze, wet to moist;Foam   Packing packing removed;packed with;gauze, iodoform   Packing Inserted  1   Packing Removed 1   Dressing Change Due 12/06/23 12/05/2023

## 2023-12-06 LAB
ALBUMIN SERPL BCP-MCNC: 2.3 G/DL (ref 3.5–5.2)
ALP SERPL-CCNC: 62 U/L (ref 55–135)
ALT SERPL W/O P-5'-P-CCNC: <5 U/L (ref 10–44)
ANION GAP SERPL CALC-SCNC: 9 MMOL/L (ref 8–16)
AST SERPL-CCNC: 18 U/L (ref 10–40)
BACTERIA BLD CULT: NORMAL
BACTERIA BLD CULT: NORMAL
BASOPHILS NFR BLD: 0 % (ref 0–1.9)
BILIRUB SERPL-MCNC: 0.3 MG/DL (ref 0.1–1)
BUN SERPL-MCNC: 17 MG/DL (ref 8–23)
CALCIUM SERPL-MCNC: 8.8 MG/DL (ref 8.7–10.5)
CHLORIDE SERPL-SCNC: 109 MMOL/L (ref 95–110)
CO2 SERPL-SCNC: 24 MMOL/L (ref 23–29)
CREAT SERPL-MCNC: 0.8 MG/DL (ref 0.5–1.4)
DIFFERENTIAL METHOD: ABNORMAL
EOSINOPHIL NFR BLD: 5 % (ref 0–8)
ERYTHROCYTE [DISTWIDTH] IN BLOOD BY AUTOMATED COUNT: 12.7 % (ref 11.5–14.5)
EST. GFR  (NO RACE VARIABLE): >60 ML/MIN/1.73 M^2
GLUCOSE SERPL-MCNC: 99 MG/DL (ref 70–110)
HCT VFR BLD AUTO: 37.9 % (ref 37–48.5)
HGB BLD-MCNC: 11.9 G/DL (ref 12–16)
IMM GRANULOCYTES # BLD AUTO: ABNORMAL K/UL (ref 0–0.04)
IMM GRANULOCYTES NFR BLD AUTO: ABNORMAL % (ref 0–0.5)
LYMPHOCYTES NFR BLD: 7 % (ref 18–48)
MCH RBC QN AUTO: 29.1 PG (ref 27–31)
MCHC RBC AUTO-ENTMCNC: 31.4 G/DL (ref 32–36)
MCV RBC AUTO: 93 FL (ref 82–98)
METAMYELOCYTES NFR BLD MANUAL: 4 %
MONOCYTES NFR BLD: 2 % (ref 4–15)
MYELOCYTES NFR BLD MANUAL: 4 %
NEUTROPHILS NFR BLD: 77 % (ref 38–73)
NEUTS BAND NFR BLD MANUAL: 1 %
NRBC BLD-RTO: 0 /100 WBC
PLATELET # BLD AUTO: 285 K/UL (ref 150–450)
PLATELET BLD QL SMEAR: ABNORMAL
PMV BLD AUTO: 10.3 FL (ref 9.2–12.9)
POTASSIUM SERPL-SCNC: 4.6 MMOL/L (ref 3.5–5.1)
PROT SERPL-MCNC: 6 G/DL (ref 6–8.4)
RBC # BLD AUTO: 4.09 M/UL (ref 4–5.4)
SODIUM SERPL-SCNC: 142 MMOL/L (ref 136–145)
WBC # BLD AUTO: 10.75 K/UL (ref 3.9–12.7)

## 2023-12-06 PROCEDURE — 21400001 HC TELEMETRY ROOM

## 2023-12-06 PROCEDURE — 25000003 PHARM REV CODE 250: Performed by: STUDENT IN AN ORGANIZED HEALTH CARE EDUCATION/TRAINING PROGRAM

## 2023-12-06 PROCEDURE — 63600175 PHARM REV CODE 636 W HCPCS: Performed by: STUDENT IN AN ORGANIZED HEALTH CARE EDUCATION/TRAINING PROGRAM

## 2023-12-06 PROCEDURE — S5010 5% DEXTROSE AND 0.45% SALINE: HCPCS | Performed by: STUDENT IN AN ORGANIZED HEALTH CARE EDUCATION/TRAINING PROGRAM

## 2023-12-06 PROCEDURE — 85007 BL SMEAR W/DIFF WBC COUNT: CPT | Performed by: STUDENT IN AN ORGANIZED HEALTH CARE EDUCATION/TRAINING PROGRAM

## 2023-12-06 PROCEDURE — 85027 COMPLETE CBC AUTOMATED: CPT | Performed by: STUDENT IN AN ORGANIZED HEALTH CARE EDUCATION/TRAINING PROGRAM

## 2023-12-06 PROCEDURE — 80053 COMPREHEN METABOLIC PANEL: CPT | Performed by: STUDENT IN AN ORGANIZED HEALTH CARE EDUCATION/TRAINING PROGRAM

## 2023-12-06 PROCEDURE — 36415 COLL VENOUS BLD VENIPUNCTURE: CPT | Performed by: STUDENT IN AN ORGANIZED HEALTH CARE EDUCATION/TRAINING PROGRAM

## 2023-12-06 RX ORDER — DEXTROSE MONOHYDRATE AND SODIUM CHLORIDE 5; .45 G/100ML; G/100ML
INJECTION, SOLUTION INTRAVENOUS CONTINUOUS
Status: ACTIVE | OUTPATIENT
Start: 2023-12-06 | End: 2023-12-07

## 2023-12-06 RX ADMIN — DEXTROSE AND SODIUM CHLORIDE: 5; 450 INJECTION, SOLUTION INTRAVENOUS at 03:12

## 2023-12-06 RX ADMIN — CARBIDOPA AND LEVODOPA 1 TABLET: 25; 100 TABLET ORAL at 09:12

## 2023-12-06 RX ADMIN — MUPIROCIN: 20 OINTMENT TOPICAL at 09:12

## 2023-12-06 RX ADMIN — ASPIRIN 81 MG: 81 TABLET, COATED ORAL at 09:12

## 2023-12-06 RX ADMIN — CETIRIZINE HYDROCHLORIDE 10 MG: 10 TABLET, FILM COATED ORAL at 09:12

## 2023-12-06 RX ADMIN — PANTOPRAZOLE SODIUM 20 MG: 20 TABLET, DELAYED RELEASE ORAL at 09:12

## 2023-12-06 RX ADMIN — CYANOCOBALAMIN TAB 1000 MCG 1000 MCG: 1000 TAB at 09:12

## 2023-12-06 RX ADMIN — TAMSULOSIN HYDROCHLORIDE 0.4 MG: 0.4 CAPSULE ORAL at 09:12

## 2023-12-06 RX ADMIN — SENNOSIDES AND DOCUSATE SODIUM 1 TABLET: 8.6; 5 TABLET ORAL at 09:12

## 2023-12-06 RX ADMIN — CEFTRIAXONE 1 G: 1 INJECTION, POWDER, FOR SOLUTION INTRAMUSCULAR; INTRAVENOUS at 05:12

## 2023-12-06 RX ADMIN — CARBIDOPA AND LEVODOPA 1 SPLIT TABLET: 25; 100 TABLET ORAL at 09:12

## 2023-12-06 RX ADMIN — CARBIDOPA AND LEVODOPA 1 TABLET: 25; 100 TABLET ORAL at 03:12

## 2023-12-06 RX ADMIN — THERA TABS 1 TABLET: TAB at 09:12

## 2023-12-06 RX ADMIN — ENOXAPARIN SODIUM 40 MG: 40 INJECTION SUBCUTANEOUS at 06:12

## 2023-12-06 NOTE — PLAN OF CARE
DAYAN spoke with ANGELICA Carson via phone.  She states pt lives at [facility], not [facility].  Surgeon has recommended LTAC.  She wants to know if pt can get LTAC.  Also discussed hospice, explained that LTAC doesn't go with hospice.  CG would like to pursue LTAC if recommended still by surgeon.  She feels like LTAC would be better care and less pain.  She is concerned with hospice pt will get morphine, and this isn't what she wants for pt.  Instructed that if pt goes with hospice, discuss this with MD.  ANGELICA v/u.      Per MD, this pt appropriate for LTAC LOC upon dc.  DAYAN sent LTAC referrals to [facility], and [facility] via [facility].  [facility] have accepted.  Insurance auth pending.    DAYAN spoke with Yamileth [phone]; informed that [facility] and [facility] have accepted pt.  She agrees for hospitals to submit for insurance auth.  Dalila Godfrey informed.   states she will come to view facility after work.    CELINA Benavides, BS, RN, CCM

## 2023-12-06 NOTE — PLAN OF CARE
Problem: Adult Inpatient Plan of Care  Goal: Plan of Care Review  Outcome: Ongoing, Progressing  Goal: Patient-Specific Goal (Individualized)  Outcome: Ongoing, Progressing  Goal: Absence of Hospital-Acquired Illness or Injury  Outcome: Ongoing, Progressing  Goal: Optimal Comfort and Wellbeing  Outcome: Ongoing, Progressing  Goal: Readiness for Transition of Care  Outcome: Ongoing, Progressing     Problem: Impaired Wound Healing  Goal: Optimal Wound Healing  Outcome: Ongoing, Progressing     Problem: Skin Injury Risk Increased  Goal: Skin Health and Integrity  Outcome: Ongoing, Progressing     Problem: Fall Injury Risk  Goal: Absence of Fall and Fall-Related Injury  Outcome: Ongoing, Progressing     Problem: Adjustment to Illness (Sepsis/Septic Shock)  Goal: Optimal Coping  Outcome: Ongoing, Progressing     Problem: Bleeding (Sepsis/Septic Shock)  Goal: Absence of Bleeding  Outcome: Ongoing, Progressing     Problem: Glycemic Control Impaired (Sepsis/Septic Shock)  Goal: Blood Glucose Level Within Desired Range  Outcome: Ongoing, Progressing     Problem: Infection Progression (Sepsis/Septic Shock)  Goal: Absence of Infection Signs and Symptoms  Outcome: Ongoing, Progressing     Problem: Nutrition Impaired (Sepsis/Septic Shock)  Goal: Optimal Nutrition Intake  Outcome: Ongoing, Progressing

## 2023-12-07 LAB
ALBUMIN SERPL BCP-MCNC: 2.1 G/DL (ref 3.5–5.2)
ALP SERPL-CCNC: 52 U/L (ref 55–135)
ALT SERPL W/O P-5'-P-CCNC: <5 U/L (ref 10–44)
ANION GAP SERPL CALC-SCNC: 7 MMOL/L (ref 8–16)
ANISOCYTOSIS BLD QL SMEAR: SLIGHT
AST SERPL-CCNC: 22 U/L (ref 10–40)
BASOPHILS # BLD AUTO: ABNORMAL K/UL (ref 0–0.2)
BASOPHILS NFR BLD: 0 % (ref 0–1.9)
BILIRUB SERPL-MCNC: 0.3 MG/DL (ref 0.1–1)
BUN SERPL-MCNC: 14 MG/DL (ref 8–23)
CALCIUM SERPL-MCNC: 8.2 MG/DL (ref 8.7–10.5)
CHLORIDE SERPL-SCNC: 108 MMOL/L (ref 95–110)
CO2 SERPL-SCNC: 21 MMOL/L (ref 23–29)
CREAT SERPL-MCNC: 0.7 MG/DL (ref 0.5–1.4)
DIFFERENTIAL METHOD: ABNORMAL
EOSINOPHIL # BLD AUTO: ABNORMAL K/UL (ref 0–0.5)
EOSINOPHIL NFR BLD: 2 % (ref 0–8)
ERYTHROCYTE [DISTWIDTH] IN BLOOD BY AUTOMATED COUNT: 12.8 % (ref 11.5–14.5)
EST. GFR  (NO RACE VARIABLE): >60 ML/MIN/1.73 M^2
GLUCOSE SERPL-MCNC: 111 MG/DL (ref 70–110)
HCT VFR BLD AUTO: 34.7 % (ref 37–48.5)
HGB BLD-MCNC: 10.2 G/DL (ref 12–16)
HYPOCHROMIA BLD QL SMEAR: ABNORMAL
IMM GRANULOCYTES # BLD AUTO: ABNORMAL K/UL (ref 0–0.04)
IMM GRANULOCYTES NFR BLD AUTO: ABNORMAL % (ref 0–0.5)
LYMPHOCYTES # BLD AUTO: ABNORMAL K/UL (ref 1–4.8)
LYMPHOCYTES NFR BLD: 10 % (ref 18–48)
MCH RBC QN AUTO: 29.1 PG (ref 27–31)
MCHC RBC AUTO-ENTMCNC: 29.4 G/DL (ref 32–36)
MCV RBC AUTO: 99 FL (ref 82–98)
METAMYELOCYTES NFR BLD MANUAL: 1 %
MONOCYTES # BLD AUTO: ABNORMAL K/UL (ref 0.3–1)
MONOCYTES NFR BLD: 4 % (ref 4–15)
NEUTROPHILS NFR BLD: 82 % (ref 38–73)
NEUTS BAND NFR BLD MANUAL: 1 %
NRBC BLD-RTO: 0 /100 WBC
OVALOCYTES BLD QL SMEAR: ABNORMAL
PLATELET # BLD AUTO: 147 K/UL (ref 150–450)
PLATELET BLD QL SMEAR: ABNORMAL
PMV BLD AUTO: 12.3 FL (ref 9.2–12.9)
POCT GLUCOSE: 119 MG/DL (ref 70–110)
POIKILOCYTOSIS BLD QL SMEAR: SLIGHT
POLYCHROMASIA BLD QL SMEAR: ABNORMAL
POTASSIUM SERPL-SCNC: 4.6 MMOL/L (ref 3.5–5.1)
PROT SERPL-MCNC: 5.2 G/DL (ref 6–8.4)
RBC # BLD AUTO: 3.5 M/UL (ref 4–5.4)
SODIUM SERPL-SCNC: 136 MMOL/L (ref 136–145)
WBC # BLD AUTO: 10.44 K/UL (ref 3.9–12.7)

## 2023-12-07 PROCEDURE — 25000003 PHARM REV CODE 250: Performed by: STUDENT IN AN ORGANIZED HEALTH CARE EDUCATION/TRAINING PROGRAM

## 2023-12-07 PROCEDURE — 80053 COMPREHEN METABOLIC PANEL: CPT | Performed by: STUDENT IN AN ORGANIZED HEALTH CARE EDUCATION/TRAINING PROGRAM

## 2023-12-07 PROCEDURE — 36410 VNPNXR 3YR/> PHY/QHP DX/THER: CPT

## 2023-12-07 PROCEDURE — 63600175 PHARM REV CODE 636 W HCPCS: Performed by: STUDENT IN AN ORGANIZED HEALTH CARE EDUCATION/TRAINING PROGRAM

## 2023-12-07 PROCEDURE — 85027 COMPLETE CBC AUTOMATED: CPT | Performed by: STUDENT IN AN ORGANIZED HEALTH CARE EDUCATION/TRAINING PROGRAM

## 2023-12-07 PROCEDURE — 36415 COLL VENOUS BLD VENIPUNCTURE: CPT | Performed by: STUDENT IN AN ORGANIZED HEALTH CARE EDUCATION/TRAINING PROGRAM

## 2023-12-07 PROCEDURE — 85007 BL SMEAR W/DIFF WBC COUNT: CPT | Performed by: STUDENT IN AN ORGANIZED HEALTH CARE EDUCATION/TRAINING PROGRAM

## 2023-12-07 PROCEDURE — 76937 US GUIDE VASCULAR ACCESS: CPT

## 2023-12-07 PROCEDURE — A4216 STERILE WATER/SALINE, 10 ML: HCPCS | Performed by: STUDENT IN AN ORGANIZED HEALTH CARE EDUCATION/TRAINING PROGRAM

## 2023-12-07 PROCEDURE — 21400001 HC TELEMETRY ROOM

## 2023-12-07 PROCEDURE — C1751 CATH, INF, PER/CENT/MIDLINE: HCPCS

## 2023-12-07 RX ORDER — SODIUM CHLORIDE 0.9 % (FLUSH) 0.9 %
10 SYRINGE (ML) INJECTION EVERY 6 HOURS
Status: DISCONTINUED | OUTPATIENT
Start: 2023-12-07 | End: 2023-12-27 | Stop reason: HOSPADM

## 2023-12-07 RX ORDER — SODIUM CHLORIDE 0.9 % (FLUSH) 0.9 %
10 SYRINGE (ML) INJECTION
Status: DISCONTINUED | OUTPATIENT
Start: 2023-12-07 | End: 2023-12-27 | Stop reason: HOSPADM

## 2023-12-07 RX ADMIN — CARBIDOPA AND LEVODOPA 1 SPLIT TABLET: 25; 100 TABLET ORAL at 08:12

## 2023-12-07 RX ADMIN — ASPIRIN 81 MG: 81 TABLET, COATED ORAL at 11:12

## 2023-12-07 RX ADMIN — CETIRIZINE HYDROCHLORIDE 10 MG: 10 TABLET, FILM COATED ORAL at 10:12

## 2023-12-07 RX ADMIN — TAMSULOSIN HYDROCHLORIDE 0.4 MG: 0.4 CAPSULE ORAL at 10:12

## 2023-12-07 RX ADMIN — PANTOPRAZOLE SODIUM 20 MG: 20 TABLET, DELAYED RELEASE ORAL at 10:12

## 2023-12-07 RX ADMIN — SENNOSIDES AND DOCUSATE SODIUM 1 TABLET: 8.6; 5 TABLET ORAL at 08:12

## 2023-12-07 RX ADMIN — ENOXAPARIN SODIUM 40 MG: 40 INJECTION SUBCUTANEOUS at 05:12

## 2023-12-07 RX ADMIN — SENNOSIDES AND DOCUSATE SODIUM 1 TABLET: 8.6; 5 TABLET ORAL at 11:12

## 2023-12-07 RX ADMIN — MUPIROCIN: 20 OINTMENT TOPICAL at 11:12

## 2023-12-07 RX ADMIN — THERA TABS 1 TABLET: TAB at 10:12

## 2023-12-07 RX ADMIN — CARBIDOPA AND LEVODOPA 1 TABLET: 25; 100 TABLET ORAL at 02:12

## 2023-12-07 RX ADMIN — CARBIDOPA AND LEVODOPA 1 TABLET: 25; 100 TABLET ORAL at 08:12

## 2023-12-07 RX ADMIN — CARBIDOPA AND LEVODOPA 1 TABLET: 25; 100 TABLET ORAL at 10:12

## 2023-12-07 RX ADMIN — CYANOCOBALAMIN TAB 1000 MCG 1000 MCG: 1000 TAB at 10:12

## 2023-12-07 RX ADMIN — MUPIROCIN: 20 OINTMENT TOPICAL at 08:12

## 2023-12-07 RX ADMIN — CEFTRIAXONE 1 G: 1 INJECTION, POWDER, FOR SOLUTION INTRAMUSCULAR; INTRAVENOUS at 06:12

## 2023-12-07 RX ADMIN — Medication 10 ML: at 05:12

## 2023-12-07 NOTE — SUBJECTIVE & OBJECTIVE
Interval History:   No events overnight.  No change in symptoms. Awaiting placement.    Review of Systems   Unable to perform ROS: Dementia     Objective:     Vital Signs (Most Recent):  Temp: 97.7 °F (36.5 °C) (12/06/23 1546)  Pulse: 70 (12/06/23 1546)  Resp: 16 (12/06/23 1546)  BP: (!) 148/78 (12/06/23 1546)  SpO2: 98 % (12/06/23 1546) Vital Signs (24h Range):  Temp:  [97.5 °F (36.4 °C)-98.1 °F (36.7 °C)] 97.7 °F (36.5 °C)  Pulse:  [59-87] 70  Resp:  [16-18] 16  SpO2:  [94 %-98 %] 98 %  BP: (104-148)/(56-78) 148/78     Weight: 52.6 kg (115 lb 15.4 oz)  Body mass index is 20.54 kg/m².    Intake/Output Summary (Last 24 hours) at 12/6/2023 1907  Last data filed at 12/6/2023 1619  Gross per 24 hour   Intake 30 ml   Output 400 ml   Net -370 ml         Physical Exam  Vitals and nursing note reviewed.   Constitutional:       General: She is not in acute distress.     Appearance: She is well-developed. She is ill-appearing (chronically). She is not diaphoretic.   HENT:      Head: Normocephalic and atraumatic.   Eyes:      General: No scleral icterus.     Conjunctiva/sclera: Conjunctivae normal.   Neck:      Vascular: No JVD.   Cardiovascular:      Rate and Rhythm: Normal rate and regular rhythm.      Heart sounds: Normal heart sounds.   Pulmonary:      Effort: Pulmonary effort is normal. No respiratory distress.   Abdominal:      General: There is no distension.      Palpations: Abdomen is soft.      Tenderness: There is no abdominal tenderness.   Musculoskeletal:      Right lower leg: No edema.      Left lower leg: No edema.   Skin:     Coloration: Skin is pale. Skin is not jaundiced.      Findings: Lesion present.   Neurological:      Mental Status: She is alert.      Motor: No abnormal muscle tone.      Comments: Does not attempt to track or answer questions, does not follow commands, no tremor, UE rigidity   Psychiatric:      Comments: Unable to evaluate             Significant Labs: All pertinent labs within the past  24 hours have been reviewed.  CBC:   Recent Labs   Lab 12/05/23  0443 12/06/23  0604   WBC 9.89 10.75   HGB 12.0 11.9*   HCT 37.4 37.9    285     CMP:   Recent Labs   Lab 12/05/23  0443 12/06/23  0604    142   K 4.3 4.6   * 109   CO2 23 24   GLU 82 99   BUN 20 17   CREATININE 0.7 0.8   CALCIUM 8.5* 8.8   PROT 5.9* 6.0   ALBUMIN 2.2* 2.3*   BILITOT 0.3 0.3   ALKPHOS 64 62   AST 12 18   ALT <5* <5*   ANIONGAP 10 9       Significant Imaging: I have reviewed all pertinent imaging results/findings within the past 24 hours.

## 2023-12-07 NOTE — PROGRESS NOTES
"[facility] ([facility]  Adult Nutrition  Progress Note    SUMMARY       Recommendations    Continue Regular diet with texture per SLP    Add Jerome BID for promotion of wound healing    Rec'd addition of MVI along w/ Vitamin C for promotion of wound healing    If PO intake remains low, consider alternate nutrition, consult RD if warranted.     RD following     Goals: Meet % EEN, EPN by RD f/u  Nutrition Goal Status: new  Communication of RD Recs:  (POC)    Assessment and Plan    Nutrition Problem  Increased nutrient needs (protein, energy)    Related to (etiology):   Increased physiological demands    Signs and Symptoms (as evidenced by):   Sacral wound    Interventions/Recommendations (treatment strategy):  Collaboration with other providers  ONS    Nutrition Diagnosis Status:   New     Reason for Assessment    Reason For Assessment: RD follow-up  Diagnosis:  (PI of sacral region)  Relevant Medical History: CKD, parkinson's, dementia, functinoal quadriplegia, severe malnutrition, macrocytic anemia, sepsis  Interdisciplinary Rounds: did not attend    General Information Comments:   RD f/u. Unable to meet with pt due to procedure in the room. Per chart review, tolerating 25% of meals. ONS and Jerome ordered. Wt stable. RD following.     Nutrition Discharge Planning: Adequate PO intake    Nutrition Risk Screen    Nutrition Risk Screen: no indicators present    Nutrition/Diet History    Spiritual, Cultural Beliefs, Mandaen Practices, Values that Affect Care: no    Anthropometrics    Temp: 96.1 °F (35.6 °C)  Height: 5' 3" (160 cm)  Height (inches): 63 in  Weight Method: Bed Scale  Weight: 52.6 kg (115 lb 15.4 oz)  Weight (lb): 115.97 lb  Ideal Body Weight (IBW), Female: 115 lb  % Ideal Body Weight, Female (lb): 100.84 %  BMI (Calculated): 20.5       Lab/Procedures/Meds    Pertinent Labs Reviewed: reviewed  Pertinent Labs Comments: glucose 111, Tpro 5.2, albumin 2.1, ALP 52, ALT <5  Pertinent " Medications Reviewed: reviewed  Pertinent Medications Comments: MV, cyanocabalamin, pantoprazole, senna    Physical Findings/Assessment         Estimated/Assessed Needs    Weight Used For Calorie Calculations: 52.2 kg (115 lb)  Energy Calorie Requirements (kcal): 1352 kcal  Energy Need Method: Garner-St Jeor (SF 1.4)  Protein Requirements: 63-78g (1.2-1.5g/kg)  Weight Used For Protein Calculations: 52.2 kg (115 lb)  Fluid Requirements (mL): 1ml/kcal or per MD  Estimated Fluid Requirement Method: RDA Method  RDA Method (mL): 1352         Nutrition Prescription Ordered    Current Diet Order: level 5  Oral Nutrition Supplement: Boost Plus, Jerome    Evaluation of Received Nutrient/Fluid Intake    I/O: - 1.2 L since admit  Energy Calories Required: not meeting needs  Protein Required: not meeting needs  Comments: LBM 12/1  Tolerance: tolerating  % Intake of Estimated Energy Needs: 0 - 25 %  % Meal Intake: 0 - 25 %    Nutrition Risk    Level of Risk/Frequency of Follow-up:  (1x/week)     Monitor and Evaluation    Food and Nutrient Intake: energy intake, food and beverage intake  Food and Nutrient Adminstration: diet order  Knowledge/Beliefs/Attitudes: food and nutrition knowledge/skill  Physical Activity and Function: nutrition-related ADLs and IADLs  Anthropometric Measurements: height/length, weight, weight change, body mass index  Biochemical Data, Medical Tests and Procedures: electrolyte and renal panel, gastrointestinal profile, glucose/endocrine profile, inflammatory profile, lipid profile  Nutrition-Focused Physical Findings: overall appearance     Nutrition Follow-Up    RD Follow-up?: Yes

## 2023-12-07 NOTE — PLAN OF CARE
Problem: Adult Inpatient Plan of Care  Goal: Plan of Care Review  Outcome: Ongoing, Progressing  Goal: Patient-Specific Goal (Individualized)  Outcome: Ongoing, Progressing  Goal: Absence of Hospital-Acquired Illness or Injury  Outcome: Ongoing, Progressing  Goal: Optimal Comfort and Wellbeing  Outcome: Ongoing, Progressing  Goal: Readiness for Transition of Care  Outcome: Ongoing, Progressing     Problem: Impaired Wound Healing  Goal: Optimal Wound Healing  Outcome: Ongoing, Progressing     Problem: Skin Injury Risk Increased  Goal: Skin Health and Integrity  Outcome: Ongoing, Progressing     Problem: Fall Injury Risk  Goal: Absence of Fall and Fall-Related Injury  Outcome: Ongoing, Progressing     Problem: Adjustment to Illness (Sepsis/Septic Shock)  Goal: Optimal Coping  Outcome: Ongoing, Progressing     Problem: Bleeding (Sepsis/Septic Shock)  Goal: Absence of Bleeding  Outcome: Ongoing, Progressing     Problem: Glycemic Control Impaired (Sepsis/Septic Shock)  Goal: Blood Glucose Level Within Desired Range  Outcome: Ongoing, Progressing     Problem: Infection Progression (Sepsis/Septic Shock)  Goal: Absence of Infection Signs and Symptoms  Outcome: Ongoing, Progressing     Problem: Nutrition Impaired (Sepsis/Septic Shock)  Goal: Optimal Nutrition Intake  Outcome: Ongoing, Progressing      on meds / fu lipid profile

## 2023-12-07 NOTE — PROGRESS NOTES
[facility] - Int[facility] ([address] Medicine  Progress Note    Patient Name: Annmarie Gallardo  MRN: [id number]  Patient Class: IP- Inpatient   Admission Date: 12/1/2023  Length of Stay: 5 days  Attending Physician: Yuniel Hills MD  Primary Care Provider: Raymond Bess MD        Subjective:     Principal Problem:Pressure injury of sacral region, unstageable        HPI:  Annmarie Gallardo is a 78 y.o. with a PMHx of parkinson's, lewy-body dementia with debility/functional quadriplegia, HTN, GERD, CKD3, and sacral decubitus ulcer who presents today from her nursing home for evaluation of sacral decubitus ulcer.     Due to her dementia and nonverbal status, she is unable to provide a history. No family available at bedside during my exam.     Overview/Hospital Course:  Pt presenting with purulent drainage from sacral wound . S/p debridement 12/3 with general surgery. Patient febrile with leukocytosis overnight 12/3. Continue IV abx.     Interval History:   No events overnight.  No change in symptoms. Awaiting placement.    Review of Systems   Unable to perform ROS: Dementia     Objective:     Vital Signs (Most Recent):  Temp: 97.7 °F (36.5 °C) (12/06/23 1546)  Pulse: 70 (12/06/23 1546)  Resp: 16 (12/06/23 1546)  BP: (!) 148/78 (12/06/23 1546)  SpO2: 98 % (12/06/23 1546) Vital Signs (24h Range):  Temp:  [97.5 °F (36.4 °C)-98.1 °F (36.7 °C)] 97.7 °F (36.5 °C)  Pulse:  [59-87] 70  Resp:  [16-18] 16  SpO2:  [94 %-98 %] 98 %  BP: (104-148)/(56-78) 148/78     Weight: 52.6 kg (115 lb 15.4 oz)  Body mass index is 20.54 kg/m².    Intake/Output Summary (Last 24 hours) at 12/6/2023 1907  Last data filed at 12/6/2023 1619  Gross per 24 hour   Intake 30 ml   Output 400 ml   Net -370 ml         Physical Exam  Vitals and nursing note reviewed.   Constitutional:       General: She is not in acute distress.     Appearance: She is well-developed. She is ill-appearing (chronically). She is not diaphoretic.   HENT:       Head: Normocephalic and atraumatic.   Eyes:      General: No scleral icterus.     Conjunctiva/sclera: Conjunctivae normal.   Neck:      Vascular: No JVD.   Cardiovascular:      Rate and Rhythm: Normal rate and regular rhythm.      Heart sounds: Normal heart sounds.   Pulmonary:      Effort: Pulmonary effort is normal. No respiratory distress.   Abdominal:      General: There is no distension.      Palpations: Abdomen is soft.      Tenderness: There is no abdominal tenderness.   Musculoskeletal:      Right lower leg: No edema.      Left lower leg: No edema.   Skin:     Coloration: Skin is pale. Skin is not jaundiced.      Findings: Lesion present.   Neurological:      Mental Status: She is alert.      Motor: No abnormal muscle tone.      Comments: Does not attempt to track or answer questions, does not follow commands, no tremor, UE rigidity   Psychiatric:      Comments: Unable to evaluate             Significant Labs: All pertinent labs within the past 24 hours have been reviewed.  CBC:   Recent Labs   Lab 12/05/23  0443 12/06/23  0604   WBC 9.89 10.75   HGB 12.0 11.9*   HCT 37.4 37.9    285     CMP:   Recent Labs   Lab 12/05/23  0443 12/06/23  0604    142   K 4.3 4.6   * 109   CO2 23 24   GLU 82 99   BUN 20 17   CREATININE 0.7 0.8   CALCIUM 8.5* 8.8   PROT 5.9* 6.0   ALBUMIN 2.2* 2.3*   BILITOT 0.3 0.3   ALKPHOS 64 62   AST 12 18   ALT <5* <5*   ANIONGAP 10 9       Significant Imaging: I have reviewed all pertinent imaging results/findings within the past 24 hours.    Assessment/Plan:      * Pressure injury of sacral region, unstageable  Presents for evaluation of sacral decubitus ulcer. Purulent on exam. Mild leukocytosis, otherwise does not meet sepsis criteria.   - Wound culture with P mirabilis  - Continue ceftriaxone (sot 12/1)  - General surgery consulted  -- s/p debridement 12/3  - Wound care consulted for recs for discharge    Sepsis without acute organ dysfunction  Febrile with new  leukocytosis on 12/3. Known infected wound.    This patient does have evidence of infective focus  My overall impression is sepsis.  Source: Skin and Soft Tissue (location sacral)  Antibiotics given-   Antibiotics (72h ago, onward)      Start     Stop Route Frequency Ordered    12/03/23 2100  mupirocin 2 % ointment         12/08/23 2059 Nasl 2 times daily 12/03/23 1735    12/02/23 0600  cefTRIAXone (ROCEPHIN) 1 g in dextrose 5 % in water (D5W) 100 mL IVPB (MB+)         -- IV Every 24 hours (non-standard times) 12/01/23 2018            Fluid challenge Not needed - patient is not hypotensive      Source control achieved by: to OR 12/3 for debridement       CKD (chronic kidney disease), stage III  Creatine stable for now. BMP reviewed- noted Estimated Creatinine Clearance: 53.9 mL/min (based on SCr of 0.7 mg/dL). according to latest data. Based on current GFR, CKD stage is stage 3 - GFR 30-59.       - Monitor UOP and serial BMP and adjust therapy as needed.    - Renally dose meds.    - Avoid nephrotoxic medications and procedures.      Macrocytic anemia  Chronic, and likely anemia of chronic disease. Currently without indication for transfusion.     Severe protein-calorie malnutrition  Due to Parkinson's and debility. Will continue dietary supplements as recommended by RD during previous hospitalization.   - SLP      Hypernatremia  Patient has hypernatremia which is controlled. The hypernatremia is due to Dehydration. The patient's sodium results have been reviewed and are listed below.  Recent Labs   Lab 12/04/23  0520              Functional quadriplegia  Due to Parkinsons' and dementia. Continue frequent turns and pressure ulcer care/prevention.       DNR (do not resuscitate)  Per previous documentation and discussions with patient's family. Clarified with daughter Yamileth at bedside (shares POA with other daughter) - patient is DNR. Palliative consulted       Severe Lewy body dementia  Monitor with delirium  precautions.   - palliative care consulted    Parkinson's disease  Continue home Sinemet.        VTE Risk Mitigation (From admission, onward)           Ordered     enoxaparin injection 40 mg  Daily         12/01/23 2123     IP VTE HIGH RISK PATIENT  Once         12/01/23 2123     Place sequential compression device  Until discontinued         12/01/23 2123     Reason for No Pharmacological VTE Prophylaxis  Once        Question:  Reasons:  Answer:  Physician Provided (leave comment)    12/01/23 2123                    Discharge Planning   MELCHOR: 12/8/2023     Code Status: DNR   Is the patient medically ready for discharge?: No    Reason for patient still in hospital (select all that apply): Patient trending condition, Laboratory test, and Pending disposition  Discharge Plan A: Return to nursing home, Hospice/home   Discharge Delays: None known at this time              Yuniel Hills MD  Department of Hospital Medicine   [facility] - Intensive Care ([place])

## 2023-12-07 NOTE — CONSULTS
CAROLE placed 20 g x 10 cm midline in Right basilic vein for pva using realtime ultrasound guidance.  Lot#[id number].  Max dwell date 1/5/24.  Imagery recorded and saved.

## 2023-12-07 NOTE — PLAN OF CARE
Problem: Adult Inpatient Plan of Care  Goal: Plan of Care Review  Outcome: Ongoing, Progressing  Goal: Patient-Specific Goal (Individualized)  Outcome: Ongoing, Progressing  Goal: Absence of Hospital-Acquired Illness or Injury  Outcome: Ongoing, Progressing  Goal: Optimal Comfort and Wellbeing  Outcome: Ongoing, Progressing  Goal: Readiness for Transition of Care  Outcome: Ongoing, Progressing     Problem: Impaired Wound Healing  Goal: Optimal Wound Healing  Outcome: Ongoing, Progressing     Problem: Adult Inpatient Plan of Care  Goal: Plan of Care Review  Outcome: Ongoing, Progressing     Problem: Adult Inpatient Plan of Care  Goal: Patient-Specific Goal (Individualized)  Outcome: Ongoing, Progressing     Problem: Adult Inpatient Plan of Care  Goal: Absence of Hospital-Acquired Illness or Injury  Outcome: Ongoing, Progressing     Problem: Adult Inpatient Plan of Care  Goal: Optimal Comfort and Wellbeing  Outcome: Ongoing, Progressing

## 2023-12-07 NOTE — PLAN OF CARE
Recommendations    Continue Regular diet with texture per SLP    Add Jerome BID for promotion of wound healing    Rec'd addition of MVI along w/ Vitamin C for promotion of wound healing    If PO intake remains low, consider alternate nutrition, consult RD if warranted.     RD following     Goals: Meet % EEN, EPN by RD f/u  Nutrition Goal Status: new  Communication of RD Recs:  (POC)

## 2023-12-08 LAB
ALBUMIN SERPL BCP-MCNC: 2.6 G/DL (ref 3.5–5.2)
ALP SERPL-CCNC: 65 U/L (ref 55–135)
ALT SERPL W/O P-5'-P-CCNC: <5 U/L (ref 10–44)
ANION GAP SERPL CALC-SCNC: 10 MMOL/L (ref 8–16)
AST SERPL-CCNC: 22 U/L (ref 10–40)
BASOPHILS # BLD AUTO: ABNORMAL K/UL (ref 0–0.2)
BASOPHILS NFR BLD: 0 % (ref 0–1.9)
BILIRUB SERPL-MCNC: 0.4 MG/DL (ref 0.1–1)
BUN SERPL-MCNC: 12 MG/DL (ref 8–23)
CALCIUM SERPL-MCNC: 9.1 MG/DL (ref 8.7–10.5)
CHLORIDE SERPL-SCNC: 103 MMOL/L (ref 95–110)
CO2 SERPL-SCNC: 26 MMOL/L (ref 23–29)
CREAT SERPL-MCNC: 0.7 MG/DL (ref 0.5–1.4)
DIFFERENTIAL METHOD: ABNORMAL
EOSINOPHIL # BLD AUTO: ABNORMAL K/UL (ref 0–0.5)
EOSINOPHIL NFR BLD: 2 % (ref 0–8)
ERYTHROCYTE [DISTWIDTH] IN BLOOD BY AUTOMATED COUNT: 12.7 % (ref 11.5–14.5)
EST. GFR  (NO RACE VARIABLE): >60 ML/MIN/1.73 M^2
GLUCOSE SERPL-MCNC: 81 MG/DL (ref 70–110)
HCT VFR BLD AUTO: 40.3 % (ref 37–48.5)
HGB BLD-MCNC: 12.6 G/DL (ref 12–16)
IMM GRANULOCYTES # BLD AUTO: ABNORMAL K/UL (ref 0–0.04)
IMM GRANULOCYTES NFR BLD AUTO: ABNORMAL % (ref 0–0.5)
LYMPHOCYTES # BLD AUTO: ABNORMAL K/UL (ref 1–4.8)
LYMPHOCYTES NFR BLD: 15 % (ref 18–48)
MCH RBC QN AUTO: 30.1 PG (ref 27–31)
MCHC RBC AUTO-ENTMCNC: 31.3 G/DL (ref 32–36)
MCV RBC AUTO: 96 FL (ref 82–98)
MONOCYTES # BLD AUTO: ABNORMAL K/UL (ref 0.3–1)
MONOCYTES NFR BLD: 2 % (ref 4–15)
MYELOCYTES NFR BLD MANUAL: 1 %
NEUTROPHILS NFR BLD: 80 % (ref 38–73)
NRBC BLD-RTO: 0 /100 WBC
PLATELET # BLD AUTO: 339 K/UL (ref 150–450)
PLATELET BLD QL SMEAR: ABNORMAL
PMV BLD AUTO: 10.9 FL (ref 9.2–12.9)
POCT GLUCOSE: 86 MG/DL (ref 70–110)
POTASSIUM SERPL-SCNC: 4.4 MMOL/L (ref 3.5–5.1)
PROT SERPL-MCNC: 6.5 G/DL (ref 6–8.4)
RBC # BLD AUTO: 4.18 M/UL (ref 4–5.4)
SODIUM SERPL-SCNC: 139 MMOL/L (ref 136–145)
WBC # BLD AUTO: 13.6 K/UL (ref 3.9–12.7)

## 2023-12-08 PROCEDURE — 25000003 PHARM REV CODE 250: Performed by: STUDENT IN AN ORGANIZED HEALTH CARE EDUCATION/TRAINING PROGRAM

## 2023-12-08 PROCEDURE — 63600175 PHARM REV CODE 636 W HCPCS: Performed by: STUDENT IN AN ORGANIZED HEALTH CARE EDUCATION/TRAINING PROGRAM

## 2023-12-08 PROCEDURE — 80053 COMPREHEN METABOLIC PANEL: CPT | Performed by: STUDENT IN AN ORGANIZED HEALTH CARE EDUCATION/TRAINING PROGRAM

## 2023-12-08 PROCEDURE — 36415 COLL VENOUS BLD VENIPUNCTURE: CPT | Performed by: STUDENT IN AN ORGANIZED HEALTH CARE EDUCATION/TRAINING PROGRAM

## 2023-12-08 PROCEDURE — 21400001 HC TELEMETRY ROOM

## 2023-12-08 PROCEDURE — 85007 BL SMEAR W/DIFF WBC COUNT: CPT | Performed by: STUDENT IN AN ORGANIZED HEALTH CARE EDUCATION/TRAINING PROGRAM

## 2023-12-08 PROCEDURE — A4216 STERILE WATER/SALINE, 10 ML: HCPCS | Performed by: STUDENT IN AN ORGANIZED HEALTH CARE EDUCATION/TRAINING PROGRAM

## 2023-12-08 PROCEDURE — 85027 COMPLETE CBC AUTOMATED: CPT | Performed by: STUDENT IN AN ORGANIZED HEALTH CARE EDUCATION/TRAINING PROGRAM

## 2023-12-08 RX ORDER — SIMETHICONE 80 MG
80 TABLET,CHEWABLE ORAL 4 TIMES DAILY PRN
Refills: 0 | Status: ON HOLD
Start: 2023-12-08 | End: 2024-02-16 | Stop reason: HOSPADM

## 2023-12-08 RX ORDER — TALC
6 POWDER (GRAM) TOPICAL NIGHTLY PRN
Refills: 0
Start: 2023-12-08 | End: 2024-02-14

## 2023-12-08 RX ORDER — ACETAMINOPHEN 500 MG
1000 TABLET ORAL EVERY 8 HOURS PRN
Status: DISCONTINUED | OUTPATIENT
Start: 2023-12-08 | End: 2023-12-27 | Stop reason: HOSPADM

## 2023-12-08 RX ORDER — PANTOPRAZOLE SODIUM 20 MG/1
20 TABLET, DELAYED RELEASE ORAL DAILY
Qty: 30 TABLET | Refills: 11 | Status: ON HOLD
Start: 2023-12-08 | End: 2024-01-26 | Stop reason: HOSPADM

## 2023-12-08 RX ORDER — POLYETHYLENE GLYCOL 3350 17 G/17G
17 POWDER, FOR SOLUTION ORAL DAILY PRN
Refills: 0 | Status: ON HOLD
Start: 2023-12-08 | End: 2024-02-16 | Stop reason: HOSPADM

## 2023-12-08 RX ORDER — ACETAMINOPHEN 325 MG/1
650 TABLET ORAL EVERY 4 HOURS PRN
Refills: 0
Start: 2023-12-08 | End: 2024-02-14

## 2023-12-08 RX ORDER — CEFTRIAXONE 1 G/1
2 INJECTION, POWDER, FOR SOLUTION INTRAMUSCULAR; INTRAVENOUS DAILY
Qty: 20 G | Refills: 0
Start: 2023-12-08 | End: 2023-12-18

## 2023-12-08 RX ORDER — AMOXICILLIN 250 MG
1 CAPSULE ORAL 2 TIMES DAILY
Status: ON HOLD
Start: 2023-12-08 | End: 2024-01-26 | Stop reason: HOSPADM

## 2023-12-08 RX ADMIN — CETIRIZINE HYDROCHLORIDE 10 MG: 10 TABLET, FILM COATED ORAL at 09:12

## 2023-12-08 RX ADMIN — CARBIDOPA AND LEVODOPA 1 TABLET: 25; 100 TABLET ORAL at 09:12

## 2023-12-08 RX ADMIN — Medication 10 ML: at 06:12

## 2023-12-08 RX ADMIN — TAMSULOSIN HYDROCHLORIDE 0.4 MG: 0.4 CAPSULE ORAL at 09:12

## 2023-12-08 RX ADMIN — CARBIDOPA AND LEVODOPA 1 SPLIT TABLET: 25; 100 TABLET ORAL at 09:12

## 2023-12-08 RX ADMIN — Medication 10 ML: at 12:12

## 2023-12-08 RX ADMIN — MUPIROCIN: 20 OINTMENT TOPICAL at 09:12

## 2023-12-08 RX ADMIN — CARBIDOPA AND LEVODOPA 1 TABLET: 25; 100 TABLET ORAL at 02:12

## 2023-12-08 RX ADMIN — CEFTRIAXONE 1 G: 1 INJECTION, POWDER, FOR SOLUTION INTRAMUSCULAR; INTRAVENOUS at 08:12

## 2023-12-08 RX ADMIN — Medication 10 ML: at 01:12

## 2023-12-08 RX ADMIN — SENNOSIDES AND DOCUSATE SODIUM 1 TABLET: 8.6; 5 TABLET ORAL at 09:12

## 2023-12-08 RX ADMIN — CYANOCOBALAMIN TAB 1000 MCG 1000 MCG: 1000 TAB at 09:12

## 2023-12-08 RX ADMIN — THERA TABS 1 TABLET: TAB at 09:12

## 2023-12-08 RX ADMIN — ENOXAPARIN SODIUM 40 MG: 40 INJECTION SUBCUTANEOUS at 05:12

## 2023-12-08 RX ADMIN — ASPIRIN 81 MG: 81 TABLET, COATED ORAL at 09:12

## 2023-12-08 RX ADMIN — PANTOPRAZOLE SODIUM 20 MG: 20 TABLET, DELAYED RELEASE ORAL at 09:12

## 2023-12-08 NOTE — PHYSICIAN QUERY
PT Name: Annmarie SADLER Logiudikanchan  MR #: [id number]     DOCUMENTATION CLARIFICATION     CDS: Brandy Capley, RN  Email: [email or web address]    This form is a permanent document in the medical record.     Query Date: December 8, 2023    By submitting this query, we are merely seeking further clarification of documentation.  Please utilize your independent clinical judgment when addressing the question(s) below.  The Medical Record contains the following:  Indicators Supporting Clinical Findings Location in Medical Record   X HR         RR          BP        Temp Vital Signs (24h Range):  Temp:  [98.9 °F (37.2 °C)-99.8 °F (37.7 °C)] 99.8 °F (37.7 °C)  Pulse:  [70-85] 75  Resp:  [15-26] 15  SpO2:  [96 %-97 %] 97 %  BP: (106-130)/(56-68) 113/58    Vital Signs (24h Range):  Temp:  [97.5 °F (36.4 °C)-99.8 °F (37.7 °C)] 97.5 °F (36.4 °C)  Pulse:  [65-85] 81  Resp:  [14-26] 18  SpO2:  [95 %-98 %] 95 %  BP: (106-168)/(55-74) 142/67    Vital Signs (24h Range):  Temp:  [97.5 °F (36.4 °C)-100.4 °F (38 °C)] 98 °F (36.7 °C)  Pulse:  [66-82] 71  Resp:  [16-20] 16  SpO2:  [93 %-100 %] 96 %  BP: (105-142)/(56-76) 109/61    Vital Signs (24h Range):  Temp:  [97.2 °F (36.2 °C)-99 °F (37.2 °C)] 98.5 °F (36.9 °C)  Pulse:  [42-86] 69  Resp:  [16-22] 18  SpO2:  [94 %-100 %] 96 %  BP: ()/(55-75) 112/63    Vital Signs (24h Range):  Temp:  [98 °F (36.7 °C)-99.2 °F (37.3 °C)] 98.5 °F (36.9 °C)  Pulse:  [51-75] 74  Resp:  [18] 18  SpO2:  [93 %-97 %] 96 %  BP: (108-142)/(56-92) 134/70     H&P 12/1              Hospital medicine progress notes 12/2              Hospital medicine progress notes 12/3              Hospital medicine progress notes 12/4              Hospital medicine progress notes 12/5   X Lactic Acid          Procalcitonin  12/01/23 17:31 12/01/23 17:32   Lactate, Panfilo 1.5    Procalcitonin  0.19      Labs 12/1   X WBC           Bands          CRP  Patient febrile with leukocytosis overnight      12/01/23 17:31 12/03/23 05:11 12/04/23  05:20 12/05/23 04:43   WBC 12.86 (H) 17.99 (H) 14.84 (H) 9.89   Bands    1.0      12/01/23 17:31   .9 (H)      Hospital medicine progress notes 12/3    Labs 12/1-12/5            Labs 12/1   X Culture(s) No growth after 5 days     PROTEUS MIRABILIS Moderate   Specimen Type: Wound  Specimen Source: Sacrum   Blood cultures 12/1    Cultures 12/1   X AMS, Confusion, LOC, etc.  PMHx of parkinson's, lewy-body dementia   Due to her dementia and nonverbal status, she is unable to provide a history.    H&P 12/1    Organ Dysfunction/Failure     X Bacteremia or Sepsis / Septic Presents for evaluation of sacral decubitus ulcer. Purulent on exam. Mild leukocytosis, otherwise does not meet sepsis criteria. Will start Vancomycin and Ceftriaxone and consult General Surgery as she may need debridement.      Sepsis without acute organ dysfunction  Febrile with new leukocytosis on 12/3. Known infected wound.   H&P 12/1      Hospital medicine progress notes 12/3   X Known or Suspected Source of Infection documented Source: Skin and Soft Tissue (location sacral)  Hospital medicine progress notes 12/3    (Failed) Outpatient Treatment     X Medication continue vanc and ceftriaxone     mupirocin 2 % ointment  Freq: 2 times daily Route: Nasl  Start: 12/03/23 2100 End: 12/08/23 2059    lactated ringers bolus 1,000 mL  Dose: 1,000 mL  Freq: Once Route: IV  Start: 12/03/23 1715 End: 12/03/23 2055    lactated ringers bolus 500 mL  Dose: 500 mL  Freq: Once Route: IV  Start: 12/01/23 2115 End: 12/02/23 0054    piperacillin-tazobactam (ZOSYN) 4.5 g in dextrose 5 % in water (D5W) 100 mL IVPB (MB+)  Dose: 4.5 g  Freq: ED 1 Time Route: IV  Indications of Use: lower respiratory infection,Skin & soft tissue  Start: 12/01/23 1900 End: 12/01/23 2012    vancomycin 1,250 mg in dextrose 5 % (D5W) 250 mL IVPB (Vial-Mate)  Dose: 1,250 mg  Freq: Once Route: IV  Indications of Use: Skin & soft tissue  Start: 12/01/23 1730 End: 12/01/23  1932    cefTRIAXone (ROCEPHIN) 1 g in dextrose 5 % in water (D5W) 100 mL IVPB (MB+)  Dose: 1 g  Freq: Every 24 hours (non-standard times) Route: IV  Indications of Use: Skin & soft tissue  Start: 12/02/23 0600 End: 12/07/23 1041    vancomycin 750 mg in dextrose 5 % (D5W) 250 mL IVPB (Vial-Mate)  Dose: 15 mg/kg  Weight Dosing Info: 52.6 kg  Freq: Every 24 hours (non-standard times) Route: IV  Indications of Use: Skin & soft tissue  Start: 12/02/23 1800 End: 12/03/23 1613     [facility] medicine progress notes 12/2    MAR 12/1-12/7   X Treatment DATE: 12/3/23.  POSTOPERATIVE DIAGNOSIS:  Sacral decubitus ulcer.  PROCEDURE:  Excisional debridement of sacral decubitus ulcer including skin, subcutaneous tissue, muscle and fascia   Op note 12/3, filed 12/4    Other        Due to the conflicting clinical picture, please clinically validate the diagnosis of _Sepsis__.    If validated, please provide additional clinical support for the diagnosis.       [ x ] Above stated diagnosis is not confirmed and/or it has been ruled out     [   ] Above stated diagnosis is not confirmed and/or it has been ruled out, other diagnosis ruled in (please specify):  (  ) SIRS with infection but without Sepsis  (  ) Other (please specify): _______________     [   ] Above stated diagnosis is confirmed. Please specify clinical support (signs, symptoms, and treatment) for the confirmed diagnosis: ____________________   [   ] Other clarification (please specify): ___________________             Please document in your progress notes daily for the duration of treatment until resolved and include in your discharge summary.

## 2023-12-08 NOTE — ASSESSMENT & PLAN NOTE
Patient has hypernatremia which is controlled. The hypernatremia is due to Dehydration. The patient's sodium results have been reviewed and are listed below.  Recent Labs   Lab 12/08/23  0432

## 2023-12-08 NOTE — PLAN OF CARE
Problem: Adult Inpatient Plan of Care  Goal: Plan of Care Review  Outcome: Ongoing, Progressing  Goal: Absence of Hospital-Acquired Illness or Injury  Outcome: Ongoing, Progressing     Problem: Adult Inpatient Plan of Care  Goal: Plan of Care Review  Outcome: Ongoing, Progressing     Problem: Adult Inpatient Plan of Care  Goal: Absence of Hospital-Acquired Illness or Injury  Outcome: Ongoing, Progressing   Pt unable to express needs.  Pull at IV 's and Telemetry .  Sinus Rafael on Telemetry.  Sacral wound debrided.  Pad intact.  Safety maintained.  Bed in low position,  call  light in reach.

## 2023-12-08 NOTE — SUBJECTIVE & OBJECTIVE
Interval History:   No events overnight. No changes. Pending placement.       Review of Systems   Unable to perform ROS: Dementia     Objective:     Vital Signs (Most Recent):  Temp: 96.1 °F (35.6 °C) (12/07/23 1554)  Pulse: 80 (12/07/23 1554)  Resp: 16 (12/07/23 1554)  BP: 138/79 (12/07/23 1554)  SpO2: 98 % (12/07/23 1554) Vital Signs (24h Range):  Temp:  [96.1 °F (35.6 °C)-98.6 °F (37 °C)] 96.1 °F (35.6 °C)  Pulse:  [50-92] 80  Resp:  [16-18] 16  SpO2:  [93 %-98 %] 98 %  BP: (107-167)/(55-79) 138/79     Weight: 52.6 kg (115 lb 15.4 oz)  Body mass index is 20.54 kg/m².    Intake/Output Summary (Last 24 hours) at 12/7/2023 1834  Last data filed at 12/7/2023 1750  Gross per 24 hour   Intake 120 ml   Output 500 ml   Net -380 ml         Physical Exam  Vitals and nursing note reviewed.   Constitutional:       General: She is not in acute distress.     Appearance: She is well-developed. She is ill-appearing (chronically). She is not diaphoretic.   HENT:      Head: Normocephalic and atraumatic.   Eyes:      General: No scleral icterus.     Conjunctiva/sclera: Conjunctivae normal.   Neck:      Vascular: No JVD.   Cardiovascular:      Rate and Rhythm: Normal rate and regular rhythm.      Heart sounds: Normal heart sounds.   Pulmonary:      Effort: Pulmonary effort is normal. No respiratory distress.   Abdominal:      General: There is no distension.      Palpations: Abdomen is soft.      Tenderness: There is no abdominal tenderness.   Musculoskeletal:      Right lower leg: No edema.      Left lower leg: No edema.   Skin:     Coloration: Skin is pale. Skin is not jaundiced.      Findings: Lesion present.   Neurological:      Mental Status: She is alert.      Motor: No abnormal muscle tone.      Comments: Does not attempt to track or answer questions, does not follow commands, no tremor, UE rigidity   Psychiatric:      Comments: Unable to evaluate             Significant Labs: All pertinent labs within the past 24 hours have  been reviewed.  CBC:   Recent Labs   Lab 12/06/23  0604 12/07/23  0335   WBC 10.75 10.44   HGB 11.9* 10.2*   HCT 37.9 34.7*    147*     CMP:   Recent Labs   Lab 12/06/23  0604 12/07/23  0335    136   K 4.6 4.6    108   CO2 24 21*   GLU 99 111*   BUN 17 14   CREATININE 0.8 0.7   CALCIUM 8.8 8.2*   PROT 6.0 5.2*   ALBUMIN 2.3* 2.1*   BILITOT 0.3 0.3   ALKPHOS 62 52*   AST 18 22   ALT <5* <5*   ANIONGAP 9 7*       Significant Imaging: I have reviewed all pertinent imaging results/findings within the past 24 hours.

## 2023-12-08 NOTE — PLAN OF CARE
Per Dalila Hernandez, pt auth status is still pending Ref #[id number].    CM escalated to leadership.    KEYUR BenavidesN, BS, RN, CCM

## 2023-12-08 NOTE — SUBJECTIVE & OBJECTIVE
Interval History:   No events overnight. Patient with mild pain. Peer to peer for LTAC denied today.      Review of Systems  Objective:     Vital Signs (Most Recent):  Temp: 98 °F (36.7 °C) (12/08/23 1125)  Pulse: 77 (12/08/23 1517)  Resp: 16 (12/08/23 1125)  BP: (!) 116/59 (12/08/23 1125)  SpO2: (!) 93 % (12/08/23 1125) Vital Signs (24h Range):  Temp:  [96.1 °F (35.6 °C)-98.2 °F (36.8 °C)] 98 °F (36.7 °C)  Pulse:  [52-84] 77  Resp:  [16-17] 16  SpO2:  [92 %-98 %] 93 %  BP: (108-143)/(55-87) 116/59     Weight: 52.6 kg (115 lb 15.4 oz)  Body mass index is 20.54 kg/m².    Intake/Output Summary (Last 24 hours) at 12/8/2023 1518  Last data filed at 12/8/2023 0900  Gross per 24 hour   Intake 408.33 ml   Output --   Net 408.33 ml         Physical Exam  Vitals and nursing note reviewed.   Constitutional:       General: She is not in acute distress.     Appearance: She is well-developed. She is ill-appearing (chronically). She is not diaphoretic.   HENT:      Head: Normocephalic and atraumatic.   Eyes:      General: No scleral icterus.     Conjunctiva/sclera: Conjunctivae normal.   Neck:      Vascular: No JVD.   Cardiovascular:      Rate and Rhythm: Normal rate and regular rhythm.      Heart sounds: Normal heart sounds.   Pulmonary:      Effort: Pulmonary effort is normal. No respiratory distress.   Abdominal:      General: There is no distension.      Palpations: Abdomen is soft.      Tenderness: There is no abdominal tenderness.   Musculoskeletal:      Right lower leg: No edema.      Left lower leg: No edema.   Skin:     Coloration: Skin is pale. Skin is not jaundiced.      Findings: Lesion present.   Neurological:      Mental Status: She is alert.      Motor: No abnormal muscle tone.      Comments: Does not attempt to track or answer questions, does not follow commands, no tremor, UE rigidity   Psychiatric:      Comments: Unable to evaluate             Significant Labs: All pertinent labs within the past 24 hours have  been reviewed.  CBC:   Recent Labs   Lab 12/07/23  0335 12/08/23  0432   WBC 10.44 13.60*   HGB 10.2* 12.6   HCT 34.7* 40.3   * 339     CMP:   Recent Labs   Lab 12/07/23  0335 12/08/23 0432    139   K 4.6 4.4    103   CO2 21* 26   * 81   BUN 14 12   CREATININE 0.7 0.7   CALCIUM 8.2* 9.1   PROT 5.2* 6.5   ALBUMIN 2.1* 2.6*   BILITOT 0.3 0.4   ALKPHOS 52* 65   AST 22 22   ALT <5* <5*   ANIONGAP 7* 10       Significant Imaging: I have reviewed all pertinent imaging results/findings within the past 24 hours.

## 2023-12-08 NOTE — ASSESSMENT & PLAN NOTE
Due to Parkinson's and debility. Will continue dietary supplements as recommended by HUMBERTO during previous hospitalization.   - SLP consulted

## 2023-12-08 NOTE — PROGRESS NOTES
[facility] ([address] Medicine  Progress Note    Patient Name: Annmarie Gallardo  MRN: [id number]  Patient Class: IP- Inpatient   Admission Date: 12/1/2023  Length of Stay: 6 days  Attending Physician: Yuniel Hills MD  Primary Care Provider: Raymond Bess MD        Subjective:     Principal Problem:Pressure injury of sacral region, unstageable        HPI:  Annmarie Gallardo is a 78 y.o. with a PMHx of parkinson's, lewy-body dementia with debility/functional quadriplegia, HTN, GERD, CKD3, and sacral decubitus ulcer who presents today from her nursing home for evaluation of sacral decubitus ulcer.     Due to her dementia and nonverbal status, she is unable to provide a history. No family available at bedside during my exam.     Overview/Hospital Course:  Pt presenting with purulent drainage from sacral wound . S/p debridement 12/3 with general surgery. Patient febrile with leukocytosis overnight 12/3. Continue IV abx.     Interval History:   No events overnight. No changes. Pending placement.       Review of Systems   Unable to perform ROS: Dementia     Objective:     Vital Signs (Most Recent):  Temp: 96.1 °F (35.6 °C) (12/07/23 1554)  Pulse: 80 (12/07/23 1554)  Resp: 16 (12/07/23 1554)  BP: 138/79 (12/07/23 1554)  SpO2: 98 % (12/07/23 1554) Vital Signs (24h Range):  Temp:  [96.1 °F (35.6 °C)-98.6 °F (37 °C)] 96.1 °F (35.6 °C)  Pulse:  [50-92] 80  Resp:  [16-18] 16  SpO2:  [93 %-98 %] 98 %  BP: (107-167)/(55-79) 138/79     Weight: 52.6 kg (115 lb 15.4 oz)  Body mass index is 20.54 kg/m².    Intake/Output Summary (Last 24 hours) at 12/7/2023 1834  Last data filed at 12/7/2023 1750  Gross per 24 hour   Intake 120 ml   Output 500 ml   Net -380 ml         Physical Exam  Vitals and nursing note reviewed.   Constitutional:       General: She is not in acute distress.     Appearance: She is well-developed. She is ill-appearing (chronically). She is not diaphoretic.   HENT:      Head:  Normocephalic and atraumatic.   Eyes:      General: No scleral icterus.     Conjunctiva/sclera: Conjunctivae normal.   Neck:      Vascular: No JVD.   Cardiovascular:      Rate and Rhythm: Normal rate and regular rhythm.      Heart sounds: Normal heart sounds.   Pulmonary:      Effort: Pulmonary effort is normal. No respiratory distress.   Abdominal:      General: There is no distension.      Palpations: Abdomen is soft.      Tenderness: There is no abdominal tenderness.   Musculoskeletal:      Right lower leg: No edema.      Left lower leg: No edema.   Skin:     Coloration: Skin is pale. Skin is not jaundiced.      Findings: Lesion present.   Neurological:      Mental Status: She is alert.      Motor: No abnormal muscle tone.      Comments: Does not attempt to track or answer questions, does not follow commands, no tremor, UE rigidity   Psychiatric:      Comments: Unable to evaluate             Significant Labs: All pertinent labs within the past 24 hours have been reviewed.  CBC:   Recent Labs   Lab 12/06/23  0604 12/07/23  0335   WBC 10.75 10.44   HGB 11.9* 10.2*   HCT 37.9 34.7*    147*     CMP:   Recent Labs   Lab 12/06/23  0604 12/07/23  0335    136   K 4.6 4.6    108   CO2 24 21*   GLU 99 111*   BUN 17 14   CREATININE 0.8 0.7   CALCIUM 8.8 8.2*   PROT 6.0 5.2*   ALBUMIN 2.3* 2.1*   BILITOT 0.3 0.3   ALKPHOS 62 52*   AST 18 22   ALT <5* <5*   ANIONGAP 9 7*       Significant Imaging: I have reviewed all pertinent imaging results/findings within the past 24 hours.    Assessment/Plan:      * Pressure injury of sacral region, unstageable  Presents for evaluation of sacral decubitus ulcer. Purulent on exam. Mild leukocytosis, otherwise does not meet sepsis criteria.   - Wound culture with P mirabilis  - Continue ceftriaxone (sot 12/1)  - General surgery consulted  -- s/p debridement 12/3  - Wound care consulted for recs for discharge    Sepsis without acute organ dysfunction  Febrile with new  leukocytosis on 12/3. Known infected wound.    This patient does have evidence of infective focus  My overall impression is sepsis.  Source: Skin and Soft Tissue (location sacral)  Antibiotics given-   Antibiotics (72h ago, onward)      Start     Stop Route Frequency Ordered    12/03/23 2100  mupirocin 2 % ointment         12/08/23 2059 Nasl 2 times daily 12/03/23 1735    12/02/23 0600  cefTRIAXone (ROCEPHIN) 1 g in dextrose 5 % in water (D5W) 100 mL IVPB (MB+)         -- IV Every 24 hours (non-standard times) 12/01/23 2018            Fluid challenge Not needed - patient is not hypotensive      Source control achieved by: to OR 12/3 for debridement       CKD (chronic kidney disease), stage III  Creatine stable for now. BMP reviewed- noted Estimated Creatinine Clearance: 53.9 mL/min (based on SCr of 0.7 mg/dL). according to latest data. Based on current GFR, CKD stage is stage 3 - GFR 30-59.       - Monitor UOP and serial BMP and adjust therapy as needed.    - Renally dose meds.    - Avoid nephrotoxic medications and procedures.      Macrocytic anemia  Chronic, and likely anemia of chronic disease. Currently without indication for transfusion.     Severe protein-calorie malnutrition  Due to Parkinson's and debility. Will continue dietary supplements as recommended by RD during previous hospitalization.   - SLP      Hypernatremia  Patient has hypernatremia which is controlled. The hypernatremia is due to Dehydration. The patient's sodium results have been reviewed and are listed below.  Recent Labs   Lab 12/04/23  0520              Functional quadriplegia  Due to Parkinsons' and dementia. Continue frequent turns and pressure ulcer care/prevention.       DNR (do not resuscitate)  Per previous documentation and discussions with patient's family. Clarified with daughter Yamileth at bedside (shares POA with other daughter) - patient is DNR. Palliative consulted       Severe Lewy body dementia  Monitor with delirium  precautions.   - palliative care consulted    Parkinson's disease  Continue home Sinemet.        VTE Risk Mitigation (From admission, onward)           Ordered     enoxaparin injection 40 mg  Daily         12/01/23 2123     IP VTE HIGH RISK PATIENT  Once         12/01/23 2123     Place sequential compression device  Until discontinued         12/01/23 2123     Reason for No Pharmacological VTE Prophylaxis  Once        Question:  Reasons:  Answer:  Physician Provided (leave comment)    12/01/23 2123                    Discharge Planning   MELCHOR: 12/8/2023     Code Status: DNR   Is the patient medically ready for discharge?: Yes    Reason for patient still in hospital (select all that apply): Patient trending condition, Treatment, and Pending disposition  Discharge Plan A: Return to nursing home, Hospice/home   Discharge Delays: None known at this time              Yuniel Hills MD  Department of Hospital Medicine   [facility] - Intensive Care ([place]-[facility])

## 2023-12-08 NOTE — PROGRESS NOTES
[facility] ([address] Medicine  Progress Note    Patient Name: Annmarie Gallardo  MRN: [id number]  Patient Class: IP- Inpatient   Admission Date: 12/1/2023  Length of Stay: 7 days  Attending Physician: Yuniel Hills MD  Primary Care Provider: Raymond Bess MD        Subjective:     Principal Problem:Pressure injury of sacral region, unstageable        HPI:  Annmarie Gallardo is a 78 y.o. with a PMHx of parkinson's, lewy-body dementia with debility/functional quadriplegia, HTN, GERD, CKD3, and sacral decubitus ulcer who presents today from her nursing home for evaluation of sacral decubitus ulcer.     Due to her dementia and nonverbal status, she is unable to provide a history. No family available at bedside during my exam.     Overview/Hospital Course:  Pt presenting with purulent drainage from sacral wound . S/p debridement 12/3 with general surgery. Patient febrile with leukocytosis overnight 12/3. Continue IV abx.     Interval History:   No events overnight. Patient with mild pain. Peer to peer for LTAC denied today.      Review of Systems  Objective:     Vital Signs (Most Recent):  Temp: 98 °F (36.7 °C) (12/08/23 1125)  Pulse: 77 (12/08/23 1517)  Resp: 16 (12/08/23 1125)  BP: (!) 116/59 (12/08/23 1125)  SpO2: (!) 93 % (12/08/23 1125) Vital Signs (24h Range):  Temp:  [96.1 °F (35.6 °C)-98.2 °F (36.8 °C)] 98 °F (36.7 °C)  Pulse:  [52-84] 77  Resp:  [16-17] 16  SpO2:  [92 %-98 %] 93 %  BP: (108-143)/(55-87) 116/59     Weight: 52.6 kg (115 lb 15.4 oz)  Body mass index is 20.54 kg/m².    Intake/Output Summary (Last 24 hours) at 12/8/2023 1518  Last data filed at 12/8/2023 0900  Gross per 24 hour   Intake 408.33 ml   Output --   Net 408.33 ml         Physical Exam  Vitals and nursing note reviewed.   Constitutional:       General: She is not in acute distress.     Appearance: She is well-developed. She is ill-appearing (chronically). She is not diaphoretic.   HENT:      Head:  Normocephalic and atraumatic.   Eyes:      General: No scleral icterus.     Conjunctiva/sclera: Conjunctivae normal.   Neck:      Vascular: No JVD.   Cardiovascular:      Rate and Rhythm: Normal rate and regular rhythm.      Heart sounds: Normal heart sounds.   Pulmonary:      Effort: Pulmonary effort is normal. No respiratory distress.   Abdominal:      General: There is no distension.      Palpations: Abdomen is soft.      Tenderness: There is no abdominal tenderness.   Musculoskeletal:      Right lower leg: No edema.      Left lower leg: No edema.   Skin:     Coloration: Skin is pale. Skin is not jaundiced.      Findings: Lesion present.   Neurological:      Mental Status: She is alert.      Motor: No abnormal muscle tone.      Comments: Does not attempt to track or answer questions, does not follow commands, no tremor, UE rigidity   Psychiatric:      Comments: Unable to evaluate             Significant Labs: All pertinent labs within the past 24 hours have been reviewed.  CBC:   Recent Labs   Lab 12/07/23  0335 12/08/23  0432   WBC 10.44 13.60*   HGB 10.2* 12.6   HCT 34.7* 40.3   * 339     CMP:   Recent Labs   Lab 12/07/23  0335 12/08/23  0432    139   K 4.6 4.4    103   CO2 21* 26   * 81   BUN 14 12   CREATININE 0.7 0.7   CALCIUM 8.2* 9.1   PROT 5.2* 6.5   ALBUMIN 2.1* 2.6*   BILITOT 0.3 0.4   ALKPHOS 52* 65   AST 22 22   ALT <5* <5*   ANIONGAP 7* 10       Significant Imaging: I have reviewed all pertinent imaging results/findings within the past 24 hours.    Assessment/Plan:      * Pressure injury of sacral region, unstageable  Presents for evaluation of sacral decubitus ulcer. Purulent on exam. Mild leukocytosis, otherwise does not meet sepsis criteria.   - Wound culture with P mirabilis  - Continue ceftriaxone 2g daily x10 days (sot 12/3)  - General surgery consulted  -- s/p debridement 12/3  - Wound care consulted for recs for discharge wound care    Sepsis without acute organ  dysfunction  Febrile with new leukocytosis on 12/3. Known infected wound.    This patient does have evidence of infective focus  My overall impression is sepsis.  Source: Skin and Soft Tissue (location sacral)  Antibiotics given-   Antibiotics (72h ago, onward)      Start     Stop Route Frequency Ordered    12/09/23 0600  cefTRIAXone (ROCEPHIN) 2 g in dextrose 5 % in water (D5W) 100 mL IVPB (MB+)         -- IV Every 24 hours (non-standard times) 12/08/23 1520    12/03/23 2100  mupirocin 2 % ointment         12/08/23 2059 Nasl 2 times daily 12/03/23 1735            Fluid challenge Not needed - patient is not hypotensive      Source control achieved by: to OR 12/3 for debridement       CKD (chronic kidney disease), stage III  Creatine stable for now. BMP reviewed- noted Estimated Creatinine Clearance: 53.9 mL/min (based on SCr of 0.7 mg/dL). according to latest data. Based on current GFR, CKD stage is stage 3 - GFR 30-59.       - Monitor UOP and serial BMP and adjust therapy as needed.    - Renally dose meds.    - Avoid nephrotoxic medications and procedures.      Stage IV pressure ulcer of sacral region  - See above      Macrocytic anemia  Chronic, and likely anemia of chronic disease. Currently without indication for transfusion.     Severe protein-calorie malnutrition  Due to Parkinson's and debility. Will continue dietary supplements as recommended by RD during previous hospitalization.   - SLP consulted      Hypernatremia  Patient has hypernatremia which is controlled. The hypernatremia is due to Dehydration. The patient's sodium results have been reviewed and are listed below.  Recent Labs   Lab 12/08/23  0432              Functional quadriplegia  Due to Parkinsons' and dementia. Continue frequent turns and pressure ulcer care/prevention.       DNR (do not resuscitate)  Per previous documentation and discussions with patient's family. Clarified with daughter Yamileth at bedside (shares POA with other  daughter) - patient is DNR.       Severe Lewy body dementia  Monitor with delirium precautions.     Parkinson's disease  Continue home Sinemet.        VTE Risk Mitigation (From admission, onward)           Ordered     enoxaparin injection 40 mg  Daily         12/01/23 2123     IP VTE HIGH RISK PATIENT  Once         12/01/23 2123     Place sequential compression device  Until discontinued         12/01/23 2123     Reason for No Pharmacological VTE Prophylaxis  Once        Question:  Reasons:  Answer:  Physician Provided (leave comment)    12/01/23 2123                    Discharge Planning   MELCHOR: 12/8/2023     Code Status: DNR   Is the patient medically ready for discharge?: Yes    Reason for patient still in hospital (select all that apply): Patient trending condition, Laboratory test, Treatment, and Pending disposition  Discharge Plan A: Long-term acute care facility (LTAC)   Discharge Delays: (!) Post-Acute Set-up              Yuniel Hills MD  Department of Hospital Medicine   [facility] - Intensive Care ([place]-[facility])

## 2023-12-08 NOTE — PLAN OF CARE
[facility]    FACILITY TRANSFER ORDERS      Patient Name: Annmarie SADLER Logiudice  YOB: 1945    PCP: Raymond Bess MD   PCP Address: [address] [place] [address] [address] / [facility]  PCP Phone Number: [phone]  PCP Fax: [phone]    Encounter Date: 12/08/2023    Admit to: LTACH    Vital Signs:  Routine    Diagnoses:   Active Hospital Problems    Diagnosis  POA    *Pressure injury of sacral region, unstageable [L89.150]  Yes     Priority: 1 - High    Sepsis without acute organ dysfunction [A41.9]  No     Priority: 2     CKD (chronic kidney disease), stage III [N18.30]  Yes     Priority: 3      Chronic    Stage IV pressure ulcer of sacral region [L89.154]  Yes    DNR (do not resuscitate) [Z66]  Yes     Chronic    Functional quadriplegia [R53.2]  Yes     Chronic    Hypernatremia [E87.0]  Yes    Severe protein-calorie malnutrition [E43]  Yes     Chronic    Macrocytic anemia [D53.9]  Yes     Chronic    Parkinson's disease [G20.A1]  Yes     Chronic    Severe Lewy body dementia [G31.83, F02.C0]  Yes     Chronic      Resolved Hospital Problems   No resolved problems to display.       Allergies:  Review of patient's allergies indicates:   Allergen Reactions    Diamox sequels [acetazolamide]     Garlic     Namenda [memantine]     Oxybutynin        Diet: Minced and Moist    Activities: Bathroom privileges with assistance    Goals of Care Treatment Preferences:  Code Status: DNR      Nursing: Per facility     Labs: CBC and BMP  Twice weekly for two weeks      CONSULTS:    Speech Therapy to evaluate and treat for Swallowing. and  to evaluate for community resources/long-range planning.    MISCELLANEOUS CARE:  Routine Skin for Bedridden Patients: Apply moisture barrier cream to all skin folds and wet areas in perineal area daily and after baths and all bowel movements.    WOUND CARE ORDERS  Yes: Pressure Ulcer(s) Stage IV:  Location: Buttocks    Consult ET nurse        Apply the  following to wound:    - Cleanse wound with hypochlorous acid (Vashe)  - Pat dry.  - Apply hypochlorous acid soaked gauze (Vashe wet to moist) to wound bed  - Cover with an island border.   - Apply this Daily.    Medications: Review discharge medications with patient and family and provide education.      Current Discharge Medication List        START taking these medications    Details   acetaminophen (TYLENOL) 325 MG tablet Take 2 tablets (650 mg total) by mouth every 4 (four) hours as needed for Pain or Temperature greater than (101).  Refills: 0      cefTRIAXone (ROCEPHIN) 1 gram injection Inject 2 g into the vein once daily. for 10 days  Qty: 20 g, Refills: 0      melatonin (MELATIN) 3 mg tablet Take 2 tablets (6 mg total) by mouth nightly as needed for Insomnia.  Refills: 0      multivitamin Tab Take 1 tablet by mouth once daily.      pantoprazole (PROTONIX) 20 MG tablet Take 1 tablet (20 mg total) by mouth once daily.  Qty: 30 tablet, Refills: 11      polyethylene glycol (GLYCOLAX) 17 gram PwPk Take 17 g by mouth daily as needed for Constipation.  Refills: 0      senna-docusate 8.6-50 mg (PERICOLACE) 8.6-50 mg per tablet Take 1 tablet by mouth 2 (two) times daily.      simethicone (MYLICON) 80 MG chewable tablet Take 1 tablet (80 mg total) by mouth 4 (four) times daily as needed for Flatulence.  Refills: 0           CONTINUE these medications which have NOT CHANGED    Details   aspirin (ECOTRIN) 81 MG EC tablet Take 81 mg by mouth once daily.      carbidopa-levodopa  mg (SINEMET)  mg per tablet Take 1 tablet by mouth 3 (three) times daily.      cyanocobalamin (VITAMIN B-12) 1000 MCG tablet Take 100 mcg by mouth once daily.      loratadine (CLARITIN) 10 mg tablet Take 10 mg by mouth once daily.      tamsulosin (FLOMAX) 0.4 mg Cap Take 1 capsule (0.4 mg total) by mouth once daily.  Qty: 30 capsule, Refills: 2      vitamin E 1000 UNIT capsule Take 1,000 Units by mouth 2 (two) times daily.            STOP taking these medications       LISINOPRIL ORAL Comments:   Reason for Stopping:         OMEPRAZOLE ORAL Comments:   Reason for Stopping:                  Immunizations Administered as of 12/8/2023       No immunizations on file.            This patient has had both covid vaccinations    Some patients may experience side effects after vaccination.  These may include fever, headache, muscle or joint aches.  Most symptoms resolve with 24-48 hours and do not require urgent medical evaluation unless they persist for more than 72 hours or symptoms are concerning for an unrelated medical condition.          _________________________________  Yuniel Hills MD  12/08/2023

## 2023-12-08 NOTE — PLAN OF CARE
[facility] ([facility])  Discharge Reassessment    Primary Care Provider: Raymond Bess MD    Expected Discharge Date: 12/8/2023    Reassessment (most recent)       Discharge Reassessment - 12/08/23 0918          Discharge Reassessment    Assessment Type Discharge Planning Reassessment (P)      Did the patient's condition or plan change since previous assessment? No (P)      Discharge Plan discussed with: Adult children (P)      Communicated MELCHOR with patient/caregiver Date not available/Unable to determine (P)      Discharge Plan A Long-term acute care facility (LTAC) (P)      Discharge Plan B Return to Nursing Home (P)      DME Needed Upon Discharge  none (P)      Transition of Care Barriers None (P)      Why the patient remains in the hospital Placement issues;Insurance issues (P)         Post-Acute Status    Post-Acute Authorization Placement (P)      Post-Acute Placement Status Pending payor review/awaiting authorization (if required) (P)      Discharge Delays Post-Acute Set-up (P)                  DAYAN spoke with pt's dtr Yamileth Logiudice [phone].  Instructed that LTAC auth is still pending; has been escalated to leadership, we hope to get answer today.  CG states she wants to flip pt's insurance to straight Medicare.  Instructed that doing this could result in jeopardizing auth request if she does it today.  CG v/u, states she will wait until auth approved or denied.  CM to follow.    11:04 AM  CM called [employer] [facility] [phone] to schedule P2P.  LVM requesting call back.  MD info: Dr. Hills [phone] around noon.    12:32 PM  DAYAN spoke with Earnestine at [employer] [facility], she scheduled P2P for 3PM with Dr. Vincent.  Dr. Brice notified.    3:55 PM  Per Dr. Brice, LTAC LOC denial was upheld d/t pt's wound care doesn't need hospital LOC.  Dtr Yamileth notified.  She states she will try to get pt's insurance flipped over to straight [employer].    4:16 PM  CM called Yamileth, spoke with Yamileth and  her sister Dayna on conference call.  Explained that pt could get wound care at SNF LOC, we need to move ahead with a plan by sending out SNF referrals.  It is always their choice to accept or decline.  They agree for CM to send out SNF referrals.  In the meantime, they will call pt's [employer] HMO, get instruction for how to proceed with switching pt's insurance over to [employer].  CM explained that pt is listed as medically ready for d/c, she can't stay here until insurance is flipped over to [employer], we have to proceed with the insurance we have now.  CG v/u.    CG agrees for CM to send out SNF referrals, declines list of in-network facilities.    CM submitted for SNF [employer] auth ref # [id number].  Auth pended.  CM sent out blast referrals to SNFs via CP.    KEYUR BenavidesN, BS, RN, CCM

## 2023-12-08 NOTE — ASSESSMENT & PLAN NOTE
Febrile with new leukocytosis on 12/3. Known infected wound.    This patient does have evidence of infective focus  My overall impression is sepsis.  Source: Skin and Soft Tissue (location sacral)  Antibiotics given-   Antibiotics (72h ago, onward)    Start     Stop Route Frequency Ordered    12/09/23 0600  cefTRIAXone (ROCEPHIN) 2 g in dextrose 5 % in water (D5W) 100 mL IVPB (MB+)         -- IV Every 24 hours (non-standard times) 12/08/23 1520    12/03/23 2100  mupirocin 2 % ointment         12/08/23 2059 Nasl 2 times daily 12/03/23 1735          Fluid challenge Not needed - patient is not hypotensive      Source control achieved by: to OR 12/3 for debridement

## 2023-12-08 NOTE — ASSESSMENT & PLAN NOTE
Presents for evaluation of sacral decubitus ulcer. Purulent on exam. Mild leukocytosis, otherwise does not meet sepsis criteria.   - Wound culture with P mirabilis  - Continue ceftriaxone 2g daily x10 days (sot 12/3)  - General surgery consulted  -- s/p debridement 12/3  - Wound care consulted for recs for discharge wound care

## 2023-12-09 LAB
ALBUMIN SERPL BCP-MCNC: 2.3 G/DL (ref 3.5–5.2)
ALP SERPL-CCNC: 64 U/L (ref 55–135)
ALT SERPL W/O P-5'-P-CCNC: <5 U/L (ref 10–44)
ANION GAP SERPL CALC-SCNC: 10 MMOL/L (ref 8–16)
ANISOCYTOSIS BLD QL SMEAR: SLIGHT
AST SERPL-CCNC: 18 U/L (ref 10–40)
BASOPHILS # BLD AUTO: ABNORMAL K/UL (ref 0–0.2)
BASOPHILS NFR BLD: 0 % (ref 0–1.9)
BILIRUB SERPL-MCNC: 0.2 MG/DL (ref 0.1–1)
BUN SERPL-MCNC: 17 MG/DL (ref 8–23)
CALCIUM SERPL-MCNC: 8.8 MG/DL (ref 8.7–10.5)
CHLORIDE SERPL-SCNC: 103 MMOL/L (ref 95–110)
CO2 SERPL-SCNC: 24 MMOL/L (ref 23–29)
CREAT SERPL-MCNC: 0.7 MG/DL (ref 0.5–1.4)
DIFFERENTIAL METHOD: ABNORMAL
EOSINOPHIL # BLD AUTO: ABNORMAL K/UL (ref 0–0.5)
EOSINOPHIL NFR BLD: 1 % (ref 0–8)
ERYTHROCYTE [DISTWIDTH] IN BLOOD BY AUTOMATED COUNT: 12.7 % (ref 11.5–14.5)
EST. GFR  (NO RACE VARIABLE): >60 ML/MIN/1.73 M^2
GLUCOSE SERPL-MCNC: 90 MG/DL (ref 70–110)
HCT VFR BLD AUTO: 35.7 % (ref 37–48.5)
HGB BLD-MCNC: 11.5 G/DL (ref 12–16)
HYPOCHROMIA BLD QL SMEAR: ABNORMAL
IMM GRANULOCYTES # BLD AUTO: ABNORMAL K/UL (ref 0–0.04)
IMM GRANULOCYTES NFR BLD AUTO: ABNORMAL % (ref 0–0.5)
LYMPHOCYTES # BLD AUTO: ABNORMAL K/UL (ref 1–4.8)
LYMPHOCYTES NFR BLD: 7 % (ref 18–48)
MCH RBC QN AUTO: 30.3 PG (ref 27–31)
MCHC RBC AUTO-ENTMCNC: 32.2 G/DL (ref 32–36)
MCV RBC AUTO: 94 FL (ref 82–98)
METAMYELOCYTES NFR BLD MANUAL: 3 %
MONOCYTES # BLD AUTO: ABNORMAL K/UL (ref 0.3–1)
MONOCYTES NFR BLD: 5 % (ref 4–15)
MYELOCYTES NFR BLD MANUAL: 2 %
NEUTROPHILS NFR BLD: 80 % (ref 38–73)
NEUTS BAND NFR BLD MANUAL: 2 %
NRBC BLD-RTO: 0 /100 WBC
OVALOCYTES BLD QL SMEAR: ABNORMAL
PLATELET # BLD AUTO: 317 K/UL (ref 150–450)
PLATELET BLD QL SMEAR: ABNORMAL
PMV BLD AUTO: 10.3 FL (ref 9.2–12.9)
POIKILOCYTOSIS BLD QL SMEAR: SLIGHT
POLYCHROMASIA BLD QL SMEAR: ABNORMAL
POTASSIUM SERPL-SCNC: 4.8 MMOL/L (ref 3.5–5.1)
PROT SERPL-MCNC: 5.6 G/DL (ref 6–8.4)
RBC # BLD AUTO: 3.8 M/UL (ref 4–5.4)
SODIUM SERPL-SCNC: 137 MMOL/L (ref 136–145)
SPHEROCYTES BLD QL SMEAR: ABNORMAL
WBC # BLD AUTO: 13.66 K/UL (ref 3.9–12.7)

## 2023-12-09 PROCEDURE — 85027 COMPLETE CBC AUTOMATED: CPT | Performed by: STUDENT IN AN ORGANIZED HEALTH CARE EDUCATION/TRAINING PROGRAM

## 2023-12-09 PROCEDURE — 25000003 PHARM REV CODE 250: Performed by: STUDENT IN AN ORGANIZED HEALTH CARE EDUCATION/TRAINING PROGRAM

## 2023-12-09 PROCEDURE — 94761 N-INVAS EAR/PLS OXIMETRY MLT: CPT

## 2023-12-09 PROCEDURE — 21400001 HC TELEMETRY ROOM

## 2023-12-09 PROCEDURE — 63600175 PHARM REV CODE 636 W HCPCS: Performed by: STUDENT IN AN ORGANIZED HEALTH CARE EDUCATION/TRAINING PROGRAM

## 2023-12-09 PROCEDURE — 80053 COMPREHEN METABOLIC PANEL: CPT | Performed by: STUDENT IN AN ORGANIZED HEALTH CARE EDUCATION/TRAINING PROGRAM

## 2023-12-09 PROCEDURE — 36415 COLL VENOUS BLD VENIPUNCTURE: CPT | Performed by: STUDENT IN AN ORGANIZED HEALTH CARE EDUCATION/TRAINING PROGRAM

## 2023-12-09 PROCEDURE — A4216 STERILE WATER/SALINE, 10 ML: HCPCS | Performed by: STUDENT IN AN ORGANIZED HEALTH CARE EDUCATION/TRAINING PROGRAM

## 2023-12-09 PROCEDURE — 85007 BL SMEAR W/DIFF WBC COUNT: CPT | Performed by: STUDENT IN AN ORGANIZED HEALTH CARE EDUCATION/TRAINING PROGRAM

## 2023-12-09 PROCEDURE — 51798 US URINE CAPACITY MEASURE: CPT

## 2023-12-09 RX ORDER — ENOXAPARIN SODIUM 100 MG/ML
30 INJECTION SUBCUTANEOUS EVERY 24 HOURS
Status: DISCONTINUED | OUTPATIENT
Start: 2023-12-09 | End: 2023-12-27 | Stop reason: HOSPADM

## 2023-12-09 RX ADMIN — ENOXAPARIN SODIUM 30 MG: 30 INJECTION SUBCUTANEOUS at 04:12

## 2023-12-09 RX ADMIN — SENNOSIDES AND DOCUSATE SODIUM 1 TABLET: 8.6; 5 TABLET ORAL at 10:12

## 2023-12-09 RX ADMIN — Medication 10 ML: at 06:12

## 2023-12-09 RX ADMIN — CETIRIZINE HYDROCHLORIDE 10 MG: 10 TABLET, FILM COATED ORAL at 10:12

## 2023-12-09 RX ADMIN — CARBIDOPA AND LEVODOPA 1 TABLET: 25; 100 TABLET ORAL at 09:12

## 2023-12-09 RX ADMIN — THERA TABS 1 TABLET: TAB at 10:12

## 2023-12-09 RX ADMIN — Medication 10 ML: at 12:12

## 2023-12-09 RX ADMIN — CEFTRIAXONE 2 G: 2 INJECTION, POWDER, FOR SOLUTION INTRAMUSCULAR; INTRAVENOUS at 06:12

## 2023-12-09 RX ADMIN — PANTOPRAZOLE SODIUM 20 MG: 20 TABLET, DELAYED RELEASE ORAL at 10:12

## 2023-12-09 RX ADMIN — CARBIDOPA AND LEVODOPA 1 TABLET: 25; 100 TABLET ORAL at 10:12

## 2023-12-09 RX ADMIN — TAMSULOSIN HYDROCHLORIDE 0.4 MG: 0.4 CAPSULE ORAL at 10:12

## 2023-12-09 RX ADMIN — CARBIDOPA AND LEVODOPA 1 TABLET: 25; 100 TABLET ORAL at 04:12

## 2023-12-09 RX ADMIN — ASPIRIN 81 MG: 81 TABLET, COATED ORAL at 10:12

## 2023-12-09 RX ADMIN — SENNOSIDES AND DOCUSATE SODIUM 1 TABLET: 8.6; 5 TABLET ORAL at 09:12

## 2023-12-09 RX ADMIN — CARBIDOPA AND LEVODOPA 1 SPLIT TABLET: 25; 100 TABLET ORAL at 09:12

## 2023-12-09 RX ADMIN — CYANOCOBALAMIN TAB 1000 MCG 1000 MCG: 1000 TAB at 10:12

## 2023-12-09 NOTE — SUBJECTIVE & OBJECTIVE
Interval History:   No events overnight. No change in clinical status. Awaiting placement.       Review of Systems   Unable to perform ROS: Dementia     Objective:     Vital Signs (Most Recent):  Temp: 98 °F (36.7 °C) (12/09/23 0600)  Pulse: (!) 57 (12/09/23 0600)  Resp: 18 (12/09/23 0600)  BP: 100/74 (12/09/23 0600)  SpO2: 100 % (12/09/23 0600) Vital Signs (24h Range):  Temp:  [97.9 °F (36.6 °C)-98.6 °F (37 °C)] 98 °F (36.7 °C)  Pulse:  [57-84] 57  Resp:  [16-18] 18  SpO2:  [93 %-100 %] 100 %  BP: ()/(54-74) 100/74     Weight: 52.6 kg (115 lb 15.4 oz)  Body mass index is 20.54 kg/m².    Intake/Output Summary (Last 24 hours) at 12/9/2023 0754  Last data filed at 12/9/2023 0600  Gross per 24 hour   Intake 408.33 ml   Output 450 ml   Net -41.67 ml         Physical Exam  Vitals and nursing note reviewed.   Constitutional:       General: She is not in acute distress.     Appearance: She is well-developed. She is ill-appearing (chronically). She is not diaphoretic.   HENT:      Head: Normocephalic and atraumatic.   Eyes:      General: No scleral icterus.     Conjunctiva/sclera: Conjunctivae normal.   Neck:      Vascular: No JVD.   Cardiovascular:      Rate and Rhythm: Normal rate and regular rhythm.      Heart sounds: Normal heart sounds.   Pulmonary:      Effort: Pulmonary effort is normal. No respiratory distress.   Abdominal:      General: There is no distension.      Palpations: Abdomen is soft.      Tenderness: There is no abdominal tenderness.   Musculoskeletal:      Right lower leg: No edema.      Left lower leg: No edema.   Skin:     Coloration: Skin is pale. Skin is not jaundiced.      Findings: Lesion present.   Neurological:      Mental Status: She is alert.      Motor: No abnormal muscle tone.      Comments: Does not attempt to track or answer questions, does not follow commands, no tremor, UE rigidity   Psychiatric:      Comments: Unable to evaluate             Significant Labs: All pertinent labs  within the past 24 hours have been reviewed.  CBC:   Recent Labs   Lab 12/08/23  0432 12/09/23  0406   WBC 13.60* 13.66*   HGB 12.6 11.5*   HCT 40.3 35.7*    317     CMP:   Recent Labs   Lab 12/08/23  0432 12/09/23  0406    137   K 4.4 4.8    103   CO2 26 24   GLU 81 90   BUN 12 17   CREATININE 0.7 0.7   CALCIUM 9.1 8.8   PROT 6.5 5.6*   ALBUMIN 2.6* 2.3*   BILITOT 0.4 0.2   ALKPHOS 65 64   AST 22 18   ALT <5* <5*   ANIONGAP 10 10       Significant Imaging: I have reviewed all pertinent imaging results/findings within the past 24 hours.

## 2023-12-09 NOTE — PROGRESS NOTES
[facility] - [facility] ([address] Medicine  Progress Note    Patient Name: Annmarie Gallardo  MRN: [id number]  Patient Class: IP- Inpatient   Admission Date: 12/1/2023  Length of Stay: 8 days  Attending Physician: Yuniel Hills MD  Primary Care Provider: Raymond Bess MD        Subjective:     Principal Problem:Pressure injury of sacral region, unstageable        HPI:  Annmarie Gallardo is a 78 y.o. with a PMHx of parkinson's, lewy-body dementia with debility/functional quadriplegia, HTN, GERD, CKD3, and sacral decubitus ulcer who presents today from her nursing home for evaluation of sacral decubitus ulcer.     Due to her dementia and nonverbal status, she is unable to provide a history. No family available at bedside during my exam.     Overview/Hospital Course:  Pt presenting with purulent drainage from sacral wound . S/p debridement 12/3 with general surgery. Patient febrile with leukocytosis overnight 12/3. Continue IV abx.     Interval History:   No events overnight. No change in clinical status. Awaiting placement.       Review of Systems   Unable to perform ROS: Dementia     Objective:     Vital Signs (Most Recent):  Temp: 98 °F (36.7 °C) (12/09/23 0600)  Pulse: (!) 57 (12/09/23 0600)  Resp: 18 (12/09/23 0600)  BP: 100/74 (12/09/23 0600)  SpO2: 100 % (12/09/23 0600) Vital Signs (24h Range):  Temp:  [97.9 °F (36.6 °C)-98.6 °F (37 °C)] 98 °F (36.7 °C)  Pulse:  [57-84] 57  Resp:  [16-18] 18  SpO2:  [93 %-100 %] 100 %  BP: ()/(54-74) 100/74     Weight: 52.6 kg (115 lb 15.4 oz)  Body mass index is 20.54 kg/m².    Intake/Output Summary (Last 24 hours) at 12/9/2023 0754  Last data filed at 12/9/2023 0600  Gross per 24 hour   Intake 408.33 ml   Output 450 ml   Net -41.67 ml         Physical Exam  Vitals and nursing note reviewed.   Constitutional:       General: She is not in acute distress.     Appearance: She is well-developed. She is ill-appearing (chronically). She is not  diaphoretic.   HENT:      Head: Normocephalic and atraumatic.   Eyes:      General: No scleral icterus.     Conjunctiva/sclera: Conjunctivae normal.   Neck:      Vascular: No JVD.   Cardiovascular:      Rate and Rhythm: Normal rate and regular rhythm.      Heart sounds: Normal heart sounds.   Pulmonary:      Effort: Pulmonary effort is normal. No respiratory distress.   Abdominal:      General: There is no distension.      Palpations: Abdomen is soft.      Tenderness: There is no abdominal tenderness.   Musculoskeletal:      Right lower leg: No edema.      Left lower leg: No edema.   Skin:     Coloration: Skin is pale. Skin is not jaundiced.      Findings: Lesion present.   Neurological:      Mental Status: She is alert.      Motor: No abnormal muscle tone.      Comments: Does not attempt to track or answer questions, does not follow commands, no tremor, UE rigidity   Psychiatric:      Comments: Unable to evaluate             Significant Labs: All pertinent labs within the past 24 hours have been reviewed.  CBC:   Recent Labs   Lab 12/08/23  0432 12/09/23  0406   WBC 13.60* 13.66*   HGB 12.6 11.5*   HCT 40.3 35.7*    317     CMP:   Recent Labs   Lab 12/08/23  0432 12/09/23  0406    137   K 4.4 4.8    103   CO2 26 24   GLU 81 90   BUN 12 17   CREATININE 0.7 0.7   CALCIUM 9.1 8.8   PROT 6.5 5.6*   ALBUMIN 2.6* 2.3*   BILITOT 0.4 0.2   ALKPHOS 65 64   AST 22 18   ALT <5* <5*   ANIONGAP 10 10       Significant Imaging: I have reviewed all pertinent imaging results/findings within the past 24 hours.    Assessment/Plan:      * Pressure injury of sacral region, unstageable  Presents for evaluation of sacral decubitus ulcer. Purulent on exam. Mild leukocytosis, otherwise does not meet sepsis criteria.   - Wound culture with P mirabilis  - Continue ceftriaxone 2g daily x10 days (sot 12/3)  - General surgery consulted  -- s/p debridement 12/3  - Wound care consulted for recs for discharge wound  care    Sepsis without acute organ dysfunction  Febrile with new leukocytosis on 12/3. Known infected wound.    This patient does have evidence of infective focus  My overall impression is sepsis.  Source: Skin and Soft Tissue (location sacral)  Antibiotics given-   Antibiotics (72h ago, onward)      Start     Stop Route Frequency Ordered    12/09/23 0600  cefTRIAXone (ROCEPHIN) 2 g in dextrose 5 % in water (D5W) 100 mL IVPB (MB+)         -- IV Every 24 hours (non-standard times) 12/08/23 1520    12/03/23 2100  mupirocin 2 % ointment         12/08/23 2059 Nasl 2 times daily 12/03/23 1735            Fluid challenge Not needed - patient is not hypotensive      Source control achieved by: to OR 12/3 for debridement       CKD (chronic kidney disease), stage III  Creatine stable for now. BMP reviewed- noted Estimated Creatinine Clearance: 53.9 mL/min (based on SCr of 0.7 mg/dL). according to latest data. Based on current GFR, CKD stage is stage 3 - GFR 30-59.       - Monitor UOP and serial BMP and adjust therapy as needed.    - Renally dose meds.    - Avoid nephrotoxic medications and procedures.      Stage IV pressure ulcer of sacral region  - See above      Macrocytic anemia  Chronic, and likely anemia of chronic disease. Currently without indication for transfusion.     Severe protein-calorie malnutrition  Due to Parkinson's and debility. Will continue dietary supplements as recommended by RD during previous hospitalization.   - SLP consulted      Hypernatremia  Patient has hypernatremia which is controlled. The hypernatremia is due to Dehydration. The patient's sodium results have been reviewed and are listed below.  Recent Labs   Lab 12/08/23  0432              Functional quadriplegia  Due to Parkinsons' and dementia. Continue frequent turns and pressure ulcer care/prevention.       DNR (do not resuscitate)  Per previous documentation and discussions with patient's family. Clarified with daughter Yamileth at  bedside (shares POA with other daughter) - patient is DNR.       Severe Lewy body dementia  Monitor with delirium precautions.     Parkinson's disease  Continue home Sinemet.        VTE Risk Mitigation (From admission, onward)           Ordered     enoxaparin injection 30 mg  Daily         12/09/23 1543     IP VTE HIGH RISK PATIENT  Once         12/01/23 2123     Place sequential compression device  Until discontinued         12/01/23 2123     Reason for No Pharmacological VTE Prophylaxis  Once        Question:  Reasons:  Answer:  Physician Provided (leave comment)    12/01/23 2123                    Discharge Planning   MELCHOR: 12/8/2023     Code Status: DNR   Is the patient medically ready for discharge?: Yes    Reason for patient still in hospital (select all that apply): Patient trending condition, Laboratory test, Treatment, PT / OT recommendations, and Pending disposition  Discharge Plan A: Long-term acute care facility (LTAC)   Discharge Delays: (!) Post-Acute Set-up              Yuniel Hills MD  Department of Hospital Medicine   [facility] - Intensive Care ([address])

## 2023-12-10 LAB
ALBUMIN SERPL BCP-MCNC: 2.4 G/DL (ref 3.5–5.2)
ALP SERPL-CCNC: 61 U/L (ref 55–135)
ALT SERPL W/O P-5'-P-CCNC: <5 U/L (ref 10–44)
ANION GAP SERPL CALC-SCNC: 10 MMOL/L (ref 8–16)
ANISOCYTOSIS BLD QL SMEAR: SLIGHT
AST SERPL-CCNC: 19 U/L (ref 10–40)
BACTERIA #/AREA URNS AUTO: ABNORMAL /HPF
BASOPHILS # BLD AUTO: ABNORMAL K/UL (ref 0–0.2)
BASOPHILS NFR BLD: 0 % (ref 0–1.9)
BILIRUB SERPL-MCNC: 0.3 MG/DL (ref 0.1–1)
BILIRUB UR QL STRIP: NEGATIVE
BUN SERPL-MCNC: 17 MG/DL (ref 8–23)
CALCIUM SERPL-MCNC: 8.7 MG/DL (ref 8.7–10.5)
CHLORIDE SERPL-SCNC: 104 MMOL/L (ref 95–110)
CLARITY UR REFRACT.AUTO: CLEAR
CO2 SERPL-SCNC: 25 MMOL/L (ref 23–29)
COLOR UR AUTO: YELLOW
CREAT SERPL-MCNC: 0.7 MG/DL (ref 0.5–1.4)
DIFFERENTIAL METHOD: ABNORMAL
EOSINOPHIL # BLD AUTO: ABNORMAL K/UL (ref 0–0.5)
EOSINOPHIL NFR BLD: 1 % (ref 0–8)
ERYTHROCYTE [DISTWIDTH] IN BLOOD BY AUTOMATED COUNT: 12.9 % (ref 11.5–14.5)
EST. GFR  (NO RACE VARIABLE): >60 ML/MIN/1.73 M^2
GLUCOSE SERPL-MCNC: 104 MG/DL (ref 70–110)
GLUCOSE UR QL STRIP: NEGATIVE
HCT VFR BLD AUTO: 35.3 % (ref 37–48.5)
HGB BLD-MCNC: 11 G/DL (ref 12–16)
HGB UR QL STRIP: NEGATIVE
HYALINE CASTS UR QL AUTO: 3 /LPF
HYPOCHROMIA BLD QL SMEAR: ABNORMAL
IMM GRANULOCYTES # BLD AUTO: ABNORMAL K/UL (ref 0–0.04)
IMM GRANULOCYTES NFR BLD AUTO: ABNORMAL % (ref 0–0.5)
KETONES UR QL STRIP: NEGATIVE
LEUKOCYTE ESTERASE UR QL STRIP: ABNORMAL
LYMPHOCYTES # BLD AUTO: ABNORMAL K/UL (ref 1–4.8)
LYMPHOCYTES NFR BLD: 2 % (ref 18–48)
MAGNESIUM SERPL-MCNC: 1.9 MG/DL (ref 1.6–2.6)
MCH RBC QN AUTO: 29.2 PG (ref 27–31)
MCHC RBC AUTO-ENTMCNC: 31.2 G/DL (ref 32–36)
MCV RBC AUTO: 94 FL (ref 82–98)
MICROSCOPIC COMMENT: ABNORMAL
MONOCYTES # BLD AUTO: ABNORMAL K/UL (ref 0.3–1)
MONOCYTES NFR BLD: 3 % (ref 4–15)
NEUTROPHILS NFR BLD: 94 % (ref 38–73)
NITRITE UR QL STRIP: NEGATIVE
NRBC BLD-RTO: 0 /100 WBC
OVALOCYTES BLD QL SMEAR: ABNORMAL
PH UR STRIP: 6 [PH] (ref 5–8)
PHOSPHATE SERPL-MCNC: 2.9 MG/DL (ref 2.7–4.5)
PLATELET # BLD AUTO: 356 K/UL (ref 150–450)
PMV BLD AUTO: 9.8 FL (ref 9.2–12.9)
POCT GLUCOSE: 101 MG/DL (ref 70–110)
POIKILOCYTOSIS BLD QL SMEAR: SLIGHT
POLYCHROMASIA BLD QL SMEAR: ABNORMAL
POTASSIUM SERPL-SCNC: 4.4 MMOL/L (ref 3.5–5.1)
PROT SERPL-MCNC: 5.8 G/DL (ref 6–8.4)
PROT UR QL STRIP: NEGATIVE
RBC # BLD AUTO: 3.77 M/UL (ref 4–5.4)
RBC #/AREA URNS AUTO: 4 /HPF (ref 0–4)
SODIUM SERPL-SCNC: 139 MMOL/L (ref 136–145)
SP GR UR STRIP: 1.02 (ref 1–1.03)
SPHEROCYTES BLD QL SMEAR: ABNORMAL
SQUAMOUS #/AREA URNS AUTO: 3 /HPF
URN SPEC COLLECT METH UR: ABNORMAL
WBC # BLD AUTO: 14.14 K/UL (ref 3.9–12.7)
WBC #/AREA URNS AUTO: 4 /HPF (ref 0–5)
YEAST UR QL AUTO: ABNORMAL

## 2023-12-10 PROCEDURE — 87040 BLOOD CULTURE FOR BACTERIA: CPT | Mod: 59 | Performed by: STUDENT IN AN ORGANIZED HEALTH CARE EDUCATION/TRAINING PROGRAM

## 2023-12-10 PROCEDURE — 25000003 PHARM REV CODE 250: Performed by: STUDENT IN AN ORGANIZED HEALTH CARE EDUCATION/TRAINING PROGRAM

## 2023-12-10 PROCEDURE — 81001 URINALYSIS AUTO W/SCOPE: CPT | Performed by: STUDENT IN AN ORGANIZED HEALTH CARE EDUCATION/TRAINING PROGRAM

## 2023-12-10 PROCEDURE — 84100 ASSAY OF PHOSPHORUS: CPT | Performed by: STUDENT IN AN ORGANIZED HEALTH CARE EDUCATION/TRAINING PROGRAM

## 2023-12-10 PROCEDURE — 51798 US URINE CAPACITY MEASURE: CPT

## 2023-12-10 PROCEDURE — 36415 COLL VENOUS BLD VENIPUNCTURE: CPT | Performed by: STUDENT IN AN ORGANIZED HEALTH CARE EDUCATION/TRAINING PROGRAM

## 2023-12-10 PROCEDURE — 63600175 PHARM REV CODE 636 W HCPCS: Performed by: STUDENT IN AN ORGANIZED HEALTH CARE EDUCATION/TRAINING PROGRAM

## 2023-12-10 PROCEDURE — 85007 BL SMEAR W/DIFF WBC COUNT: CPT | Performed by: STUDENT IN AN ORGANIZED HEALTH CARE EDUCATION/TRAINING PROGRAM

## 2023-12-10 PROCEDURE — A4216 STERILE WATER/SALINE, 10 ML: HCPCS | Performed by: STUDENT IN AN ORGANIZED HEALTH CARE EDUCATION/TRAINING PROGRAM

## 2023-12-10 PROCEDURE — 83735 ASSAY OF MAGNESIUM: CPT | Performed by: STUDENT IN AN ORGANIZED HEALTH CARE EDUCATION/TRAINING PROGRAM

## 2023-12-10 PROCEDURE — 80053 COMPREHEN METABOLIC PANEL: CPT | Performed by: STUDENT IN AN ORGANIZED HEALTH CARE EDUCATION/TRAINING PROGRAM

## 2023-12-10 PROCEDURE — 85027 COMPLETE CBC AUTOMATED: CPT | Performed by: STUDENT IN AN ORGANIZED HEALTH CARE EDUCATION/TRAINING PROGRAM

## 2023-12-10 PROCEDURE — 21400001 HC TELEMETRY ROOM

## 2023-12-10 RX ADMIN — Medication 10 ML: at 12:12

## 2023-12-10 RX ADMIN — Medication 10 ML: at 06:12

## 2023-12-10 RX ADMIN — ENOXAPARIN SODIUM 30 MG: 30 INJECTION SUBCUTANEOUS at 05:12

## 2023-12-10 RX ADMIN — CEFTRIAXONE 2 G: 2 INJECTION, POWDER, FOR SOLUTION INTRAMUSCULAR; INTRAVENOUS at 07:12

## 2023-12-10 NOTE — SUBJECTIVE & OBJECTIVE
Interval History:   No events overnight.  Patient with continued leukocytosis.  Infectious workup ordered.  Continue IV ceftriaxone.      Review of Systems   Unable to perform ROS: Dementia     Objective:     Vital Signs (Most Recent):  Temp: 97.3 °F (36.3 °C) (12/10/23 1225)  Pulse: 66 (12/10/23 1507)  Resp: 18 (12/10/23 1225)  BP: (!) 114/59 (12/10/23 1225)  SpO2: 98 % (12/10/23 1507) Vital Signs (24h Range):  Temp:  [97.3 °F (36.3 °C)-99.1 °F (37.3 °C)] 97.3 °F (36.3 °C)  Pulse:  [64-81] 66  Resp:  [16-20] 18  SpO2:  [97 %-100 %] 98 %  BP: (100-122)/(57-78) 114/59     Weight: 52.6 kg (115 lb 15.4 oz)  Body mass index is 20.54 kg/m².    Intake/Output Summary (Last 24 hours) at 12/10/2023 1532  Last data filed at 12/9/2023 1715  Gross per 24 hour   Intake 0 ml   Output 580 ml   Net -580 ml         Physical Exam  Vitals and nursing note reviewed.   Constitutional:       General: She is not in acute distress.     Appearance: She is well-developed. She is ill-appearing (chronically). She is not diaphoretic.   HENT:      Head: Normocephalic and atraumatic.   Eyes:      General: No scleral icterus.     Conjunctiva/sclera: Conjunctivae normal.   Neck:      Vascular: No JVD.   Cardiovascular:      Rate and Rhythm: Normal rate and regular rhythm.      Heart sounds: Normal heart sounds.   Pulmonary:      Effort: Pulmonary effort is normal. No respiratory distress.   Abdominal:      General: There is no distension.      Palpations: Abdomen is soft.      Tenderness: There is no abdominal tenderness.   Musculoskeletal:      Right lower leg: No edema.      Left lower leg: No edema.   Skin:     Coloration: Skin is pale. Skin is not jaundiced.      Findings: Lesion present.   Neurological:      Mental Status: She is alert.      Motor: No abnormal muscle tone.      Comments: Does not attempt to track or answer questions, does not follow commands, no tremor, UE rigidity   Psychiatric:      Comments: Unable to evaluate              Significant Labs: All pertinent labs within the past 24 hours have been reviewed.  CBC:   Recent Labs   Lab 12/09/23  0406 12/10/23  0449   WBC 13.66* 14.14*   HGB 11.5* 11.0*   HCT 35.7* 35.3*    356     CMP:   Recent Labs   Lab 12/09/23  0406 12/10/23  0449    139   K 4.8 4.4    104   CO2 24 25   GLU 90 104   BUN 17 17   CREATININE 0.7 0.7   CALCIUM 8.8 8.7   PROT 5.6* 5.8*   ALBUMIN 2.3* 2.4*   BILITOT 0.2 0.3   ALKPHOS 64 61   AST 18 19   ALT <5* <5*   ANIONGAP 10 10       Significant Imaging: I have reviewed all pertinent imaging results/findings within the past 24 hours.

## 2023-12-10 NOTE — PROGRESS NOTES
[facility] ([address] Medicine  Progress Note    Patient Name: Annmarie Gallardo  MRN: [id number]  Patient Class: IP- Inpatient   Admission Date: 12/1/2023  Length of Stay: 9 days  Attending Physician: Yuniel Hills MD  Primary Care Provider: Raymond Bess MD        Subjective:     Principal Problem:Pressure injury of sacral region, unstageable        HPI:  Annmarie Gallardo is a 78 y.o. with a PMHx of parkinson's, lewy-body dementia with debility/functional quadriplegia, HTN, GERD, CKD3, and sacral decubitus ulcer who presents today from her nursing home for evaluation of sacral decubitus ulcer.     Due to her dementia and nonverbal status, she is unable to provide a history. No family available at bedside during my exam.     Overview/Hospital Course:  Pt presenting with purulent drainage from sacral wound . S/p debridement 12/3 with general surgery. Patient febrile with leukocytosis overnight 12/3. Continue IV abx.     Interval History:   No events overnight.  Patient with continued leukocytosis.  Infectious workup ordered.  Continue IV ceftriaxone.      Review of Systems   Unable to perform ROS: Dementia     Objective:     Vital Signs (Most Recent):  Temp: 97.3 °F (36.3 °C) (12/10/23 1225)  Pulse: 66 (12/10/23 1507)  Resp: 18 (12/10/23 1225)  BP: (!) 114/59 (12/10/23 1225)  SpO2: 98 % (12/10/23 1507) Vital Signs (24h Range):  Temp:  [97.3 °F (36.3 °C)-99.1 °F (37.3 °C)] 97.3 °F (36.3 °C)  Pulse:  [64-81] 66  Resp:  [16-20] 18  SpO2:  [97 %-100 %] 98 %  BP: (100-122)/(57-78) 114/59     Weight: 52.6 kg (115 lb 15.4 oz)  Body mass index is 20.54 kg/m².    Intake/Output Summary (Last 24 hours) at 12/10/2023 1532  Last data filed at 12/9/2023 1715  Gross per 24 hour   Intake 0 ml   Output 580 ml   Net -580 ml         Physical Exam  Vitals and nursing note reviewed.   Constitutional:       General: She is not in acute distress.     Appearance: She is well-developed. She is  ill-appearing (chronically). She is not diaphoretic.   HENT:      Head: Normocephalic and atraumatic.   Eyes:      General: No scleral icterus.     Conjunctiva/sclera: Conjunctivae normal.   Neck:      Vascular: No JVD.   Cardiovascular:      Rate and Rhythm: Normal rate and regular rhythm.      Heart sounds: Normal heart sounds.   Pulmonary:      Effort: Pulmonary effort is normal. No respiratory distress.   Abdominal:      General: There is no distension.      Palpations: Abdomen is soft.      Tenderness: There is no abdominal tenderness.   Musculoskeletal:      Right lower leg: No edema.      Left lower leg: No edema.   Skin:     Coloration: Skin is pale. Skin is not jaundiced.      Findings: Lesion present.   Neurological:      Mental Status: She is alert.      Motor: No abnormal muscle tone.      Comments: Does not attempt to track or answer questions, does not follow commands, no tremor, UE rigidity   Psychiatric:      Comments: Unable to evaluate             Significant Labs: All pertinent labs within the past 24 hours have been reviewed.  CBC:   Recent Labs   Lab 12/09/23  0406 12/10/23  0449   WBC 13.66* 14.14*   HGB 11.5* 11.0*   HCT 35.7* 35.3*    356     CMP:   Recent Labs   Lab 12/09/23  0406 12/10/23  0449    139   K 4.8 4.4    104   CO2 24 25   GLU 90 104   BUN 17 17   CREATININE 0.7 0.7   CALCIUM 8.8 8.7   PROT 5.6* 5.8*   ALBUMIN 2.3* 2.4*   BILITOT 0.2 0.3   ALKPHOS 64 61   AST 18 19   ALT <5* <5*   ANIONGAP 10 10       Significant Imaging: I have reviewed all pertinent imaging results/findings within the past 24 hours.    Assessment/Plan:      * Pressure injury of sacral region, unstageable  Presents for evaluation of sacral decubitus ulcer. Purulent on exam. Mild leukocytosis, otherwise does not meet sepsis criteria.   - Wound culture with P mirabilis  - Continue ceftriaxone 2g daily x10 days (sot 12/3)  - General surgery consulted  -- s/p debridement 12/3  - Wound care  consulted for recs for discharge wound care    Sepsis without acute organ dysfunction  Febrile with new leukocytosis on 12/3. Known infected wound.    This patient does have evidence of infective focus  My overall impression is sepsis.  Source: Skin and Soft Tissue (location sacral)  Antibiotics given-   Antibiotics (72h ago, onward)      Start     Stop Route Frequency Ordered    12/09/23 0600  cefTRIAXone (ROCEPHIN) 2 g in dextrose 5 % in water (D5W) 100 mL IVPB (MB+)         -- IV Every 24 hours (non-standard times) 12/08/23 1520    12/03/23 2100  mupirocin 2 % ointment         12/08/23 2059 Nasl 2 times daily 12/03/23 1735            Fluid challenge Not needed - patient is not hypotensive      Source control achieved by: to OR 12/3 for debridement   - Follow-up infectious workup ordered 12/10  - Blood cultures no growth to date   - UA pending   - Chest x-ray unremarkable  - Antibiotics as above      CKD (chronic kidney disease), stage III  Creatine stable for now. BMP reviewed- noted Estimated Creatinine Clearance: 53.9 mL/min (based on SCr of 0.7 mg/dL). according to latest data. Based on current GFR, CKD stage is stage 3 - GFR 30-59.       - Monitor UOP and serial BMP and adjust therapy as needed.    - Renally dose meds.    - Avoid nephrotoxic medications and procedures.      Stage IV pressure ulcer of sacral region  - See above      Macrocytic anemia  Chronic, and likely anemia of chronic disease. Currently without indication for transfusion.     Severe protein-calorie malnutrition  Due to Parkinson's and debility. Will continue dietary supplements as recommended by RD during previous hospitalization.   - SLP consulted      Hypernatremia  Patient has hypernatremia which is controlled. The hypernatremia is due to Dehydration. The patient's sodium results have been reviewed and are listed below.  Recent Labs   Lab 12/08/23  0432              Functional quadriplegia  Due to Parkinsons' and dementia.  Continue frequent turns and pressure ulcer care/prevention.       DNR (do not resuscitate)  Per previous documentation and discussions with patient's family. Clarified with daughter Yamileth at bedside (shares POA with other daughter) - patient is DNR.       Severe Lewy body dementia  Monitor with delirium precautions.     Parkinson's disease  Continue home Sinemet.        VTE Risk Mitigation (From admission, onward)           Ordered     enoxaparin injection 30 mg  Daily         12/09/23 1543     IP VTE HIGH RISK PATIENT  Once         12/01/23 2123     Place sequential compression device  Until discontinued         12/01/23 2123     Reason for No Pharmacological VTE Prophylaxis  Once        Question:  Reasons:  Answer:  Physician Provided (leave comment)    12/01/23 2123                    Discharge Planning   MELCHOR: 12/8/2023     Code Status: DNR   Is the patient medically ready for discharge?: Yes    Reason for patient still in hospital (select all that apply): Patient trending condition, Laboratory test, Treatment, and Pending disposition  Discharge Plan A: Long-term acute care facility (LTAC)   Discharge Delays: (!) Post-Acute Set-up              Yuniel Hills MD  Department of Hospital Medicine   [facility] - Intensive Care ([place])

## 2023-12-10 NOTE — ASSESSMENT & PLAN NOTE
Febrile with new leukocytosis on 12/3. Known infected wound.    This patient does have evidence of infective focus  My overall impression is sepsis.  Source: Skin and Soft Tissue (location sacral)  Antibiotics given-   Antibiotics (72h ago, onward)      Start     Stop Route Frequency Ordered    12/09/23 0600  cefTRIAXone (ROCEPHIN) 2 g in dextrose 5 % in water (D5W) 100 mL IVPB (MB+)         -- IV Every 24 hours (non-standard times) 12/08/23 1520    12/03/23 2100  mupirocin 2 % ointment         12/08/23 2059 Nasl 2 times daily 12/03/23 1735            Fluid challenge Not needed - patient is not hypotensive      Source control achieved by: to OR 12/3 for debridement   - Follow-up infectious workup ordered 12/10  - Blood cultures no growth to date   - UA pending   - Chest x-ray unremarkable  - Antibiotics as above

## 2023-12-11 LAB
ALBUMIN SERPL BCP-MCNC: 2.4 G/DL (ref 3.5–5.2)
ALP SERPL-CCNC: 60 U/L (ref 55–135)
ALT SERPL W/O P-5'-P-CCNC: 12 U/L (ref 10–44)
ANION GAP SERPL CALC-SCNC: 9 MMOL/L (ref 8–16)
AST SERPL-CCNC: 21 U/L (ref 10–40)
BASOPHILS # BLD AUTO: 0.11 K/UL (ref 0–0.2)
BASOPHILS NFR BLD: 0.8 % (ref 0–1.9)
BILIRUB SERPL-MCNC: 0.2 MG/DL (ref 0.1–1)
BUN SERPL-MCNC: 24 MG/DL (ref 8–23)
CALCIUM SERPL-MCNC: 8.6 MG/DL (ref 8.7–10.5)
CHLORIDE SERPL-SCNC: 104 MMOL/L (ref 95–110)
CO2 SERPL-SCNC: 25 MMOL/L (ref 23–29)
CREAT SERPL-MCNC: 0.8 MG/DL (ref 0.5–1.4)
DIFFERENTIAL METHOD: ABNORMAL
EOSINOPHIL # BLD AUTO: 0.2 K/UL (ref 0–0.5)
EOSINOPHIL NFR BLD: 1.2 % (ref 0–8)
ERYTHROCYTE [DISTWIDTH] IN BLOOD BY AUTOMATED COUNT: 13.1 % (ref 11.5–14.5)
EST. GFR  (NO RACE VARIABLE): >60 ML/MIN/1.73 M^2
GLUCOSE SERPL-MCNC: 102 MG/DL (ref 70–110)
HCT VFR BLD AUTO: 33.3 % (ref 37–48.5)
HGB BLD-MCNC: 10.7 G/DL (ref 12–16)
IMM GRANULOCYTES # BLD AUTO: 0.7 K/UL (ref 0–0.04)
IMM GRANULOCYTES NFR BLD AUTO: 5 % (ref 0–0.5)
LYMPHOCYTES # BLD AUTO: 1.1 K/UL (ref 1–4.8)
LYMPHOCYTES NFR BLD: 7.8 % (ref 18–48)
MAGNESIUM SERPL-MCNC: 1.9 MG/DL (ref 1.6–2.6)
MCH RBC QN AUTO: 30 PG (ref 27–31)
MCHC RBC AUTO-ENTMCNC: 32.1 G/DL (ref 32–36)
MCV RBC AUTO: 93 FL (ref 82–98)
MONOCYTES # BLD AUTO: 0.8 K/UL (ref 0.3–1)
MONOCYTES NFR BLD: 5.5 % (ref 4–15)
NEUTROPHILS # BLD AUTO: 11.1 K/UL (ref 1.8–7.7)
NEUTROPHILS NFR BLD: 79.7 % (ref 38–73)
NRBC BLD-RTO: 0 /100 WBC
PHOSPHATE SERPL-MCNC: 3.1 MG/DL (ref 2.7–4.5)
PLATELET # BLD AUTO: 364 K/UL (ref 150–450)
PMV BLD AUTO: 9.7 FL (ref 9.2–12.9)
POTASSIUM SERPL-SCNC: 4.4 MMOL/L (ref 3.5–5.1)
PROT SERPL-MCNC: 5.7 G/DL (ref 6–8.4)
RBC # BLD AUTO: 3.57 M/UL (ref 4–5.4)
SODIUM SERPL-SCNC: 138 MMOL/L (ref 136–145)
WBC # BLD AUTO: 13.9 K/UL (ref 3.9–12.7)

## 2023-12-11 PROCEDURE — 25000003 PHARM REV CODE 250: Performed by: STUDENT IN AN ORGANIZED HEALTH CARE EDUCATION/TRAINING PROGRAM

## 2023-12-11 PROCEDURE — A4216 STERILE WATER/SALINE, 10 ML: HCPCS | Performed by: STUDENT IN AN ORGANIZED HEALTH CARE EDUCATION/TRAINING PROGRAM

## 2023-12-11 PROCEDURE — 21400001 HC TELEMETRY ROOM

## 2023-12-11 PROCEDURE — 84100 ASSAY OF PHOSPHORUS: CPT | Performed by: STUDENT IN AN ORGANIZED HEALTH CARE EDUCATION/TRAINING PROGRAM

## 2023-12-11 PROCEDURE — 80053 COMPREHEN METABOLIC PANEL: CPT | Performed by: STUDENT IN AN ORGANIZED HEALTH CARE EDUCATION/TRAINING PROGRAM

## 2023-12-11 PROCEDURE — 36415 COLL VENOUS BLD VENIPUNCTURE: CPT | Performed by: STUDENT IN AN ORGANIZED HEALTH CARE EDUCATION/TRAINING PROGRAM

## 2023-12-11 PROCEDURE — 63600175 PHARM REV CODE 636 W HCPCS: Performed by: STUDENT IN AN ORGANIZED HEALTH CARE EDUCATION/TRAINING PROGRAM

## 2023-12-11 PROCEDURE — 83735 ASSAY OF MAGNESIUM: CPT | Performed by: STUDENT IN AN ORGANIZED HEALTH CARE EDUCATION/TRAINING PROGRAM

## 2023-12-11 PROCEDURE — 85007 BL SMEAR W/DIFF WBC COUNT: CPT | Performed by: STUDENT IN AN ORGANIZED HEALTH CARE EDUCATION/TRAINING PROGRAM

## 2023-12-11 PROCEDURE — 85027 COMPLETE CBC AUTOMATED: CPT | Performed by: STUDENT IN AN ORGANIZED HEALTH CARE EDUCATION/TRAINING PROGRAM

## 2023-12-11 RX ORDER — DEXTROSE MONOHYDRATE AND SODIUM CHLORIDE 5; .9 G/100ML; G/100ML
INJECTION, SOLUTION INTRAVENOUS CONTINUOUS
Status: ACTIVE | OUTPATIENT
Start: 2023-12-11 | End: 2023-12-11

## 2023-12-11 RX ADMIN — CARBIDOPA AND LEVODOPA 1 TABLET: 25; 100 TABLET ORAL at 03:12

## 2023-12-11 RX ADMIN — CARBIDOPA AND LEVODOPA 1 TABLET: 25; 100 TABLET ORAL at 08:12

## 2023-12-11 RX ADMIN — CARBIDOPA AND LEVODOPA 1 SPLIT TABLET: 25; 100 TABLET ORAL at 09:12

## 2023-12-11 RX ADMIN — PANTOPRAZOLE SODIUM 20 MG: 20 TABLET, DELAYED RELEASE ORAL at 08:12

## 2023-12-11 RX ADMIN — TAMSULOSIN HYDROCHLORIDE 0.4 MG: 0.4 CAPSULE ORAL at 08:12

## 2023-12-11 RX ADMIN — CEFTRIAXONE 2 G: 2 INJECTION, POWDER, FOR SOLUTION INTRAMUSCULAR; INTRAVENOUS at 07:12

## 2023-12-11 RX ADMIN — SENNOSIDES AND DOCUSATE SODIUM 1 TABLET: 8.6; 5 TABLET ORAL at 09:12

## 2023-12-11 RX ADMIN — ASPIRIN 81 MG: 81 TABLET, COATED ORAL at 08:12

## 2023-12-11 RX ADMIN — CARBIDOPA AND LEVODOPA 1 TABLET: 25; 100 TABLET ORAL at 09:12

## 2023-12-11 RX ADMIN — THERA TABS 1 TABLET: TAB at 08:12

## 2023-12-11 RX ADMIN — CYANOCOBALAMIN TAB 1000 MCG 1000 MCG: 1000 TAB at 08:12

## 2023-12-11 RX ADMIN — Medication 10 ML: at 06:12

## 2023-12-11 RX ADMIN — Medication 10 ML: at 12:12

## 2023-12-11 RX ADMIN — CETIRIZINE HYDROCHLORIDE 10 MG: 10 TABLET, FILM COATED ORAL at 08:12

## 2023-12-11 RX ADMIN — DEXTROSE AND SODIUM CHLORIDE: 5; 900 INJECTION, SOLUTION INTRAVENOUS at 08:12

## 2023-12-11 RX ADMIN — Medication 10 ML: at 03:12

## 2023-12-11 RX ADMIN — ENOXAPARIN SODIUM 30 MG: 30 INJECTION SUBCUTANEOUS at 03:12

## 2023-12-11 RX ADMIN — SENNOSIDES AND DOCUSATE SODIUM 1 TABLET: 8.6; 5 TABLET ORAL at 08:12

## 2023-12-11 NOTE — CARE UPDATE
RAPID RESPONSE NURSE PROACTIVE ROUNDING NOTE       Time of Visit: 2310    Admit Date: 2023  LOS: 10  Code Status: DNR   Date of Visit: 2023  : 1945  Age: 78 y.o.  Sex: female  Race: White  Bed: 93804/74951 A:   MRN: [id number]  Was the patient discharged from an ICU this admission? No   Was the patient discharged from a PACU within last 24 hours? No   Did the patient receive conscious sedation/general anesthesia in last 24 hours? No  Was the patient in the ED within the past 24 hours? No  Was the patient on NIPPV within the past 24 hours? No   Attending Physician: Yuniel Hills MD  Primary Service: [facility]   Time spent at the bedside: 15 -30 min    SITUATION    Notified by [employer] patient alert.  Reason for alert: AMS, hypotension  Called to evaluate the patient for Circulatory, neuro    BACKGROUND     Why is the patient in the hospital?: Pressure injury of sacral region, unstageable    Patient has a past medical history of HTN (hypertension), Parkinson's disease, and Vitamin D deficiency.    Last Vitals:  Temp: 98.8 °F (37.1 °C) (12/10 2316)  Pulse: 69 (12/10 2319)  Resp: 35 (12/10 2316)  BP: 116/62 (12/10 2316)  SpO2: 98 % (12/10 2316)    24 Hours Vitals Range:  Temp:  [97.3 °F (36.3 °C)-98.8 °F (37.1 °C)]   Pulse:  [64-81]   Resp:  [16-35]   BP: (100-132)/(58-62)   SpO2:  [95 %-100 %]     Labs:  Recent Labs     232 23  0406 12/10/23  0449   WBC 13.60* 13.66* 14.14*   HGB 12.6 11.5* 11.0*   HCT 40.3 35.7* 35.3*    317 356       Recent Labs     232 23  0406 12/10/23  0449    137 139   K 4.4 4.8 4.4    103 104   CO2 26 24 25   BUN 12 17 17   CREATININE 0.7 0.7 0.7   GLU 81 90 104   PHOS  --   --  2.9   MG  --   --  1.9       ASSESSMENT    Physical Exam  Constitutional:       Appearance: She is ill-appearing.   Cardiovascular:      Rate and Rhythm: Normal rate and regular rhythm.      Pulses: Normal pulses.   Pulmonary:      Effort:  Tachypnea present.      Breath sounds: Decreased breath sounds and rales present.   Skin:     General: Skin is warm and moist.      Capillary Refill: Capillary refill takes 2 to 3 seconds.      Coloration: Skin is pale.      Findings: Bruising present.   Neurological:      Mental Status: She is lethargic and disoriented.      GCS: GCS eye subscore is 2. GCS verbal subscore is 2. GCS motor subscore is 4.     HR 69  /62 (84)  RR 35, SpO2 98% on room air  Temp 98.8      Patient lying in bed. When vigorously stimulated, mumbles incomprehensibly but does not answer questions or follow commands. Withdraws all four extremities to pain, no facial droop.     INTERVENTIONS    The patient was seen for Neurological problem. Staff concerns included decreased responsiveness. The following interventions were performed: POCT glucose, continuous pulse ox monitoring continued , and continuous cardiac monitoring continued.  Cardiac problem. Staff concerns included hypotension. The following interventions were performed: No additional interventions needed at this time..    Normotensive at this time    , q6 accuchecks ordered  Primary team aware of decreased responsiveness, and patient is non-verbal with advanced dementia at baseline, though according to chart review she is currently less responsive than her normal    RECOMMENDATIONS    Strict NPO until mental status improves, aspiration precautions   Continue to monitor VS, neuro status    PROVIDER ESCALATION    Yes/No  No    Orders received and case discussed with NA.    Disposition: Remain in room 38529.    FOLLOW-UP    charge Rony MCGEE  updated on plan of care. Instructed to call the Rapid Response NurseCOOPER RN at [phone] for additional questions or concerns.

## 2023-12-11 NOTE — SUBJECTIVE & OBJECTIVE
Interval History:   No events overnight. Continue CTX. 1L IVF for elevated renal function and hypotension. CM working on placement.      Review of Systems   Unable to perform ROS: Dementia     Objective:     Vital Signs (Most Recent):  Temp: 96.1 °F (35.6 °C) (12/11/23 1303)  Pulse: 75 (12/11/23 1303)  Resp: 16 (12/11/23 1303)  BP: (!) 132/58 (12/11/23 1303)  SpO2: 98 % (12/11/23 1303) Vital Signs (24h Range):  Temp:  [96.1 °F (35.6 °C)-98.8 °F (37.1 °C)] 96.1 °F (35.6 °C)  Pulse:  [67-84] 75  Resp:  [16-35] 16  SpO2:  [95 %-98 %] 98 %  BP: (100-137)/(57-62) 132/58     Weight: 52.6 kg (115 lb 15.4 oz)  Body mass index is 20.54 kg/m².    Intake/Output Summary (Last 24 hours) at 12/11/2023 1519  Last data filed at 12/11/2023 0110  Gross per 24 hour   Intake 240 ml   Output 250 ml   Net -10 ml         Physical Exam  Vitals and nursing note reviewed.   Constitutional:       General: She is not in acute distress.     Appearance: She is well-developed. She is ill-appearing (chronically). She is not diaphoretic.   HENT:      Head: Normocephalic and atraumatic.   Eyes:      General: No scleral icterus.     Conjunctiva/sclera: Conjunctivae normal.   Neck:      Vascular: No JVD.   Cardiovascular:      Rate and Rhythm: Normal rate and regular rhythm.      Heart sounds: Normal heart sounds.   Pulmonary:      Effort: Pulmonary effort is normal. No respiratory distress.   Abdominal:      General: There is no distension.      Palpations: Abdomen is soft.      Tenderness: There is no abdominal tenderness.   Musculoskeletal:      Right lower leg: No edema.      Left lower leg: No edema.   Skin:     Coloration: Skin is pale. Skin is not jaundiced.      Findings: Lesion present.   Neurological:      Mental Status: She is alert.      Motor: No abnormal muscle tone.      Comments: Does not attempt to track or answer questions, does not follow commands, no tremor, UE rigidity   Psychiatric:      Comments: Unable to evaluate              Significant Labs: All pertinent labs within the past 24 hours have been reviewed.  CBC:   Recent Labs   Lab 12/10/23  0449 12/11/23  0536   WBC 14.14* 13.90*   HGB 11.0* 10.7*   HCT 35.3* 33.3*    364     CMP:   Recent Labs   Lab 12/10/23  0449 12/11/23  0536    138   K 4.4 4.4    104   CO2 25 25    102   BUN 17 24*   CREATININE 0.7 0.8   CALCIUM 8.7 8.6*   PROT 5.8* 5.7*   ALBUMIN 2.4* 2.4*   BILITOT 0.3 0.2   ALKPHOS 61 60   AST 19 21   ALT <5* 12   ANIONGAP 10 9       Significant Imaging: I have reviewed all pertinent imaging results/findings within the past 24 hours.

## 2023-12-11 NOTE — NURSING
Pt bladder scanned at 2000, bladder scan >325. Nurse informed MD, new order for blackburn to be placed. Nurse re-scanned pt's bladder, scan <81. Pt is resting in bed, rise and fall of chest noted. No distress noted at this time. POC transferred to day nurse.

## 2023-12-11 NOTE — PLAN OF CARE
Per Amy at [facility], P2P scheduled, they need clinical info by 1:40 faxed to [phone].   faxed to [facility].    KEYUR BenavidesN, BS, RN, CCM

## 2023-12-11 NOTE — PLAN OF CARE
Problem: Adult Inpatient Plan of Care  Goal: Plan of Care Review  Outcome: Ongoing, Progressing  Flowsheets (Taken 12/11/2023 1429)  Plan of Care Reviewed With: patient  Goal: Patient-Specific Goal (Individualized)  Outcome: Ongoing, Progressing  Flowsheets (Taken 12/11/2023 1429)  Anxieties, Fears or Concerns: safety  Individualized Care Needs: safety  Goal: Absence of Hospital-Acquired Illness or Injury  Outcome: Ongoing, Progressing  Intervention: Identify and Manage Fall Risk  Flowsheets (Taken 12/11/2023 1429)  Safety Promotion/Fall Prevention:   assistive device/personal item within reach   medications reviewed   side rails raised x 2  Intervention: Prevent Skin Injury  Flowsheets (Taken 12/11/2023 1429)  Body Position: weight shifting  Skin Protection:   adhesive use limited   skin-to-device areas padded   skin-to-skin areas padded  Intervention: Prevent and Manage VTE (Venous Thromboembolism) Risk  Flowsheets (Taken 12/11/2023 1429)  Activity Management: Rolling - L1  VTE Prevention/Management:   bleeding precations maintained   bleeding risk assessed   fluids promoted   ROM (passive) performed   ROM (active) performed  Range of Motion: ROM (range of motion) performed  Goal: Optimal Comfort and Wellbeing  Outcome: Ongoing, Progressing  Intervention: Monitor Pain and Promote Comfort  Flowsheets (Taken 12/11/2023 1429)  Pain Management Interventions: pain management plan reviewed with patient/caregiver  Goal: Readiness for Transition of Care  Outcome: Ongoing, Progressing     Problem: Impaired Wound Healing  Goal: Optimal Wound Healing  Outcome: Ongoing, Progressing  Intervention: Promote Wound Healing  Flowsheets (Taken 12/11/2023 1429)  Activity Management: Rolling - L1  Pain Management Interventions: pain management plan reviewed with patient/caregiver     Problem: Skin Injury Risk Increased  Goal: Skin Health and Integrity  Outcome: Ongoing, Progressing  Intervention: Optimize Skin Protection  Flowsheets  (Taken 12/11/2023 1429)  Pressure Reduction Techniques: weight shift assistance provided  Pressure Reduction Devices: positioning supports utilized  Skin Protection:   adhesive use limited   skin-to-device areas padded   skin-to-skin areas padded  Head of Bed (HOB) Positioning: HOB elevated     Problem: Fall Injury Risk  Goal: Absence of Fall and Fall-Related Injury  Outcome: Ongoing, Progressing  Intervention: Identify and Manage Contributors  Flowsheets (Taken 12/11/2023 1429)  Self-Care Promotion:   BADL personal objects within reach   BADL personal routines maintained  Medication Review/Management: medications reviewed  Intervention: Promote Injury-Free Environment  Flowsheets (Taken 12/11/2023 1429)  Safety Promotion/Fall Prevention:   assistive device/personal item within reach   medications reviewed   side rails raised x 2

## 2023-12-11 NOTE — PROGRESS NOTES
[facility] ([address] Medicine  Progress Note    Patient Name: nAnmarie Gallardo  MRN: [id number]  Patient Class: IP- Inpatient   Admission Date: 12/1/2023  Length of Stay: 10 days  Attending Physician: Yuniel Hills MD  Primary Care Provider: Raymond Bess MD        Subjective:     Principal Problem:Pressure injury of sacral region, unstageable        HPI:  Annmarie Gallardo is a 78 y.o. with a PMHx of parkinson's, lewy-body dementia with debility/functional quadriplegia, HTN, GERD, CKD3, and sacral decubitus ulcer who presents today from her nursing home for evaluation of sacral decubitus ulcer.     Due to her dementia and nonverbal status, she is unable to provide a history. No family available at bedside during my exam.     Overview/Hospital Course:  Pt presenting with purulent drainage from sacral wound . S/p debridement 12/3 with general surgery. Patient febrile with leukocytosis overnight 12/3. Continue IV abx.     Interval History:   No events overnight. Continue CTX. 1L IVF for elevated renal function and hypotension. CM working on placement.      Review of Systems   Unable to perform ROS: Dementia     Objective:     Vital Signs (Most Recent):  Temp: 96.1 °F (35.6 °C) (12/11/23 1303)  Pulse: 75 (12/11/23 1303)  Resp: 16 (12/11/23 1303)  BP: (!) 132/58 (12/11/23 1303)  SpO2: 98 % (12/11/23 1303) Vital Signs (24h Range):  Temp:  [96.1 °F (35.6 °C)-98.8 °F (37.1 °C)] 96.1 °F (35.6 °C)  Pulse:  [67-84] 75  Resp:  [16-35] 16  SpO2:  [95 %-98 %] 98 %  BP: (100-137)/(57-62) 132/58     Weight: 52.6 kg (115 lb 15.4 oz)  Body mass index is 20.54 kg/m².    Intake/Output Summary (Last 24 hours) at 12/11/2023 1519  Last data filed at 12/11/2023 0110  Gross per 24 hour   Intake 240 ml   Output 250 ml   Net -10 ml         Physical Exam  Vitals and nursing note reviewed.   Constitutional:       General: She is not in acute distress.     Appearance: She is well-developed. She is  ill-appearing (chronically). She is not diaphoretic.   HENT:      Head: Normocephalic and atraumatic.   Eyes:      General: No scleral icterus.     Conjunctiva/sclera: Conjunctivae normal.   Neck:      Vascular: No JVD.   Cardiovascular:      Rate and Rhythm: Normal rate and regular rhythm.      Heart sounds: Normal heart sounds.   Pulmonary:      Effort: Pulmonary effort is normal. No respiratory distress.   Abdominal:      General: There is no distension.      Palpations: Abdomen is soft.      Tenderness: There is no abdominal tenderness.   Musculoskeletal:      Right lower leg: No edema.      Left lower leg: No edema.   Skin:     Coloration: Skin is pale. Skin is not jaundiced.      Findings: Lesion present.   Neurological:      Mental Status: She is alert.      Motor: No abnormal muscle tone.      Comments: Does not attempt to track or answer questions, does not follow commands, no tremor, UE rigidity   Psychiatric:      Comments: Unable to evaluate             Significant Labs: All pertinent labs within the past 24 hours have been reviewed.  CBC:   Recent Labs   Lab 12/10/23  0449 12/11/23  0536   WBC 14.14* 13.90*   HGB 11.0* 10.7*   HCT 35.3* 33.3*    364     CMP:   Recent Labs   Lab 12/10/23  0449 12/11/23  0536    138   K 4.4 4.4    104   CO2 25 25    102   BUN 17 24*   CREATININE 0.7 0.8   CALCIUM 8.7 8.6*   PROT 5.8* 5.7*   ALBUMIN 2.4* 2.4*   BILITOT 0.3 0.2   ALKPHOS 61 60   AST 19 21   ALT <5* 12   ANIONGAP 10 9       Significant Imaging: I have reviewed all pertinent imaging results/findings within the past 24 hours.    Assessment/Plan:      * Pressure injury of sacral region, unstageable  Presents for evaluation of sacral decubitus ulcer. Purulent on exam. Mild leukocytosis, otherwise does not meet sepsis criteria.   - Wound culture with P mirabilis  - Continue ceftriaxone 2g daily x10 days (sot 12/3)  - General surgery consulted  -- s/p debridement 12/3  - Wound care  consulted for recs for discharge wound care    Sepsis without acute organ dysfunction  Febrile with new leukocytosis on 12/3. Known infected wound.    This patient does have evidence of infective focus  My overall impression is sepsis.  Source: Skin and Soft Tissue (location sacral)  Antibiotics given-   Antibiotics (72h ago, onward)      Start     Stop Route Frequency Ordered    12/09/23 0600  cefTRIAXone (ROCEPHIN) 2 g in dextrose 5 % in water (D5W) 100 mL IVPB (MB+)         -- IV Every 24 hours (non-standard times) 12/08/23 1520            Fluid challenge Not needed - patient is not hypotensive      Source control achieved by: to OR 12/3 for debridement   - Follow-up infectious workup ordered 12/10  - Blood cultures no growth to date   - UA bland  - Chest x-ray unremarkable  - Antibiotics as above      CKD (chronic kidney disease), stage III  Creatine stable for now. BMP reviewed- noted Estimated Creatinine Clearance: 53.9 mL/min (based on SCr of 0.7 mg/dL). according to latest data. Based on current GFR, CKD stage is stage 3 - GFR 30-59.       - Monitor UOP and serial BMP and adjust therapy as needed.    - Renally dose meds.    - Avoid nephrotoxic medications and procedures.      Stage IV pressure ulcer of sacral region  - See above      Macrocytic anemia  Chronic, and likely anemia of chronic disease. Currently without indication for transfusion.     Severe protein-calorie malnutrition  Due to Parkinson's and debility. Will continue dietary supplements as recommended by RD during previous hospitalization.   - SLP consulted      Hypernatremia  Patient has hypernatremia which is controlled. The hypernatremia is due to Dehydration. The patient's sodium results have been reviewed and are listed below.  Recent Labs   Lab 12/08/23  0432              Functional quadriplegia  Due to Parkinsons' and dementia. Continue frequent turns and pressure ulcer care/prevention.       DNR (do not resuscitate)  Per previous  documentation and discussions with patient's family. Clarified with daughter Yamileth at bedside (shares POA with other daughter) - patient is DNR.       Severe Lewy body dementia  Monitor with delirium precautions.     Parkinson's disease  Continue home Sinemet.        VTE Risk Mitigation (From admission, onward)           Ordered     enoxaparin injection 30 mg  Daily         12/09/23 1543     IP VTE HIGH RISK PATIENT  Once         12/01/23 2123     Place sequential compression device  Until discontinued         12/01/23 2123     Reason for No Pharmacological VTE Prophylaxis  Once        Question:  Reasons:  Answer:  Physician Provided (leave comment)    12/01/23 2123                    Discharge Planning   MELCHOR: 12/12/2023     Code Status: DNR   Is the patient medically ready for discharge?: No    Reason for patient still in hospital (select all that apply): Patient trending condition, Laboratory test, Treatment, and Pending disposition  Discharge Plan A: Long-term acute care facility (LTAC)   Discharge Delays: (!) Post-Acute Set-up              Yuniel Hills MD  Department of Hospital Medicine   [facility] - Intensive Care ([place])

## 2023-12-11 NOTE — ASSESSMENT & PLAN NOTE
Febrile with new leukocytosis on 12/3. Known infected wound.    This patient does have evidence of infective focus  My overall impression is sepsis.  Source: Skin and Soft Tissue (location sacral)  Antibiotics given-   Antibiotics (72h ago, onward)      Start     Stop Route Frequency Ordered    12/09/23 0600  cefTRIAXone (ROCEPHIN) 2 g in dextrose 5 % in water (D5W) 100 mL IVPB (MB+)         -- IV Every 24 hours (non-standard times) 12/08/23 1520            Fluid challenge Not needed - patient is not hypotensive      Source control achieved by: to OR 12/3 for debridement   - Follow-up infectious workup ordered 12/10  - Blood cultures no growth to date   - UA bland  - Chest x-ray unremarkable  - Antibiotics as above

## 2023-12-11 NOTE — PLAN OF CARE
[facility] ([facility])  Discharge Reassessment    Primary Care Provider: Raymond Bess MD    Expected Discharge Date: 12/12/2023    Reassessment (most recent)       Discharge Reassessment - 12/11/23 1454          Discharge Reassessment    Assessment Type Discharge Planning Reassessment (P)      Did the patient's condition or plan change since previous assessment? No (P)      Discharge Plan discussed with: Adult children (P)      Communicated MELCHOR with patient/caregiver Date not available/Unable to determine (P)      Discharge Plan A Long-term acute care facility (LTAC) (P)      Discharge Plan B Skilled Nursing Facility (P)      DME Needed Upon Discharge  none (P)      Transition of Care Barriers None (P)      Why the patient remains in the hospital Placement issues;Insurance issues (P)         Post-Acute Status    Post-Acute Authorization Placement (P)      Post-Acute Placement Status Pending payor review/awaiting authorization (if required) (P)      Discharge Delays Post-Acute Set-up (P)                  DAYAN spoke with pt's dtrYamileth [phone].  She states that pt has dotten Brandan effective 11/7/23 person ID [id number].  Dalila Godfrey with [facility] informed; she states she will run benefits.    Dalila Bo, KEYURN, BS, RN, CCM

## 2023-12-11 NOTE — PLAN OF CARE
Pt arousal to gentle shaking, less alert then she was yesterday. Bladder scanned PRN. Urine output monitored. PO intake decreased from yesterday as well. UA collected. Turned q2h. Safety maintained.      Problem: Adult Inpatient Plan of Care  Goal: Plan of Care Review  Outcome: Ongoing, Progressing     Problem: Impaired Wound Healing  Goal: Optimal Wound Healing  Outcome: Ongoing, Progressing     Problem: Nutrition Impaired (Sepsis/Septic Shock)  Goal: Optimal Nutrition Intake  Outcome: Ongoing, Progressing     Problem: Infection  Goal: Absence of Infection Signs and Symptoms  Outcome: Ongoing, Progressing

## 2023-12-12 LAB
ALBUMIN SERPL BCP-MCNC: 2.1 G/DL (ref 3.5–5.2)
ALP SERPL-CCNC: 50 U/L (ref 55–135)
ALT SERPL W/O P-5'-P-CCNC: <5 U/L (ref 10–44)
ANION GAP SERPL CALC-SCNC: 8 MMOL/L (ref 8–16)
AST SERPL-CCNC: 16 U/L (ref 10–40)
BASOPHILS # BLD AUTO: 0.05 K/UL (ref 0–0.2)
BASOPHILS NFR BLD: 0.4 % (ref 0–1.9)
BILIRUB SERPL-MCNC: 0.2 MG/DL (ref 0.1–1)
BUN SERPL-MCNC: 18 MG/DL (ref 8–23)
CALCIUM SERPL-MCNC: 8.1 MG/DL (ref 8.7–10.5)
CHLORIDE SERPL-SCNC: 108 MMOL/L (ref 95–110)
CO2 SERPL-SCNC: 22 MMOL/L (ref 23–29)
CREAT SERPL-MCNC: 0.6 MG/DL (ref 0.5–1.4)
DIFFERENTIAL METHOD: ABNORMAL
EOSINOPHIL # BLD AUTO: 0.3 K/UL (ref 0–0.5)
EOSINOPHIL NFR BLD: 2.3 % (ref 0–8)
ERYTHROCYTE [DISTWIDTH] IN BLOOD BY AUTOMATED COUNT: 13.2 % (ref 11.5–14.5)
EST. GFR  (NO RACE VARIABLE): >60 ML/MIN/1.73 M^2
GLUCOSE SERPL-MCNC: 80 MG/DL (ref 70–110)
HCT VFR BLD AUTO: 29.8 % (ref 37–48.5)
HGB BLD-MCNC: 9.6 G/DL (ref 12–16)
IMM GRANULOCYTES # BLD AUTO: 0.37 K/UL (ref 0–0.04)
IMM GRANULOCYTES NFR BLD AUTO: 3.3 % (ref 0–0.5)
LYMPHOCYTES # BLD AUTO: 1.2 K/UL (ref 1–4.8)
LYMPHOCYTES NFR BLD: 10.5 % (ref 18–48)
MAGNESIUM SERPL-MCNC: 1.8 MG/DL (ref 1.6–2.6)
MCH RBC QN AUTO: 29.2 PG (ref 27–31)
MCHC RBC AUTO-ENTMCNC: 32.2 G/DL (ref 32–36)
MCV RBC AUTO: 91 FL (ref 82–98)
MONOCYTES # BLD AUTO: 0.8 K/UL (ref 0.3–1)
MONOCYTES NFR BLD: 6.7 % (ref 4–15)
NEUTROPHILS # BLD AUTO: 8.7 K/UL (ref 1.8–7.7)
NEUTROPHILS NFR BLD: 76.8 % (ref 38–73)
NRBC BLD-RTO: 0 /100 WBC
PHOSPHATE SERPL-MCNC: 2.8 MG/DL (ref 2.7–4.5)
PLATELET # BLD AUTO: 321 K/UL (ref 150–450)
PMV BLD AUTO: 9.6 FL (ref 9.2–12.9)
POCT GLUCOSE: 106 MG/DL (ref 70–110)
POCT GLUCOSE: 107 MG/DL (ref 70–110)
POCT GLUCOSE: 114 MG/DL (ref 70–110)
POCT GLUCOSE: 116 MG/DL (ref 70–110)
POCT GLUCOSE: 89 MG/DL (ref 70–110)
POTASSIUM SERPL-SCNC: 4.3 MMOL/L (ref 3.5–5.1)
PROT SERPL-MCNC: 5 G/DL (ref 6–8.4)
RBC # BLD AUTO: 3.29 M/UL (ref 4–5.4)
SODIUM SERPL-SCNC: 138 MMOL/L (ref 136–145)
WBC # BLD AUTO: 11.28 K/UL (ref 3.9–12.7)

## 2023-12-12 PROCEDURE — A4216 STERILE WATER/SALINE, 10 ML: HCPCS | Performed by: STUDENT IN AN ORGANIZED HEALTH CARE EDUCATION/TRAINING PROGRAM

## 2023-12-12 PROCEDURE — 25000003 PHARM REV CODE 250: Performed by: STUDENT IN AN ORGANIZED HEALTH CARE EDUCATION/TRAINING PROGRAM

## 2023-12-12 PROCEDURE — 63600175 PHARM REV CODE 636 W HCPCS: Performed by: STUDENT IN AN ORGANIZED HEALTH CARE EDUCATION/TRAINING PROGRAM

## 2023-12-12 PROCEDURE — 85025 COMPLETE CBC W/AUTO DIFF WBC: CPT | Performed by: STUDENT IN AN ORGANIZED HEALTH CARE EDUCATION/TRAINING PROGRAM

## 2023-12-12 PROCEDURE — 80053 COMPREHEN METABOLIC PANEL: CPT | Performed by: STUDENT IN AN ORGANIZED HEALTH CARE EDUCATION/TRAINING PROGRAM

## 2023-12-12 PROCEDURE — 36415 COLL VENOUS BLD VENIPUNCTURE: CPT | Performed by: STUDENT IN AN ORGANIZED HEALTH CARE EDUCATION/TRAINING PROGRAM

## 2023-12-12 PROCEDURE — 21400001 HC TELEMETRY ROOM

## 2023-12-12 PROCEDURE — 84100 ASSAY OF PHOSPHORUS: CPT | Performed by: STUDENT IN AN ORGANIZED HEALTH CARE EDUCATION/TRAINING PROGRAM

## 2023-12-12 PROCEDURE — 83735 ASSAY OF MAGNESIUM: CPT | Performed by: STUDENT IN AN ORGANIZED HEALTH CARE EDUCATION/TRAINING PROGRAM

## 2023-12-12 RX ADMIN — CARBIDOPA AND LEVODOPA 1 SPLIT TABLET: 25; 100 TABLET ORAL at 09:12

## 2023-12-12 RX ADMIN — Medication 10 ML: at 06:12

## 2023-12-12 RX ADMIN — TAMSULOSIN HYDROCHLORIDE 0.4 MG: 0.4 CAPSULE ORAL at 09:12

## 2023-12-12 RX ADMIN — CYANOCOBALAMIN TAB 1000 MCG 1000 MCG: 1000 TAB at 09:12

## 2023-12-12 RX ADMIN — SENNOSIDES AND DOCUSATE SODIUM 1 TABLET: 8.6; 5 TABLET ORAL at 09:12

## 2023-12-12 RX ADMIN — CEFTRIAXONE 2 G: 2 INJECTION, POWDER, FOR SOLUTION INTRAMUSCULAR; INTRAVENOUS at 06:12

## 2023-12-12 RX ADMIN — ASPIRIN 81 MG: 81 TABLET, COATED ORAL at 09:12

## 2023-12-12 RX ADMIN — CETIRIZINE HYDROCHLORIDE 10 MG: 10 TABLET, FILM COATED ORAL at 09:12

## 2023-12-12 RX ADMIN — Medication 10 ML: at 01:12

## 2023-12-12 RX ADMIN — CARBIDOPA AND LEVODOPA 1 TABLET: 25; 100 TABLET ORAL at 04:12

## 2023-12-12 RX ADMIN — ENOXAPARIN SODIUM 30 MG: 30 INJECTION SUBCUTANEOUS at 04:12

## 2023-12-12 RX ADMIN — CARBIDOPA AND LEVODOPA 1 TABLET: 25; 100 TABLET ORAL at 09:12

## 2023-12-12 RX ADMIN — PANTOPRAZOLE SODIUM 20 MG: 20 TABLET, DELAYED RELEASE ORAL at 09:12

## 2023-12-12 RX ADMIN — Medication 10 ML: at 12:12

## 2023-12-12 RX ADMIN — THERA TABS 1 TABLET: TAB at 09:12

## 2023-12-12 NOTE — PLAN OF CARE
Problem: Impaired Wound Healing  Goal: Optimal Wound Healing  Outcome: Ongoing, Progressing     Problem: Adult Inpatient Plan of Care  Goal: Plan of Care Review  Outcome: Ongoing, Progressing     Problem: Skin Injury Risk Increased  Goal: Skin Health and Integrity  Outcome: Ongoing, Progressing     Problem: Nutrition Impaired (Sepsis/Septic Shock)  Goal: Optimal Nutrition Intake  Outcome: Ongoing, Progressing\    Pt disoriented x4. No acute events noted during shift. Vitals remained stable. No signs/complaints of pain or discomfort, scheduled meds given per MD orders. Q1-2hr safety checks and turns completed. Bed remained low, locked, with all personal items in reach.

## 2023-12-12 NOTE — PLAN OF CARE
CM spoke with Archie at [facility] [phone], informed that we are pursuing LTAC for patient at this time.    KEYUR BenavidesN, BS, RN, [facility]

## 2023-12-12 NOTE — PROGRESS NOTES
[facility] ([address] Medicine  Progress Note    Patient Name: Annmarie Gallardo  MRN: [id number]  Patient Class: IP- Inpatient   Admission Date: 12/1/2023  Length of Stay: 11 days  Attending Physician: Yuniel Hills MD  Primary Care Provider: Raymond Bess MD        Subjective:     Principal Problem:Pressure injury of sacral region, unstageable        HPI:  Annmarie Gallardo is a 78 y.o. with a PMHx of parkinson's, lewy-body dementia with debility/functional quadriplegia, HTN, GERD, CKD3, and sacral decubitus ulcer who presents today from her nursing home for evaluation of sacral decubitus ulcer.     Due to her dementia and nonverbal status, she is unable to provide a history. No family available at bedside during my exam.     Overview/Hospital Course:  Pt presenting with purulent drainage from sacral wound . S/p debridement 12/3 with general surgery. Patient febrile with leukocytosis overnight 12/3. Continue IV abx.     Interval History:   No events overnight. WBC improving. Renal function improved with IVF. Pending placement.      Review of Systems   Unable to perform ROS: Dementia     Objective:     Vital Signs (Most Recent):  Temp: 98.5 °F (36.9 °C) (12/12/23 1223)  Pulse: 69 (12/12/23 1223)  Resp: 17 (12/12/23 1223)  BP: (!) 107/56 (12/12/23 1223)  SpO2: 99 % (12/12/23 1223) Vital Signs (24h Range):  Temp:  [98.4 °F (36.9 °C)-99.4 °F (37.4 °C)] 98.5 °F (36.9 °C)  Pulse:  [51-78] 69  Resp:  [16-19] 17  SpO2:  [96 %-100 %] 99 %  BP: (105-127)/(54-71) 107/56     Weight: 52.6 kg (115 lb 15.4 oz)  Body mass index is 20.54 kg/m².    Intake/Output Summary (Last 24 hours) at 12/12/2023 1343  Last data filed at 12/12/2023 0943  Gross per 24 hour   Intake 120 ml   Output 775 ml   Net -655 ml         Physical Exam  Vitals and nursing note reviewed.   Constitutional:       General: She is not in acute distress.     Appearance: She is well-developed. She is ill-appearing (chronically).  She is not diaphoretic.   HENT:      Head: Normocephalic and atraumatic.   Eyes:      General: No scleral icterus.     Conjunctiva/sclera: Conjunctivae normal.   Neck:      Vascular: No JVD.   Cardiovascular:      Rate and Rhythm: Normal rate and regular rhythm.      Heart sounds: Normal heart sounds.   Pulmonary:      Effort: Pulmonary effort is normal. No respiratory distress.   Abdominal:      General: There is no distension.      Palpations: Abdomen is soft.      Tenderness: There is no abdominal tenderness.   Musculoskeletal:      Right lower leg: No edema.      Left lower leg: No edema.   Skin:     Coloration: Skin is pale. Skin is not jaundiced.      Findings: Lesion present.   Neurological:      Mental Status: She is alert.      Motor: No abnormal muscle tone.      Comments: Does not attempt to track or answer questions, does not follow commands, no tremor, UE rigidity   Psychiatric:      Comments: Unable to evaluate             Significant Labs: All pertinent labs within the past 24 hours have been reviewed.  CBC:   Recent Labs   Lab 12/11/23  0536 12/12/23  0419   WBC 13.90* 11.28   HGB 10.7* 9.6*   HCT 33.3* 29.8*    321     CMP:   Recent Labs   Lab 12/11/23  0536 12/12/23  0419    138   K 4.4 4.3    108   CO2 25 22*    80   BUN 24* 18   CREATININE 0.8 0.6   CALCIUM 8.6* 8.1*   PROT 5.7* 5.0*   ALBUMIN 2.4* 2.1*   BILITOT 0.2 0.2   ALKPHOS 60 50*   AST 21 16   ALT 12 <5*   ANIONGAP 9 8       Significant Imaging: I have reviewed all pertinent imaging results/findings within the past 24 hours.    Assessment/Plan:      * Pressure injury of sacral region, unstageable  Presents for evaluation of sacral decubitus ulcer. Purulent on exam. Mild leukocytosis, otherwise does not meet sepsis criteria.   - Wound culture with P mirabilis  - Continue ceftriaxone 2g daily x10 days (sot 12/9)  - General surgery consulted  -- s/p debridement 12/3  - Wound care consulted for recs for discharge  wound care    Sepsis without acute organ dysfunction  Febrile with new leukocytosis on 12/3. Known infected wound.    This patient does have evidence of infective focus  My overall impression is sepsis.  Source: Skin and Soft Tissue (location sacral)  Antibiotics given-   Antibiotics (72h ago, onward)      Start     Stop Route Frequency Ordered    12/09/23 0600  cefTRIAXone (ROCEPHIN) 2 g in dextrose 5 % in water (D5W) 100 mL IVPB (MB+)         -- IV Every 24 hours (non-standard times) 12/08/23 1520            Fluid challenge Not needed - patient is not hypotensive      Source control achieved by: to OR 12/3 for debridement   - Follow-up infectious workup ordered 12/10  - Blood cultures no growth to date   - UA bland  - Chest x-ray unremarkable  - Antibiotics as above      CKD (chronic kidney disease), stage III  Creatine stable for now. BMP reviewed- noted Estimated Creatinine Clearance: 53.9 mL/min (based on SCr of 0.7 mg/dL). according to latest data. Based on current GFR, CKD stage is stage 3 - GFR 30-59.       - Monitor UOP and serial BMP and adjust therapy as needed.    - Renally dose meds.    - Avoid nephrotoxic medications and procedures.      Stage IV pressure ulcer of sacral region  - See above      Macrocytic anemia  Chronic, and likely anemia of chronic disease. Currently without indication for transfusion.     Severe protein-calorie malnutrition  Due to Parkinson's and debility. Will continue dietary supplements as recommended by RD during previous hospitalization.   - SLP consulted      Hypernatremia  Patient has hypernatremia which is controlled. The hypernatremia is due to Dehydration. The patient's sodium results have been reviewed and are listed below.  Recent Labs   Lab 12/08/23  0432              Functional quadriplegia  Due to Parkinsons' and dementia. Continue frequent turns and pressure ulcer care/prevention.       DNR (do not resuscitate)  Per previous documentation and discussions with  patient's family. Clarified with daughter Yamileth at bedside (shares POA with other daughter) - patient is DNR.       Severe Lewy body dementia  Monitor with delirium precautions.     Parkinson's disease  Continue home Sinemet.        VTE Risk Mitigation (From admission, onward)           Ordered     enoxaparin injection 30 mg  Daily         12/09/23 1543     IP VTE HIGH RISK PATIENT  Once         12/01/23 2123     Place sequential compression device  Until discontinued         12/01/23 2123     Reason for No Pharmacological VTE Prophylaxis  Once        Question:  Reasons:  Answer:  Physician Provided (leave comment)    12/01/23 2123                    Discharge Planning   MELCHOR: 12/12/2023     Code Status: DNR   Is the patient medically ready for discharge?: No    Reason for patient still in hospital (select all that apply): Patient trending condition, Laboratory test, Treatment, and Pending disposition  Discharge Plan A: Long-term acute care facility (LTAC)   Discharge Delays: (!) Post-Acute Set-up              Yuniel Hills MD  Department of Hospital Medicine   [facility] - Intensive Care ([place])

## 2023-12-12 NOTE — ASSESSMENT & PLAN NOTE
Presents for evaluation of sacral decubitus ulcer. Purulent on exam. Mild leukocytosis, otherwise does not meet sepsis criteria.   - Wound culture with P mirabilis  - Continue ceftriaxone 2g daily x10 days (sot 12/9)  - General surgery consulted  -- s/p debridement 12/3  - Wound care consulted for recs for discharge wound care

## 2023-12-12 NOTE — PLAN OF CARE
Problem: Impaired Wound Healing  Goal: Optimal Wound Healing  Outcome: Ongoing, Progressing     Problem: Skin Injury Risk Increased  Goal: Skin Health and Integrity  Outcome: Ongoing, Progressing   Wound care performed on sacrum. Meds crushed in chocolate pudding. Bed locked and in lowest position. Call light in reach.

## 2023-12-12 NOTE — SUBJECTIVE & OBJECTIVE
Interval History:   No events overnight. WBC improving. Renal function improved with IVF. Pending placement.      Review of Systems   Unable to perform ROS: Dementia     Objective:     Vital Signs (Most Recent):  Temp: 98.5 °F (36.9 °C) (12/12/23 1223)  Pulse: 69 (12/12/23 1223)  Resp: 17 (12/12/23 1223)  BP: (!) 107/56 (12/12/23 1223)  SpO2: 99 % (12/12/23 1223) Vital Signs (24h Range):  Temp:  [98.4 °F (36.9 °C)-99.4 °F (37.4 °C)] 98.5 °F (36.9 °C)  Pulse:  [51-78] 69  Resp:  [16-19] 17  SpO2:  [96 %-100 %] 99 %  BP: (105-127)/(54-71) 107/56     Weight: 52.6 kg (115 lb 15.4 oz)  Body mass index is 20.54 kg/m².    Intake/Output Summary (Last 24 hours) at 12/12/2023 1343  Last data filed at 12/12/2023 0943  Gross per 24 hour   Intake 120 ml   Output 775 ml   Net -655 ml         Physical Exam  Vitals and nursing note reviewed.   Constitutional:       General: She is not in acute distress.     Appearance: She is well-developed. She is ill-appearing (chronically). She is not diaphoretic.   HENT:      Head: Normocephalic and atraumatic.   Eyes:      General: No scleral icterus.     Conjunctiva/sclera: Conjunctivae normal.   Neck:      Vascular: No JVD.   Cardiovascular:      Rate and Rhythm: Normal rate and regular rhythm.      Heart sounds: Normal heart sounds.   Pulmonary:      Effort: Pulmonary effort is normal. No respiratory distress.   Abdominal:      General: There is no distension.      Palpations: Abdomen is soft.      Tenderness: There is no abdominal tenderness.   Musculoskeletal:      Right lower leg: No edema.      Left lower leg: No edema.   Skin:     Coloration: Skin is pale. Skin is not jaundiced.      Findings: Lesion present.   Neurological:      Mental Status: She is alert.      Motor: No abnormal muscle tone.      Comments: Does not attempt to track or answer questions, does not follow commands, no tremor, UE rigidity   Psychiatric:      Comments: Unable to evaluate             Significant Labs: All  pertinent labs within the past 24 hours have been reviewed.  CBC:   Recent Labs   Lab 12/11/23  0536 12/12/23  0419   WBC 13.90* 11.28   HGB 10.7* 9.6*   HCT 33.3* 29.8*    321     CMP:   Recent Labs   Lab 12/11/23  0536 12/12/23  0419    138   K 4.4 4.3    108   CO2 25 22*    80   BUN 24* 18   CREATININE 0.8 0.6   CALCIUM 8.6* 8.1*   PROT 5.7* 5.0*   ALBUMIN 2.4* 2.1*   BILITOT 0.2 0.2   ALKPHOS 60 50*   AST 21 16   ALT 12 <5*   ANIONGAP 9 8       Significant Imaging: I have reviewed all pertinent imaging results/findings within the past 24 hours.

## 2023-12-13 LAB
ALBUMIN SERPL BCP-MCNC: 2.1 G/DL (ref 3.5–5.2)
ALP SERPL-CCNC: 53 U/L (ref 55–135)
ALT SERPL W/O P-5'-P-CCNC: <5 U/L (ref 10–44)
ANION GAP SERPL CALC-SCNC: 5 MMOL/L (ref 8–16)
AST SERPL-CCNC: 16 U/L (ref 10–40)
BASOPHILS # BLD AUTO: 0.07 K/UL (ref 0–0.2)
BASOPHILS NFR BLD: 0.7 % (ref 0–1.9)
BILIRUB SERPL-MCNC: 0.2 MG/DL (ref 0.1–1)
BUN SERPL-MCNC: 18 MG/DL (ref 8–23)
CALCIUM SERPL-MCNC: 8.5 MG/DL (ref 8.7–10.5)
CHLORIDE SERPL-SCNC: 107 MMOL/L (ref 95–110)
CO2 SERPL-SCNC: 24 MMOL/L (ref 23–29)
CREAT SERPL-MCNC: 0.7 MG/DL (ref 0.5–1.4)
DIFFERENTIAL METHOD: ABNORMAL
EOSINOPHIL # BLD AUTO: 0.2 K/UL (ref 0–0.5)
EOSINOPHIL NFR BLD: 1.6 % (ref 0–8)
ERYTHROCYTE [DISTWIDTH] IN BLOOD BY AUTOMATED COUNT: 13.3 % (ref 11.5–14.5)
EST. GFR  (NO RACE VARIABLE): >60 ML/MIN/1.73 M^2
GLUCOSE SERPL-MCNC: 98 MG/DL (ref 70–110)
HCT VFR BLD AUTO: 32.3 % (ref 37–48.5)
HGB BLD-MCNC: 10.2 G/DL (ref 12–16)
IMM GRANULOCYTES # BLD AUTO: 0.2 K/UL (ref 0–0.04)
IMM GRANULOCYTES NFR BLD AUTO: 2.1 % (ref 0–0.5)
LYMPHOCYTES # BLD AUTO: 1.2 K/UL (ref 1–4.8)
LYMPHOCYTES NFR BLD: 13.2 % (ref 18–48)
MAGNESIUM SERPL-MCNC: 1.8 MG/DL (ref 1.6–2.6)
MCH RBC QN AUTO: 29.7 PG (ref 27–31)
MCHC RBC AUTO-ENTMCNC: 31.6 G/DL (ref 32–36)
MCV RBC AUTO: 94 FL (ref 82–98)
MONOCYTES # BLD AUTO: 0.6 K/UL (ref 0.3–1)
MONOCYTES NFR BLD: 6.3 % (ref 4–15)
NEUTROPHILS # BLD AUTO: 7.2 K/UL (ref 1.8–7.7)
NEUTROPHILS NFR BLD: 76.1 % (ref 38–73)
NRBC BLD-RTO: 0 /100 WBC
PHOSPHATE SERPL-MCNC: 2.6 MG/DL (ref 2.7–4.5)
PLATELET # BLD AUTO: 321 K/UL (ref 150–450)
PMV BLD AUTO: 9.7 FL (ref 9.2–12.9)
POCT GLUCOSE: 103 MG/DL (ref 70–110)
POCT GLUCOSE: 114 MG/DL (ref 70–110)
POCT GLUCOSE: 122 MG/DL (ref 70–110)
POCT GLUCOSE: 96 MG/DL (ref 70–110)
POCT GLUCOSE: 98 MG/DL (ref 70–110)
POTASSIUM SERPL-SCNC: 4.6 MMOL/L (ref 3.5–5.1)
PROT SERPL-MCNC: 5.4 G/DL (ref 6–8.4)
RBC # BLD AUTO: 3.44 M/UL (ref 4–5.4)
SODIUM SERPL-SCNC: 136 MMOL/L (ref 136–145)
WBC # BLD AUTO: 9.42 K/UL (ref 3.9–12.7)

## 2023-12-13 PROCEDURE — 83735 ASSAY OF MAGNESIUM: CPT | Performed by: STUDENT IN AN ORGANIZED HEALTH CARE EDUCATION/TRAINING PROGRAM

## 2023-12-13 PROCEDURE — 25000003 PHARM REV CODE 250: Performed by: STUDENT IN AN ORGANIZED HEALTH CARE EDUCATION/TRAINING PROGRAM

## 2023-12-13 PROCEDURE — 85025 COMPLETE CBC W/AUTO DIFF WBC: CPT | Performed by: STUDENT IN AN ORGANIZED HEALTH CARE EDUCATION/TRAINING PROGRAM

## 2023-12-13 PROCEDURE — S5010 5% DEXTROSE AND 0.45% SALINE: HCPCS | Performed by: STUDENT IN AN ORGANIZED HEALTH CARE EDUCATION/TRAINING PROGRAM

## 2023-12-13 PROCEDURE — 36415 COLL VENOUS BLD VENIPUNCTURE: CPT | Performed by: STUDENT IN AN ORGANIZED HEALTH CARE EDUCATION/TRAINING PROGRAM

## 2023-12-13 PROCEDURE — 80053 COMPREHEN METABOLIC PANEL: CPT | Performed by: STUDENT IN AN ORGANIZED HEALTH CARE EDUCATION/TRAINING PROGRAM

## 2023-12-13 PROCEDURE — 21400001 HC TELEMETRY ROOM

## 2023-12-13 PROCEDURE — 63600175 PHARM REV CODE 636 W HCPCS: Performed by: STUDENT IN AN ORGANIZED HEALTH CARE EDUCATION/TRAINING PROGRAM

## 2023-12-13 PROCEDURE — A4216 STERILE WATER/SALINE, 10 ML: HCPCS | Performed by: STUDENT IN AN ORGANIZED HEALTH CARE EDUCATION/TRAINING PROGRAM

## 2023-12-13 PROCEDURE — 84100 ASSAY OF PHOSPHORUS: CPT | Performed by: STUDENT IN AN ORGANIZED HEALTH CARE EDUCATION/TRAINING PROGRAM

## 2023-12-13 RX ORDER — DEXTROSE MONOHYDRATE AND SODIUM CHLORIDE 5; .45 G/100ML; G/100ML
INJECTION, SOLUTION INTRAVENOUS CONTINUOUS
Status: ACTIVE | OUTPATIENT
Start: 2023-12-13 | End: 2023-12-14

## 2023-12-13 RX ADMIN — Medication 10 ML: at 05:12

## 2023-12-13 RX ADMIN — ENOXAPARIN SODIUM 30 MG: 30 INJECTION SUBCUTANEOUS at 05:12

## 2023-12-13 RX ADMIN — CEFTRIAXONE 2 G: 2 INJECTION, POWDER, FOR SOLUTION INTRAMUSCULAR; INTRAVENOUS at 05:12

## 2023-12-13 RX ADMIN — CYANOCOBALAMIN TAB 1000 MCG 1000 MCG: 1000 TAB at 08:12

## 2023-12-13 RX ADMIN — TAMSULOSIN HYDROCHLORIDE 0.4 MG: 0.4 CAPSULE ORAL at 08:12

## 2023-12-13 RX ADMIN — SENNOSIDES AND DOCUSATE SODIUM 1 TABLET: 8.6; 5 TABLET ORAL at 10:12

## 2023-12-13 RX ADMIN — Medication 10 ML: at 12:12

## 2023-12-13 RX ADMIN — CARBIDOPA AND LEVODOPA 1 TABLET: 25; 100 TABLET ORAL at 03:12

## 2023-12-13 RX ADMIN — ASPIRIN 81 MG: 81 TABLET, COATED ORAL at 08:12

## 2023-12-13 RX ADMIN — CARBIDOPA AND LEVODOPA 1 TABLET: 25; 100 TABLET ORAL at 08:12

## 2023-12-13 RX ADMIN — Medication 10 ML: at 01:12

## 2023-12-13 RX ADMIN — CETIRIZINE HYDROCHLORIDE 10 MG: 10 TABLET, FILM COATED ORAL at 08:12

## 2023-12-13 RX ADMIN — THERA TABS 1 TABLET: TAB at 08:12

## 2023-12-13 RX ADMIN — PANTOPRAZOLE SODIUM 20 MG: 20 TABLET, DELAYED RELEASE ORAL at 08:12

## 2023-12-13 RX ADMIN — DEXTROSE AND SODIUM CHLORIDE: 5; 450 INJECTION, SOLUTION INTRAVENOUS at 05:12

## 2023-12-13 RX ADMIN — POTASSIUM PHOSPHATE, MONOBASIC POTASSIUM PHOSPHATE, DIBASIC 20 MMOL: 224; 236 INJECTION, SOLUTION, CONCENTRATE INTRAVENOUS at 09:12

## 2023-12-13 RX ADMIN — CARBIDOPA AND LEVODOPA 1 TABLET: 25; 100 TABLET ORAL at 10:12

## 2023-12-13 NOTE — PLAN OF CARE
Problem: Impaired Wound Healing  Goal: Optimal Wound Healing  Outcome: Ongoing, Progressing     Problem: Bleeding (Sepsis/Septic Shock)  Goal: Absence of Bleeding  Outcome: Ongoing, Progressing   No significant changes noted. D5W 1/2 NS running. Wound care performed. Bed locked and in lowest position. Call light in reach.    Ear Wedge Repair Text: A wedge excision was completed by carrying down an excision through the full thickness of the ear and cartilage with an inward facing Burow's triangle. The wound was then closed in a layered fashion.

## 2023-12-13 NOTE — ASSESSMENT & PLAN NOTE
Presents for evaluation of sacral decubitus ulcer. Purulent on exam. Mild leukocytosis, otherwise does not meet sepsis criteria.   - Wound culture with P mirabilis  - Continue ceftriaxone 2g daily x7-10 days (sot 12/9)  - General surgery consulted  -- s/p debridement 12/3  - Wound care consulted for recs for discharge wound care  - Pending placement

## 2023-12-13 NOTE — PROGRESS NOTES
[facility] - [facility] ([address] Medicine  Progress Note    Patient Name: Annmarie Gallardo  MRN: [id number]  Patient Class: IP- Inpatient   Admission Date: 12/1/2023  Length of Stay: 12 days  Attending Physician: Yuniel Hills MD  Primary Care Provider: Raymond Bess MD        Subjective:     Principal Problem:Pressure injury of sacral region, unstageable        HPI:  Annmarie Gallardo is a 78 y.o. with a PMHx of parkinson's, lewy-body dementia with debility/functional quadriplegia, HTN, GERD, CKD3, and sacral decubitus ulcer who presents today from her nursing home for evaluation of sacral decubitus ulcer.     Due to her dementia and nonverbal status, she is unable to provide a history. No family available at bedside during my exam.     Overview/Hospital Course:  Pt presenting with purulent drainage from sacral wound . S/p debridement 12/3 with general surgery. Patient febrile with leukocytosis overnight 12/3.  Wound cultures with Proteus mirabilis.  Patient treated with ceftriaxone based on sensitivities to treat soft tissue infection.  Patient given IV fluids given poor oral intake.  Patient denied LTAC on peer to peer.    Interval History:   No events overnight. Continue IV abx. CM working on placement.       Review of Systems   Unable to perform ROS: Dementia     Objective:     Vital Signs (Most Recent):  Temp: 98 °F (36.7 °C) (12/13/23 1122)  Pulse: 78 (12/13/23 1509)  Resp: 19 (12/13/23 1122)  BP: (!) 113/53 (12/13/23 1122)  SpO2: 99 % (12/13/23 1509) Vital Signs (24h Range):  Temp:  [97 °F (36.1 °C)-98 °F (36.7 °C)] 98 °F (36.7 °C)  Pulse:  [58-78] 78  Resp:  [17-19] 19  SpO2:  [95 %-100 %] 99 %  BP: (113-149)/(53-67) 113/53     Weight: 52.6 kg (115 lb 15.4 oz)  Body mass index is 20.54 kg/m².    Intake/Output Summary (Last 24 hours) at 12/13/2023 0453  Last data filed at 12/13/2023 1537  Gross per 24 hour   Intake --   Output 950 ml   Net -950 ml         Physical Exam  Vitals and  nursing note reviewed.   Constitutional:       General: She is not in acute distress.     Appearance: She is well-developed. She is ill-appearing (chronically). She is not diaphoretic.   HENT:      Head: Normocephalic and atraumatic.   Eyes:      General: No scleral icterus.     Conjunctiva/sclera: Conjunctivae normal.   Neck:      Vascular: No JVD.   Cardiovascular:      Rate and Rhythm: Normal rate and regular rhythm.      Heart sounds: Normal heart sounds.   Pulmonary:      Effort: Pulmonary effort is normal. No respiratory distress.   Abdominal:      General: There is no distension.      Palpations: Abdomen is soft.      Tenderness: There is no abdominal tenderness.   Musculoskeletal:      Right lower leg: No edema.      Left lower leg: No edema.   Skin:     Coloration: Skin is pale. Skin is not jaundiced.      Findings: Lesion present.   Neurological:      Mental Status: She is alert.      Motor: No abnormal muscle tone.      Comments: Does not attempt to track or answer questions, does not follow commands, no tremor, UE rigidity   Psychiatric:      Comments: Unable to evaluate             Significant Labs: All pertinent labs within the past 24 hours have been reviewed.  CBC:   Recent Labs   Lab 12/12/23 0419 12/13/23  0531   WBC 11.28 9.42   HGB 9.6* 10.2*   HCT 29.8* 32.3*    321     CMP:   Recent Labs   Lab 12/12/23 0419 12/13/23  0531    136   K 4.3 4.6    107   CO2 22* 24   GLU 80 98   BUN 18 18   CREATININE 0.6 0.7   CALCIUM 8.1* 8.5*   PROT 5.0* 5.4*   ALBUMIN 2.1* 2.1*   BILITOT 0.2 0.2   ALKPHOS 50* 53*   AST 16 16   ALT <5* <5*   ANIONGAP 8 5*       Significant Imaging: I have reviewed all pertinent imaging results/findings within the past 24 hours.    Assessment/Plan:      * Pressure injury of sacral region, unstageable  Presents for evaluation of sacral decubitus ulcer. Purulent on exam. Mild leukocytosis, otherwise does not meet sepsis criteria.   - Wound culture with P  mirabilis  - Continue ceftriaxone 2g daily x7-10 days (sot 12/9)  - General surgery consulted  -- s/p debridement 12/3  - Wound care consulted for recs for discharge wound care  - Pending placement     Sepsis without acute organ dysfunction  Febrile with new leukocytosis on 12/3. Known infected wound.    This patient does have evidence of infective focus  My overall impression is sepsis.  Source: Skin and Soft Tissue (location sacral)  Antibiotics given-   Antibiotics (72h ago, onward)      Start     Stop Route Frequency Ordered    12/09/23 0600  cefTRIAXone (ROCEPHIN) 2 g in dextrose 5 % in water (D5W) 100 mL IVPB (MB+)         -- IV Every 24 hours (non-standard times) 12/08/23 1520            Fluid challenge Not needed - patient is not hypotensive      Source control achieved by: to OR 12/3 for debridement   - Follow-up infectious workup ordered 12/10  - Blood cultures no growth to date   - UA bland  - Chest x-ray unremarkable  - Antibiotics as above      CKD (chronic kidney disease), stage III  Creatine stable for now. BMP reviewed- noted Estimated Creatinine Clearance: 53.9 mL/min (based on SCr of 0.7 mg/dL). according to latest data. Based on current GFR, CKD stage is stage 3 - GFR 30-59.       - Monitor UOP and serial BMP and adjust therapy as needed.    - Renally dose meds.   - Avoid nephrotoxic medications and procedures.  - IVF as needed      Stage IV pressure ulcer of sacral region  - See above      Macrocytic anemia  Chronic, and likely anemia of chronic disease. Currently without indication for transfusion.     Severe protein-calorie malnutrition  Due to Parkinson's and debility. Will continue dietary supplements as recommended by RD during previous hospitalization.   - SLP consulted      Hypernatremia  Patient has hypernatremia which is controlled. The hypernatremia is due to Dehydration. The patient's sodium results have been reviewed and are listed below.  Recent Labs   Lab 12/08/23  0432               Functional quadriplegia  Due to Parkinsons' and dementia. Continue frequent turns and pressure ulcer care/prevention.       DNR (do not resuscitate)  Per previous documentation and discussions with patient's family. Clarified with daughter Yamileth at bedside (shares POA with other daughter) - patient is DNR.       Severe Lewy body dementia  Monitor with delirium precautions.     Parkinson's disease  Continue home Sinemet.        VTE Risk Mitigation (From admission, onward)           Ordered     enoxaparin injection 30 mg  Daily         12/09/23 1543     IP VTE HIGH RISK PATIENT  Once         12/01/23 2123     Place sequential compression device  Until discontinued         12/01/23 2123     Reason for No Pharmacological VTE Prophylaxis  Once        Question:  Reasons:  Answer:  Physician Provided (leave comment)    12/01/23 2123                    Discharge Planning   MELCHOR: 12/15/2023     Code Status: DNR   Is the patient medically ready for discharge?: Yes    Reason for patient still in hospital (select all that apply): Patient trending condition, Laboratory test, Treatment, and Pending disposition  Discharge Plan A: Long-term acute care facility (LTAC)   Discharge Delays: (!) Post-Acute Set-up              Yuniel Hills MD  Department of Hospital Medicine   [facility] - Intensive Care ([place])

## 2023-12-13 NOTE — PLAN OF CARE
Problem: Impaired Wound Healing  Goal: Optimal Wound Healing  Outcome: Ongoing, Progressing     Problem: Skin Injury Risk Increased  Goal: Skin Health and Integrity  Outcome: Ongoing, Progressing     Problem: Fall Injury Risk  Goal: Absence of Fall and Fall-Related Injury  Outcome: Ongoing, Progressing     Problem: Adjustment to Illness (Sepsis/Septic Shock)  Goal: Optimal Coping  Outcome: Ongoing, Progressing     Problem: Glycemic Control Impaired (Sepsis/Septic Shock)  Goal: Blood Glucose Level Within Desired Range  Outcome: Ongoing, Progressing

## 2023-12-13 NOTE — ASSESSMENT & PLAN NOTE
Creatine stable for now. BMP reviewed- noted Estimated Creatinine Clearance: 53.9 mL/min (based on SCr of 0.7 mg/dL). according to latest data. Based on current GFR, CKD stage is stage 3 - GFR 30-59.       - Monitor UOP and serial BMP and adjust therapy as needed.    - Renally dose meds.   - Avoid nephrotoxic medications and procedures.  - IVF as needed

## 2023-12-13 NOTE — PLAN OF CARE
Per Dalila MAXWELL at \[facility]\"", she re ran benefits, [employer] is still showing as primary, and they can't bill [facility] if another insurance is listed as primary.    CM sent SNF referral to [facility].    3:30 PM  CM spoke with pt's daughter Yamileth [phone].  Explained that since LTAC denied, we have to move forward with SNF.  Since she is medically ready for d/c, she will accrue a bill of $1200/night if we don't move forward with SNF placement.  Instructed that switching to Medicare from [employer] doesn't take place for billing until the end of the month, and she can't stay here waiting for that to take place.  CG v/u.  CM instructed that [facility] and [facility] have accepted pt.  DAYAN sent names/numbers of 2 accepting facilities to Yamileth via email [email or web address]    DAYAN submitted for [employer] SNF auth via [employer]ity.    CELINA Benavides, BS, RN, CCM

## 2023-12-13 NOTE — SUBJECTIVE & OBJECTIVE
Interval History:   No events overnight. Continue IV abx. CM working on placement.       Review of Systems   Unable to perform ROS: Dementia     Objective:     Vital Signs (Most Recent):  Temp: 98 °F (36.7 °C) (12/13/23 1122)  Pulse: 78 (12/13/23 1509)  Resp: 19 (12/13/23 1122)  BP: (!) 113/53 (12/13/23 1122)  SpO2: 99 % (12/13/23 1509) Vital Signs (24h Range):  Temp:  [97 °F (36.1 °C)-98 °F (36.7 °C)] 98 °F (36.7 °C)  Pulse:  [58-78] 78  Resp:  [17-19] 19  SpO2:  [95 %-100 %] 99 %  BP: (113-149)/(53-67) 113/53     Weight: 52.6 kg (115 lb 15.4 oz)  Body mass index is 20.54 kg/m².    Intake/Output Summary (Last 24 hours) at 12/13/2023 1555  Last data filed at 12/13/2023 1537  Gross per 24 hour   Intake --   Output 950 ml   Net -950 ml         Physical Exam  Vitals and nursing note reviewed.   Constitutional:       General: She is not in acute distress.     Appearance: She is well-developed. She is ill-appearing (chronically). She is not diaphoretic.   HENT:      Head: Normocephalic and atraumatic.   Eyes:      General: No scleral icterus.     Conjunctiva/sclera: Conjunctivae normal.   Neck:      Vascular: No JVD.   Cardiovascular:      Rate and Rhythm: Normal rate and regular rhythm.      Heart sounds: Normal heart sounds.   Pulmonary:      Effort: Pulmonary effort is normal. No respiratory distress.   Abdominal:      General: There is no distension.      Palpations: Abdomen is soft.      Tenderness: There is no abdominal tenderness.   Musculoskeletal:      Right lower leg: No edema.      Left lower leg: No edema.   Skin:     Coloration: Skin is pale. Skin is not jaundiced.      Findings: Lesion present.   Neurological:      Mental Status: She is alert.      Motor: No abnormal muscle tone.      Comments: Does not attempt to track or answer questions, does not follow commands, no tremor, UE rigidity   Psychiatric:      Comments: Unable to evaluate             Significant Labs: All pertinent labs within the past 24  hours have been reviewed.  CBC:   Recent Labs   Lab 12/12/23 0419 12/13/23  0531   WBC 11.28 9.42   HGB 9.6* 10.2*   HCT 29.8* 32.3*    321     CMP:   Recent Labs   Lab 12/12/23 0419 12/13/23  0531    136   K 4.3 4.6    107   CO2 22* 24   GLU 80 98   BUN 18 18   CREATININE 0.6 0.7   CALCIUM 8.1* 8.5*   PROT 5.0* 5.4*   ALBUMIN 2.1* 2.1*   BILITOT 0.2 0.2   ALKPHOS 50* 53*   AST 16 16   ALT <5* <5*   ANIONGAP 8 5*       Significant Imaging: I have reviewed all pertinent imaging results/findings within the past 24 hours.

## 2023-12-14 LAB
ALBUMIN SERPL BCP-MCNC: 2.2 G/DL (ref 3.5–5.2)
ALP SERPL-CCNC: 53 U/L (ref 55–135)
ALT SERPL W/O P-5'-P-CCNC: <5 U/L (ref 10–44)
ANION GAP SERPL CALC-SCNC: 8 MMOL/L (ref 8–16)
AST SERPL-CCNC: 15 U/L (ref 10–40)
BASOPHILS # BLD AUTO: 0.05 K/UL (ref 0–0.2)
BASOPHILS NFR BLD: 0.4 % (ref 0–1.9)
BILIRUB SERPL-MCNC: 0.3 MG/DL (ref 0.1–1)
BUN SERPL-MCNC: 12 MG/DL (ref 8–23)
CALCIUM SERPL-MCNC: 8.5 MG/DL (ref 8.7–10.5)
CHLORIDE SERPL-SCNC: 106 MMOL/L (ref 95–110)
CO2 SERPL-SCNC: 23 MMOL/L (ref 23–29)
CREAT SERPL-MCNC: 0.6 MG/DL (ref 0.5–1.4)
DIFFERENTIAL METHOD: ABNORMAL
EOSINOPHIL # BLD AUTO: 0.2 K/UL (ref 0–0.5)
EOSINOPHIL NFR BLD: 1.4 % (ref 0–8)
ERYTHROCYTE [DISTWIDTH] IN BLOOD BY AUTOMATED COUNT: 13.3 % (ref 11.5–14.5)
EST. GFR  (NO RACE VARIABLE): >60 ML/MIN/1.73 M^2
GLUCOSE SERPL-MCNC: 98 MG/DL (ref 70–110)
HCT VFR BLD AUTO: 31.2 % (ref 37–48.5)
HGB BLD-MCNC: 10.4 G/DL (ref 12–16)
IMM GRANULOCYTES # BLD AUTO: 0.17 K/UL (ref 0–0.04)
IMM GRANULOCYTES NFR BLD AUTO: 1.5 % (ref 0–0.5)
LYMPHOCYTES # BLD AUTO: 1 K/UL (ref 1–4.8)
LYMPHOCYTES NFR BLD: 8.9 % (ref 18–48)
MAGNESIUM SERPL-MCNC: 1.8 MG/DL (ref 1.6–2.6)
MCH RBC QN AUTO: 29.4 PG (ref 27–31)
MCHC RBC AUTO-ENTMCNC: 33.3 G/DL (ref 32–36)
MCV RBC AUTO: 88 FL (ref 82–98)
MONOCYTES # BLD AUTO: 0.8 K/UL (ref 0.3–1)
MONOCYTES NFR BLD: 6.9 % (ref 4–15)
NEUTROPHILS # BLD AUTO: 9.3 K/UL (ref 1.8–7.7)
NEUTROPHILS NFR BLD: 80.9 % (ref 38–73)
NRBC BLD-RTO: 0 /100 WBC
PHOSPHATE SERPL-MCNC: 3.2 MG/DL (ref 2.7–4.5)
PLATELET # BLD AUTO: 306 K/UL (ref 150–450)
PMV BLD AUTO: 9.2 FL (ref 9.2–12.9)
POCT GLUCOSE: 103 MG/DL (ref 70–110)
POCT GLUCOSE: 134 MG/DL (ref 70–110)
POCT GLUCOSE: 86 MG/DL (ref 70–110)
POCT GLUCOSE: 90 MG/DL (ref 70–110)
POCT GLUCOSE: 91 MG/DL (ref 70–110)
POTASSIUM SERPL-SCNC: 4.7 MMOL/L (ref 3.5–5.1)
PROT SERPL-MCNC: 5.5 G/DL (ref 6–8.4)
RBC # BLD AUTO: 3.54 M/UL (ref 4–5.4)
SODIUM SERPL-SCNC: 137 MMOL/L (ref 136–145)
WBC # BLD AUTO: 11.52 K/UL (ref 3.9–12.7)

## 2023-12-14 PROCEDURE — A4216 STERILE WATER/SALINE, 10 ML: HCPCS | Performed by: STUDENT IN AN ORGANIZED HEALTH CARE EDUCATION/TRAINING PROGRAM

## 2023-12-14 PROCEDURE — 80053 COMPREHEN METABOLIC PANEL: CPT | Performed by: STUDENT IN AN ORGANIZED HEALTH CARE EDUCATION/TRAINING PROGRAM

## 2023-12-14 PROCEDURE — 63600175 PHARM REV CODE 636 W HCPCS: Performed by: STUDENT IN AN ORGANIZED HEALTH CARE EDUCATION/TRAINING PROGRAM

## 2023-12-14 PROCEDURE — 21400001 HC TELEMETRY ROOM

## 2023-12-14 PROCEDURE — 25000003 PHARM REV CODE 250: Performed by: STUDENT IN AN ORGANIZED HEALTH CARE EDUCATION/TRAINING PROGRAM

## 2023-12-14 PROCEDURE — 36415 COLL VENOUS BLD VENIPUNCTURE: CPT | Performed by: STUDENT IN AN ORGANIZED HEALTH CARE EDUCATION/TRAINING PROGRAM

## 2023-12-14 PROCEDURE — 83735 ASSAY OF MAGNESIUM: CPT | Performed by: STUDENT IN AN ORGANIZED HEALTH CARE EDUCATION/TRAINING PROGRAM

## 2023-12-14 PROCEDURE — 84100 ASSAY OF PHOSPHORUS: CPT | Performed by: STUDENT IN AN ORGANIZED HEALTH CARE EDUCATION/TRAINING PROGRAM

## 2023-12-14 PROCEDURE — 85025 COMPLETE CBC W/AUTO DIFF WBC: CPT | Performed by: STUDENT IN AN ORGANIZED HEALTH CARE EDUCATION/TRAINING PROGRAM

## 2023-12-14 RX ADMIN — CARBIDOPA AND LEVODOPA 1 SPLIT TABLET: 25; 100 TABLET ORAL at 01:12

## 2023-12-14 RX ADMIN — ENOXAPARIN SODIUM 30 MG: 30 INJECTION SUBCUTANEOUS at 04:12

## 2023-12-14 RX ADMIN — CETIRIZINE HYDROCHLORIDE 10 MG: 10 TABLET, FILM COATED ORAL at 09:12

## 2023-12-14 RX ADMIN — PANTOPRAZOLE SODIUM 20 MG: 20 TABLET, DELAYED RELEASE ORAL at 09:12

## 2023-12-14 RX ADMIN — Medication 10 ML: at 06:12

## 2023-12-14 RX ADMIN — Medication 10 ML: at 12:12

## 2023-12-14 RX ADMIN — CARBIDOPA AND LEVODOPA 1 TABLET: 25; 100 TABLET ORAL at 04:12

## 2023-12-14 RX ADMIN — CARBIDOPA AND LEVODOPA 1 TABLET: 25; 100 TABLET ORAL at 09:12

## 2023-12-14 RX ADMIN — SENNOSIDES AND DOCUSATE SODIUM 1 TABLET: 8.6; 5 TABLET ORAL at 09:12

## 2023-12-14 RX ADMIN — CYANOCOBALAMIN TAB 1000 MCG 1000 MCG: 1000 TAB at 09:12

## 2023-12-14 RX ADMIN — THERA TABS 1 TABLET: TAB at 09:12

## 2023-12-14 RX ADMIN — CEFTRIAXONE 2 G: 2 INJECTION, POWDER, FOR SOLUTION INTRAMUSCULAR; INTRAVENOUS at 07:12

## 2023-12-14 RX ADMIN — ASPIRIN 81 MG: 81 TABLET, COATED ORAL at 09:12

## 2023-12-14 RX ADMIN — CARBIDOPA AND LEVODOPA 1 SPLIT TABLET: 25; 100 TABLET ORAL at 09:12

## 2023-12-14 RX ADMIN — TAMSULOSIN HYDROCHLORIDE 0.4 MG: 0.4 CAPSULE ORAL at 09:12

## 2023-12-14 NOTE — PLAN OF CARE
Recommendations    Continue Regular diet with texture per SLP     Continue Boost Plus TID    Continue Jerome BID for promotion of wound healing    Rec'd addition of MVI along w/ Vitamin C for promotion of wound healing    If PO intake remains low, consider alternate nutrition, consult RD if warranted.     RD following      Goals: Meet % EEN, EPN by RD f/u  Nutrition Goal Status: progressing towards goal  Communication of RD Recs:  (POC)

## 2023-12-14 NOTE — PLAN OF CARE
DAYAN spoke with contrerasr Yamileth [phone].  She states she's not made a decision about which SNF facility she wants for pt.  She states she spoke with Dot, who told her policy was cancelled effective 11/30/23.  Confirmation # [id number].  This was processed today at 12:45, and would take up to 48 hours to process.  Her [employer] contact info is [phone], Dot .  Alt number is [phone].  She spoke with Brenda.  CG states as soon as this processes, Medicare will be active.    4:09 PM  [employer] appeal number: [id number].    DAYAN put [employer] appeal information bedside in pt's room.    KEYUR BenavidesN, BS, RN, [facility]

## 2023-12-14 NOTE — ASSESSMENT & PLAN NOTE
Malnutrition Type:  Context: chronic illness  Level: severe    Related to (etiology):   Inadequate energy intake    Signs and Symptoms (as evidenced by):   Malnutrition Characteristic Summary:  Energy Intake (Malnutrition): less than 75% for greater than or equal to 1 month  Subcutaneous Fat (Malnutrition): moderate depletion  Muscle Mass (Malnutrition): severe depletion    Interventions/Recommendations (treatment strategy):  Continue Regular diet with texture per SLP   Continue Boost Plus TID  Continue Jerome BID for promotion of wound healing  Rec'd addition of MVI along w/ Vitamin C for promotion of wound healing  If PO intake remains low, consider alternate nutrition, consult RD if warranted.   RD following     Nutrition Diagnosis Status:   New

## 2023-12-14 NOTE — PROGRESS NOTES
[facility] ([address] Medicine  Progress Note    Patient Name: Annmarie Gallardo  MRN: [id number]  Patient Class: IP- Inpatient   Admission Date: 12/1/2023  Length of Stay: 13 days  Attending Physician: Cristian Dueañs*  Primary Care Provider: Raymond Bess MD        Subjective:     Principal Problem:Pressure injury of sacral region, unstageable        HPI:  Annmarie Gallardo is a 78 y.o. with a PMHx of parkinson's, lewy-body dementia with debility/functional quadriplegia, HTN, GERD, CKD3, and sacral decubitus ulcer who presents today from her nursing home for evaluation of sacral decubitus ulcer.     Due to her dementia and nonverbal status, she is unable to provide a history. No family available at bedside during my exam.     Overview/Hospital Course:  Pt presenting with purulent drainage from sacral wound . S/p debridement 12/3 with general surgery. Patient febrile with leukocytosis overnight 12/3.  Wound cultures with Proteus mirabilis.  Patient treated with ceftriaxone based on sensitivities to treat soft tissue infection.  Patient given IV fluids given poor oral intake.  Patient denied LTAC on peer to peer.  Patient adequately treated for soft tissue infection with ceftriaxone 2 g daily with improvement of leukocytosis.  Iv fluids given for worsening renal function with improvement.    Interval History: No acute events. Afebrile. At baseline mentation    Review of Systems  Objective:     Vital Signs (Most Recent):  Temp: 98.1 °F (36.7 °C) (12/14/23 1202)  Pulse: 72 (12/14/23 1512)  Resp: 18 (12/14/23 1202)  BP: (!) 117/56 (12/14/23 1202)  SpO2: 100 % (12/14/23 1512) Vital Signs (24h Range):  Temp:  [96.8 °F (36 °C)-98.1 °F (36.7 °C)] 98.1 °F (36.7 °C)  Pulse:  [61-93] 72  Resp:  [17-18] 18  SpO2:  [92 %-100 %] 100 %  BP: (103-130)/(56-83) 117/56     Weight: 52.6 kg (115 lb 15.4 oz)  Body mass index is 20.54 kg/m².    Intake/Output Summary (Last 24 hours) at 12/14/2023  1522  Last data filed at 12/14/2023 1325  Gross per 24 hour   Intake 240 ml   Output 1350 ml   Net -1110 ml         Physical Exam  Vitals and nursing note reviewed.   Constitutional:       General: She is not in acute distress.     Appearance: She is well-developed. She is ill-appearing (chronically). She is not diaphoretic.   HENT:      Head: Normocephalic and atraumatic.   Eyes:      General: No scleral icterus.     Conjunctiva/sclera: Conjunctivae normal.   Neck:      Vascular: No JVD.   Cardiovascular:      Rate and Rhythm: Normal rate and regular rhythm.      Heart sounds: Normal heart sounds.   Pulmonary:      Effort: Pulmonary effort is normal. No respiratory distress.   Abdominal:      General: There is no distension.      Palpations: Abdomen is soft.      Tenderness: There is no abdominal tenderness.   Musculoskeletal:      Right lower leg: No edema.      Left lower leg: No edema.   Skin:     Coloration: Skin is pale. Skin is not jaundiced.      Findings: Lesion present.   Neurological:      Mental Status: She is alert.      Motor: No abnormal muscle tone.      Comments: Does not attempt to track or answer questions, does not follow commands, no tremor, UE rigidity   Psychiatric:      Comments: Unable to evaluate             Significant Labs: All pertinent labs within the past 24 hours have been reviewed.    Significant Imaging: I have reviewed all pertinent imaging results/findings within the past 24 hours.    Assessment/Plan:      * Pressure injury of sacral region, unstageable  Presents for evaluation of sacral decubitus ulcer. Purulent on exam. Mild leukocytosis, otherwise does not meet sepsis criteria.   - Wound culture with P mirabilis  - Continue ceftriaxone 2g daily x7-10 days (sot 12/9)  - General surgery consulted  -- s/p debridement 12/3  - Wound care consulted for recs for discharge wound care  - Pending placement     Stage IV pressure ulcer of sacral region  - See above      Sepsis without  acute organ dysfunction  Febrile with new leukocytosis on 12/3. Known infected wound.    This patient does have evidence of infective focus  My overall impression is sepsis.  Source: Skin and Soft Tissue (location sacral)  Antibiotics given-   Antibiotics (72h ago, onward)      Start     Stop Route Frequency Ordered    12/09/23 0600  cefTRIAXone (ROCEPHIN) 2 g in dextrose 5 % in water (D5W) 100 mL IVPB (MB+)         -- IV Every 24 hours (non-standard times) 12/08/23 1520            Fluid challenge Not needed - patient is not hypotensive      Source control achieved by: to OR 12/3 for debridement   - Follow-up infectious workup ordered 12/10  - Blood cultures no growth to date   - UA bland  - Chest x-ray unremarkable  - Antibiotics as above      Macrocytic anemia  Chronic, and likely anemia of chronic disease. Currently without indication for transfusion.     Severe protein-calorie malnutrition  Due to Parkinson's and debility. Will continue dietary supplements as recommended by RD during previous hospitalization.   - SLP consulted      Hypernatremia  Patient has hypernatremia which is controlled. The hypernatremia is due to Dehydration. The patient's sodium results have been reviewed and are listed below.  Recent Labs   Lab 12/08/23  0432              Functional quadriplegia  Due to Parkinsons' and dementia. Continue frequent turns and pressure ulcer care/prevention.       DNR (do not resuscitate)  Per previous documentation and discussions with patient's family. Clarified with daughter Yamileth at bedside (shares POA with other daughter) - patient is DNR.       Severe Lewy body dementia  Monitor with delirium precautions.     Parkinson's disease  Continue home Sinemet.      CKD (chronic kidney disease), stage III  Creatine stable for now. BMP reviewed- noted Estimated Creatinine Clearance: 62.9 mL/min (based on SCr of 0.6 mg/dL). according to latest data. Based on current GFR, CKD stage is stage 3 - GFR 30-59.        - Monitor UOP and serial BMP and adjust therapy as needed.    - Renally dose meds.   - Avoid nephrotoxic medications and procedures.        VTE Risk Mitigation (From admission, onward)           Ordered     enoxaparin injection 30 mg  Daily         12/09/23 1543     IP VTE HIGH RISK PATIENT  Once         12/01/23 2123     Place sequential compression device  Until discontinued         12/01/23 2123     Reason for No Pharmacological VTE Prophylaxis  Once        Question:  Reasons:  Answer:  Physician Provided (leave comment)    12/01/23 2123                    Discharge Planning   MELCHOR: 12/13/2023     Code Status: DNR   Is the patient medically ready for discharge?: Yes    Reason for patient still in hospital (select all that apply): Pending disposition  Discharge Plan A: Long-term acute care facility (LTAC)   Discharge Delays: (!) Post-Acute Set-up      Cristian Dueñas MD  Department of Hospital Medicine   [facility] - Intensive Care ([place])

## 2023-12-14 NOTE — ASSESSMENT & PLAN NOTE
Creatine stable for now. BMP reviewed- noted Estimated Creatinine Clearance: 62.9 mL/min (based on SCr of 0.6 mg/dL). according to latest data. Based on current GFR, CKD stage is stage 3 - GFR 30-59.       - Monitor UOP and serial BMP and adjust therapy as needed.    - Renally dose meds.   - Avoid nephrotoxic medications and procedures.

## 2023-12-14 NOTE — PROGRESS NOTES
[facility] ([facility]  Adult Nutrition  Progress Note    SUMMARY       Recommendations    Continue Regular diet with texture per SLP     Continue Boost Plus TID    Continue Jerome BID for promotion of wound healing    Rec'd addition of MVI along w/ Vitamin C for promotion of wound healing    If PO intake remains low, consider alternate nutrition, consult RD if warranted.     RD following      Goals: Meet % EEN, EPN by RD f/u  Nutrition Goal Status: progressing towards goal  Communication of RD Recs:  (POC)    Assessment and Plan    Endocrine  Severe protein-calorie malnutrition  Malnutrition Type:  Context: chronic illness  Level: severe    Related to (etiology):   Inadequate energy intake    Signs and Symptoms (as evidenced by):   Malnutrition Characteristic Summary:  Energy Intake (Malnutrition): less than 75% for greater than or equal to 1 month  Subcutaneous Fat (Malnutrition): moderate depletion  Muscle Mass (Malnutrition): severe depletion    Interventions/Recommendations (treatment strategy):  Continue Regular diet with texture per SLP   Continue Boost Plus TID  Continue Jerome BID for promotion of wound healing  Rec'd addition of MVI along w/ Vitamin C for promotion of wound healing  If PO intake remains low, consider alternate nutrition, consult RD if warranted.   RD following     Nutrition Diagnosis Status:   New         Malnutrition Assessment  Malnutrition Context: chronic illness  Malnutrition Level: severe  Skin (Micronutrient): none  Nails (Micronutrient): none  Hair/Scalp (Micronutrient): none  Eyes (Micronutrient): none  Extraoral (Micronutrient): none  Gums (Micronutrient): none  Lips/Mucous Membranes (Micronutrient): none  Teeth (Micronutrient): none  Tongue (Micronutrient): none  Neck/Chest (Micronutrient): muscle wasting  Musculoskeletal/Lower Extremities: muscle wasting, subcutaneous fat loss       Energy Intake (Malnutrition): less than 75% for greater than or equal to  "1 month  Subcutaneous Fat (Malnutrition): moderate depletion  Muscle Mass (Malnutrition): severe depletion   Orbital Region (Subcutaneous Fat Loss): moderate depletion  Upper Arm Region (Subcutaneous Fat Loss): moderate depletion   [place] Region (Muscle Loss): severe depletion  Clavicle Bone Region (Muscle Loss): severe depletion  Clavicle and Acromion Bone Region (Muscle Loss): severe depletion  Dorsal Hand (Muscle Loss): severe depletion  Patellar Region (Muscle Loss): severe depletion  Anterior Thigh Region (Muscle Loss): severe depletion  Posterior Calf Region (Muscle Loss): severe depletion       Reason for Assessment    Reason For Assessment: RD follow-up  Diagnosis:  (PI of sacral region)  Relevant Medical History: CKD, parkinson's, dementia, functinoal quadriplegia, severe malnutrition, macrocytic anemia, sepsis  Interdisciplinary Rounds: did not attend    General Information Comments:   RD f/u. Pt appears confused, unable to answer questions during visits. ONS and Jerome ordered. PO intake 50-75% per chart. Wt stable per chart. NFPE completed on 12/14, noted moderate-severe fat and muscle depletion. Pt meet criteria for severe malnutrition.     Nutrition Discharge Planning: Adequate PO intake    Nutrition Risk Screen    Nutrition Risk Screen: no indicators present    Nutrition/Diet History    Spiritual, Cultural Beliefs, Caodaism Practices, Values that Affect Care: no    Anthropometrics    Temp: 98 °F (36.7 °C)  Height: 5' 3" (160 cm)  Height (inches): 63 in  Weight Method: Bed Scale  Weight: 52.6 kg (115 lb 15.4 oz)  Weight (lb): 115.97 lb  Ideal Body Weight (IBW), Female: 115 lb  % Ideal Body Weight, Female (lb): 100.84 %  BMI (Calculated): 20.5       Lab/Procedures/Meds    Pertinent Labs Reviewed: reviewed  Pertinent Labs Comments: Tpro 5.5, albumin 2.2, ALP 53, ALT <5  Pertinent Medications Reviewed: reviewed  Pertinent Medications Comments: senna-docusate, MV, pantoprazole, MV, " cyanocobalamin    Estimated/Assessed Needs    Weight Used For Calorie Calculations: 52.2 kg (115 lb)  Energy Calorie Requirements (kcal): 1352 kcal  Energy Need Method: Brooks-St Jeor (SF 1.4)  Protein Requirements: 63-78g (1.2-1.5g/kg)  Weight Used For Protein Calculations: 52.2 kg (115 lb)  Fluid Requirements (mL): 1ml/kcal or per MD  Estimated Fluid Requirement Method: RDA Method  RDA Method (mL): 1352     Nutrition Prescription Ordered    Current Diet Order: level 5  Oral Nutrition Supplement: Boost Plus, Jerome    Evaluation of Received Nutrient/Fluid Intake    I/O: -  Energy Calories Required: not meeting needs  Protein Required: not meeting needs  Comments: LBM 12/13  Tolerance: tolerating  % Intake of Estimated Energy Needs: 50 - 75 %  % Meal Intake: 50 - 75 %    Nutrition Risk    Level of Risk/Frequency of Follow-up:  (1x/week)     Monitor and Evaluation    Food and Nutrient Intake: energy intake, food and beverage intake  Food and Nutrient Adminstration: diet order  Knowledge/Beliefs/Attitudes: food and nutrition knowledge/skill  Physical Activity and Function: nutrition-related ADLs and IADLs  Anthropometric Measurements: height/length, weight, weight change, body mass index  Biochemical Data, Medical Tests and Procedures: electrolyte and renal panel, gastrointestinal profile, glucose/endocrine profile, inflammatory profile, lipid profile  Nutrition-Focused Physical Findings: overall appearance     Nutrition Follow-Up    RD Follow-up?: Yes

## 2023-12-14 NOTE — PLAN OF CARE
KATARINA uploaded documents to [facility]  CONFIRMATION #  [id number]

## 2023-12-14 NOTE — PLAN OF CARE
CM called in Locet, per Shelley it expires today, so if pt needs a new locet, it will need to be called in today.  PasRR faxed to [facility],  uploaded to .    9:25 AM  CM called Marisa at [facility] [phone], informed pt likely to d/c today, dtr is going to look at a couple of places and make her decision.  Marisa states they have one bed available; it will go to the first person who reserves it.    12:00 PM  Responded to Erin Quigley from the [facility] regarding Locet/PasRR.  Informed that pt directly admitted from [facility] 12/1.  She states Locet needs to be called in today.  CM called [facility] again to call in Locet.    1:01 PM  CM called in Locet again, spoke with Davey, alyssa Locet, faxed PasRR to [facility].  PasRR, 142 uploaded to .    KEYUR BenavidesN, BS, RN, CCM

## 2023-12-14 NOTE — SUBJECTIVE & OBJECTIVE
Interval History: No acute events. Afebrile. At baseline mentation    Review of Systems  Objective:     Vital Signs (Most Recent):  Temp: 98.1 °F (36.7 °C) (12/14/23 1202)  Pulse: 72 (12/14/23 1512)  Resp: 18 (12/14/23 1202)  BP: (!) 117/56 (12/14/23 1202)  SpO2: 100 % (12/14/23 1512) Vital Signs (24h Range):  Temp:  [96.8 °F (36 °C)-98.1 °F (36.7 °C)] 98.1 °F (36.7 °C)  Pulse:  [61-93] 72  Resp:  [17-18] 18  SpO2:  [92 %-100 %] 100 %  BP: (103-130)/(56-83) 117/56     Weight: 52.6 kg (115 lb 15.4 oz)  Body mass index is 20.54 kg/m².    Intake/Output Summary (Last 24 hours) at 12/14/2023 1522  Last data filed at 12/14/2023 1325  Gross per 24 hour   Intake 240 ml   Output 1350 ml   Net -1110 ml         Physical Exam  Vitals and nursing note reviewed.   Constitutional:       General: She is not in acute distress.     Appearance: She is well-developed. She is ill-appearing (chronically). She is not diaphoretic.   HENT:      Head: Normocephalic and atraumatic.   Eyes:      General: No scleral icterus.     Conjunctiva/sclera: Conjunctivae normal.   Neck:      Vascular: No JVD.   Cardiovascular:      Rate and Rhythm: Normal rate and regular rhythm.      Heart sounds: Normal heart sounds.   Pulmonary:      Effort: Pulmonary effort is normal. No respiratory distress.   Abdominal:      General: There is no distension.      Palpations: Abdomen is soft.      Tenderness: There is no abdominal tenderness.   Musculoskeletal:      Right lower leg: No edema.      Left lower leg: No edema.   Skin:     Coloration: Skin is pale. Skin is not jaundiced.      Findings: Lesion present.   Neurological:      Mental Status: She is alert.      Motor: No abnormal muscle tone.      Comments: Does not attempt to track or answer questions, does not follow commands, no tremor, UE rigidity   Psychiatric:      Comments: Unable to evaluate             Significant Labs: All pertinent labs within the past 24 hours have been reviewed.    Significant  Imaging: I have reviewed all pertinent imaging results/findings within the past 24 hours.

## 2023-12-15 LAB
BACTERIA BLD CULT: NORMAL
BACTERIA BLD CULT: NORMAL
POCT GLUCOSE: 94 MG/DL (ref 70–110)
POCT GLUCOSE: 96 MG/DL (ref 70–110)
POCT GLUCOSE: 98 MG/DL (ref 70–110)

## 2023-12-15 PROCEDURE — A4216 STERILE WATER/SALINE, 10 ML: HCPCS | Performed by: STUDENT IN AN ORGANIZED HEALTH CARE EDUCATION/TRAINING PROGRAM

## 2023-12-15 PROCEDURE — 63600175 PHARM REV CODE 636 W HCPCS: Performed by: STUDENT IN AN ORGANIZED HEALTH CARE EDUCATION/TRAINING PROGRAM

## 2023-12-15 PROCEDURE — 25000003 PHARM REV CODE 250: Performed by: STUDENT IN AN ORGANIZED HEALTH CARE EDUCATION/TRAINING PROGRAM

## 2023-12-15 PROCEDURE — 21400001 HC TELEMETRY ROOM

## 2023-12-15 RX ADMIN — THERA TABS 1 TABLET: TAB at 09:12

## 2023-12-15 RX ADMIN — Medication 10 ML: at 12:12

## 2023-12-15 RX ADMIN — PANTOPRAZOLE SODIUM 20 MG: 20 TABLET, DELAYED RELEASE ORAL at 09:12

## 2023-12-15 RX ADMIN — CARBIDOPA AND LEVODOPA 1 TABLET: 25; 100 TABLET ORAL at 09:12

## 2023-12-15 RX ADMIN — CYANOCOBALAMIN TAB 1000 MCG 1000 MCG: 1000 TAB at 09:12

## 2023-12-15 RX ADMIN — SENNOSIDES AND DOCUSATE SODIUM 1 TABLET: 8.6; 5 TABLET ORAL at 09:12

## 2023-12-15 RX ADMIN — TAMSULOSIN HYDROCHLORIDE 0.4 MG: 0.4 CAPSULE ORAL at 09:12

## 2023-12-15 RX ADMIN — CARBIDOPA AND LEVODOPA 1 TABLET: 25; 100 TABLET ORAL at 10:12

## 2023-12-15 RX ADMIN — Medication 10 ML: at 06:12

## 2023-12-15 RX ADMIN — CEFTRIAXONE 2 G: 2 INJECTION, POWDER, FOR SOLUTION INTRAMUSCULAR; INTRAVENOUS at 05:12

## 2023-12-15 RX ADMIN — CARBIDOPA AND LEVODOPA 1 TABLET: 25; 100 TABLET ORAL at 03:12

## 2023-12-15 RX ADMIN — CARBIDOPA AND LEVODOPA 1 SPLIT TABLET: 25; 100 TABLET ORAL at 10:12

## 2023-12-15 RX ADMIN — ENOXAPARIN SODIUM 30 MG: 30 INJECTION SUBCUTANEOUS at 05:12

## 2023-12-15 RX ADMIN — ASPIRIN 81 MG: 81 TABLET, COATED ORAL at 09:12

## 2023-12-15 RX ADMIN — SENNOSIDES AND DOCUSATE SODIUM 1 TABLET: 8.6; 5 TABLET ORAL at 10:12

## 2023-12-15 RX ADMIN — CETIRIZINE HYDROCHLORIDE 10 MG: 10 TABLET, FILM COATED ORAL at 09:12

## 2023-12-15 NOTE — SUBJECTIVE & OBJECTIVE
Interval History: No acute events. Tolerating po. Baseline mentation. Afebrile    Review of Systems  Objective:     Vital Signs (Most Recent):  Temp: 98 °F (36.7 °C) (12/15/23 1138)  Pulse: 65 (12/15/23 1138)  Resp: 18 (12/15/23 1138)  BP: 103/72 (12/15/23 1138)  SpO2: 97 % (12/15/23 1138) Vital Signs (24h Range):  Temp:  [97.4 °F (36.3 °C)-98.7 °F (37.1 °C)] 98 °F (36.7 °C)  Pulse:  [61-85] 65  Resp:  [17-18] 18  SpO2:  [96 %-100 %] 97 %  BP: (102-140)/(58-74) 103/72     Weight: 52.6 kg (115 lb 15.4 oz)  Body mass index is 20.54 kg/m².    Intake/Output Summary (Last 24 hours) at 12/15/2023 1509  Last data filed at 12/15/2023 0745  Gross per 24 hour   Intake 236 ml   Output --   Net 236 ml         Physical Exam  Vitals and nursing note reviewed.   Constitutional:       General: She is not in acute distress.     Appearance: She is well-developed. She is ill-appearing (chronically). She is not diaphoretic.   HENT:      Head: Normocephalic and atraumatic.   Eyes:      General: No scleral icterus.     Conjunctiva/sclera: Conjunctivae normal.   Neck:      Vascular: No JVD.   Cardiovascular:      Rate and Rhythm: Normal rate and regular rhythm.      Heart sounds: Normal heart sounds.   Pulmonary:      Effort: Pulmonary effort is normal. No respiratory distress.   Abdominal:      General: There is no distension.      Palpations: Abdomen is soft.      Tenderness: There is no abdominal tenderness.   Musculoskeletal:      Right lower leg: No edema.      Left lower leg: No edema.   Skin:     Coloration: Skin is pale. Skin is not jaundiced.      Findings: Lesion present.   Neurological:      Mental Status: She is alert.      Motor: No abnormal muscle tone.      Comments: Does not attempt to track or answer questions, does not follow commands, no tremor, UE rigidity   Psychiatric:      Comments: Unable to evaluate             Significant Labs: All pertinent labs within the past 24 hours have been reviewed.    Significant  Imaging: I have reviewed all pertinent imaging results/findings within the past 24 hours.

## 2023-12-15 NOTE — PLAN OF CARE
Yudith from [facility] called [phone], ext [phone].  She states pt's auth expires today.  She requests updated clinicals: Mar, wound care notes, MD and nurse progress notes.    CM faxed to [phone].    Dalila Bo, BSN, BS, RN, CCM

## 2023-12-15 NOTE — PROGRESS NOTES
[facility] - [facility] ([address] Medicine  Progress Note    Patient Name: Annmarie Gallardo  MRN: [id number]  Patient Class: IP- Inpatient   Admission Date: 12/1/2023  Length of Stay: 14 days  Attending Physician: Cristian Dueñas*  Primary Care Provider: Raymond Bess MD        Subjective:     Principal Problem:Pressure injury of sacral region, unstageable        HPI:  Annmarie Gallardo is a 78 y.o. with a PMHx of parkinson's, lewy-body dementia with debility/functional quadriplegia, HTN, GERD, CKD3, and sacral decubitus ulcer who presents today from her nursing home for evaluation of sacral decubitus ulcer.     Due to her dementia and nonverbal status, she is unable to provide a history. No family available at bedside during my exam.     Overview/Hospital Course:  Pt presenting with purulent drainage from sacral wound . S/p debridement 12/3 with general surgery. Patient febrile with leukocytosis overnight 12/3.  Wound cultures with Proteus mirabilis.  Patient treated with ceftriaxone based on sensitivities to treat soft tissue infection.  Patient given IV fluids given poor oral intake.  Patient denied LTAC on peer to peer.  Patient adequately treated for soft tissue infection with ceftriaxone 2 g daily with improvement of leukocytosis.  Iv fluids given for worsening renal function with improvement.    Interval History: No acute events. Tolerating po. Baseline mentation. Afebrile    Review of Systems  Objective:     Vital Signs (Most Recent):  Temp: 98 °F (36.7 °C) (12/15/23 1138)  Pulse: 65 (12/15/23 1138)  Resp: 18 (12/15/23 1138)  BP: 103/72 (12/15/23 1138)  SpO2: 97 % (12/15/23 1138) Vital Signs (24h Range):  Temp:  [97.4 °F (36.3 °C)-98.7 °F (37.1 °C)] 98 °F (36.7 °C)  Pulse:  [61-85] 65  Resp:  [17-18] 18  SpO2:  [96 %-100 %] 97 %  BP: (102-140)/(58-74) 103/72     Weight: 52.6 kg (115 lb 15.4 oz)  Body mass index is 20.54 kg/m².    Intake/Output Summary (Last 24 hours) at 12/15/2023  1509  Last data filed at 12/15/2023 0745  Gross per 24 hour   Intake 236 ml   Output --   Net 236 ml         Physical Exam  Vitals and nursing note reviewed.   Constitutional:       General: She is not in acute distress.     Appearance: She is well-developed. She is ill-appearing (chronically). She is not diaphoretic.   HENT:      Head: Normocephalic and atraumatic.   Eyes:      General: No scleral icterus.     Conjunctiva/sclera: Conjunctivae normal.   Neck:      Vascular: No JVD.   Cardiovascular:      Rate and Rhythm: Normal rate and regular rhythm.      Heart sounds: Normal heart sounds.   Pulmonary:      Effort: Pulmonary effort is normal. No respiratory distress.   Abdominal:      General: There is no distension.      Palpations: Abdomen is soft.      Tenderness: There is no abdominal tenderness.   Musculoskeletal:      Right lower leg: No edema.      Left lower leg: No edema.   Skin:     Coloration: Skin is pale. Skin is not jaundiced.      Findings: Lesion present.   Neurological:      Mental Status: She is alert.      Motor: No abnormal muscle tone.      Comments: Does not attempt to track or answer questions, does not follow commands, no tremor, UE rigidity   Psychiatric:      Comments: Unable to evaluate             Significant Labs: All pertinent labs within the past 24 hours have been reviewed.    Significant Imaging: I have reviewed all pertinent imaging results/findings within the past 24 hours.    Assessment/Plan:      * Pressure injury of sacral region, unstageable  Presents for evaluation of sacral decubitus ulcer. Purulent on exam. Mild leukocytosis, otherwise does not meet sepsis criteria.   - Wound culture with P mirabilis  - Continue ceftriaxone 2g daily x7-10 days (sot 12/9)  - General surgery consulted  -- s/p debridement 12/3  - Wound care consulted for recs for discharge wound care  - Pending placement     Stage IV pressure ulcer of sacral region  - See above      Sepsis without acute organ  dysfunction  Febrile with new leukocytosis on 12/3. Known infected wound.    This patient does have evidence of infective focus  My overall impression is sepsis.  Source: Skin and Soft Tissue (location sacral)  Antibiotics given-   Antibiotics (72h ago, onward)      Start     Stop Route Frequency Ordered    12/09/23 0600  cefTRIAXone (ROCEPHIN) 2 g in dextrose 5 % in water (D5W) 100 mL IVPB (MB+)         -- IV Every 24 hours (non-standard times) 12/08/23 1520            Fluid challenge Not needed - patient is not hypotensive      Source control achieved by: to OR 12/3 for debridement   - Follow-up infectious workup ordered 12/10  - Blood cultures no growth to date   - UA bland  - Chest x-ray unremarkable  - Antibiotics as above      Macrocytic anemia  Chronic, and likely anemia of chronic disease. Currently without indication for transfusion.     Severe protein-calorie malnutrition  Due to Parkinson's and debility. Will continue dietary supplements as recommended by RD during previous hospitalization.   - SLP consulted      Hypernatremia  Patient has hypernatremia which is controlled. The hypernatremia is due to Dehydration. The patient's sodium results have been reviewed and are listed below.  Recent Labs   Lab 12/08/23  0432              Functional quadriplegia  Due to Parkinsons' and dementia. Continue frequent turns and pressure ulcer care/prevention.       DNR (do not resuscitate)  Per previous documentation and discussions with patient's family. Clarified with daughter Yamileth at bedside (shares POA with other daughter) - patient is DNR.       Severe Lewy body dementia  Monitor with delirium precautions.     Parkinson's disease  Continue home Sinemet.      CKD (chronic kidney disease), stage III  Creatine stable for now. BMP reviewed- noted Estimated Creatinine Clearance: 62.9 mL/min (based on SCr of 0.6 mg/dL). according to latest data. Based on current GFR, CKD stage is stage 3 - GFR 30-59.       -  Monitor UOP and serial BMP and adjust therapy as needed.    - Renally dose meds.   - Avoid nephrotoxic medications and procedures.        VTE Risk Mitigation (From admission, onward)           Ordered     enoxaparin injection 30 mg  Daily         12/09/23 1543     IP VTE HIGH RISK PATIENT  Once         12/01/23 2123     Place sequential compression device  Until discontinued         12/01/23 2123     Reason for No Pharmacological VTE Prophylaxis  Once        Question:  Reasons:  Answer:  Physician Provided (leave comment)    12/01/23 2123                    Discharge Planning   MELCHOR: 12/18/2023     Code Status: DNR   Is the patient medically ready for discharge?: Yes    Reason for patient still in hospital (select all that apply): Pending disposition  Discharge Plan A: Long-term acute care facility (LTAC)   Discharge Delays: (!) Post-Acute Set-up      Cristian Dueñas MD  Department of Hospital Medicine   [facility] - Intensive Care ([place])

## 2023-12-15 NOTE — CONSULTS
[facility] - [facility] ([place] [address])  Wound Care    Patient Name:  Annmarie Gallardo   MRN:  [id number]  Date: 12/14/2023  Diagnosis: Pressure injury of sacral region, unstageable    History:     Past Medical History:   Diagnosis Date    HTN (hypertension)     Parkinson's disease     Vitamin D deficiency        Social History     Socioeconomic History    Marital status:    Tobacco Use    Smoking status: Never    Smokeless tobacco: Never   Substance and Sexual Activity    Alcohol use: No    Drug use: No     Social Determinants of Health     Financial Resource Strain: Low Risk  (12/1/2023)    Overall Financial Resource Strain (CARDIA)     Difficulty of Paying Living Expenses: Not hard at all   Recent Concern: Financial Resource Strain - Medium Risk (11/12/2023)    Overall Financial Resource Strain (CARDIA)     Difficulty of Paying Living Expenses: Somewhat hard   Food Insecurity: No Food Insecurity (12/1/2023)    Hunger Vital Sign     Worried About Running Out of Food in the Last Year: Never true     Ran Out of Food in the Last Year: Never true   Transportation Needs: No Transportation Needs (12/1/2023)    PRAPARE - Transportation     Lack of Transportation (Medical): No     Lack of Transportation (Non-Medical): No   Physical Activity: Inactive (12/1/2023)    Exercise Vital Sign     Days of Exercise per Week: 0 days     Minutes of Exercise per Session: 0 min   Stress: No Stress Concern Present (12/1/2023)    [employer] - Occupational Stress Questionnaire     Feeling of Stress : Not at all   Social Connections: Socially Isolated (12/1/2023)    Social Connection and Isolation Panel [NHANES]     Frequency of Communication with Friends and Family: Never     Frequency of Social Gatherings with Friends and Family: Three times a week     Attends [employer] Services: Never     Active Member of Clubs or Organizations: No     Attends Club or Organization Meetings: Never     Marital  Status:    Housing Stability: Low Risk  (12/1/2023)    Housing Stability Vital Sign     Unable to Pay for Housing in the Last Year: No     Number of Places Lived in the Last Year: 2     Unstable Housing in the Last Year: No       Precautions:     Allergies as of 12/01/2023 - Reviewed 12/01/2023   Allergen Reaction Noted    Diamox sequels [acetazolamide]  12/01/2023    Garlic  11/12/2023    Namenda [memantine]  11/12/2023    Oxybutynin  11/12/2023       WO Assessment Details/Treatment   Patient seen for wound care follow up.   Reviewed chart for this encounter.   See Flow Sheet for findings.      Per chart review. Annmarie Gallardo is a 78 y.o. with a PMHx of parkinson's, lewy-body dementia with debility/functional quadriplegia, HTN, GERD, CKD3, and sacral decubitus ulcer who presents today from her nursing home for evaluation of sacral decubitus ulcer. Annmarie Gallardo is a 78 y.o. with a PMHx of parkinson's, lewy-body dementia with debility/functional quadriplegia, HTN, GERD, CKD3, and sacral decubitus ulcer who presents today  12/01/23 from her nursing home for evaluation of sacral decubitus ulcer. Wound care removed old dressing. Wound care cleansed with Vashe before applying a Vashe wet to moist dressing. An island border dressing was used as a cover dressing. Supplies left at bedside.  Pt tech assisted in turning and cleaning pt.     RECOMMENDATIONS:  - Frequency to change to BID instead of Daily.     Discussed POC with patient and primary RN.   See EMR for orders & patient education.  Discussed POC with primary team.      Nursing to continue care.  Nursing to maintain pressure injury prevention interventions.  Contact wound care for any further questions.       12/14/23 1800   WOCN Assessment   WOCN Total Time (mins) 30   Visit Date 12/14/23   Visit Time 1800   Consult Type Follow Up   WOCN Speciality Wound   Wound surgical   Intervention assessed;changed;applied;chart review;orders   Teaching on-going         Incision/Site 12/03/23 1448 Buttocks   Date First Assessed/Time First Assessed: 12/03/23 1448   Location: Buttocks   Wound Image    Dressing Appearance Dry;Intact;Clean   Drainage Amount Moderate   Drainage Characteristics/Odor Sanguineous   Appearance Pink;Red;Tan;Slough;Moist   Wound Length (cm) 5.8 cm   Wound Width (cm) 8.2 cm   Wound Depth (cm) 5 cm   Wound Volume (cm^3) 237.8 cm^3   Wound Surface Area (cm^2) 47.56 cm^2   Undermining (depth (cm)/location) 4cm @ 9:00 7:00-10:00   Care Cleansed with:;Antimicrobial agent   Dressing Applied;Gauze, wet to moist;Island/border   Packing Inserted  1   Packing Removed 3   Dressing Change Due 12/15/23     12/14/2023

## 2023-12-16 LAB
POCT GLUCOSE: 100 MG/DL (ref 70–110)
POCT GLUCOSE: 104 MG/DL (ref 70–110)
POCT GLUCOSE: 125 MG/DL (ref 70–110)
POCT GLUCOSE: 130 MG/DL (ref 70–110)

## 2023-12-16 PROCEDURE — 21400001 HC TELEMETRY ROOM

## 2023-12-16 PROCEDURE — A4216 STERILE WATER/SALINE, 10 ML: HCPCS | Performed by: STUDENT IN AN ORGANIZED HEALTH CARE EDUCATION/TRAINING PROGRAM

## 2023-12-16 PROCEDURE — 25000003 PHARM REV CODE 250: Performed by: STUDENT IN AN ORGANIZED HEALTH CARE EDUCATION/TRAINING PROGRAM

## 2023-12-16 PROCEDURE — 63600175 PHARM REV CODE 636 W HCPCS: Performed by: STUDENT IN AN ORGANIZED HEALTH CARE EDUCATION/TRAINING PROGRAM

## 2023-12-16 RX ADMIN — THERA TABS 1 TABLET: TAB at 09:12

## 2023-12-16 RX ADMIN — Medication 10 ML: at 06:12

## 2023-12-16 RX ADMIN — CARBIDOPA AND LEVODOPA 1 SPLIT TABLET: 25; 100 TABLET ORAL at 09:12

## 2023-12-16 RX ADMIN — ASPIRIN 81 MG: 81 TABLET, COATED ORAL at 09:12

## 2023-12-16 RX ADMIN — CARBIDOPA AND LEVODOPA 1 TABLET: 25; 100 TABLET ORAL at 09:12

## 2023-12-16 RX ADMIN — CEFTRIAXONE 2 G: 2 INJECTION, POWDER, FOR SOLUTION INTRAMUSCULAR; INTRAVENOUS at 06:12

## 2023-12-16 RX ADMIN — SENNOSIDES AND DOCUSATE SODIUM 1 TABLET: 8.6; 5 TABLET ORAL at 09:12

## 2023-12-16 RX ADMIN — Medication 10 ML: at 04:12

## 2023-12-16 RX ADMIN — Medication 10 ML: at 05:12

## 2023-12-16 RX ADMIN — CETIRIZINE HYDROCHLORIDE 10 MG: 10 TABLET, FILM COATED ORAL at 09:12

## 2023-12-16 RX ADMIN — CARBIDOPA AND LEVODOPA 1 TABLET: 25; 100 TABLET ORAL at 04:12

## 2023-12-16 RX ADMIN — PANTOPRAZOLE SODIUM 20 MG: 20 TABLET, DELAYED RELEASE ORAL at 09:12

## 2023-12-16 RX ADMIN — ENOXAPARIN SODIUM 30 MG: 30 INJECTION SUBCUTANEOUS at 04:12

## 2023-12-16 RX ADMIN — CYANOCOBALAMIN TAB 1000 MCG 1000 MCG: 1000 TAB at 09:12

## 2023-12-16 RX ADMIN — TAMSULOSIN HYDROCHLORIDE 0.4 MG: 0.4 CAPSULE ORAL at 09:12

## 2023-12-16 RX ADMIN — Medication 10 ML: at 12:12

## 2023-12-16 NOTE — SUBJECTIVE & OBJECTIVE
Interval History: No acute events. Awaiting discharge    Review of Systems  Objective:     Vital Signs (Most Recent):  Temp: 97.9 °F (36.6 °C) (12/16/23 1134)  Pulse: 69 (12/16/23 1134)  Resp: 18 (12/16/23 1134)  BP: (!) 112/59 (12/16/23 1134)  SpO2: 100 % (12/16/23 1134) Vital Signs (24h Range):  Temp:  [97.6 °F (36.4 °C)-98 °F (36.7 °C)] 97.9 °F (36.6 °C)  Pulse:  [67-96] 69  Resp:  [16-18] 18  SpO2:  [97 %-100 %] 100 %  BP: ()/(51-68) 112/59     Weight: 52.6 kg (115 lb 15.4 oz)  Body mass index is 20.54 kg/m².    Intake/Output Summary (Last 24 hours) at 12/16/2023 1358  Last data filed at 12/16/2023 0537  Gross per 24 hour   Intake 40 ml   Output 450 ml   Net -410 ml         Physical Exam  Vitals and nursing note reviewed.   Constitutional:       General: She is not in acute distress.     Appearance: She is well-developed. She is ill-appearing (chronically). She is not diaphoretic.   HENT:      Head: Normocephalic and atraumatic.   Eyes:      General: No scleral icterus.     Conjunctiva/sclera: Conjunctivae normal.   Neck:      Vascular: No JVD.   Cardiovascular:      Rate and Rhythm: Normal rate and regular rhythm.      Heart sounds: Normal heart sounds.   Pulmonary:      Effort: Pulmonary effort is normal. No respiratory distress.   Abdominal:      General: There is no distension.      Palpations: Abdomen is soft.      Tenderness: There is no abdominal tenderness.   Musculoskeletal:      Right lower leg: No edema.      Left lower leg: No edema.   Skin:     Coloration: Skin is pale. Skin is not jaundiced.      Findings: Lesion present.   Neurological:      Mental Status: She is alert.      Motor: No abnormal muscle tone.      Comments: Does not attempt to track or answer questions, does not follow commands, no tremor, UE rigidity   Psychiatric:      Comments: Unable to evaluate             Significant Labs: All pertinent labs within the past 24 hours have been reviewed.    Significant Imaging: I have  reviewed all pertinent imaging results/findings within the past 24 hours.

## 2023-12-16 NOTE — PLAN OF CARE
Discharge order noted. CM spoke with daughter Yamileth. Per patient daughter patient no longer has active coverage with [employer]. Has traditional Medicare but is not reflective in system yet. MD provided with update. Accepting facilities pending active insurance coverage. Unable to discharge patient until weekday.CM also notified daughter Yamileth Zafar has denied appeal.

## 2023-12-16 NOTE — PROGRESS NOTES
[facility] ([address] Medicine  Progress Note    Patient Name: Annmarie Gallardo  MRN: [id number]  Patient Class: IP- Inpatient   Admission Date: 12/1/2023  Length of Stay: 15 days  Attending Physician: Cristian Dueñas*  Primary Care Provider: Raymond Bess MD        Subjective:     Principal Problem:Pressure injury of sacral region, unstageable        HPI:  Annmarie Gallardo is a 78 y.o. with a PMHx of parkinson's, lewy-body dementia with debility/functional quadriplegia, HTN, GERD, CKD3, and sacral decubitus ulcer who presents today from her nursing home for evaluation of sacral decubitus ulcer.     Due to her dementia and nonverbal status, she is unable to provide a history. No family available at bedside during my exam.     Overview/Hospital Course:  Pt presenting with purulent drainage from sacral wound . S/p debridement 12/3 with general surgery. Patient febrile with leukocytosis overnight 12/3.  Wound cultures with Proteus mirabilis.  Patient treated with ceftriaxone based on sensitivities to treat soft tissue infection.  Patient given IV fluids given poor oral intake.  Patient denied LTAC on peer to peer.  Patient adequately treated for soft tissue infection with ceftriaxone 2 g daily with improvement of leukocytosis.  Iv fluids given for worsening renal function with improvement.    Interval History: No acute events. Awaiting discharge    Review of Systems  Objective:     Vital Signs (Most Recent):  Temp: 97.9 °F (36.6 °C) (12/16/23 1134)  Pulse: 69 (12/16/23 1134)  Resp: 18 (12/16/23 1134)  BP: (!) 112/59 (12/16/23 1134)  SpO2: 100 % (12/16/23 1134) Vital Signs (24h Range):  Temp:  [97.6 °F (36.4 °C)-98 °F (36.7 °C)] 97.9 °F (36.6 °C)  Pulse:  [67-96] 69  Resp:  [16-18] 18  SpO2:  [97 %-100 %] 100 %  BP: ()/(51-68) 112/59     Weight: 52.6 kg (115 lb 15.4 oz)  Body mass index is 20.54 kg/m².    Intake/Output Summary (Last 24 hours) at 12/16/2023 1359  Last data  filed at 12/16/2023 0537  Gross per 24 hour   Intake 40 ml   Output 450 ml   Net -410 ml         Physical Exam  Vitals and nursing note reviewed.   Constitutional:       General: She is not in acute distress.     Appearance: She is well-developed. She is ill-appearing (chronically). She is not diaphoretic.   HENT:      Head: Normocephalic and atraumatic.   Eyes:      General: No scleral icterus.     Conjunctiva/sclera: Conjunctivae normal.   Neck:      Vascular: No JVD.   Cardiovascular:      Rate and Rhythm: Normal rate and regular rhythm.      Heart sounds: Normal heart sounds.   Pulmonary:      Effort: Pulmonary effort is normal. No respiratory distress.   Abdominal:      General: There is no distension.      Palpations: Abdomen is soft.      Tenderness: There is no abdominal tenderness.   Musculoskeletal:      Right lower leg: No edema.      Left lower leg: No edema.   Skin:     Coloration: Skin is pale. Skin is not jaundiced.      Findings: Lesion present.   Neurological:      Mental Status: She is alert.      Motor: No abnormal muscle tone.      Comments: Does not attempt to track or answer questions, does not follow commands, no tremor, UE rigidity   Psychiatric:      Comments: Unable to evaluate             Significant Labs: All pertinent labs within the past 24 hours have been reviewed.    Significant Imaging: I have reviewed all pertinent imaging results/findings within the past 24 hours.    Assessment/Plan:      * Pressure injury of sacral region, unstageable  Presents for evaluation of sacral decubitus ulcer. Purulent on exam. Mild leukocytosis, otherwise does not meet sepsis criteria.   - Wound culture with P mirabilis  - Continue ceftriaxone 2g daily x7-10 days (sot 12/9)  - General surgery consulted  -- s/p debridement 12/3  - Wound care consulted for recs for discharge wound care  - Pending placement     Stage IV pressure ulcer of sacral region  - See above      Sepsis without acute organ  dysfunction  Febrile with new leukocytosis on 12/3. Known infected wound.    This patient does have evidence of infective focus  My overall impression is sepsis.  Source: Skin and Soft Tissue (location sacral)  Antibiotics given-   Antibiotics (72h ago, onward)      Start     Stop Route Frequency Ordered    12/09/23 0600  cefTRIAXone (ROCEPHIN) 2 g in dextrose 5 % in water (D5W) 100 mL IVPB (MB+)         -- IV Every 24 hours (non-standard times) 12/08/23 1520            Fluid challenge Not needed - patient is not hypotensive      Source control achieved by: to OR 12/3 for debridement   - Follow-up infectious workup ordered 12/10  - Blood cultures no growth to date   - UA bland  - Chest x-ray unremarkable  - Antibiotics as above      Macrocytic anemia  Chronic, and likely anemia of chronic disease. Currently without indication for transfusion.     Severe protein-calorie malnutrition  Due to Parkinson's and debility. Will continue dietary supplements as recommended by RD during previous hospitalization.   - SLP consulted      Hypernatremia  Patient has hypernatremia which is controlled. The hypernatremia is due to Dehydration. The patient's sodium results have been reviewed and are listed below.  Recent Labs   Lab 12/08/23  0432              Functional quadriplegia  Due to Parkinsons' and dementia. Continue frequent turns and pressure ulcer care/prevention.       DNR (do not resuscitate)  Per previous documentation and discussions with patient's family. Clarified with daughter Yamileth at bedside (shares POA with other daughter) - patient is DNR.       Severe Lewy body dementia  Monitor with delirium precautions.     Parkinson's disease  Continue home Sinemet.      CKD (chronic kidney disease), stage III  Creatine stable for now. BMP reviewed- noted Estimated Creatinine Clearance: 62.9 mL/min (based on SCr of 0.6 mg/dL). according to latest data. Based on current GFR, CKD stage is stage 3 - GFR 30-59.       -  Monitor UOP and serial BMP and adjust therapy as needed.    - Renally dose meds.   - Avoid nephrotoxic medications and procedures.        VTE Risk Mitigation (From admission, onward)           Ordered     enoxaparin injection 30 mg  Daily         12/09/23 1543     IP VTE HIGH RISK PATIENT  Once         12/01/23 2123     Place sequential compression device  Until discontinued         12/01/23 2123     Reason for No Pharmacological VTE Prophylaxis  Once        Question:  Reasons:  Answer:  Physician Provided (leave comment)    12/01/23 2123                    Discharge Planning   MELCHOR: 12/18/2023     Code Status: DNR   Is the patient medically ready for discharge?: Yes    Reason for patient still in hospital (select all that apply): Pending disposition  Discharge Plan A: Long-term acute care facility (LTAC)   Discharge Delays: (!) Post-Acute Set-up      Cristian Dueñas MD  Department of Hospital Medicine   [facility] - Intensive Care ([place])

## 2023-12-16 NOTE — PLAN OF CARE
Problem: Adult Inpatient Plan of Care  Goal: Optimal Comfort and Wellbeing  Outcome: Ongoing, Progressing     Problem: Impaired Wound Healing  Goal: Optimal Wound Healing  Outcome: Ongoing, Progressing   Patient presents lying in bed with eyes open, chest rising and falling. Patient is non verbal. Patient is able to nod head yes or no when asked questions. Bed in lowest position, bed wheels locked, side rails up x3, call light within reach.

## 2023-12-17 LAB
POCT GLUCOSE: 104 MG/DL (ref 70–110)
POCT GLUCOSE: 108 MG/DL (ref 70–110)
POCT GLUCOSE: 113 MG/DL (ref 70–110)
POCT GLUCOSE: 141 MG/DL (ref 70–110)

## 2023-12-17 PROCEDURE — 51798 US URINE CAPACITY MEASURE: CPT

## 2023-12-17 PROCEDURE — A4216 STERILE WATER/SALINE, 10 ML: HCPCS | Performed by: STUDENT IN AN ORGANIZED HEALTH CARE EDUCATION/TRAINING PROGRAM

## 2023-12-17 PROCEDURE — 21400001 HC TELEMETRY ROOM

## 2023-12-17 PROCEDURE — 25000003 PHARM REV CODE 250: Performed by: STUDENT IN AN ORGANIZED HEALTH CARE EDUCATION/TRAINING PROGRAM

## 2023-12-17 PROCEDURE — 63600175 PHARM REV CODE 636 W HCPCS: Performed by: STUDENT IN AN ORGANIZED HEALTH CARE EDUCATION/TRAINING PROGRAM

## 2023-12-17 RX ADMIN — CETIRIZINE HYDROCHLORIDE 10 MG: 10 TABLET, FILM COATED ORAL at 11:12

## 2023-12-17 RX ADMIN — Medication 10 ML: at 06:12

## 2023-12-17 RX ADMIN — CYANOCOBALAMIN TAB 1000 MCG 1000 MCG: 1000 TAB at 11:12

## 2023-12-17 RX ADMIN — CARBIDOPA AND LEVODOPA 1 TABLET: 25; 100 TABLET ORAL at 11:12

## 2023-12-17 RX ADMIN — CARBIDOPA AND LEVODOPA 1 SPLIT TABLET: 25; 100 TABLET ORAL at 10:12

## 2023-12-17 RX ADMIN — ASPIRIN 81 MG: 81 TABLET, COATED ORAL at 11:12

## 2023-12-17 RX ADMIN — SENNOSIDES AND DOCUSATE SODIUM 1 TABLET: 8.6; 5 TABLET ORAL at 11:12

## 2023-12-17 RX ADMIN — CARBIDOPA AND LEVODOPA 1 TABLET: 25; 100 TABLET ORAL at 06:12

## 2023-12-17 RX ADMIN — Medication 10 ML: at 12:12

## 2023-12-17 RX ADMIN — TAMSULOSIN HYDROCHLORIDE 0.4 MG: 0.4 CAPSULE ORAL at 11:12

## 2023-12-17 RX ADMIN — Medication 10 ML: at 11:12

## 2023-12-17 RX ADMIN — SENNOSIDES AND DOCUSATE SODIUM 1 TABLET: 8.6; 5 TABLET ORAL at 10:12

## 2023-12-17 RX ADMIN — CARBIDOPA AND LEVODOPA 1 TABLET: 25; 100 TABLET ORAL at 10:12

## 2023-12-17 RX ADMIN — ENOXAPARIN SODIUM 30 MG: 30 INJECTION SUBCUTANEOUS at 06:12

## 2023-12-17 RX ADMIN — PANTOPRAZOLE SODIUM 20 MG: 20 TABLET, DELAYED RELEASE ORAL at 11:12

## 2023-12-17 RX ADMIN — THERA TABS 1 TABLET: TAB at 11:12

## 2023-12-17 RX ADMIN — CEFTRIAXONE 2 G: 2 INJECTION, POWDER, FOR SOLUTION INTRAMUSCULAR; INTRAVENOUS at 06:12

## 2023-12-17 NOTE — PROGRESS NOTES
[facility] - [facility] ([address] Medicine  Progress Note    Patient Name: Annmarie Gallardo  MRN: [id number]  Patient Class: IP- Inpatient   Admission Date: 12/1/2023  Length of Stay: 16 days  Attending Physician: Cristian Dueñas*  Primary Care Provider: Raymond Bess MD        Subjective:     Principal Problem:Pressure injury of sacral region, unstageable        HPI:  Annmarie Gallardo is a 78 y.o. with a PMHx of parkinson's, lewy-body dementia with debility/functional quadriplegia, HTN, GERD, CKD3, and sacral decubitus ulcer who presents today from her nursing home for evaluation of sacral decubitus ulcer.     Due to her dementia and nonverbal status, she is unable to provide a history. No family available at bedside during my exam.     Overview/Hospital Course:  Pt presenting with purulent drainage from sacral wound . S/p debridement 12/3 with general surgery. Patient febrile with leukocytosis overnight 12/3.  Wound cultures with Proteus mirabilis.  Patient treated with ceftriaxone based on sensitivities to treat soft tissue infection.  Patient given IV fluids given poor oral intake.  Patient denied LTAC on peer to peer.  Patient adequately treated for soft tissue infection with ceftriaxone 2 g daily with improvement of leukocytosis.  Iv fluids given for worsening renal function with improvement.    Interval History: no acute events    Review of Systems  Objective:     Vital Signs (Most Recent):  Temp: 98.1 °F (36.7 °C) (12/17/23 1132)  Pulse: 77 (12/17/23 1132)  Resp: 18 (12/17/23 1132)  BP: (!) 120/57 (12/17/23 1132)  SpO2: 97 % (12/17/23 1132) Vital Signs (24h Range):  Temp:  [97.7 °F (36.5 °C)-98.1 °F (36.7 °C)] 98.1 °F (36.7 °C)  Pulse:  [] 77  Resp:  [17-20] 18  SpO2:  [95 %-100 %] 97 %  BP: ()/(51-67) 120/57     Weight: 52.6 kg (115 lb 15.4 oz)  Body mass index is 20.54 kg/m².    Intake/Output Summary (Last 24 hours) at 12/17/2023 5353  Last data filed at 12/17/2023  0026  Gross per 24 hour   Intake 120 ml   Output 150 ml   Net -30 ml         Physical Exam  Vitals and nursing note reviewed.   Constitutional:       General: She is not in acute distress.     Appearance: She is well-developed. She is ill-appearing (chronically). She is not diaphoretic.   HENT:      Head: Normocephalic and atraumatic.   Eyes:      General: No scleral icterus.     Conjunctiva/sclera: Conjunctivae normal.   Neck:      Vascular: No JVD.   Cardiovascular:      Rate and Rhythm: Normal rate and regular rhythm.      Heart sounds: Normal heart sounds.   Pulmonary:      Effort: Pulmonary effort is normal. No respiratory distress.   Abdominal:      General: There is no distension.      Palpations: Abdomen is soft.      Tenderness: There is no abdominal tenderness.   Musculoskeletal:      Right lower leg: No edema.      Left lower leg: No edema.   Skin:     Coloration: Skin is pale. Skin is not jaundiced.      Findings: Lesion present.   Neurological:      Mental Status: She is alert.      Motor: No abnormal muscle tone.      Comments: Does not attempt to track or answer questions, does not follow commands, no tremor, UE rigidity   Psychiatric:      Comments: Unable to evaluate             Significant Labs: All pertinent labs within the past 24 hours have been reviewed.    Significant Imaging: I have reviewed all pertinent imaging results/findings within the past 24 hours.    Assessment/Plan:      * Pressure injury of sacral region, unstageable  Presents for evaluation of sacral decubitus ulcer. Purulent on exam. Mild leukocytosis, otherwise does not meet sepsis criteria.   - Wound culture with P mirabilis  - Continue ceftriaxone 2g daily x7-10 days (sot 12/9)  - General surgery consulted  -- s/p debridement 12/3  - Wound care consulted for recs for discharge wound care    Stage IV pressure ulcer of sacral region  - See above      Sepsis without acute organ dysfunction  Febrile with new leukocytosis on 12/3.  Known infected wound.    This patient does have evidence of infective focus  My overall impression is sepsis.  Source: Skin and Soft Tissue (location sacral)  Antibiotics given-   Antibiotics (72h ago, onward)      Start     Stop Route Frequency Ordered    12/09/23 0600  cefTRIAXone (ROCEPHIN) 2 g in dextrose 5 % in water (D5W) 100 mL IVPB (MB+)         -- IV Every 24 hours (non-standard times) 12/08/23 1520            Fluid challenge Not needed - patient is not hypotensive      Source control achieved by: to OR 12/3 for debridement   - Follow-up infectious workup ordered 12/10  - Blood cultures no growth to date   - UA bland  - Chest x-ray unremarkable  - Antibiotics as above      Macrocytic anemia  Chronic, and likely anemia of chronic disease. Currently without indication for transfusion.     Severe protein-calorie malnutrition  Due to Parkinson's and debility. Will continue dietary supplements as recommended by RD during previous hospitalization.   - SLP consulted      Hypernatremia  Patient has hypernatremia which is controlled. The hypernatremia is due to Dehydration. The patient's sodium results have been reviewed and are listed below.  Recent Labs   Lab 12/08/23  0432              Functional quadriplegia  Due to Parkinsons' and dementia. Continue frequent turns and pressure ulcer care/prevention.       DNR (do not resuscitate)  Per previous documentation and discussions with patient's family. Clarified with daughter Yamileth at bedside (shares POA with other daughter) - patient is DNR.       Severe Lewy body dementia  Monitor with delirium precautions.     Parkinson's disease  Continue home Sinemet.      CKD (chronic kidney disease), stage III  Creatine stable for now. BMP reviewed- noted Estimated Creatinine Clearance: 62.9 mL/min (based on SCr of 0.6 mg/dL). according to latest data. Based on current GFR, CKD stage is stage 3 - GFR 30-59.       - Monitor UOP and serial BMP and adjust therapy as  needed.    - Renally dose meds.   - Avoid nephrotoxic medications and procedures.        VTE Risk Mitigation (From admission, onward)           Ordered     enoxaparin injection 30 mg  Daily         12/09/23 1543     IP VTE HIGH RISK PATIENT  Once         12/01/23 2123     Place sequential compression device  Until discontinued         12/01/23 2123     Reason for No Pharmacological VTE Prophylaxis  Once        Question:  Reasons:  Answer:  Physician Provided (leave comment)    12/01/23 2123                    Discharge Planning   MELCHOR: 12/18/2023     Code Status: DNR   Is the patient medically ready for discharge?: Yes    Reason for patient still in hospital (select all that apply): Pending disposition  Discharge Plan A: Long-term acute care facility (LTAC)   Discharge Delays: (!) Post-Acute Set-up      Cristian Dueñas MD  Department of Hospital Medicine   [facility] - Intensive Care ([place]-16)

## 2023-12-17 NOTE — SUBJECTIVE & OBJECTIVE
Interval History: no acute events    Review of Systems  Objective:     Vital Signs (Most Recent):  Temp: 98.1 °F (36.7 °C) (12/17/23 1132)  Pulse: 77 (12/17/23 1132)  Resp: 18 (12/17/23 1132)  BP: (!) 120/57 (12/17/23 1132)  SpO2: 97 % (12/17/23 1132) Vital Signs (24h Range):  Temp:  [97.7 °F (36.5 °C)-98.1 °F (36.7 °C)] 98.1 °F (36.7 °C)  Pulse:  [] 77  Resp:  [17-20] 18  SpO2:  [95 %-100 %] 97 %  BP: ()/(51-67) 120/57     Weight: 52.6 kg (115 lb 15.4 oz)  Body mass index is 20.54 kg/m².    Intake/Output Summary (Last 24 hours) at 12/17/2023 1258  Last data filed at 12/17/2023 0026  Gross per 24 hour   Intake 120 ml   Output 150 ml   Net -30 ml         Physical Exam  Vitals and nursing note reviewed.   Constitutional:       General: She is not in acute distress.     Appearance: She is well-developed. She is ill-appearing (chronically). She is not diaphoretic.   HENT:      Head: Normocephalic and atraumatic.   Eyes:      General: No scleral icterus.     Conjunctiva/sclera: Conjunctivae normal.   Neck:      Vascular: No JVD.   Cardiovascular:      Rate and Rhythm: Normal rate and regular rhythm.      Heart sounds: Normal heart sounds.   Pulmonary:      Effort: Pulmonary effort is normal. No respiratory distress.   Abdominal:      General: There is no distension.      Palpations: Abdomen is soft.      Tenderness: There is no abdominal tenderness.   Musculoskeletal:      Right lower leg: No edema.      Left lower leg: No edema.   Skin:     Coloration: Skin is pale. Skin is not jaundiced.      Findings: Lesion present.   Neurological:      Mental Status: She is alert.      Motor: No abnormal muscle tone.      Comments: Does not attempt to track or answer questions, does not follow commands, no tremor, UE rigidity   Psychiatric:      Comments: Unable to evaluate             Significant Labs: All pertinent labs within the past 24 hours have been reviewed.    Significant Imaging: I have reviewed all pertinent  imaging results/findings within the past 24 hours.

## 2023-12-17 NOTE — ASSESSMENT & PLAN NOTE
Presents for evaluation of sacral decubitus ulcer. Purulent on exam. Mild leukocytosis, otherwise does not meet sepsis criteria.   - Wound culture with P mirabilis  - Continue ceftriaxone 2g daily x7-10 days (sot 12/9)  - General surgery consulted  -- s/p debridement 12/3  - Wound care consulted for recs for discharge wound care

## 2023-12-18 LAB
ALBUMIN SERPL BCP-MCNC: 2.4 G/DL (ref 3.5–5.2)
ALP SERPL-CCNC: 54 U/L (ref 55–135)
ALT SERPL W/O P-5'-P-CCNC: <5 U/L (ref 10–44)
ANION GAP SERPL CALC-SCNC: 6 MMOL/L (ref 8–16)
ANISOCYTOSIS BLD QL SMEAR: SLIGHT
AST SERPL-CCNC: 13 U/L (ref 10–40)
BASOPHILS # BLD AUTO: 0.05 K/UL (ref 0–0.2)
BASOPHILS NFR BLD: 0.5 % (ref 0–1.9)
BILIRUB SERPL-MCNC: 0.7 MG/DL (ref 0.1–1)
BUN SERPL-MCNC: 19 MG/DL (ref 8–23)
CALCIUM SERPL-MCNC: 9.3 MG/DL (ref 8.7–10.5)
CHLORIDE SERPL-SCNC: 106 MMOL/L (ref 95–110)
CO2 SERPL-SCNC: 24 MMOL/L (ref 23–29)
CREAT SERPL-MCNC: 0.7 MG/DL (ref 0.5–1.4)
DIFFERENTIAL METHOD: ABNORMAL
EOSINOPHIL # BLD AUTO: 0.4 K/UL (ref 0–0.5)
EOSINOPHIL NFR BLD: 3.6 % (ref 0–8)
ERYTHROCYTE [DISTWIDTH] IN BLOOD BY AUTOMATED COUNT: 13.6 % (ref 11.5–14.5)
EST. GFR  (NO RACE VARIABLE): >60 ML/MIN/1.73 M^2
GLUCOSE SERPL-MCNC: 80 MG/DL (ref 70–110)
HCT VFR BLD AUTO: 35.5 % (ref 37–48.5)
HGB BLD-MCNC: 11 G/DL (ref 12–16)
HYPOCHROMIA BLD QL SMEAR: ABNORMAL
IMM GRANULOCYTES # BLD AUTO: 0.07 K/UL (ref 0–0.04)
IMM GRANULOCYTES NFR BLD AUTO: 0.7 % (ref 0–0.5)
LYMPHOCYTES # BLD AUTO: 1.1 K/UL (ref 1–4.8)
LYMPHOCYTES NFR BLD: 10.5 % (ref 18–48)
MCH RBC QN AUTO: 29.4 PG (ref 27–31)
MCHC RBC AUTO-ENTMCNC: 31 G/DL (ref 32–36)
MCV RBC AUTO: 95 FL (ref 82–98)
MONOCYTES # BLD AUTO: 0.9 K/UL (ref 0.3–1)
MONOCYTES NFR BLD: 8.9 % (ref 4–15)
NEUTROPHILS # BLD AUTO: 7.9 K/UL (ref 1.8–7.7)
NEUTROPHILS NFR BLD: 75.8 % (ref 38–73)
NRBC BLD-RTO: 0 /100 WBC
OVALOCYTES BLD QL SMEAR: ABNORMAL
PLATELET # BLD AUTO: 300 K/UL (ref 150–450)
PLATELET BLD QL SMEAR: ABNORMAL
PMV BLD AUTO: 10.4 FL (ref 9.2–12.9)
POCT GLUCOSE: 100 MG/DL (ref 70–110)
POCT GLUCOSE: 122 MG/DL (ref 70–110)
POCT GLUCOSE: 90 MG/DL (ref 70–110)
POCT GLUCOSE: 97 MG/DL (ref 70–110)
POIKILOCYTOSIS BLD QL SMEAR: SLIGHT
POLYCHROMASIA BLD QL SMEAR: ABNORMAL
POTASSIUM SERPL-SCNC: 4.4 MMOL/L (ref 3.5–5.1)
PROT SERPL-MCNC: 6.1 G/DL (ref 6–8.4)
RBC # BLD AUTO: 3.74 M/UL (ref 4–5.4)
SODIUM SERPL-SCNC: 136 MMOL/L (ref 136–145)
WBC # BLD AUTO: 10.42 K/UL (ref 3.9–12.7)

## 2023-12-18 PROCEDURE — A4216 STERILE WATER/SALINE, 10 ML: HCPCS | Performed by: STUDENT IN AN ORGANIZED HEALTH CARE EDUCATION/TRAINING PROGRAM

## 2023-12-18 PROCEDURE — 80053 COMPREHEN METABOLIC PANEL: CPT | Performed by: STUDENT IN AN ORGANIZED HEALTH CARE EDUCATION/TRAINING PROGRAM

## 2023-12-18 PROCEDURE — 63600175 PHARM REV CODE 636 W HCPCS: Performed by: STUDENT IN AN ORGANIZED HEALTH CARE EDUCATION/TRAINING PROGRAM

## 2023-12-18 PROCEDURE — 25000003 PHARM REV CODE 250: Performed by: STUDENT IN AN ORGANIZED HEALTH CARE EDUCATION/TRAINING PROGRAM

## 2023-12-18 PROCEDURE — 36415 COLL VENOUS BLD VENIPUNCTURE: CPT | Performed by: STUDENT IN AN ORGANIZED HEALTH CARE EDUCATION/TRAINING PROGRAM

## 2023-12-18 PROCEDURE — 21400001 HC TELEMETRY ROOM

## 2023-12-18 PROCEDURE — 85025 COMPLETE CBC W/AUTO DIFF WBC: CPT | Performed by: STUDENT IN AN ORGANIZED HEALTH CARE EDUCATION/TRAINING PROGRAM

## 2023-12-18 RX ADMIN — ENOXAPARIN SODIUM 30 MG: 30 INJECTION SUBCUTANEOUS at 04:12

## 2023-12-18 RX ADMIN — PANTOPRAZOLE SODIUM 20 MG: 20 TABLET, DELAYED RELEASE ORAL at 09:12

## 2023-12-18 RX ADMIN — SENNOSIDES AND DOCUSATE SODIUM 1 TABLET: 8.6; 5 TABLET ORAL at 08:12

## 2023-12-18 RX ADMIN — Medication 10 ML: at 05:12

## 2023-12-18 RX ADMIN — CETIRIZINE HYDROCHLORIDE 10 MG: 10 TABLET, FILM COATED ORAL at 09:12

## 2023-12-18 RX ADMIN — THERA TABS 1 TABLET: TAB at 09:12

## 2023-12-18 RX ADMIN — TAMSULOSIN HYDROCHLORIDE 0.4 MG: 0.4 CAPSULE ORAL at 09:12

## 2023-12-18 RX ADMIN — ASPIRIN 81 MG: 81 TABLET, COATED ORAL at 09:12

## 2023-12-18 RX ADMIN — CYANOCOBALAMIN TAB 1000 MCG 1000 MCG: 1000 TAB at 09:12

## 2023-12-18 RX ADMIN — Medication 10 ML: at 11:12

## 2023-12-18 RX ADMIN — CARBIDOPA AND LEVODOPA 1 SPLIT TABLET: 25; 100 TABLET ORAL at 08:12

## 2023-12-18 RX ADMIN — CARBIDOPA AND LEVODOPA 1 TABLET: 25; 100 TABLET ORAL at 09:12

## 2023-12-18 RX ADMIN — CARBIDOPA AND LEVODOPA 1 TABLET: 25; 100 TABLET ORAL at 08:12

## 2023-12-18 RX ADMIN — CARBIDOPA AND LEVODOPA 1 TABLET: 25; 100 TABLET ORAL at 04:12

## 2023-12-18 RX ADMIN — Medication 10 ML: at 06:12

## 2023-12-18 NOTE — PLAN OF CARE
Problem: Adult Inpatient Plan of Care  Goal: Plan of Care Review  Outcome: Ongoing, Not Progressing  Goal: Optimal Comfort and Wellbeing  Outcome: Ongoing, Not Progressing     Problem: Impaired Wound Healing  Goal: Optimal Wound Healing  Outcome: Ongoing, Not Progressing     Alert. No s/s of discomfort at present time.  No output during shift bladder scanned and retained 259 cc's. Md notified.

## 2023-12-18 NOTE — SUBJECTIVE & OBJECTIVE
Interval History: No acute events. Pt stable    Review of Systems  Objective:     Vital Signs (Most Recent):  Temp: 98 °F (36.7 °C) (12/18/23 1548)  Pulse: 67 (12/18/23 1548)  Resp: 18 (12/18/23 1548)  BP: 119/62 (12/18/23 1548)  SpO2: 96 % (12/18/23 1548) Vital Signs (24h Range):  Temp:  [97.7 °F (36.5 °C)-98.4 °F (36.9 °C)] 98 °F (36.7 °C)  Pulse:  [65-86] 67  Resp:  [17-18] 18  SpO2:  [95 %-100 %] 96 %  BP: ()/(42-87) 119/62     Weight: 52.6 kg (115 lb 15.4 oz)  Body mass index is 20.54 kg/m².    Intake/Output Summary (Last 24 hours) at 12/18/2023 1552  Last data filed at 12/18/2023 0941  Gross per 24 hour   Intake 60 ml   Output 1800 ml   Net -1740 ml         Physical Exam  Vitals and nursing note reviewed.   Constitutional:       General: She is not in acute distress.     Appearance: She is well-developed. She is ill-appearing (chronically). She is not diaphoretic.   HENT:      Head: Normocephalic and atraumatic.   Eyes:      General: No scleral icterus.     Conjunctiva/sclera: Conjunctivae normal.   Neck:      Vascular: No JVD.   Cardiovascular:      Rate and Rhythm: Normal rate and regular rhythm.      Heart sounds: Normal heart sounds.   Pulmonary:      Effort: Pulmonary effort is normal. No respiratory distress.   Abdominal:      General: There is no distension.      Palpations: Abdomen is soft.      Tenderness: There is no abdominal tenderness.   Musculoskeletal:      Right lower leg: No edema.      Left lower leg: No edema.   Skin:     Coloration: Skin is pale. Skin is not jaundiced.      Findings: Lesion present.   Neurological:      Mental Status: She is alert.      Motor: No abnormal muscle tone.      Comments: Does not attempt to track or answer questions, does not follow commands, no tremor, UE rigidity   Psychiatric:      Comments: Unable to evaluate             Significant Labs: All pertinent labs within the past 24 hours have been reviewed.    Significant Imaging: I have reviewed all  pertinent imaging results/findings within the past 24 hours.

## 2023-12-18 NOTE — PLAN OF CARE
[facility] - [facility] ([facility])  Discharge Reassessment    Primary Care Provider: Raymond Bess MD    Expected Discharge Date: 12/18/2023    Reassessment (most recent)       Discharge Reassessment - 12/18/23 0999          Discharge Reassessment    Assessment Type Discharge Planning Reassessment (P)      Did the patient's condition or plan change since previous assessment? No (P)      Discharge Plan discussed with: Adult children (P)      Communicated MELCHOR with patient/caregiver Date not available/Unable to determine (P)      Discharge Plan A Long-term acute care facility (LTAC) (P)      Discharge Plan B Skilled Nursing Facility (P)      DME Needed Upon Discharge  none (P)      Transition of Care Barriers None (P)      Why the patient remains in the hospital Insurance issues;Placement issues (P)         Post-Acute Status    Post-Acute Authorization Placement (P)      Post-Acute Placement Status Pending payor review/awaiting authorization (if required) (P)      Discharge Delays Post-Acute Set-up (P)                  DAYAN spoke with dtr Yamileth who states that she got paperwork that states pt's HMO was dropped effective 11/30/23, paperwork in pt's chart.  DAYAN sent paperwork to Dalila Godfrey tono at [email or web address].    2:31 PM  CM spoke with pt's dtr, no updates to provide at this time.  Instructed that CM had sent paperwork to [facility] for them to run benefits again.  CG states she's not rec'd letter of denial from [facility] regarding appeal, but she does know that appeal was denied.      KEYUR BenavidesN, BS, RN, CCM

## 2023-12-18 NOTE — PROGRESS NOTES
[facility] ([address] Medicine  Progress Note    Patient Name: Annmarie Gallardo  MRN: [id number]  Patient Class: IP- Inpatient   Admission Date: 12/1/2023  Length of Stay: 17 days  Attending Physician: Cristian Dueñas*  Primary Care Provider: Raymond Bess MD        Subjective:     Principal Problem:Pressure injury of sacral region, unstageable        HPI:  Annmarie Gallardo is a 78 y.o. with a PMHx of parkinson's, lewy-body dementia with debility/functional quadriplegia, HTN, GERD, CKD3, and sacral decubitus ulcer who presents today from her nursing home for evaluation of sacral decubitus ulcer.     Due to her dementia and nonverbal status, she is unable to provide a history. No family available at bedside during my exam.     Overview/Hospital Course:  Pt presenting with purulent drainage from sacral wound . S/p debridement 12/3 with general surgery. Patient febrile with leukocytosis overnight 12/3.  Wound cultures with Proteus mirabilis.  Patient treated with ceftriaxone based on sensitivities to treat soft tissue infection.  Patient given IV fluids given poor oral intake.  Patient denied LTAC on peer to peer.  Patient adequately treated for soft tissue infection with ceftriaxone 2 g daily with improvement of leukocytosis.  Iv fluids given for worsening renal function with improvement.    Interval History: No acute events. Pt stable    Review of Systems  Objective:     Vital Signs (Most Recent):  Temp: 98 °F (36.7 °C) (12/18/23 1548)  Pulse: 67 (12/18/23 1548)  Resp: 18 (12/18/23 1548)  BP: 119/62 (12/18/23 1548)  SpO2: 96 % (12/18/23 1548) Vital Signs (24h Range):  Temp:  [97.7 °F (36.5 °C)-98.4 °F (36.9 °C)] 98 °F (36.7 °C)  Pulse:  [65-86] 67  Resp:  [17-18] 18  SpO2:  [95 %-100 %] 96 %  BP: ()/(42-87) 119/62     Weight: 52.6 kg (115 lb 15.4 oz)  Body mass index is 20.54 kg/m².    Intake/Output Summary (Last 24 hours) at 12/18/2023 1552  Last data filed at 12/18/2023  0941  Gross per 24 hour   Intake 60 ml   Output 1800 ml   Net -1740 ml         Physical Exam  Vitals and nursing note reviewed.   Constitutional:       General: She is not in acute distress.     Appearance: She is well-developed. She is ill-appearing (chronically). She is not diaphoretic.   HENT:      Head: Normocephalic and atraumatic.   Eyes:      General: No scleral icterus.     Conjunctiva/sclera: Conjunctivae normal.   Neck:      Vascular: No JVD.   Cardiovascular:      Rate and Rhythm: Normal rate and regular rhythm.      Heart sounds: Normal heart sounds.   Pulmonary:      Effort: Pulmonary effort is normal. No respiratory distress.   Abdominal:      General: There is no distension.      Palpations: Abdomen is soft.      Tenderness: There is no abdominal tenderness.   Musculoskeletal:      Right lower leg: No edema.      Left lower leg: No edema.   Skin:     Coloration: Skin is pale. Skin is not jaundiced.      Findings: Lesion present.   Neurological:      Mental Status: She is alert.      Motor: No abnormal muscle tone.      Comments: Does not attempt to track or answer questions, does not follow commands, no tremor, UE rigidity   Psychiatric:      Comments: Unable to evaluate             Significant Labs: All pertinent labs within the past 24 hours have been reviewed.    Significant Imaging: I have reviewed all pertinent imaging results/findings within the past 24 hours.    Assessment/Plan:      * Pressure injury of sacral region, unstageable  Presents for evaluation of sacral decubitus ulcer. Purulent on exam. Mild leukocytosis, otherwise does not meet sepsis criteria.   - Wound culture with P mirabilis  - Continue ceftriaxone 2g daily x7-10 days (sot 12/9)  - General surgery consulted  -- s/p debridement 12/3  - Wound care consulted for recs for discharge wound care    Stage IV pressure ulcer of sacral region  - See above      Sepsis without acute organ dysfunction  Febrile with new leukocytosis on 12/3.  Known infected wound.    This patient does have evidence of infective focus  My overall impression is sepsis.  Source: Skin and Soft Tissue (location sacral)  Antibiotics given-   Antibiotics (72h ago, onward)      Start     Stop Route Frequency Ordered    12/09/23 0600  cefTRIAXone (ROCEPHIN) 2 g in dextrose 5 % in water (D5W) 100 mL IVPB (MB+)         -- IV Every 24 hours (non-standard times) 12/08/23 1520            Fluid challenge Not needed - patient is not hypotensive      Source control achieved by: to OR 12/3 for debridement   - Follow-up infectious workup ordered 12/10  - Blood cultures no growth to date   - UA bland  - Chest x-ray unremarkable  - Antibiotics as above      Macrocytic anemia  Chronic, and likely anemia of chronic disease. Currently without indication for transfusion.     Severe protein-calorie malnutrition  Due to Parkinson's and debility. Will continue dietary supplements as recommended by RD during previous hospitalization.   - SLP consulted      Hypernatremia  Patient has hypernatremia which is controlled. The hypernatremia is due to Dehydration. The patient's sodium results have been reviewed and are listed below.  Recent Labs   Lab 12/08/23  0432              Functional quadriplegia  Due to Parkinsons' and dementia. Continue frequent turns and pressure ulcer care/prevention.       DNR (do not resuscitate)  Per previous documentation and discussions with patient's family. Clarified with daughter Yamileth at bedside (shares POA with other daughter) - patient is DNR.       Severe Lewy body dementia  Monitor with delirium precautions.     Parkinson's disease  Continue home Sinemet.      CKD (chronic kidney disease), stage III  Creatine stable for now. BMP reviewed- noted Estimated Creatinine Clearance: 62.9 mL/min (based on SCr of 0.6 mg/dL). according to latest data. Based on current GFR, CKD stage is stage 3 - GFR 30-59.       - Monitor UOP and serial BMP and adjust therapy as  needed.    - Renally dose meds.   - Avoid nephrotoxic medications and procedures.        VTE Risk Mitigation (From admission, onward)           Ordered     enoxaparin injection 30 mg  Daily         12/09/23 1543     IP VTE HIGH RISK PATIENT  Once         12/01/23 2123     Place sequential compression device  Until discontinued         12/01/23 2123     Reason for No Pharmacological VTE Prophylaxis  Once        Question:  Reasons:  Answer:  Physician Provided (leave comment)    12/01/23 2123                    Discharge Planning   MELCHOR: 12/18/2023     Code Status: DNR   Is the patient medically ready for discharge?: Yes    Reason for patient still in hospital (select all that apply): Pending disposition  Discharge Plan A: Long-term acute care facility (LTAC)   Discharge Delays: (!) Post-Acute Set-up      Cristian Dueñas MD  Department of Hospital Medicine   [facility] - Intensive Care ([place]-16)

## 2023-12-18 NOTE — PLAN OF CARE
Problem: Adult Inpatient Plan of Care  Goal: Plan of Care Review  Outcome: Ongoing, Progressing  Goal: Optimal Comfort and Wellbeing  Outcome: Ongoing, Progressing     Problem: Impaired Wound Healing  Goal: Optimal Wound Healing  Outcome: Ongoing, Progressing     Problem: Skin Injury Risk Increased  Goal: Skin Health and Integrity  Outcome: Ongoing, Progressing     Problem: Fall Injury Risk  Goal: Absence of Fall and Fall-Related Injury  Outcome: Ongoing, Progressing     Problem: Infection  Goal: Absence of Infection Signs and Symptoms  Outcome: Ongoing, Progressing

## 2023-12-19 LAB — POCT GLUCOSE: 107 MG/DL (ref 70–110)

## 2023-12-19 PROCEDURE — 63600175 PHARM REV CODE 636 W HCPCS: Performed by: STUDENT IN AN ORGANIZED HEALTH CARE EDUCATION/TRAINING PROGRAM

## 2023-12-19 PROCEDURE — 25000003 PHARM REV CODE 250: Performed by: STUDENT IN AN ORGANIZED HEALTH CARE EDUCATION/TRAINING PROGRAM

## 2023-12-19 PROCEDURE — A4216 STERILE WATER/SALINE, 10 ML: HCPCS | Performed by: STUDENT IN AN ORGANIZED HEALTH CARE EDUCATION/TRAINING PROGRAM

## 2023-12-19 PROCEDURE — 21400001 HC TELEMETRY ROOM

## 2023-12-19 PROCEDURE — 51798 US URINE CAPACITY MEASURE: CPT

## 2023-12-19 RX ADMIN — Medication 10 ML: at 11:12

## 2023-12-19 RX ADMIN — ENOXAPARIN SODIUM 30 MG: 30 INJECTION SUBCUTANEOUS at 04:12

## 2023-12-19 RX ADMIN — ASPIRIN 81 MG: 81 TABLET, COATED ORAL at 09:12

## 2023-12-19 RX ADMIN — CARBIDOPA AND LEVODOPA 1 TABLET: 25; 100 TABLET ORAL at 03:12

## 2023-12-19 RX ADMIN — CARBIDOPA AND LEVODOPA 1 TABLET: 25; 100 TABLET ORAL at 09:12

## 2023-12-19 RX ADMIN — Medication 10 ML: at 06:12

## 2023-12-19 RX ADMIN — CARBIDOPA AND LEVODOPA 1 SPLIT TABLET: 25; 100 TABLET ORAL at 09:12

## 2023-12-19 RX ADMIN — SENNOSIDES AND DOCUSATE SODIUM 1 TABLET: 8.6; 5 TABLET ORAL at 09:12

## 2023-12-19 RX ADMIN — Medication 10 ML: at 05:12

## 2023-12-19 RX ADMIN — PANTOPRAZOLE SODIUM 20 MG: 20 TABLET, DELAYED RELEASE ORAL at 09:12

## 2023-12-19 RX ADMIN — TAMSULOSIN HYDROCHLORIDE 0.4 MG: 0.4 CAPSULE ORAL at 09:12

## 2023-12-19 RX ADMIN — THERA TABS 1 TABLET: TAB at 09:12

## 2023-12-19 RX ADMIN — CETIRIZINE HYDROCHLORIDE 10 MG: 10 TABLET, FILM COATED ORAL at 09:12

## 2023-12-19 RX ADMIN — CYANOCOBALAMIN TAB 1000 MCG 1000 MCG: 1000 TAB at 09:12

## 2023-12-19 NOTE — PROGRESS NOTES
[facility] ([address] Medicine  Progress Note    Patient Name: Annmarie Gallardo  MRN: [id number]  Patient Class: IP- Inpatient   Admission Date: 12/1/2023  Length of Stay: 18 days  Attending Physician: Cristian Dueñas*  Primary Care Provider: Raymond Bess MD        Subjective:     Principal Problem:Pressure injury of sacral region, unstageable        HPI:  Annmarie Gallardo is a 78 y.o. with a PMHx of parkinson's, lewy-body dementia with debility/functional quadriplegia, HTN, GERD, CKD3, and sacral decubitus ulcer who presents today from her nursing home for evaluation of sacral decubitus ulcer.     Due to her dementia and nonverbal status, she is unable to provide a history. No family available at bedside during my exam.     Overview/Hospital Course:  Pt presenting with purulent drainage from sacral wound . S/p debridement 12/3 with general surgery. Patient febrile with leukocytosis overnight 12/3.  Wound cultures with Proteus mirabilis.  Patient treated with ceftriaxone based on sensitivities to treat soft tissue infection.  Patient given IV fluids given poor oral intake.  Patient denied LTAC on peer to peer.  Patient adequately treated for soft tissue infection with ceftriaxone 2 g daily with improvement of leukocytosis.  Iv fluids given for worsening renal function with improvement.    Interval History: Awaiting discharge    Physical Exam  Vitals and nursing note reviewed.   Constitutional:       General: She is not in acute distress.     Appearance: She is well-developed. She is ill-appearing (chronically). She is not diaphoretic.   HENT:      Head: Normocephalic and atraumatic.   Eyes:      General: No scleral icterus.     Conjunctiva/sclera: Conjunctivae normal.   Neck:      Vascular: No JVD.   Cardiovascular:      Rate and Rhythm: Normal rate and regular rhythm.      Heart sounds: Normal heart sounds.   Pulmonary:      Effort: Pulmonary effort is normal. No respiratory  distress.   Abdominal:      General: There is no distension.      Palpations: Abdomen is soft.      Tenderness: There is no abdominal tenderness.   Musculoskeletal:      Right lower leg: No edema.      Left lower leg: No edema.   Skin:     Coloration: Skin is pale. Skin is not jaundiced.      Findings: Lesion present.   Neurological:      Mental Status: She is alert.      Motor: No abnormal muscle tone.      Comments: Does not attempt to track or answer questions, does not follow commands, no tremor, UE rigidity   Psychiatric:      Comments: Unable to evaluate       Assessment/Plan:      * Pressure injury of sacral region, unstageable  Presents for evaluation of sacral decubitus ulcer. Purulent on exam. Mild leukocytosis, otherwise does not meet sepsis criteria.   - Wound culture with P mirabilis  - Continue ceftriaxone 2g daily x7-10 days (sot 12/9)  - General surgery consulted  -- s/p debridement 12/3  - Wound care consulted for recs for discharge wound care    Stage IV pressure ulcer of sacral region  - See above      Sepsis without acute organ dysfunction  Febrile with new leukocytosis on 12/3. Known infected wound.    This patient does have evidence of infective focus  My overall impression is sepsis.  Source: Skin and Soft Tissue (location sacral)  Antibiotics given-   Antibiotics (72h ago, onward)      Start     Stop Route Frequency Ordered    12/09/23 0600  cefTRIAXone (ROCEPHIN) 2 g in dextrose 5 % in water (D5W) 100 mL IVPB (MB+)         -- IV Every 24 hours (non-standard times) 12/08/23 1520            Fluid challenge Not needed - patient is not hypotensive      Source control achieved by: to OR 12/3 for debridement   - Follow-up infectious workup ordered 12/10  - Blood cultures no growth to date   - UA bland  - Chest x-ray unremarkable  - Antibiotics as above      Macrocytic anemia  Chronic, and likely anemia of chronic disease. Currently without indication for transfusion.     Severe protein-calorie  malnutrition  Due to Parkinson's and debility. Will continue dietary supplements as recommended by RD during previous hospitalization.   - SLP consulted      Hypernatremia  Patient has hypernatremia which is controlled. The hypernatremia is due to Dehydration. The patient's sodium results have been reviewed and are listed below.  Recent Labs   Lab 12/08/23  0432          Functional quadriplegia  Due to Parkinsons' and dementia. Continue frequent turns and pressure ulcer care/prevention.       DNR (do not resuscitate)  Per previous documentation and discussions with patient's family. Clarified with daughter Yamileth at bedside (shares POA with other daughter) - patient is DNR.       Severe Lewy body dementia  Monitor with delirium precautions.     Parkinson's disease  Continue home Sinemet.      CKD (chronic kidney disease), stage III  Creatine stable for now. BMP reviewed- noted Estimated Creatinine Clearance: 62.9 mL/min (based on SCr of 0.6 mg/dL). according to latest data. Based on current GFR, CKD stage is stage 3 - GFR 30-59.       - Monitor UOP and serial BMP and adjust therapy as needed.    - Renally dose meds.   - Avoid nephrotoxic medications and procedures.        VTE Risk Mitigation (From admission, onward)           Ordered     enoxaparin injection 30 mg  Daily         12/09/23 1543     IP VTE HIGH RISK PATIENT  Once         12/01/23 2123     Place sequential compression device  Until discontinued         12/01/23 2123     Reason for No Pharmacological VTE Prophylaxis  Once        Question:  Reasons:  Answer:  Physician Provided (leave comment)    12/01/23 2123                    Discharge Planning   MELCHOR: 12/18/2023     Code Status: DNR   Is the patient medically ready for discharge?: Yes    Reason for patient still in hospital (select all that apply): Pending disposition  Discharge Plan A: Long-term acute care facility (LTAC)   Discharge Delays: (!) Post-Acute Set-up      Cristian Dueñas,  MD  Department of Hospital Medicine   [facility] - [facility] ([place])

## 2023-12-19 NOTE — PLAN OF CARE
Problem: Adult Inpatient Plan of Care  Goal: Plan of Care Review  Outcome: Ongoing, Progressing  Goal: Patient-Specific Goal (Individualized)  Outcome: Ongoing, Progressing  Goal: Absence of Hospital-Acquired Illness or Injury  Outcome: Ongoing, Progressing     Problem: Impaired Wound Healing  Goal: Optimal Wound Healing  Outcome: Ongoing, Progressing     Problem: Fall Injury Risk  Goal: Absence of Fall and Fall-Related Injury  Outcome: Ongoing, Progressing     Problem: Adjustment to Illness (Sepsis/Septic Shock)  Goal: Optimal Coping  Outcome: Ongoing, Progressing     Problem: Bleeding (Sepsis/Septic Shock)  Goal: Absence of Bleeding  Outcome: Ongoing, Progressing     Problem: Glycemic Control Impaired (Sepsis/Septic Shock)  Goal: Blood Glucose Level Within Desired Range  Outcome: Ongoing, Progressing     Problem: Infection Progression (Sepsis/Septic Shock)  Goal: Absence of Infection Signs and Symptoms  Outcome: Ongoing, Progressing     Problem: Nutrition Impaired (Sepsis/Septic Shock)  Goal: Optimal Nutrition Intake  Outcome: Ongoing, Progressing

## 2023-12-19 NOTE — PLAN OF CARE
Problem: Adult Inpatient Plan of Care  Goal: Plan of Care Review  Outcome: Ongoing, Progressing  Goal: Patient-Specific Goal (Individualized)  Outcome: Ongoing, Progressing  Goal: Absence of Hospital-Acquired Illness or Injury  Outcome: Ongoing, Progressing  Goal: Optimal Comfort and Wellbeing  Outcome: Ongoing, Progressing     Problem: Impaired Wound Healing  Goal: Optimal Wound Healing  Outcome: Ongoing, Progressing     Problem: Skin Injury Risk Increased  Goal: Skin Health and Integrity  Outcome: Ongoing, Progressing     Problem: Fall Injury Risk  Goal: Absence of Fall and Fall-Related Injury  Outcome: Ongoing, Progressing     Problem: Infection Progression (Sepsis/Septic Shock)  Goal: Absence of Infection Signs and Symptoms  Outcome: Ongoing, Progressing     Problem: Nutrition Impaired (Sepsis/Septic Shock)  Goal: Optimal Nutrition Intake  Outcome: Ongoing, Progressing

## 2023-12-20 LAB
POCT GLUCOSE: 100 MG/DL (ref 70–110)
POCT GLUCOSE: 108 MG/DL (ref 70–110)
POCT GLUCOSE: 111 MG/DL (ref 70–110)
POCT GLUCOSE: 114 MG/DL (ref 70–110)
POCT GLUCOSE: 151 MG/DL (ref 70–110)
POCT GLUCOSE: 74 MG/DL (ref 70–110)

## 2023-12-20 PROCEDURE — A4216 STERILE WATER/SALINE, 10 ML: HCPCS | Performed by: STUDENT IN AN ORGANIZED HEALTH CARE EDUCATION/TRAINING PROGRAM

## 2023-12-20 PROCEDURE — 25000003 PHARM REV CODE 250: Performed by: STUDENT IN AN ORGANIZED HEALTH CARE EDUCATION/TRAINING PROGRAM

## 2023-12-20 PROCEDURE — 21400001 HC TELEMETRY ROOM

## 2023-12-20 PROCEDURE — 63600175 PHARM REV CODE 636 W HCPCS: Performed by: STUDENT IN AN ORGANIZED HEALTH CARE EDUCATION/TRAINING PROGRAM

## 2023-12-20 RX ADMIN — SENNOSIDES AND DOCUSATE SODIUM 1 TABLET: 8.6; 5 TABLET ORAL at 10:12

## 2023-12-20 RX ADMIN — Medication 10 ML: at 11:12

## 2023-12-20 RX ADMIN — CARBIDOPA AND LEVODOPA 1 TABLET: 25; 100 TABLET ORAL at 09:12

## 2023-12-20 RX ADMIN — CYANOCOBALAMIN TAB 1000 MCG 1000 MCG: 1000 TAB at 10:12

## 2023-12-20 RX ADMIN — PANTOPRAZOLE SODIUM 20 MG: 20 TABLET, DELAYED RELEASE ORAL at 10:12

## 2023-12-20 RX ADMIN — ENOXAPARIN SODIUM 30 MG: 30 INJECTION SUBCUTANEOUS at 05:12

## 2023-12-20 RX ADMIN — CARBIDOPA AND LEVODOPA 1 TABLET: 25; 100 TABLET ORAL at 02:12

## 2023-12-20 RX ADMIN — ASPIRIN 81 MG: 81 TABLET, COATED ORAL at 10:12

## 2023-12-20 RX ADMIN — Medication 10 ML: at 05:12

## 2023-12-20 RX ADMIN — TAMSULOSIN HYDROCHLORIDE 0.4 MG: 0.4 CAPSULE ORAL at 10:12

## 2023-12-20 RX ADMIN — SENNOSIDES AND DOCUSATE SODIUM 1 TABLET: 8.6; 5 TABLET ORAL at 09:12

## 2023-12-20 RX ADMIN — CARBIDOPA AND LEVODOPA 1 TABLET: 25; 100 TABLET ORAL at 10:12

## 2023-12-20 RX ADMIN — CETIRIZINE HYDROCHLORIDE 10 MG: 10 TABLET, FILM COATED ORAL at 10:12

## 2023-12-20 RX ADMIN — CARBIDOPA AND LEVODOPA 1 SPLIT TABLET: 25; 100 TABLET ORAL at 09:12

## 2023-12-20 RX ADMIN — THERA TABS 1 TABLET: TAB at 10:12

## 2023-12-20 NOTE — SUBJECTIVE & OBJECTIVE
Interval History: Awaiting discharge    Review of Systems  Objective:     Vital Signs (Most Recent):  Temp: 98.3 °F (36.8 °C) (12/20/23 1207)  Pulse: 71 (12/20/23 1207)  Resp: 18 (12/20/23 1207)  BP: 102/65 (12/20/23 1207)  SpO2: 100 % (12/20/23 1207) Vital Signs (24h Range):  Temp:  [98.3 °F (36.8 °C)-100 °F (37.8 °C)] 98.3 °F (36.8 °C)  Pulse:  [71-99] 71  Resp:  [17-18] 18  SpO2:  [96 %-100 %] 100 %  BP: (102-152)/(58-77) 102/65     Weight: 52.6 kg (115 lb 15.4 oz)  Body mass index is 20.54 kg/m².    Intake/Output Summary (Last 24 hours) at 12/20/2023 1245  Last data filed at 12/19/2023 2200  Gross per 24 hour   Intake 220 ml   Output 450 ml   Net -230 ml         Physical Exam  Vitals and nursing note reviewed.   Constitutional:       General: She is not in acute distress.     Appearance: She is well-developed. She is ill-appearing (chronically). She is not diaphoretic.   HENT:      Head: Normocephalic and atraumatic.   Eyes:      General: No scleral icterus.     Conjunctiva/sclera: Conjunctivae normal.   Neck:      Vascular: No JVD.   Cardiovascular:      Rate and Rhythm: Normal rate and regular rhythm.      Heart sounds: Normal heart sounds.   Pulmonary:      Effort: Pulmonary effort is normal. No respiratory distress.   Abdominal:      General: There is no distension.      Palpations: Abdomen is soft.      Tenderness: There is no abdominal tenderness.   Musculoskeletal:      Right lower leg: No edema.      Left lower leg: No edema.   Skin:     Coloration: Skin is pale. Skin is not jaundiced.      Findings: Lesion present.   Neurological:      Mental Status: She is alert.      Motor: No abnormal muscle tone.      Comments: Does not attempt to track or answer questions, does not follow commands, no tremor, UE rigidity   Psychiatric:      Comments: Unable to evaluate             Significant Labs: All pertinent labs within the past 24 hours have been reviewed.    Significant Imaging: I have reviewed all pertinent  imaging results/findings within the past 24 hours.

## 2023-12-20 NOTE — PLAN OF CARE
Problem: Adult Inpatient Plan of Care  Goal: Patient-Specific Goal (Individualized)  Outcome: Ongoing, Progressing  Goal: Absence of Hospital-Acquired Illness or Injury  Outcome: Ongoing, Progressing  Goal: Optimal Comfort and Wellbeing  Outcome: Ongoing, Progressing     Problem: Impaired Wound Healing  Goal: Optimal Wound Healing  Outcome: Ongoing, Progressing     Problem: Skin Injury Risk Increased  Goal: Skin Health and Integrity  Outcome: Ongoing, Progressing     Problem: Fall Injury Risk  Goal: Absence of Fall and Fall-Related Injury  Outcome: Ongoing, Progressing     Problem: Infection  Goal: Absence of Infection Signs and Symptoms  Outcome: Ongoing, Progressing

## 2023-12-20 NOTE — PLAN OF CARE
CM spoke with pt's dtr Yamileth [phone], instructed that LTAC denied, and SNF likely to be denied d/t no more IV abx.  CG states coming home is not an option.  She would like to pursue nursing home if SNF not approved. She states that she spoke with [facility] and they can do the wound care.    CM returned Yudith from [facility]'s call [phone], ext [phone], stated that CM sent clinicals 12/15, do they need them again?  DAYAN could not understand fax number on .  M requesting call back.    2:40 PM  CM faxed recent clinicals to RUPINDER Torres nurse from [facility] [phone].    KEYUR BenavidesN, BS, RN, CCM

## 2023-12-20 NOTE — PROGRESS NOTES
[facility] ([address] Medicine  Progress Note    Patient Name: Annmarie Gallardo  MRN: [id number]  Patient Class: IP- Inpatient   Admission Date: 12/1/2023  Length of Stay: 19 days  Attending Physician: Cristian Dueñas*  Primary Care Provider: Raymond Bess MD        Subjective:     Principal Problem:Pressure injury of sacral region, unstageable        HPI:  Annmarie Gallardo is a 78 y.o. with a PMHx of parkinson's, lewy-body dementia with debility/functional quadriplegia, HTN, GERD, CKD3, and sacral decubitus ulcer who presents today from her nursing home for evaluation of sacral decubitus ulcer.     Due to her dementia and nonverbal status, she is unable to provide a history. No family available at bedside during my exam.     Overview/Hospital Course:  Pt presenting with purulent drainage from sacral wound . S/p debridement 12/3 with general surgery. Patient febrile with leukocytosis overnight 12/3.  Wound cultures with Proteus mirabilis.  Patient treated with ceftriaxone based on sensitivities to treat soft tissue infection.  Patient given IV fluids given poor oral intake.  Patient denied LTAC on peer to peer.  Patient adequately treated for soft tissue infection with ceftriaxone 2 g daily with improvement of leukocytosis.  Iv fluids given for worsening renal function with improvement.    Interval History: Awaiting discharge    Review of Systems  Objective:     Vital Signs (Most Recent):  Temp: 98.3 °F (36.8 °C) (12/20/23 1207)  Pulse: 71 (12/20/23 1207)  Resp: 18 (12/20/23 1207)  BP: 102/65 (12/20/23 1207)  SpO2: 100 % (12/20/23 1207) Vital Signs (24h Range):  Temp:  [98.3 °F (36.8 °C)-100 °F (37.8 °C)] 98.3 °F (36.8 °C)  Pulse:  [71-99] 71  Resp:  [17-18] 18  SpO2:  [96 %-100 %] 100 %  BP: (102-152)/(58-77) 102/65     Weight: 52.6 kg (115 lb 15.4 oz)  Body mass index is 20.54 kg/m².    Intake/Output Summary (Last 24 hours) at 12/20/2023 1245  Last data filed at 12/19/2023  2200  Gross per 24 hour   Intake 220 ml   Output 450 ml   Net -230 ml         Physical Exam  Vitals and nursing note reviewed.   Constitutional:       General: She is not in acute distress.     Appearance: She is well-developed. She is ill-appearing (chronically). She is not diaphoretic.   HENT:      Head: Normocephalic and atraumatic.   Eyes:      General: No scleral icterus.     Conjunctiva/sclera: Conjunctivae normal.   Neck:      Vascular: No JVD.   Cardiovascular:      Rate and Rhythm: Normal rate and regular rhythm.      Heart sounds: Normal heart sounds.   Pulmonary:      Effort: Pulmonary effort is normal. No respiratory distress.   Abdominal:      General: There is no distension.      Palpations: Abdomen is soft.      Tenderness: There is no abdominal tenderness.   Musculoskeletal:      Right lower leg: No edema.      Left lower leg: No edema.   Skin:     Coloration: Skin is pale. Skin is not jaundiced.      Findings: Lesion present.   Neurological:      Mental Status: She is alert.      Motor: No abnormal muscle tone.      Comments: Does not attempt to track or answer questions, does not follow commands, no tremor, UE rigidity   Psychiatric:      Comments: Unable to evaluate             Significant Labs: All pertinent labs within the past 24 hours have been reviewed.    Significant Imaging: I have reviewed all pertinent imaging results/findings within the past 24 hours.    Assessment/Plan:      * Pressure injury of sacral region, unstageable  Presents for evaluation of sacral decubitus ulcer. Purulent on exam. Mild leukocytosis, otherwise does not meet sepsis criteria.   - Wound culture with P mirabilis  - Continue ceftriaxone 2g daily x7-10 days (sot 12/9) -- completed  - General surgery consulted  -- s/p debridement 12/3  - Wound care consulted for recs for discharge wound care    Stage IV pressure ulcer of sacral region  - See above      Sepsis without acute organ dysfunction  Febrile with new  leukocytosis on 12/3. Known infected wound.    This patient does have evidence of infective focus  My overall impression is sepsis.  Source: Skin and Soft Tissue (location sacral)  Antibiotics given-   Antibiotics (72h ago, onward)      Start     Stop Route Frequency Ordered    12/09/23 0600  cefTRIAXone (ROCEPHIN) 2 g in dextrose 5 % in water (D5W) 100 mL IVPB (MB+)         -- IV Every 24 hours (non-standard times) 12/08/23 1520            Fluid challenge Not needed - patient is not hypotensive      Source control achieved by: to OR 12/3 for debridement   - Follow-up infectious workup ordered 12/10  - Blood cultures no growth to date   - UA bland  - Chest x-ray unremarkable  - Antibiotics as above      Macrocytic anemia  Chronic, and likely anemia of chronic disease. Currently without indication for transfusion.     Severe protein-calorie malnutrition  Due to Parkinson's and debility. Will continue dietary supplements as recommended by RD during previous hospitalization.   - SLP consulted      Hypernatremia  Patient has hypernatremia which is controlled. The hypernatremia is due to Dehydration. The patient's sodium results have been reviewed and are listed below.  Recent Labs   Lab 12/08/23  0432              Functional quadriplegia  Due to Parkinsons' and dementia. Continue frequent turns and pressure ulcer care/prevention.       DNR (do not resuscitate)  Per previous documentation and discussions with patient's family. Clarified with daughter Yamileth at bedside (shares POA with other daughter) - patient is DNR.       Severe Lewy body dementia  Monitor with delirium precautions.     Parkinson's disease  Continue home Sinemet.      CKD (chronic kidney disease), stage III  Creatine stable for now. BMP reviewed- noted Estimated Creatinine Clearance: 62.9 mL/min (based on SCr of 0.6 mg/dL). according to latest data. Based on current GFR, CKD stage is stage 3 - GFR 30-59.       - Monitor UOP and serial BMP and  adjust therapy as needed.    - Renally dose meds.   - Avoid nephrotoxic medications and procedures.        VTE Risk Mitigation (From admission, onward)           Ordered     enoxaparin injection 30 mg  Daily         12/09/23 1543     IP VTE HIGH RISK PATIENT  Once         12/01/23 2123     Place sequential compression device  Until discontinued         12/01/23 2123     Reason for No Pharmacological VTE Prophylaxis  Once        Question:  Reasons:  Answer:  Physician Provided (leave comment)    12/01/23 2123                    Discharge Planning   MELCHOR: 12/18/2023     Code Status: DNR   Is the patient medically ready for discharge?: Yes    Reason for patient still in hospital (select all that apply): Pending disposition  Discharge Plan A: Long-term acute care facility (LTAC)   Discharge Delays: (!) Post-Acute Set-up        Cristian Dueñas MD  Department of Hospital Medicine   [facility] - Intensive Care ([place])

## 2023-12-20 NOTE — ASSESSMENT & PLAN NOTE
Presents for evaluation of sacral decubitus ulcer. Purulent on exam. Mild leukocytosis, otherwise does not meet sepsis criteria.   - Wound culture with P mirabilis  - Continue ceftriaxone 2g daily x7-10 days (sot 12/9) -- completed  - General surgery consulted  -- s/p debridement 12/3  - Wound care consulted for recs for discharge wound care

## 2023-12-21 LAB — POCT GLUCOSE: 142 MG/DL (ref 70–110)

## 2023-12-21 PROCEDURE — 51798 US URINE CAPACITY MEASURE: CPT

## 2023-12-21 PROCEDURE — 21400001 HC TELEMETRY ROOM

## 2023-12-21 PROCEDURE — 25000003 PHARM REV CODE 250: Performed by: STUDENT IN AN ORGANIZED HEALTH CARE EDUCATION/TRAINING PROGRAM

## 2023-12-21 PROCEDURE — A4216 STERILE WATER/SALINE, 10 ML: HCPCS | Performed by: STUDENT IN AN ORGANIZED HEALTH CARE EDUCATION/TRAINING PROGRAM

## 2023-12-21 PROCEDURE — 63600175 PHARM REV CODE 636 W HCPCS: Performed by: STUDENT IN AN ORGANIZED HEALTH CARE EDUCATION/TRAINING PROGRAM

## 2023-12-21 RX ADMIN — CARBIDOPA AND LEVODOPA 1 TABLET: 25; 100 TABLET ORAL at 09:12

## 2023-12-21 RX ADMIN — Medication 10 ML: at 12:12

## 2023-12-21 RX ADMIN — CARBIDOPA AND LEVODOPA 1 TABLET: 25; 100 TABLET ORAL at 04:12

## 2023-12-21 RX ADMIN — CARBIDOPA AND LEVODOPA 1 SPLIT TABLET: 25; 100 TABLET ORAL at 08:12

## 2023-12-21 RX ADMIN — ENOXAPARIN SODIUM 30 MG: 30 INJECTION SUBCUTANEOUS at 04:12

## 2023-12-21 RX ADMIN — Medication 10 ML: at 11:12

## 2023-12-21 RX ADMIN — THERA TABS 1 TABLET: TAB at 09:12

## 2023-12-21 RX ADMIN — SENNOSIDES AND DOCUSATE SODIUM 1 TABLET: 8.6; 5 TABLET ORAL at 09:12

## 2023-12-21 RX ADMIN — CETIRIZINE HYDROCHLORIDE 10 MG: 10 TABLET, FILM COATED ORAL at 09:12

## 2023-12-21 RX ADMIN — PANTOPRAZOLE SODIUM 20 MG: 20 TABLET, DELAYED RELEASE ORAL at 09:12

## 2023-12-21 RX ADMIN — ASPIRIN 81 MG: 81 TABLET, COATED ORAL at 09:12

## 2023-12-21 RX ADMIN — Medication 10 ML: at 07:12

## 2023-12-21 RX ADMIN — CYANOCOBALAMIN TAB 1000 MCG 1000 MCG: 1000 TAB at 09:12

## 2023-12-21 RX ADMIN — CARBIDOPA AND LEVODOPA 1 TABLET: 25; 100 TABLET ORAL at 08:12

## 2023-12-21 RX ADMIN — TAMSULOSIN HYDROCHLORIDE 0.4 MG: 0.4 CAPSULE ORAL at 09:12

## 2023-12-21 RX ADMIN — Medication 10 ML: at 05:12

## 2023-12-21 RX ADMIN — SENNOSIDES AND DOCUSATE SODIUM 1 TABLET: 8.6; 5 TABLET ORAL at 08:12

## 2023-12-21 NOTE — ASSESSMENT & PLAN NOTE
Patient has hypernatremia which is controlled. The hypernatremia is due to Dehydration. The patient's sodium results have been reviewed and are listed below.

## 2023-12-21 NOTE — PLAN OF CARE
Problem: Adult Inpatient Plan of Care  Goal: Plan of Care Review  Outcome: Ongoing, Progressing  Flowsheets (Taken 12/20/2023 1805)  Plan of Care Reviewed With: patient  Goal: Patient-Specific Goal (Individualized)  Outcome: Ongoing, Progressing  Goal: Absence of Hospital-Acquired Illness or Injury  Outcome: Ongoing, Progressing  Goal: Optimal Comfort and Wellbeing  Outcome: Ongoing, Progressing     Problem: Skin Injury Risk Increased  Goal: Skin Health and Integrity  Outcome: Ongoing, Progressing  Intervention: Optimize Skin Protection  Flowsheets (Taken 12/20/2023 1805)  Pressure Reduction Techniques: weight shift assistance provided  Pressure Reduction Devices:   heel offloading device utilized   positioning supports utilized  Skin Protection: incontinence pads utilized     Problem: Fall Injury Risk  Goal: Absence of Fall and Fall-Related Injury  Outcome: Ongoing, Progressing

## 2023-12-21 NOTE — SUBJECTIVE & OBJECTIVE
Interval History: No changes. Patient medically stable for discharge. Nonverbal.     Review of Systems   Unable to perform ROS: Dementia     Objective:     Vital Signs (Most Recent):  Temp: 98.5 °F (36.9 °C) (12/21/23 0827)  Pulse: 92 (12/21/23 0827)  Resp: 18 (12/21/23 0827)  BP: (!) 164/74 (12/21/23 0827)  SpO2: 98 % (12/21/23 0827) Vital Signs (24h Range):  Temp:  [98.3 °F (36.8 °C)-99.8 °F (37.7 °C)] 98.5 °F (36.9 °C)  Pulse:  [71-92] 92  Resp:  [17-18] 18  SpO2:  [96 %-100 %] 98 %  BP: (102-172)/(52-86) 164/74     Weight: 52.6 kg (115 lb 15.4 oz)  Body mass index is 20.54 kg/m².    Intake/Output Summary (Last 24 hours) at 12/21/2023 1124  Last data filed at 12/21/2023 0645  Gross per 24 hour   Intake 602 ml   Output 500 ml   Net 102 ml         Physical Exam  Vitals and nursing note reviewed.   Constitutional:       General: She is not in acute distress.     Appearance: She is well-developed. She is ill-appearing (chronically). She is not diaphoretic.   HENT:      Head: Normocephalic and atraumatic.   Eyes:      General: No scleral icterus.     Conjunctiva/sclera: Conjunctivae normal.   Neck:      Vascular: No JVD.   Cardiovascular:      Rate and Rhythm: Normal rate and regular rhythm.      Heart sounds: Normal heart sounds.   Pulmonary:      Effort: Pulmonary effort is normal. No respiratory distress.   Abdominal:      General: There is no distension.      Palpations: Abdomen is soft.      Tenderness: There is no abdominal tenderness.   Musculoskeletal:      Right lower leg: No edema.      Left lower leg: No edema.   Skin:     Coloration: Skin is pale. Skin is not jaundiced.      Findings: Lesion present.   Neurological:      Mental Status: She is alert.      Motor: No abnormal muscle tone.      Comments: Does not attempt to track or answer questions, does not follow commands, no tremor, UE rigidity   Psychiatric:      Comments: Unable to evaluate             Significant Labs: All pertinent labs within the past  24 hours have been reviewed.    Significant Imaging: I have reviewed all pertinent imaging results/findings within the past 24 hours.

## 2023-12-21 NOTE — ASSESSMENT & PLAN NOTE
Febrile with new leukocytosis on 12/3. Known infected wound.    This patient does have evidence of infective focus  My overall impression is sepsis.  Source: Skin and Soft Tissue (location sacral)  Antibiotics given-   Antibiotics (72h ago, onward)      None            Fluid challenge Not needed - patient is not hypotensive      Source control achieved by: to OR 12/3 for debridement   - Blood cultures no growth to date   - UA bland  - Chest x-ray unremarkable  - Antibiotics as above

## 2023-12-21 NOTE — PROGRESS NOTES
[facility] - [facility] ([address] Medicine  Progress Note    Patient Name: Annmarie Gallardo  MRN: [id number]  Patient Class: IP- Inpatient   Admission Date: 12/1/2023  Length of Stay: 20 days  Attending Physician: Aleta Sequeira MD  Primary Care Provider: Raymond Bess MD        Subjective:     Principal Problem:Pressure injury of sacral region, unstageable        HPI:  Annmarie Gallardo is a 78 y.o. with a PMHx of parkinson's, lewy-body dementia with debility/functional quadriplegia, HTN, GERD, CKD3, and sacral decubitus ulcer who presents today from her nursing home for evaluation of sacral decubitus ulcer.     Due to her dementia and nonverbal status, she is unable to provide a history. No family available at bedside during my exam.     Overview/Hospital Course:  Pt presenting with purulent drainage from sacral wound . S/p debridement 12/3 with general surgery. Patient febrile with leukocytosis overnight 12/3.  Wound cultures with Proteus mirabilis.  Patient treated with ceftriaxone based on sensitivities to treat soft tissue infection.  Patient given IV fluids given poor oral intake.  Patient denied LTAC on peer to peer.  Patient adequately treated for soft tissue infection with ceftriaxone 2 g daily with improvement of leukocytosis.  Iv fluids given for worsening renal function with improvement.    Interval History: No changes. Patient medically stable for discharge. Nonverbal.     Review of Systems   Unable to perform ROS: Dementia     Objective:     Vital Signs (Most Recent):  Temp: 98.5 °F (36.9 °C) (12/21/23 0827)  Pulse: 92 (12/21/23 0827)  Resp: 18 (12/21/23 0827)  BP: (!) 164/74 (12/21/23 0827)  SpO2: 98 % (12/21/23 0827) Vital Signs (24h Range):  Temp:  [98.3 °F (36.8 °C)-99.8 °F (37.7 °C)] 98.5 °F (36.9 °C)  Pulse:  [71-92] 92  Resp:  [17-18] 18  SpO2:  [96 %-100 %] 98 %  BP: (102-172)/(52-86) 164/74     Weight: 52.6 kg (115 lb 15.4 oz)  Body mass index is 20.54  kg/m².    Intake/Output Summary (Last 24 hours) at 12/21/2023 1124  Last data filed at 12/21/2023 0645  Gross per 24 hour   Intake 602 ml   Output 500 ml   Net 102 ml         Physical Exam  Vitals and nursing note reviewed.   Constitutional:       General: She is not in acute distress.     Appearance: She is well-developed. She is ill-appearing (chronically). She is not diaphoretic.   HENT:      Head: Normocephalic and atraumatic.   Eyes:      General: No scleral icterus.     Conjunctiva/sclera: Conjunctivae normal.   Neck:      Vascular: No JVD.   Cardiovascular:      Rate and Rhythm: Normal rate and regular rhythm.      Heart sounds: Normal heart sounds.   Pulmonary:      Effort: Pulmonary effort is normal. No respiratory distress.   Abdominal:      General: There is no distension.      Palpations: Abdomen is soft.      Tenderness: There is no abdominal tenderness.   Musculoskeletal:      Right lower leg: No edema.      Left lower leg: No edema.   Skin:     Coloration: Skin is pale. Skin is not jaundiced.      Findings: Lesion present.   Neurological:      Mental Status: She is alert.      Motor: No abnormal muscle tone.      Comments: Does not attempt to track or answer questions, does not follow commands, no tremor, UE rigidity   Psychiatric:      Comments: Unable to evaluate             Significant Labs: All pertinent labs within the past 24 hours have been reviewed.    Significant Imaging: I have reviewed all pertinent imaging results/findings within the past 24 hours.    Assessment/Plan:      * Pressure injury of sacral region, unstageable  12/1 Presents for evaluation of sacral decubitus ulcer. Purulent on exam.   - Wound culture with P mirabilis  - ceftriaxone 2g daily x7-10 days (sot 12/9) -- completed  - General surgery consulted, s/p debridement 12/3  - Wound care consulted for recs for discharge wound care    Sepsis without acute organ dysfunction  Febrile with new leukocytosis on 12/3. Known infected  wound.    This patient does have evidence of infective focus  My overall impression is sepsis.  Source: Skin and Soft Tissue (location sacral)  Antibiotics given-   Antibiotics (72h ago, onward)      None            Fluid challenge Not needed - patient is not hypotensive      Source control achieved by: to OR 12/3 for debridement   - Blood cultures no growth to date   - UA bland  - Chest x-ray unremarkable  - Antibiotics as above      Stage IV pressure ulcer of sacral region  - See above      Macrocytic anemia  Chronic, and likely anemia of chronic disease. Currently without indication for transfusion.     Severe protein-calorie malnutrition  Due to Parkinson's and debility. Will continue dietary supplements as recommended by RD during previous hospitalization.   - SLP consulted      Hypernatremia  Patient has hypernatremia which is controlled. The hypernatremia is due to Dehydration. The patient's sodium results have been reviewed and are listed below.          Functional quadriplegia  Due to Parkinsons' and dementia. Continue frequent turns and pressure ulcer care/prevention.       DNR (do not resuscitate)  Per previous documentation and discussions with patient's family. Clarified with daughter Yamileth at bedside (shares POA with other daughter) - patient is DNR.       Severe Lewy body dementia  Monitor with delirium precautions.     Parkinson's disease  Continue home Sinemet.      CKD (chronic kidney disease), stage III  Creatine stable for now. BMP reviewed- noted Estimated Creatinine Clearance: 53.9 mL/min (based on SCr of 0.7 mg/dL). according to latest data. Based on current GFR, CKD stage is stage 3 - GFR 30-59.       - Monitor UOP and serial BMP and adjust therapy as needed.    - Renally dose meds.   - Avoid nephrotoxic medications and procedures.        VTE Risk Mitigation (From admission, onward)           Ordered     enoxaparin injection 30 mg  Daily         12/09/23 1543     IP VTE HIGH RISK PATIENT   Once         12/01/23 2123     Place sequential compression device  Until discontinued         12/01/23 2123     Reason for No Pharmacological VTE Prophylaxis  Once        Question:  Reasons:  Answer:  Physician Provided (leave comment)    12/01/23 2123                    Discharge Planning   MELCHOR: 12/21/2023     Code Status: DNR   Is the patient medically ready for discharge?: Yes    Reason for patient still in hospital (select all that apply): Pending disposition  Discharge Plan A: Long-term acute care facility (LTAC)   Discharge Delays: (!) Post-Acute Set-up              Aleta Sequeira MD  Department of Hospital Medicine   [facility] - Intensive Care ([place])

## 2023-12-21 NOTE — PLAN OF CARE
DAYAN spoke with Marisa from [facility], she states they will not be able to accept pt SNF d/t wound care too complex for them to manage.    DAYAN called  nurse from [facility] [phone] ext [phone].  He states auth for SNF is approved through 12/26/23.  DAYAN faxed wound care orders to [phone].    KEYUR BenavidesN, BS, RN, CCM

## 2023-12-21 NOTE — PLAN OF CARE
Problem: Adult Inpatient Plan of Care  Goal: Plan of Care Review  Outcome: Ongoing, Progressing  Goal: Patient-Specific Goal (Individualized)  Outcome: Ongoing, Progressing  Goal: Absence of Hospital-Acquired Illness or Injury  Outcome: Ongoing, Progressing  Goal: Optimal Comfort and Wellbeing  Outcome: Ongoing, Progressing     Problem: Impaired Wound Healing  Goal: Optimal Wound Healing  Outcome: Ongoing, Progressing     Problem: Skin Injury Risk Increased  Goal: Skin Health and Integrity  Outcome: Ongoing, Progressing     Problem: Fall Injury Risk  Goal: Absence of Fall and Fall-Related Injury  Outcome: Ongoing, Progressing     Problem: Infection  Goal: Absence of Infection Signs and Symptoms  Outcome: Ongoing, Progressing

## 2023-12-21 NOTE — ASSESSMENT & PLAN NOTE
12/1 Presents for evaluation of sacral decubitus ulcer. Purulent on exam.   - Wound culture with P mirabilis  - ceftriaxone 2g daily x7-10 days (sot 12/9) -- completed  - General surgery consulted, s/p debridement 12/3  - Wound care consulted for recs for discharge wound care

## 2023-12-22 LAB
POCT GLUCOSE: 116 MG/DL (ref 70–110)
POCT GLUCOSE: 161 MG/DL (ref 70–110)

## 2023-12-22 PROCEDURE — 21400001 HC TELEMETRY ROOM

## 2023-12-22 PROCEDURE — 51798 US URINE CAPACITY MEASURE: CPT

## 2023-12-22 PROCEDURE — 63600175 PHARM REV CODE 636 W HCPCS: Performed by: STUDENT IN AN ORGANIZED HEALTH CARE EDUCATION/TRAINING PROGRAM

## 2023-12-22 PROCEDURE — A4216 STERILE WATER/SALINE, 10 ML: HCPCS | Performed by: STUDENT IN AN ORGANIZED HEALTH CARE EDUCATION/TRAINING PROGRAM

## 2023-12-22 PROCEDURE — 25000003 PHARM REV CODE 250: Performed by: STUDENT IN AN ORGANIZED HEALTH CARE EDUCATION/TRAINING PROGRAM

## 2023-12-22 RX ADMIN — THERA TABS 1 TABLET: TAB at 08:12

## 2023-12-22 RX ADMIN — TAMSULOSIN HYDROCHLORIDE 0.4 MG: 0.4 CAPSULE ORAL at 08:12

## 2023-12-22 RX ADMIN — Medication 10 ML: at 04:12

## 2023-12-22 RX ADMIN — SENNOSIDES AND DOCUSATE SODIUM 1 TABLET: 8.6; 5 TABLET ORAL at 08:12

## 2023-12-22 RX ADMIN — CARBIDOPA AND LEVODOPA 1 TABLET: 25; 100 TABLET ORAL at 04:12

## 2023-12-22 RX ADMIN — ASPIRIN 81 MG: 81 TABLET, COATED ORAL at 08:12

## 2023-12-22 RX ADMIN — Medication 10 ML: at 06:12

## 2023-12-22 RX ADMIN — ENOXAPARIN SODIUM 30 MG: 30 INJECTION SUBCUTANEOUS at 04:12

## 2023-12-22 RX ADMIN — Medication 10 ML: at 11:12

## 2023-12-22 RX ADMIN — CARBIDOPA AND LEVODOPA 1 TABLET: 25; 100 TABLET ORAL at 08:12

## 2023-12-22 RX ADMIN — Medication 10 ML: at 08:12

## 2023-12-22 RX ADMIN — CYANOCOBALAMIN TAB 1000 MCG 1000 MCG: 1000 TAB at 08:12

## 2023-12-22 RX ADMIN — CETIRIZINE HYDROCHLORIDE 10 MG: 10 TABLET, FILM COATED ORAL at 08:12

## 2023-12-22 RX ADMIN — PANTOPRAZOLE SODIUM 20 MG: 20 TABLET, DELAYED RELEASE ORAL at 08:12

## 2023-12-22 RX ADMIN — CARBIDOPA AND LEVODOPA 1 TABLET: 25; 100 TABLET ORAL at 09:12

## 2023-12-22 RX ADMIN — CARBIDOPA AND LEVODOPA 1 SPLIT TABLET: 25; 100 TABLET ORAL at 08:12

## 2023-12-22 RX ADMIN — SODIUM CHLORIDE, POTASSIUM CHLORIDE, SODIUM LACTATE AND CALCIUM CHLORIDE 500 ML: 600; 310; 30; 20 INJECTION, SOLUTION INTRAVENOUS at 11:12

## 2023-12-22 NOTE — PLAN OF CARE
Problem: Adult Inpatient Plan of Care  Goal: Plan of Care Review  Outcome: Ongoing, Progressing  Flowsheets (Taken 12/22/2023 0304)  Plan of Care Reviewed With: patient     Problem: Impaired Wound Healing  Goal: Optimal Wound Healing  Outcome: Ongoing, Progressing  Intervention: Promote Wound Healing  Flowsheets (Taken 12/22/2023 0304)  Activity Management: Rolling - L1  Pain Management Interventions: position adjusted     Problem: Fall Injury Risk  Goal: Absence of Fall and Fall-Related Injury  Outcome: Ongoing, Progressing  Intervention: Identify and Manage Contributors  Flowsheets (Taken 12/22/2023 0304)  Self-Care Promotion: BADL personal objects within reach  Medication Review/Management: medications reviewed

## 2023-12-22 NOTE — SUBJECTIVE & OBJECTIVE
Interval History: Decreased UOP in setting of decreased PO intake. Small IVF bolus.     Review of Systems   Unable to perform ROS: Patient nonverbal     Objective:     Vital Signs (Most Recent):  Temp: 96.6 °F (35.9 °C) (12/22/23 1209)  Pulse: 68 (12/22/23 1209)  Resp: 16 (12/22/23 1209)  BP: 119/82 (12/22/23 1209)  SpO2: 99 % (12/22/23 1209) Vital Signs (24h Range):  Temp:  [96.6 °F (35.9 °C)-99.3 °F (37.4 °C)] 96.6 °F (35.9 °C)  Pulse:  [68-94] 68  Resp:  [16-18] 16  SpO2:  [97 %-99 %] 99 %  BP: ()/(46-82) 119/82     Weight: 52.6 kg (115 lb 15.4 oz)  Body mass index is 20.54 kg/m².    Intake/Output Summary (Last 24 hours) at 12/22/2023 1349  Last data filed at 12/21/2023 1900  Gross per 24 hour   Intake 240 ml   Output 200 ml   Net 40 ml         Physical Exam  Vitals and nursing note reviewed.   Constitutional:       General: She is not in acute distress.     Appearance: She is well-developed. She is ill-appearing (chronically). She is not diaphoretic.   HENT:      Head: Normocephalic and atraumatic.   Eyes:      General: No scleral icterus.     Conjunctiva/sclera: Conjunctivae normal.   Neck:      Vascular: No JVD.   Cardiovascular:      Rate and Rhythm: Normal rate and regular rhythm.      Heart sounds: Normal heart sounds.   Pulmonary:      Effort: Pulmonary effort is normal. No respiratory distress.   Abdominal:      General: There is no distension.      Palpations: Abdomen is soft.      Tenderness: There is no abdominal tenderness.   Musculoskeletal:      Right lower leg: No edema.      Left lower leg: No edema.   Skin:     Coloration: Skin is pale. Skin is not jaundiced.      Findings: Lesion present.   Neurological:      Mental Status: She is alert.      Motor: No abnormal muscle tone.      Comments: Does not attempt to track or answer questions, does not follow commands, no tremor, UE rigidity   Psychiatric:      Comments: Unable to evaluate             Significant Labs: None    Significant Imaging:   none

## 2023-12-22 NOTE — PROGRESS NOTES
[facility] ([address] Medicine  Progress Note    Patient Name: Annmarie Gallardo  MRN: [id number]  Patient Class: IP- Inpatient   Admission Date: 12/1/2023  Length of Stay: 21 days  Attending Physician: Aleta Sequeira MD  Primary Care Provider: Raymond Bess MD        Subjective:     Principal Problem:Pressure injury of sacral region, unstageable        HPI:  Annmarie Gallardo is a 78 y.o. with a PMHx of parkinson's, lewy-body dementia with debility/functional quadriplegia, HTN, GERD, CKD3, and sacral decubitus ulcer who presents today from her nursing home for evaluation of sacral decubitus ulcer.     Due to her dementia and nonverbal status, she is unable to provide a history. No family available at bedside during my exam.     Overview/Hospital Course:  Pt presenting with purulent drainage from sacral wound . S/p debridement 12/3 with general surgery. Patient febrile with leukocytosis overnight 12/3.  Wound cultures with Proteus mirabilis.  Patient treated with ceftriaxone based on sensitivities to treat soft tissue infection.  Patient given IV fluids given poor oral intake.  Patient denied LTAC on peer to peer.  Patient adequately treated for soft tissue infection with ceftriaxone 2 g daily with improvement of leukocytosis.  Iv fluids given for worsening renal function with improvement.    Interval History: Decreased UOP in setting of decreased PO intake. Small IVF bolus.     Review of Systems   Unable to perform ROS: Patient nonverbal     Objective:     Vital Signs (Most Recent):  Temp: 96.6 °F (35.9 °C) (12/22/23 1209)  Pulse: 68 (12/22/23 1209)  Resp: 16 (12/22/23 1209)  BP: 119/82 (12/22/23 1209)  SpO2: 99 % (12/22/23 1209) Vital Signs (24h Range):  Temp:  [96.6 °F (35.9 °C)-99.3 °F (37.4 °C)] 96.6 °F (35.9 °C)  Pulse:  [68-94] 68  Resp:  [16-18] 16  SpO2:  [97 %-99 %] 99 %  BP: ()/(46-82) 119/82     Weight: 52.6 kg (115 lb 15.4 oz)  Body mass index is 20.54  kg/m².    Intake/Output Summary (Last 24 hours) at 12/22/2023 1349  Last data filed at 12/21/2023 1900  Gross per 24 hour   Intake 240 ml   Output 200 ml   Net 40 ml         Physical Exam  Vitals and nursing note reviewed.   Constitutional:       General: She is not in acute distress.     Appearance: She is well-developed. She is ill-appearing (chronically). She is not diaphoretic.   HENT:      Head: Normocephalic and atraumatic.   Eyes:      General: No scleral icterus.     Conjunctiva/sclera: Conjunctivae normal.   Neck:      Vascular: No JVD.   Cardiovascular:      Rate and Rhythm: Normal rate and regular rhythm.      Heart sounds: Normal heart sounds.   Pulmonary:      Effort: Pulmonary effort is normal. No respiratory distress.   Abdominal:      General: There is no distension.      Palpations: Abdomen is soft.      Tenderness: There is no abdominal tenderness.   Musculoskeletal:      Right lower leg: No edema.      Left lower leg: No edema.   Skin:     Coloration: Skin is pale. Skin is not jaundiced.      Findings: Lesion present.   Neurological:      Mental Status: She is alert.      Motor: No abnormal muscle tone.      Comments: Does not attempt to track or answer questions, does not follow commands, no tremor, UE rigidity   Psychiatric:      Comments: Unable to evaluate             Significant Labs: None    Significant Imaging:  none    Assessment/Plan:      * Pressure injury of sacral region, unstageable  12/1 Presents for evaluation of sacral decubitus ulcer. Purulent on exam.   - Wound culture with P mirabilis  - ceftriaxone 2g daily x7-10 days (sot 12/9) -- completed  - General surgery consulted, s/p debridement 12/3  - Wound care consulted for recs for discharge wound care    Sepsis without acute organ dysfunction  Febrile with new leukocytosis on 12/3. Known infected wound.    This patient does have evidence of infective focus  My overall impression is sepsis.  Source: Skin and Soft Tissue (location  sacral)  Antibiotics given-   Antibiotics (72h ago, onward)      None            Fluid challenge Not needed - patient is not hypotensive      Source control achieved by: to OR 12/3 for debridement   - Blood cultures no growth to date   - UA bland  - Chest x-ray unremarkable  - Antibiotics as above      Stage IV pressure ulcer of sacral region  - See above      Macrocytic anemia  Chronic, and likely anemia of chronic disease. Currently without indication for transfusion.     Severe protein-calorie malnutrition  Due to Parkinson's and debility. Will continue dietary supplements as recommended by RD during previous hospitalization.   - SLP consulted      Hypernatremia  Resolved   Patient has hypernatremia which is controlled. The hypernatremia is due to Dehydration. The patient's sodium results have been reviewed and are listed below.        Functional quadriplegia  Due to Parkinsons' and dementia. Continue frequent turns and pressure ulcer care/prevention.       DNR (do not resuscitate)  Per previous documentation and discussions with patient's family. Clarified with daughter Yamileth at bedside (shares POA with other daughter) - patient is DNR.       Severe Lewy body dementia  Monitor with delirium precautions.     Parkinson's disease  Continue home Sinemet.      CKD (chronic kidney disease), stage III  Creatine stable for now. BMP reviewed- noted Estimated Creatinine Clearance: 53.9 mL/min (based on SCr of 0.7 mg/dL). according to latest data. Based on current GFR, CKD stage is stage 3 - GFR 30-59.     - Monitor UOP and serial BMP and adjust therapy as needed.    - Renally dose meds.   - Avoid nephrotoxic medications and procedures.        VTE Risk Mitigation (From admission, onward)           Ordered     enoxaparin injection 30 mg  Daily         12/09/23 1543     IP VTE HIGH RISK PATIENT  Once         12/01/23 2123     Place sequential compression device  Until discontinued         12/01/23 2123     Reason for No  Pharmacological VTE Prophylaxis  Once        Question:  Reasons:  Answer:  Physician Provided (leave comment)    12/01/23 2123                    Discharge Planning   MELCHOR: 12/26/2023     Code Status: DNR   Is the patient medically ready for discharge?: Yes    Reason for patient still in hospital (select all that apply): Pending disposition  Discharge Plan A: Long-term acute care facility (LTAC)   Discharge Delays: (!) Post-Acute Set-up              Aleta Sequeira MD  Department of Hospital Medicine   [facility] - Intensive Care ([place])

## 2023-12-22 NOTE — ASSESSMENT & PLAN NOTE
Resolved   Patient has hypernatremia which is controlled. The hypernatremia is due to Dehydration. The patient's sodium results have been reviewed and are listed below.

## 2023-12-22 NOTE — PROGRESS NOTES
[facility] - [facility] ([place] [address])  Wound Care    Patient Name:  Annmarie Gallardo   MRN:  [id number]  Date: 12/22/2023  Diagnosis: Pressure injury of sacral region, unstageable    History:     Past Medical History:   Diagnosis Date    HTN (hypertension)     Parkinson's disease     Vitamin D deficiency        Social History     Socioeconomic History    Marital status:    Tobacco Use    Smoking status: Never    Smokeless tobacco: Never   Substance and Sexual Activity    Alcohol use: No    Drug use: No     Social Determinants of Health     Financial Resource Strain: Low Risk  (12/1/2023)    Overall Financial Resource Strain (CARDIA)     Difficulty of Paying Living Expenses: Not hard at all   Recent Concern: Financial Resource Strain - Medium Risk (11/12/2023)    Overall Financial Resource Strain (CARDIA)     Difficulty of Paying Living Expenses: Somewhat hard   Food Insecurity: No Food Insecurity (12/1/2023)    Hunger Vital Sign     Worried About Running Out of Food in the Last Year: Never true     Ran Out of Food in the Last Year: Never true   Transportation Needs: No Transportation Needs (12/1/2023)    PRAPARE - Transportation     Lack of Transportation (Medical): No     Lack of Transportation (Non-Medical): No   Physical Activity: Inactive (12/1/2023)    Exercise Vital Sign     Days of Exercise per Week: 0 days     Minutes of Exercise per Session: 0 min   Stress: No Stress Concern Present (12/1/2023)    [employer] - Occupational Stress Questionnaire     Feeling of Stress : Not at all   Social Connections: Socially Isolated (12/1/2023)    Social Connection and Isolation Panel [NHANES]     Frequency of Communication with Friends and Family: Never     Frequency of Social Gatherings with Friends and Family: Three times a week     Attends Church Services: Never     Active Member of Clubs or Organizations: No     Attends Club or Organization Meetings: Never     Marital  Status:    Housing Stability: Low Risk  (12/1/2023)    Housing Stability Vital Sign     Unable to Pay for Housing in the Last Year: No     Number of Places Lived in the Last Year: 2     Unstable Housing in the Last Year: No       Precautions:     Allergies as of 12/01/2023 - Reviewed 12/01/2023   Allergen Reaction Noted    Diamox sequels [acetazolamide]  12/01/2023    Garlic  11/12/2023    Namenda [memantine]  11/12/2023    Oxybutynin  11/12/2023       WO Assessment Details/Treatment   Patient seen for wound care follow up.   Reviewed chart for this encounter.   See Flow Sheet for findings.      Per chart review. Annmarie Gallardo is a 78 y.o. with a PMHx of parkinson's, lewy-body dementia with debility/functional quadriplegia, HTN, GERD, CKD3, and sacral decubitus ulcer who presents today from her nursing home for evaluation of sacral decubitus ulcer. Annmarie Gallardo is a 78 y.o. with a PMHx of parkinson's, lewy-body dementia with debility/functional quadriplegia, HTN, GERD, CKD3, and sacral decubitus ulcer who presents today  12/01/23 from her nursing home for evaluation of sacral decubitus ulcer. Wound care removed old dressing. Wound care cleansed with Vashe before applying a Vashe wet to moist dressing. An island border dressing was used as a cover dressing. Supplies left at bedside.  Pt tech assisted in turning and cleaning pt.        RECOMMENDATIONS:  - Wound care orders to remain the same.    Recommendations made to primary team for above plan via secured chat. Wound Care to follow up. Orders placed.     Discussed POC with patient and primary RN.   See EMR for orders & patient education.  Discussed POC with primary team.    Nursing to continue care.  Nursing to maintain pressure injury prevention interventions.  Contact wound care for any further questions.         12/22/23 1130   WOCN Assessment   WOCN Total Time (mins) 30   Visit Date 12/22/23   Visit Time 1130   Consult Type Follow Up   WOCN  Speciality Wound   Wound surgical   Intervention assessed;changed;applied;chart review;coordination of care   Teaching on-going        Incision/Site 12/03/23 1448 Buttocks   Date First Assessed/Time First Assessed: 12/03/23 1448   Location: Buttocks   Wound Image    Dressing Appearance Dry;Intact;Clean   Drainage Amount Moderate   Drainage Characteristics/Odor Serosanguineous   Appearance Red;Black;Tan;Dry   Black (%), Wound Tissue Color 10 %   Red (%), Wound Tissue Color 60 %   Yellow (%), Wound Tissue Color 30 %   Wound Edges Defined;Open   Wound Length (cm) 6.5 cm   Wound Width (cm) 7.3 cm   Wound Depth (cm) 4.8 cm   Wound Volume (cm^3) 227.76 cm^3   Wound Surface Area (cm^2) 47.45 cm^2   Undermining (depth (cm)/location) 4 cm @2:00 7:00-3:00   Care Cleansed with:;Antimicrobial agent   Dressing Applied;Gauze, wet to moist;Island/border   Packing Inserted  1   Packing Removed 1   Dressing Change Due 12/22/23 12/22/2023

## 2023-12-23 LAB
ANION GAP SERPL CALC-SCNC: 13 MMOL/L (ref 8–16)
BASOPHILS # BLD AUTO: 0.03 K/UL (ref 0–0.2)
BASOPHILS NFR BLD: 0.3 % (ref 0–1.9)
BUN SERPL-MCNC: 18 MG/DL (ref 8–23)
CALCIUM SERPL-MCNC: 8.7 MG/DL (ref 8.7–10.5)
CHLORIDE SERPL-SCNC: 100 MMOL/L (ref 95–110)
CO2 SERPL-SCNC: 24 MMOL/L (ref 23–29)
CREAT SERPL-MCNC: 0.7 MG/DL (ref 0.5–1.4)
DIFFERENTIAL METHOD: ABNORMAL
EOSINOPHIL # BLD AUTO: 0.1 K/UL (ref 0–0.5)
EOSINOPHIL NFR BLD: 0.7 % (ref 0–8)
ERYTHROCYTE [DISTWIDTH] IN BLOOD BY AUTOMATED COUNT: 13.4 % (ref 11.5–14.5)
EST. GFR  (NO RACE VARIABLE): >60 ML/MIN/1.73 M^2
GLUCOSE SERPL-MCNC: 137 MG/DL (ref 70–110)
HCT VFR BLD AUTO: 30 % (ref 37–48.5)
HGB BLD-MCNC: 9.6 G/DL (ref 12–16)
IMM GRANULOCYTES # BLD AUTO: 0.06 K/UL (ref 0–0.04)
IMM GRANULOCYTES NFR BLD AUTO: 0.6 % (ref 0–0.5)
LYMPHOCYTES # BLD AUTO: 0.6 K/UL (ref 1–4.8)
LYMPHOCYTES NFR BLD: 5.2 % (ref 18–48)
MCH RBC QN AUTO: 29.1 PG (ref 27–31)
MCHC RBC AUTO-ENTMCNC: 32 G/DL (ref 32–36)
MCV RBC AUTO: 91 FL (ref 82–98)
MONOCYTES # BLD AUTO: 0.7 K/UL (ref 0.3–1)
MONOCYTES NFR BLD: 6.5 % (ref 4–15)
NEUTROPHILS # BLD AUTO: 9.3 K/UL (ref 1.8–7.7)
NEUTROPHILS NFR BLD: 86.7 % (ref 38–73)
NRBC BLD-RTO: 0 /100 WBC
PLATELET # BLD AUTO: 299 K/UL (ref 150–450)
PMV BLD AUTO: 9.9 FL (ref 9.2–12.9)
POTASSIUM SERPL-SCNC: 4.2 MMOL/L (ref 3.5–5.1)
RBC # BLD AUTO: 3.3 M/UL (ref 4–5.4)
SODIUM SERPL-SCNC: 137 MMOL/L (ref 136–145)
WBC # BLD AUTO: 10.69 K/UL (ref 3.9–12.7)

## 2023-12-23 PROCEDURE — 25000003 PHARM REV CODE 250: Performed by: STUDENT IN AN ORGANIZED HEALTH CARE EDUCATION/TRAINING PROGRAM

## 2023-12-23 PROCEDURE — 21400001 HC TELEMETRY ROOM

## 2023-12-23 PROCEDURE — 85025 COMPLETE CBC W/AUTO DIFF WBC: CPT | Performed by: STUDENT IN AN ORGANIZED HEALTH CARE EDUCATION/TRAINING PROGRAM

## 2023-12-23 PROCEDURE — 80048 BASIC METABOLIC PNL TOTAL CA: CPT | Performed by: STUDENT IN AN ORGANIZED HEALTH CARE EDUCATION/TRAINING PROGRAM

## 2023-12-23 PROCEDURE — 63600175 PHARM REV CODE 636 W HCPCS: Performed by: STUDENT IN AN ORGANIZED HEALTH CARE EDUCATION/TRAINING PROGRAM

## 2023-12-23 PROCEDURE — 36415 COLL VENOUS BLD VENIPUNCTURE: CPT | Performed by: STUDENT IN AN ORGANIZED HEALTH CARE EDUCATION/TRAINING PROGRAM

## 2023-12-23 PROCEDURE — A4216 STERILE WATER/SALINE, 10 ML: HCPCS | Performed by: STUDENT IN AN ORGANIZED HEALTH CARE EDUCATION/TRAINING PROGRAM

## 2023-12-23 RX ADMIN — Medication 10 ML: at 12:12

## 2023-12-23 RX ADMIN — ENOXAPARIN SODIUM 30 MG: 30 INJECTION SUBCUTANEOUS at 05:12

## 2023-12-23 RX ADMIN — ASPIRIN 81 MG: 81 TABLET, COATED ORAL at 08:12

## 2023-12-23 RX ADMIN — CARBIDOPA AND LEVODOPA 1 TABLET: 25; 100 TABLET ORAL at 08:12

## 2023-12-23 RX ADMIN — CYANOCOBALAMIN TAB 1000 MCG 1000 MCG: 1000 TAB at 08:12

## 2023-12-23 RX ADMIN — ACETAMINOPHEN 1000 MG: 500 TABLET ORAL at 10:12

## 2023-12-23 RX ADMIN — TAMSULOSIN HYDROCHLORIDE 0.4 MG: 0.4 CAPSULE ORAL at 08:12

## 2023-12-23 RX ADMIN — SENNOSIDES AND DOCUSATE SODIUM 1 TABLET: 8.6; 5 TABLET ORAL at 08:12

## 2023-12-23 RX ADMIN — ACETAMINOPHEN 1000 MG: 500 TABLET ORAL at 08:12

## 2023-12-23 RX ADMIN — Medication 10 ML: at 03:12

## 2023-12-23 RX ADMIN — CARBIDOPA AND LEVODOPA 1 TABLET: 25; 100 TABLET ORAL at 02:12

## 2023-12-23 RX ADMIN — Medication 10 ML: at 05:12

## 2023-12-23 RX ADMIN — THERA TABS 1 TABLET: TAB at 08:12

## 2023-12-23 RX ADMIN — CETIRIZINE HYDROCHLORIDE 10 MG: 10 TABLET, FILM COATED ORAL at 08:12

## 2023-12-23 RX ADMIN — CARBIDOPA AND LEVODOPA 1 SPLIT TABLET: 25; 100 TABLET ORAL at 08:12

## 2023-12-23 NOTE — SUBJECTIVE & OBJECTIVE
Interval History: Seen at bedside this AM. No acute events overnight. No complaints today    Review of Systems  Objective:     Vital Signs (Most Recent):  Temp: (!) 100.5 °F (38.1 °C) (12/23/23 1234)  Pulse: 103 (12/23/23 1234)  Resp: 20 (12/23/23 1234)  BP: (!) 108/57 (12/23/23 1234)  SpO2: 99 % (12/23/23 1234) Vital Signs (24h Range):  Temp:  [97.1 °F (36.2 °C)-101.9 °F (38.8 °C)] 100.5 °F (38.1 °C)  Pulse:  [] 103  Resp:  [16-20] 20  SpO2:  [97 %-100 %] 99 %  BP: (108-142)/(56-78) 108/57     Weight: 52.6 kg (115 lb 15.4 oz)  Body mass index is 20.54 kg/m².    Intake/Output Summary (Last 24 hours) at 12/23/2023 1442  Last data filed at 12/23/2023 0452  Gross per 24 hour   Intake --   Output 600 ml   Net -600 ml         Physical Exam  Vitals and nursing note reviewed.   Constitutional:       General: She is not in acute distress.     Appearance: She is well-developed. She is ill-appearing (chronically). She is not diaphoretic.   HENT:      Head: Normocephalic and atraumatic.   Eyes:      General: No scleral icterus.     Conjunctiva/sclera: Conjunctivae normal.   Neck:      Vascular: No JVD.   Cardiovascular:      Rate and Rhythm: Normal rate and regular rhythm.      Heart sounds: Normal heart sounds.   Pulmonary:      Effort: Pulmonary effort is normal. No respiratory distress.   Abdominal:      General: There is no distension.      Palpations: Abdomen is soft.      Tenderness: There is no abdominal tenderness.   Musculoskeletal:      Right lower leg: No edema.      Left lower leg: No edema.   Skin:     Coloration: Skin is pale. Skin is not jaundiced.      Findings: Lesion present.   Neurological:      Mental Status: She is alert.      Motor: No abnormal muscle tone.      Comments: Does not follow commands   Psychiatric:      Comments: Unable to evaluate             Significant Labs: All pertinent labs within the past 24 hours have been reviewed.    Significant Imaging: I have reviewed all pertinent imaging  results/findings within the past 24 hours.

## 2023-12-23 NOTE — PROGRESS NOTES
[facility] - Intensive Care ([address] Medicine  Progress Note    Patient Name: Annmarie Gallardo  MRN: [id number]  Patient Class: IP- Inpatient   Admission Date: 12/1/2023  Length of Stay: 22 days  Attending Physician: Gordy Smith DO  Primary Care Provider: Raymond Bess MD        Subjective:     Principal Problem:Pressure injury of sacral region, unstageable        HPI:  Annmarie Gallardo is a 78 y.o. with a PMHx of parkinson's, lewy-body dementia with debility/functional quadriplegia, HTN, GERD, CKD3, and sacral decubitus ulcer who presents today from her nursing home for evaluation of sacral decubitus ulcer.     Due to her dementia and nonverbal status, she is unable to provide a history. No family available at bedside during my exam.     Overview/Hospital Course:  Pt presenting with purulent drainage from sacral wound . S/p debridement 12/3 with general surgery. Patient febrile with leukocytosis overnight 12/3.  Wound cultures with Proteus mirabilis.  Patient treated with ceftriaxone based on sensitivities to treat soft tissue infection.  Patient given IV fluids given poor oral intake.  Patient denied LTAC on peer to peer.  [facility] placement. Patient adequately treated for soft tissue infection with ceftriaxone 2 g daily with improvement of leukocytosis.  Iv fluids given for worsening renal function with improvement.    Interval History: Seen at bedside this AM. No acute events overnight. No complaints today    Review of Systems  Objective:     Vital Signs (Most Recent):  Temp: (!) 100.5 °F (38.1 °C) (12/23/23 1234)  Pulse: 103 (12/23/23 1234)  Resp: 20 (12/23/23 1234)  BP: (!) 108/57 (12/23/23 1234)  SpO2: 99 % (12/23/23 1234) Vital Signs (24h Range):  Temp:  [97.1 °F (36.2 °C)-101.9 °F (38.8 °C)] 100.5 °F (38.1 °C)  Pulse:  [] 103  Resp:  [16-20] 20  SpO2:  [97 %-100 %] 99 %  BP: (108-142)/(56-78) 108/57     Weight: 52.6 kg (115 lb 15.4 oz)  Body mass index is  20.54 kg/m².    Intake/Output Summary (Last 24 hours) at 12/23/2023 1442  Last data filed at 12/23/2023 0452  Gross per 24 hour   Intake --   Output 600 ml   Net -600 ml         Physical Exam  Vitals and nursing note reviewed.   Constitutional:       General: She is not in acute distress.     Appearance: She is well-developed. She is ill-appearing (chronically). She is not diaphoretic.   HENT:      Head: Normocephalic and atraumatic.   Eyes:      General: No scleral icterus.     Conjunctiva/sclera: Conjunctivae normal.   Neck:      Vascular: No JVD.   Cardiovascular:      Rate and Rhythm: Normal rate and regular rhythm.      Heart sounds: Normal heart sounds.   Pulmonary:      Effort: Pulmonary effort is normal. No respiratory distress.   Abdominal:      General: There is no distension.      Palpations: Abdomen is soft.      Tenderness: There is no abdominal tenderness.   Musculoskeletal:      Right lower leg: No edema.      Left lower leg: No edema.   Skin:     Coloration: Skin is pale. Skin is not jaundiced.      Findings: Lesion present.   Neurological:      Mental Status: She is alert.      Motor: No abnormal muscle tone.      Comments: Does not follow commands   Psychiatric:      Comments: Unable to evaluate             Significant Labs: All pertinent labs within the past 24 hours have been reviewed.    Significant Imaging: I have reviewed all pertinent imaging results/findings within the past 24 hours.    Assessment/Plan:      * Pressure injury of sacral region, unstageable  12/1 Presents for evaluation of sacral decubitus ulcer. Purulent on exam.   - Wound culture with P mirabilis  - ceftriaxone 2g daily x7-10 days (sot 12/9) -- completed  - General surgery consulted, s/p debridement 12/3  - Wound care consulted for recs for discharge wound care    Stage IV pressure ulcer of sacral region  - See above      Sepsis without acute organ dysfunction  Febrile with new leukocytosis on 12/3. Known infected  wound.    This patient does have evidence of infective focus  My overall impression is sepsis.  Source: Skin and Soft Tissue (location sacral)  Antibiotics given-   Antibiotics (72h ago, onward)      None            Fluid challenge Not needed - patient is not hypotensive      Source control achieved by: to OR 12/3 for debridement   - Blood cultures no growth to date   - UA bland  - Chest x-ray unremarkable  - Antibiotics as above      Macrocytic anemia  Chronic, and likely anemia of chronic disease. Currently without indication for transfusion.     Severe protein-calorie malnutrition  Due to Parkinson's and debility. Will continue dietary supplements as recommended by RD during previous hospitalization.   - SLP consulted      Hypernatremia  Resolved   Patient has hypernatremia which is controlled. The hypernatremia is due to Dehydration. The patient's sodium results have been reviewed and are listed below.        Functional quadriplegia  Due to Parkinsons' and dementia. Continue frequent turns and pressure ulcer care/prevention.       DNR (do not resuscitate)  Per previous documentation and discussions with patient's family. Clarified with daughter Yamileth at bedside (shares POA with other daughter) - patient is DNR.       Severe Lewy body dementia  Monitor with delirium precautions.     Parkinson's disease  Continue home Sinemet.      CKD (chronic kidney disease), stage III  Creatine stable for now. BMP reviewed- noted Estimated Creatinine Clearance: 53.9 mL/min (based on SCr of 0.7 mg/dL). according to latest data. Based on current GFR, CKD stage is stage 3 - GFR 30-59.     - Monitor UOP and serial BMP and adjust therapy as needed.    - Renally dose meds.   - Avoid nephrotoxic medications and procedures.        VTE Risk Mitigation (From admission, onward)           Ordered     enoxaparin injection 30 mg  Daily         12/09/23 1543     IP VTE HIGH RISK PATIENT  Once         12/01/23 2123     Place sequential  compression device  Until discontinued         12/01/23 2123     Reason for No Pharmacological VTE Prophylaxis  Once        Question:  Reasons:  Answer:  Physician Provided (leave comment)    12/01/23 2123                    Discharge Planning   MELCHOR: 12/26/2023     Code Status: DNR   Is the patient medically ready for discharge?: Yes    Reason for patient still in hospital (select all that apply): Pending placement  Discharge Plan A: Long-term acute care facility (LTAC)   Discharge Delays: (!) Post-Acute Set-up              Gordy Smith DO  Department of Hospital Medicine   [facility] - Intensive Care ([place])

## 2023-12-23 NOTE — PLAN OF CARE
Problem: Adult Inpatient Plan of Care  Goal: Plan of Care Review  Outcome: Ongoing, Progressing  Flowsheets (Taken 12/23/2023 0348)  Plan of Care Reviewed With: patient     Problem: Impaired Wound Healing  Goal: Optimal Wound Healing  Outcome: Ongoing, Progressing  Intervention: Promote Wound Healing  Flowsheets (Taken 12/23/2023 0348)  Activity Management: Rolling - L1  Pain Management Interventions: position adjusted     Problem: Fall Injury Risk  Goal: Absence of Fall and Fall-Related Injury  Outcome: Ongoing, Progressing

## 2023-12-23 NOTE — PLAN OF CARE
Problem: Adult Inpatient Plan of Care  Goal: Plan of Care Review  Outcome: Ongoing, Progressing  Goal: Patient-Specific Goal (Individualized)  Outcome: Ongoing, Progressing  Goal: Absence of Hospital-Acquired Illness or Injury  Outcome: Ongoing, Progressing     Problem: Skin Injury Risk Increased  Goal: Skin Health and Integrity  Outcome: Ongoing, Progressing     Problem: Fall Injury Risk  Goal: Absence of Fall and Fall-Related Injury  Outcome: Ongoing, Progressing     Problem: Bleeding (Sepsis/Septic Shock)  Goal: Absence of Bleeding  Outcome: Ongoing, Progressing     Problem: Glycemic Control Impaired (Sepsis/Septic Shock)  Goal: Blood Glucose Level Within Desired Range  Outcome: Ongoing, Progressing

## 2023-12-24 LAB
BASOPHILS # BLD AUTO: 0.04 K/UL (ref 0–0.2)
BASOPHILS NFR BLD: 0.4 % (ref 0–1.9)
BILIRUB UR QL STRIP: NEGATIVE
CLARITY UR REFRACT.AUTO: CLEAR
COLOR UR AUTO: YELLOW
DIFFERENTIAL METHOD: ABNORMAL
EOSINOPHIL # BLD AUTO: 0.1 K/UL (ref 0–0.5)
EOSINOPHIL NFR BLD: 1.1 % (ref 0–8)
ERYTHROCYTE [DISTWIDTH] IN BLOOD BY AUTOMATED COUNT: 13.3 % (ref 11.5–14.5)
GLUCOSE UR QL STRIP: NEGATIVE
HCT VFR BLD AUTO: 33.9 % (ref 37–48.5)
HGB BLD-MCNC: 10.5 G/DL (ref 12–16)
HGB UR QL STRIP: NEGATIVE
IMM GRANULOCYTES # BLD AUTO: 0.07 K/UL (ref 0–0.04)
IMM GRANULOCYTES NFR BLD AUTO: 0.8 % (ref 0–0.5)
KETONES UR QL STRIP: NEGATIVE
LACTATE SERPL-SCNC: 1.3 MMOL/L (ref 0.5–2.2)
LEUKOCYTE ESTERASE UR QL STRIP: NEGATIVE
LYMPHOCYTES # BLD AUTO: 0.5 K/UL (ref 1–4.8)
LYMPHOCYTES NFR BLD: 5.6 % (ref 18–48)
MCH RBC QN AUTO: 29.2 PG (ref 27–31)
MCHC RBC AUTO-ENTMCNC: 31 G/DL (ref 32–36)
MCV RBC AUTO: 94 FL (ref 82–98)
MONOCYTES # BLD AUTO: 0.8 K/UL (ref 0.3–1)
MONOCYTES NFR BLD: 9.3 % (ref 4–15)
NEUTROPHILS # BLD AUTO: 7.4 K/UL (ref 1.8–7.7)
NEUTROPHILS NFR BLD: 82.8 % (ref 38–73)
NITRITE UR QL STRIP: NEGATIVE
NRBC BLD-RTO: 0 /100 WBC
PH UR STRIP: 5 [PH] (ref 5–8)
PLATELET # BLD AUTO: 286 K/UL (ref 150–450)
PMV BLD AUTO: 10.3 FL (ref 9.2–12.9)
POCT GLUCOSE: 110 MG/DL (ref 70–110)
POCT GLUCOSE: 110 MG/DL (ref 70–110)
POCT GLUCOSE: 157 MG/DL (ref 70–110)
PROT UR QL STRIP: NEGATIVE
RBC # BLD AUTO: 3.59 M/UL (ref 4–5.4)
SP GR UR STRIP: 1.01 (ref 1–1.03)
URN SPEC COLLECT METH UR: NORMAL
WBC # BLD AUTO: 8.94 K/UL (ref 3.9–12.7)

## 2023-12-24 PROCEDURE — 21400001 HC TELEMETRY ROOM

## 2023-12-24 PROCEDURE — 85025 COMPLETE CBC W/AUTO DIFF WBC: CPT | Performed by: STUDENT IN AN ORGANIZED HEALTH CARE EDUCATION/TRAINING PROGRAM

## 2023-12-24 PROCEDURE — 25000003 PHARM REV CODE 250: Performed by: STUDENT IN AN ORGANIZED HEALTH CARE EDUCATION/TRAINING PROGRAM

## 2023-12-24 PROCEDURE — 63600175 PHARM REV CODE 636 W HCPCS: Performed by: STUDENT IN AN ORGANIZED HEALTH CARE EDUCATION/TRAINING PROGRAM

## 2023-12-24 PROCEDURE — 87150 DNA/RNA AMPLIFIED PROBE: CPT | Performed by: STUDENT IN AN ORGANIZED HEALTH CARE EDUCATION/TRAINING PROGRAM

## 2023-12-24 PROCEDURE — 36415 COLL VENOUS BLD VENIPUNCTURE: CPT | Performed by: STUDENT IN AN ORGANIZED HEALTH CARE EDUCATION/TRAINING PROGRAM

## 2023-12-24 PROCEDURE — 83605 ASSAY OF LACTIC ACID: CPT | Performed by: STUDENT IN AN ORGANIZED HEALTH CARE EDUCATION/TRAINING PROGRAM

## 2023-12-24 PROCEDURE — 81003 URINALYSIS AUTO W/O SCOPE: CPT | Performed by: STUDENT IN AN ORGANIZED HEALTH CARE EDUCATION/TRAINING PROGRAM

## 2023-12-24 PROCEDURE — 87040 BLOOD CULTURE FOR BACTERIA: CPT | Mod: 59 | Performed by: STUDENT IN AN ORGANIZED HEALTH CARE EDUCATION/TRAINING PROGRAM

## 2023-12-24 PROCEDURE — A4216 STERILE WATER/SALINE, 10 ML: HCPCS | Performed by: STUDENT IN AN ORGANIZED HEALTH CARE EDUCATION/TRAINING PROGRAM

## 2023-12-24 PROCEDURE — 87077 CULTURE AEROBIC IDENTIFY: CPT | Performed by: STUDENT IN AN ORGANIZED HEALTH CARE EDUCATION/TRAINING PROGRAM

## 2023-12-24 RX ADMIN — PANTOPRAZOLE SODIUM 20 MG: 20 TABLET, DELAYED RELEASE ORAL at 09:12

## 2023-12-24 RX ADMIN — Medication 10 ML: at 12:12

## 2023-12-24 RX ADMIN — CARBIDOPA AND LEVODOPA 1 TABLET: 25; 100 TABLET ORAL at 03:12

## 2023-12-24 RX ADMIN — ASPIRIN 81 MG: 81 TABLET, COATED ORAL at 09:12

## 2023-12-24 RX ADMIN — Medication 10 ML: at 06:12

## 2023-12-24 RX ADMIN — PIPERACILLIN SODIUM AND TAZOBACTAM SODIUM 4.5 G: 4; .5 INJECTION, POWDER, FOR SOLUTION INTRAVENOUS at 10:12

## 2023-12-24 RX ADMIN — CETIRIZINE HYDROCHLORIDE 10 MG: 10 TABLET, FILM COATED ORAL at 09:12

## 2023-12-24 RX ADMIN — THERA TABS 1 TABLET: TAB at 09:12

## 2023-12-24 RX ADMIN — ACETAMINOPHEN 1000 MG: 500 TABLET ORAL at 09:12

## 2023-12-24 RX ADMIN — VANCOMYCIN HYDROCHLORIDE 1000 MG: 1 INJECTION, POWDER, LYOPHILIZED, FOR SOLUTION INTRAVENOUS at 10:12

## 2023-12-24 RX ADMIN — ENOXAPARIN SODIUM 30 MG: 30 INJECTION SUBCUTANEOUS at 05:12

## 2023-12-24 RX ADMIN — CARBIDOPA AND LEVODOPA 1 TABLET: 25; 100 TABLET ORAL at 09:12

## 2023-12-24 RX ADMIN — SENNOSIDES AND DOCUSATE SODIUM 1 TABLET: 8.6; 5 TABLET ORAL at 09:12

## 2023-12-24 RX ADMIN — TAMSULOSIN HYDROCHLORIDE 0.4 MG: 0.4 CAPSULE ORAL at 09:12

## 2023-12-24 RX ADMIN — Medication 10 ML: at 05:12

## 2023-12-24 RX ADMIN — PIPERACILLIN SODIUM AND TAZOBACTAM SODIUM 4.5 G: 4; .5 INJECTION, POWDER, FOR SOLUTION INTRAVENOUS at 05:12

## 2023-12-24 RX ADMIN — CYANOCOBALAMIN TAB 1000 MCG 1000 MCG: 1000 TAB at 09:12

## 2023-12-24 RX ADMIN — CARBIDOPA AND LEVODOPA 1 SPLIT TABLET: 25; 100 TABLET ORAL at 09:12

## 2023-12-24 NOTE — SUBJECTIVE & OBJECTIVE
Interval History: Seen at bedside this AM. Remains lethargic at bedside, minimally opening eyes to commands    Review of Systems  Objective:     Vital Signs (Most Recent):  Temp: (!) 101.3 °F (38.5 °C) (12/24/23 0853)  Pulse: 92 (12/24/23 1139)  Resp: 18 (12/24/23 0853)  BP: 107/61 (12/24/23 0853)  SpO2: 96 % (12/24/23 0853) Vital Signs (24h Range):  Temp:  [99 °F (37.2 °C)-101.3 °F (38.5 °C)] 101.3 °F (38.5 °C)  Pulse:  [] 92  Resp:  [16-20] 18  SpO2:  [96 %-100 %] 96 %  BP: (107-138)/(57-68) 107/61     Weight: 52.6 kg (115 lb 15.4 oz)  Body mass index is 20.54 kg/m².    Intake/Output Summary (Last 24 hours) at 12/24/2023 1144  Last data filed at 12/24/2023 0502  Gross per 24 hour   Intake 670 ml   Output 800 ml   Net -130 ml           Physical Exam  Vitals and nursing note reviewed.   Constitutional:       General: She is not in acute distress.     Appearance: She is well-developed. She is ill-appearing (chronically). She is not diaphoretic.   HENT:      Head: Normocephalic and atraumatic.   Eyes:      General: No scleral icterus.     Conjunctiva/sclera: Conjunctivae normal.   Neck:      Vascular: No JVD.   Cardiovascular:      Rate and Rhythm: Normal rate and regular rhythm.      Heart sounds: Normal heart sounds.   Pulmonary:      Effort: Pulmonary effort is normal. No respiratory distress.   Abdominal:      General: There is no distension.      Palpations: Abdomen is soft.      Tenderness: There is no abdominal tenderness.   Musculoskeletal:      Right lower leg: No edema.      Left lower leg: No edema.   Skin:     Coloration: Skin is pale. Skin is not jaundiced.      Findings: Lesion present.   Neurological:      Mental Status: She is alert.      Motor: No abnormal muscle tone.      Comments: Minimally following verbal commands   Psychiatric:      Comments: Unable to evaluate             Significant Labs: All pertinent labs within the past 24 hours have been reviewed.    Significant Imaging: I have  reviewed all pertinent imaging results/findings within the past 24 hours.

## 2023-12-24 NOTE — PLAN OF CARE
Problem: Adult Inpatient Plan of Care  Goal: Plan of Care Review  Outcome: Ongoing, Progressing     Problem: Adult Inpatient Plan of Care  Goal: Patient-Specific Goal (Individualized)  Outcome: Ongoing, Progressing     Problem: Impaired Wound Healing  Goal: Optimal Wound Healing  Outcome: Ongoing, Progressing     Problem: Skin Injury Risk Increased  Goal: Skin Health and Integrity  Outcome: Ongoing, Progressing     Problem: Fall Injury Risk  Goal: Absence of Fall and Fall-Related Injury  Outcome: Ongoing, Progressing     Problem: Infection  Goal: Absence of Infection Signs and Symptoms  Outcome: Ongoing, Progressing   Patient laying in bed with eyes open no acute distress noted VSS wound care done this shift patient tolerated well. Patient turned and repositioned q2hr and prn had a large BM this shift. continue to encourage patient to eat and drinks fluids. No injuries noted safety measures maintained. Will cont to monitor.

## 2023-12-24 NOTE — ASSESSMENT & PLAN NOTE
Febrile with new leukocytosis on 12/3. Known infected sacral wound    This patient does have evidence of infective focus  My overall impression is sepsis.  Source: Skin and Soft Tissue (location sacral)  Antibiotics given-   Antibiotics (72h ago, onward)      Start     Stop Route Frequency Ordered    12/25/23 1030  vancomycin 750 mg in dextrose 5 % (D5W) 250 mL IVPB (Vial-Mate)        See Hyperspace for full Linked Orders Report.    -- IV Every 24 hours (non-standard times) 12/24/23 0930    12/24/23 1030  vancomycin (VANCOCIN) 1,000 mg in dextrose 5 % (D5W) 250 mL IVPB (Vial-Mate)        See Hyperspace for full Linked Orders Report.    -- IV Once 12/24/23 0930    12/24/23 1015  piperacillin-tazobactam (ZOSYN) 4.5 g in dextrose 5 % in water (D5W) 100 mL IVPB (MB+)         -- IV Every 8 hours (non-standard times) 12/24/23 0910    12/24/23 1009  vancomycin - pharmacy to dose  (vancomycin IVPB (PEDS and ADULTS))        See Hyperspace for full Linked Orders Report.    -- IV pharmacy to manage frequency 12/24/23 0910            Fluid challenge Not needed - patient is not hypotensive      Source control achieved by: to OR 12/3 for debridement however since 12/23 has been having recurrent fevers once again, lactate reassuring at 1.3 and hemodynamically stable  - UA negative, no new obvious respiratory symptoms  - Obtained repeat blood cx, gm positive cocci in clusters in one bottle, assume contamination  - Repeat blood cx today 12/27  - Vancomycin, zosyn 12/24-: discontinue at this time

## 2023-12-24 NOTE — PLAN OF CARE
Problem: Adult Inpatient Plan of Care  Goal: Plan of Care Review  Outcome: Ongoing, Progressing  Goal: Patient-Specific Goal (Individualized)  Outcome: Ongoing, Progressing  Goal: Absence of Hospital-Acquired Illness or Injury  Outcome: Ongoing, Progressing  Goal: Optimal Comfort and Wellbeing  Outcome: Ongoing, Progressing  Goal: Readiness for Transition of Care  Outcome: Ongoing, Progressing     Problem: Impaired Wound Healing  Goal: Optimal Wound Healing  Outcome: Ongoing, Progressing     Problem: Skin Injury Risk Increased  Goal: Skin Health and Integrity  Outcome: Ongoing, Progressing     Problem: Fall Injury Risk  Goal: Absence of Fall and Fall-Related Injury  Outcome: Ongoing, Progressing     Problem: Adjustment to Illness (Sepsis/Septic Shock)  Goal: Optimal Coping  Outcome: Ongoing, Progressing     Problem: Bleeding (Sepsis/Septic Shock)  Goal: Absence of Bleeding  Outcome: Ongoing, Progressing     Problem: Glycemic Control Impaired (Sepsis/Septic Shock)  Goal: Blood Glucose Level Within Desired Range  Outcome: Ongoing, Progressing     Problem: Infection Progression (Sepsis/Septic Shock)  Goal: Absence of Infection Signs and Symptoms  Outcome: Ongoing, Progressing     Problem: Nutrition Impaired (Sepsis/Septic Shock)  Goal: Optimal Nutrition Intake  Outcome: Ongoing, Progressing     Problem: Infection  Goal: Absence of Infection Signs and Symptoms  Outcome: Ongoing, Progressing

## 2023-12-24 NOTE — PROGRESS NOTES
Pharmacokinetic Initial Assessment: IV Vancomycin    Assessment:  - Patient does not have prior stable regimens to guide dosing.   - The patient's last dose of vancomycin was received on the evening of 12/2 during this hospitalization.   - Renal function has been stable and she is getting weekly labs.   - Currently dosing for sepsis of unknown source. Will target a higher trough goal of 15-20 mcg/mL until source is identified.     Plan:  - Will initiate therapy with a loading dose of 1000 mg (20 mg/kg) IV x 1 followed by 750 mg (15 mg/kg) IV every 24 hours based on renal function.  - Obtain a vancomycin trough level on 12/27 @ 0930 prior to the 3rd dose of the maintenance regimen or sooner if clinically warranted.   - Monitor for signs and symptoms of nephrotoxicity and infusion reactions.     Pharmacy will continue to follow and monitor vancomycin.      Please contact pharmacy at extension [phone] with any questions regarding this assessment.     Thank you for the consult,   Leatha Curiel       Patient brief summary:  Annmarie Gallardo is a 78 y.o. female initiated on antimicrobial therapy with IV Vancomycin for treatment of suspected sepsis    Drug Allergies:   Review of patient's allergies indicates:   Allergen Reactions    Diamox sequels [acetazolamide]     Garlic     Namenda [memantine]     Oxybutynin        Actual Body Weight:   52.6 kg    Renal Function:   Estimated Creatinine Clearance: 53.9 mL/min (based on SCr of 0.7 mg/dL).,     CBC (last 72 hours):  Recent Labs   Lab Result Units 12/23/23  1411   WBC K/uL 10.69   Hemoglobin g/dL 9.6*   Hematocrit % 30.0*   Platelets K/uL 299   Gran % % 86.7*   Lymph % % 5.2*   Mono % % 6.5   Eosinophil % % 0.7   Basophil % % 0.3   Differential Method  Automated       Metabolic Panel (last 72 hours):  Recent Labs   Lab Result Units 12/23/23  1411   Sodium mmol/L 137   Potassium mmol/L 4.2   Chloride mmol/L 100   CO2 mmol/L 24   Glucose mg/dL 137*   BUN mg/dL 18  "  Creatinine mg/dL 0.7       Drug levels (last 3 results):  No results for input(s): "VANCOMYCINRA", "VANCORANDOM", "VANCOMYCINPE", "VANCOPEAK", "VANCOMYCINTR", "VANCOTROUGH" in the last 72 hours.    Microbiologic Results:  Microbiology Results (last 7 days)       Procedure Component Value Units Date/Time    Blood culture [[id number]] Collected: 12/24/23 0904    Order Status: Sent Specimen: Blood Updated: 12/24/23 0904    Blood culture [[id number]] Collected: 12/24/23 0904    Order Status: Sent Specimen: Blood Updated: 12/24/23 0904            "

## 2023-12-24 NOTE — PROGRESS NOTES
[facility] - [facility] ([address] Medicine  Progress Note    Patient Name: Annmarie Gallardo  MRN: [id number]  Patient Class: IP- Inpatient   Admission Date: 12/1/2023  Length of Stay: 23 days  Attending Physician: Gordy Smith DO  Primary Care Provider: Raymond Bess MD        Subjective:     Principal Problem:Pressure injury of sacral region, unstageable        HPI:  Annmarie Gallardo is a 78 y.o. with a PMHx of parkinson's, lewy-body dementia with debility/functional quadriplegia, HTN, GERD, CKD3, and sacral decubitus ulcer who presents today from her nursing home for evaluation of sacral decubitus ulcer.     Due to her dementia and nonverbal status, she is unable to provide a history. No family available at bedside during my exam.     Overview/Hospital Course:  Pt presenting with purulent drainage from sacral wound . S/p debridement 12/3 with general surgery. Patient febrile with leukocytosis overnight 12/3.  Wound cultures with Proteus mirabilis.  Patient treated with ceftriaxone based on sensitivities to treat soft tissue infection.  Patient given IV fluids given poor oral intake.  Patient denied LTAC on peer to peer.  Currently looking for alf placement. Patient adequately treated for soft tissue infection with ceftriaxone 2 g daily with improvement of leukocytosis.  Iv fluids given for worsening renal function with improvement.    Has experienced recurrent intermittent fevers since 11/23, septic workup obtained and started on broad spectrum abx    Interval History: Seen at bedside this AM. Recurrent fevers overnight with Tmax 101.9. Lethargic at bedside, more than usual per RN    Review of Systems  Objective:     Vital Signs (Most Recent):  Temp: (!) 101.3 °F (38.5 °C) (12/24/23 0853)  Pulse: 92 (12/24/23 1139)  Resp: 18 (12/24/23 0853)  BP: 107/61 (12/24/23 0853)  SpO2: 96 % (12/24/23 0853) Vital Signs (24h Range):  Temp:  [99 °F (37.2 °C)-101.3 °F (38.5 °C)] 101.3 °F (38.5  °C)  Pulse:  [] 92  Resp:  [16-20] 18  SpO2:  [96 %-100 %] 96 %  BP: (107-138)/(57-68) 107/61     Weight: 52.6 kg (115 lb 15.4 oz)  Body mass index is 20.54 kg/m².    Intake/Output Summary (Last 24 hours) at 12/24/2023 1144  Last data filed at 12/24/2023 0502  Gross per 24 hour   Intake 670 ml   Output 800 ml   Net -130 ml           Physical Exam  Vitals and nursing note reviewed.   Constitutional:       General: She is not in acute distress.     Appearance: She is well-developed. She is ill-appearing (chronically). She is not diaphoretic.   HENT:      Head: Normocephalic and atraumatic.   Eyes:      General: No scleral icterus.     Conjunctiva/sclera: Conjunctivae normal.   Neck:      Vascular: No JVD.   Cardiovascular:      Rate and Rhythm: Normal rate and regular rhythm.      Heart sounds: Normal heart sounds.   Pulmonary:      Effort: Pulmonary effort is normal. No respiratory distress.   Abdominal:      General: There is no distension.      Palpations: Abdomen is soft.      Tenderness: There is no abdominal tenderness.   Musculoskeletal:      Right lower leg: No edema.      Left lower leg: No edema.   Skin:     Coloration: Skin is pale. Skin is not jaundiced.      Findings: Lesion present.   Neurological:      Mental Status: She is alert.      Motor: No abnormal muscle tone.      Comments: Does not follow commands   Psychiatric:      Comments: Unable to evaluate             Significant Labs: All pertinent labs within the past 24 hours have been reviewed.    Significant Imaging: I have reviewed all pertinent imaging results/findings within the past 24 hours.    Assessment/Plan:      * Pressure injury of sacral region, unstageable  12/1 Presents for evaluation of sacral decubitus ulcer. Purulent on exam.   - Wound culture with P mirabilis  - ceftriaxone 2g daily x7-10 days (sot 12/9) -- completed  - General surgery consulted, s/p debridement 12/3  - Wound care consulted for recs for discharge wound  care    Sepsis without acute organ dysfunction  Febrile with new leukocytosis on 12/3. Known infected sacral wound    This patient does have evidence of infective focus  My overall impression is sepsis.  Source: Skin and Soft Tissue (location sacral)  Antibiotics given-   Antibiotics (72h ago, onward)      Start     Stop Route Frequency Ordered    12/25/23 1030  vancomycin 750 mg in dextrose 5 % (D5W) 250 mL IVPB (Vial-Mate)        See Hyperspace for full Linked Orders Report.    -- IV Every 24 hours (non-standard times) 12/24/23 0930    12/24/23 1030  vancomycin (VANCOCIN) 1,000 mg in dextrose 5 % (D5W) 250 mL IVPB (Vial-Mate)        See Hyperspace for full Linked Orders Report.    -- IV Once 12/24/23 0930    12/24/23 1015  piperacillin-tazobactam (ZOSYN) 4.5 g in dextrose 5 % in water (D5W) 100 mL IVPB (MB+)         -- IV Every 8 hours (non-standard times) 12/24/23 0910    12/24/23 1009  vancomycin - pharmacy to dose  (vancomycin IVPB (PEDS and ADULTS))        See Hyperspace for full Linked Orders Report.    -- IV pharmacy to manage frequency 12/24/23 0910            Fluid challenge Not needed - patient is not hypotensive      Source control achieved by: to OR 12/3 for debridement however since 12/23 has been having recurrent fevers once again, lactate reassuring at 1.3 and hemodynamically stable  - Obtain repeat blood cx  - UA  - Vancomycin, zosyn 12/23-      Stage IV pressure ulcer of sacral region  - See above      Macrocytic anemia  Chronic, and likely anemia of chronic disease. Currently without indication for transfusion.     Severe protein-calorie malnutrition  Due to Parkinson's and debility. Will continue dietary supplements as recommended by RD during previous hospitalization.   - SLP consulted      Hypernatremia  Resolved   Patient has hypernatremia which is controlled. The hypernatremia is due to Dehydration. The patient's sodium results have been reviewed and are listed below.        Functional  quadriplegia  Due to Parkinsons' and dementia. Continue frequent turns and pressure ulcer care/prevention.       DNR (do not resuscitate)  Per previous documentation and discussions with patient's family. Clarified with daughter Yamileth at bedside (shares POA with other daughter) - patient is DNR.       Severe Lewy body dementia  Monitor with delirium precautions.     Parkinson's disease  Continue home Sinemet.      CKD (chronic kidney disease), stage III  Creatine stable for now. BMP reviewed- noted Estimated Creatinine Clearance: 53.9 mL/min (based on SCr of 0.7 mg/dL). according to latest data. Based on current GFR, CKD stage is stage 3 - GFR 30-59.     - Monitor UOP and serial BMP and adjust therapy as needed.    - Renally dose meds.   - Avoid nephrotoxic medications and procedures.        VTE Risk Mitigation (From admission, onward)           Ordered     enoxaparin injection 30 mg  Daily         12/09/23 1543     IP VTE HIGH RISK PATIENT  Once         12/01/23 2123     Place sequential compression device  Until discontinued         12/01/23 2123     Reason for No Pharmacological VTE Prophylaxis  Once        Question:  Reasons:  Answer:  Physician Provided (leave comment)    12/01/23 2123                    Discharge Planning   MELCHOR: 12/26/2023     Code Status: DNR   Is the patient medically ready for discharge?: Yes    Reason for patient still in hospital (select all that apply): Pending placement  Discharge Plan A: Long-term acute care facility (LTAC)   Discharge Delays: (!) Post-Acute Set-up              Gordy Smith DO  Department of Hospital Medicine   [facility] - Intensive Care ([place])

## 2023-12-25 PROBLEM — Z71.89 GOALS OF CARE, COUNSELING/DISCUSSION: Status: RESOLVED | Noted: 2023-12-25 | Resolved: 2023-12-25

## 2023-12-25 PROBLEM — Z71.89 GOALS OF CARE, COUNSELING/DISCUSSION: Status: ACTIVE | Noted: 2023-12-25

## 2023-12-25 LAB
ANION GAP SERPL CALC-SCNC: 9 MMOL/L (ref 8–16)
BASOPHILS # BLD AUTO: 0.04 K/UL (ref 0–0.2)
BASOPHILS NFR BLD: 0.5 % (ref 0–1.9)
BUN SERPL-MCNC: 17 MG/DL (ref 8–23)
CALCIUM SERPL-MCNC: 8.5 MG/DL (ref 8.7–10.5)
CHLORIDE SERPL-SCNC: 103 MMOL/L (ref 95–110)
CO2 SERPL-SCNC: 25 MMOL/L (ref 23–29)
CREAT SERPL-MCNC: 0.8 MG/DL (ref 0.5–1.4)
DIFFERENTIAL METHOD: ABNORMAL
EOSINOPHIL # BLD AUTO: 0.2 K/UL (ref 0–0.5)
EOSINOPHIL NFR BLD: 2.9 % (ref 0–8)
ERYTHROCYTE [DISTWIDTH] IN BLOOD BY AUTOMATED COUNT: 13.6 % (ref 11.5–14.5)
EST. GFR  (NO RACE VARIABLE): >60 ML/MIN/1.73 M^2
GLUCOSE SERPL-MCNC: 95 MG/DL (ref 70–110)
HCT VFR BLD AUTO: 31.1 % (ref 37–48.5)
HGB BLD-MCNC: 9.7 G/DL (ref 12–16)
IMM GRANULOCYTES # BLD AUTO: 0.05 K/UL (ref 0–0.04)
IMM GRANULOCYTES NFR BLD AUTO: 0.6 % (ref 0–0.5)
LYMPHOCYTES # BLD AUTO: 0.5 K/UL (ref 1–4.8)
LYMPHOCYTES NFR BLD: 6.4 % (ref 18–48)
MCH RBC QN AUTO: 28.7 PG (ref 27–31)
MCHC RBC AUTO-ENTMCNC: 31.2 G/DL (ref 32–36)
MCV RBC AUTO: 92 FL (ref 82–98)
MONOCYTES # BLD AUTO: 0.8 K/UL (ref 0.3–1)
MONOCYTES NFR BLD: 10 % (ref 4–15)
NEUTROPHILS # BLD AUTO: 6.2 K/UL (ref 1.8–7.7)
NEUTROPHILS NFR BLD: 79.6 % (ref 38–73)
NRBC BLD-RTO: 0 /100 WBC
PLATELET # BLD AUTO: 291 K/UL (ref 150–450)
PMV BLD AUTO: 9.9 FL (ref 9.2–12.9)
POCT GLUCOSE: 116 MG/DL (ref 70–110)
POCT GLUCOSE: 131 MG/DL (ref 70–110)
POCT GLUCOSE: 149 MG/DL (ref 70–110)
POCT GLUCOSE: 169 MG/DL (ref 70–110)
POCT GLUCOSE: 94 MG/DL (ref 70–110)
POCT GLUCOSE: 97 MG/DL (ref 70–110)
POTASSIUM SERPL-SCNC: 4.1 MMOL/L (ref 3.5–5.1)
RBC # BLD AUTO: 3.38 M/UL (ref 4–5.4)
SODIUM SERPL-SCNC: 137 MMOL/L (ref 136–145)
WBC # BLD AUTO: 7.82 K/UL (ref 3.9–12.7)

## 2023-12-25 PROCEDURE — A4216 STERILE WATER/SALINE, 10 ML: HCPCS | Performed by: STUDENT IN AN ORGANIZED HEALTH CARE EDUCATION/TRAINING PROGRAM

## 2023-12-25 PROCEDURE — 36415 COLL VENOUS BLD VENIPUNCTURE: CPT | Performed by: STUDENT IN AN ORGANIZED HEALTH CARE EDUCATION/TRAINING PROGRAM

## 2023-12-25 PROCEDURE — 25000003 PHARM REV CODE 250: Performed by: STUDENT IN AN ORGANIZED HEALTH CARE EDUCATION/TRAINING PROGRAM

## 2023-12-25 PROCEDURE — 80048 BASIC METABOLIC PNL TOTAL CA: CPT | Performed by: STUDENT IN AN ORGANIZED HEALTH CARE EDUCATION/TRAINING PROGRAM

## 2023-12-25 PROCEDURE — 63600175 PHARM REV CODE 636 W HCPCS: Performed by: STUDENT IN AN ORGANIZED HEALTH CARE EDUCATION/TRAINING PROGRAM

## 2023-12-25 PROCEDURE — 85025 COMPLETE CBC W/AUTO DIFF WBC: CPT | Performed by: STUDENT IN AN ORGANIZED HEALTH CARE EDUCATION/TRAINING PROGRAM

## 2023-12-25 PROCEDURE — 21400001 HC TELEMETRY ROOM

## 2023-12-25 RX ORDER — ASCORBIC ACID 250 MG
250 TABLET ORAL DAILY
Status: DISCONTINUED | OUTPATIENT
Start: 2023-12-26 | End: 2023-12-27 | Stop reason: HOSPADM

## 2023-12-25 RX ADMIN — PIPERACILLIN SODIUM AND TAZOBACTAM SODIUM 4.5 G: 4; .5 INJECTION, POWDER, FOR SOLUTION INTRAVENOUS at 01:12

## 2023-12-25 RX ADMIN — Medication 10 ML: at 12:12

## 2023-12-25 RX ADMIN — TAMSULOSIN HYDROCHLORIDE 0.4 MG: 0.4 CAPSULE ORAL at 08:12

## 2023-12-25 RX ADMIN — Medication 10 ML: at 07:12

## 2023-12-25 RX ADMIN — CYANOCOBALAMIN TAB 1000 MCG 1000 MCG: 1000 TAB at 08:12

## 2023-12-25 RX ADMIN — PIPERACILLIN SODIUM AND TAZOBACTAM SODIUM 4.5 G: 4; .5 INJECTION, POWDER, FOR SOLUTION INTRAVENOUS at 05:12

## 2023-12-25 RX ADMIN — Medication 10 ML: at 05:12

## 2023-12-25 RX ADMIN — CARBIDOPA AND LEVODOPA 1 TABLET: 25; 100 TABLET ORAL at 08:12

## 2023-12-25 RX ADMIN — SENNOSIDES AND DOCUSATE SODIUM 1 TABLET: 8.6; 5 TABLET ORAL at 08:12

## 2023-12-25 RX ADMIN — VANCOMYCIN HYDROCHLORIDE 750 MG: 750 INJECTION, POWDER, LYOPHILIZED, FOR SOLUTION INTRAVENOUS at 10:12

## 2023-12-25 RX ADMIN — Medication 10 ML: at 11:12

## 2023-12-25 RX ADMIN — CARBIDOPA AND LEVODOPA 1 TABLET: 25; 100 TABLET ORAL at 03:12

## 2023-12-25 RX ADMIN — CARBIDOPA AND LEVODOPA 1 TABLET: 25; 100 TABLET ORAL at 09:12

## 2023-12-25 RX ADMIN — CETIRIZINE HYDROCHLORIDE 10 MG: 10 TABLET, FILM COATED ORAL at 08:12

## 2023-12-25 RX ADMIN — THERA TABS 1 TABLET: TAB at 08:12

## 2023-12-25 RX ADMIN — PANTOPRAZOLE SODIUM 20 MG: 20 TABLET, DELAYED RELEASE ORAL at 08:12

## 2023-12-25 RX ADMIN — SENNOSIDES AND DOCUSATE SODIUM 1 TABLET: 8.6; 5 TABLET ORAL at 09:12

## 2023-12-25 RX ADMIN — CARBIDOPA AND LEVODOPA 1 SPLIT TABLET: 25; 100 TABLET ORAL at 09:12

## 2023-12-25 RX ADMIN — ENOXAPARIN SODIUM 30 MG: 30 INJECTION SUBCUTANEOUS at 04:12

## 2023-12-25 RX ADMIN — ASPIRIN 81 MG: 81 TABLET, COATED ORAL at 08:12

## 2023-12-25 RX ADMIN — PIPERACILLIN SODIUM AND TAZOBACTAM SODIUM 4.5 G: 4; .5 INJECTION, POWDER, FOR SOLUTION INTRAVENOUS at 10:12

## 2023-12-25 NOTE — ASSESSMENT & PLAN NOTE
- 12/1 Presents for evaluation of recurrent sacral decubitus ulcer infection. Purulent on exam.   - Wound culture with P mirabilis  Plan:  - ceftriaxone 2g daily x7-10 days (sot 12/9) -- completed  - General surgery consulted, s/p debridement 12/3  - Wound care consulted for recs for discharge wound care  - Reintroduced broad spectrum abx 12/24 due to recurrent fevers  - Palliative care consult per below

## 2023-12-25 NOTE — PROGRESS NOTES
[facility] ([address] Medicine  Progress Note    Patient Name: Annmarie Gallardo  MRN: [id number]  Patient Class: IP- Inpatient   Admission Date: 12/1/2023  Length of Stay: 24 days  Attending Physician: Gordy Smith DO  Primary Care Provider: Raymond Bess MD        Subjective:     Principal Problem:Pressure injury of sacral region, unstageable        HPI:  Annmarie Gallardo is a 78 y.o. with a PMHx of parkinson's, lewy-body dementia with debility/functional quadriplegia, HTN, GERD, CKD3, and sacral decubitus ulcer who presents today from her nursing home for evaluation of sacral decubitus ulcer.     Due to her dementia and nonverbal status, she is unable to provide a history. No family available at bedside during my exam.     Overview/Hospital Course:  Pt presenting with purulent drainage from sacral wound . S/p debridement 12/3 with general surgery. Patient febrile with leukocytosis overnight 12/3.  Wound cultures with Proteus mirabilis.  Patient treated with ceftriaxone based on sensitivities to treat soft tissue infection.  Patient given IV fluids given poor oral intake.  Patient denied LTAC on peer to peer.  Currently looking for snf placement. Patient treated for soft tissue infection with ceftriaxone 2 g daily with improvement of leukocytosis.      Has experienced recurrent intermittent fevers since 11/23, septic workup obtained and started on broad spectrum abx. Given recurrent infections and persistent medical deterioration, discussed w daughter (POA) about establishing goals of care conversations w palliative team, who is in agreement.    Interval History: Seen at bedside this AM. Continues to remain lethargic at bedside since Friday.     Review of Systems  Objective:     Vital Signs (Most Recent):  Temp: (!) 101.3 °F (38.5 °C) (12/24/23 0853)  Pulse: 92 (12/24/23 1139)  Resp: 18 (12/24/23 0853)  BP: 107/61 (12/24/23 0853)  SpO2: 96 % (12/24/23 0853) Vital Signs (24h  Range):  Temp:  [99 °F (37.2 °C)-101.3 °F (38.5 °C)] 101.3 °F (38.5 °C)  Pulse:  [] 92  Resp:  [16-20] 18  SpO2:  [96 %-100 %] 96 %  BP: (107-138)/(57-68) 107/61     Weight: 52.6 kg (115 lb 15.4 oz)  Body mass index is 20.54 kg/m².    Intake/Output Summary (Last 24 hours) at 12/24/2023 1144  Last data filed at 12/24/2023 0502  Gross per 24 hour   Intake 670 ml   Output 800 ml   Net -130 ml           Physical Exam  Vitals and nursing note reviewed.   Constitutional:       General: She is not in acute distress.     Appearance: She is well-developed. She is ill-appearing (chronically). She is not diaphoretic.   HENT:      Head: Normocephalic and atraumatic.   Eyes:      General: No scleral icterus.     Conjunctiva/sclera: Conjunctivae normal.   Neck:      Vascular: No JVD.   Cardiovascular:      Rate and Rhythm: Normal rate and regular rhythm.      Heart sounds: Normal heart sounds.   Pulmonary:      Effort: Pulmonary effort is normal. No respiratory distress.   Abdominal:      General: There is no distension.      Palpations: Abdomen is soft.      Tenderness: There is no abdominal tenderness.   Musculoskeletal:      Right lower leg: No edema.      Left lower leg: No edema.   Skin:     Coloration: Skin is pale. Skin is not jaundiced.      Findings: Lesion present.   Neurological:      Mental Status: She is alert.      Motor: No abnormal muscle tone.      Comments: Does not follow commands   Psychiatric:      Comments: Unable to evaluate             Significant Labs: All pertinent labs within the past 24 hours have been reviewed.    Significant Imaging: I have reviewed all pertinent imaging results/findings within the past 24 hours.    Assessment/Plan:      * Pressure injury of sacral region, unstageable  - 12/1 Presents for evaluation of recurrent sacral decubitus ulcer infection. Purulent on exam.   - Wound culture with P mirabilis  Plan:  - ceftriaxone 2g daily x7-10 days (sot 12/9) -- completed  - General  surgery consulted, s/p debridement 12/3  - Wound care consulted for recs for discharge wound care  - Reintroduced broad spectrum abx 12/24 due to recurrent fevers  - Palliative care consult per below    Sepsis without acute organ dysfunction  Febrile with new leukocytosis on 12/3. Known infected sacral wound    This patient does have evidence of infective focus  My overall impression is sepsis.  Source: Skin and Soft Tissue (location sacral)  Antibiotics given-   Antibiotics (72h ago, onward)      Start     Stop Route Frequency Ordered    12/25/23 1030  vancomycin 750 mg in dextrose 5 % (D5W) 250 mL IVPB (Vial-Mate)        See Hyperspace for full Linked Orders Report.    -- IV Every 24 hours (non-standard times) 12/24/23 0930    12/24/23 1030  vancomycin (VANCOCIN) 1,000 mg in dextrose 5 % (D5W) 250 mL IVPB (Vial-Mate)        See Hyperspace for full Linked Orders Report.    -- IV Once 12/24/23 0930    12/24/23 1015  piperacillin-tazobactam (ZOSYN) 4.5 g in dextrose 5 % in water (D5W) 100 mL IVPB (MB+)         -- IV Every 8 hours (non-standard times) 12/24/23 0910    12/24/23 1009  vancomycin - pharmacy to dose  (vancomycin IVPB (PEDS and ADULTS))        See Hyperspace for full Linked Orders Report.    -- IV pharmacy to manage frequency 12/24/23 0910            Fluid challenge Not needed - patient is not hypotensive      Source control achieved by: to OR 12/3 for debridement however since 12/23 has been having recurrent fevers once again, lactate reassuring at 1.3 and hemodynamically stable  - Obtained repeat blood cx  - UA negative  - Vancomycin, zosyn 12/24-      ACP (advance care planning)  12/25    Had a 20 minute goals of care conversation with daughter, Yamileth, who is one of the POAs over the phone. We were able to discuss the patient's recurrent infections leading to physical deterioration along with how her mentation has deteriorated over the last year in [place] of progressive Parkinson's, Lewy body dementia.  At this time, she is in agreement that it is appropriate to start establishing goals of care conversations with the palliative team, will discuss this with her sister who is the other POA [place]      Stage IV pressure ulcer of sacral region  - See above      Macrocytic anemia  Chronic, and likely anemia of chronic disease. Currently without indication for transfusion.     Severe protein-calorie malnutrition  Due to Parkinson's and debility. Will continue dietary supplements as recommended by RD during previous hospitalization.   - SLP consulted      Hypernatremia  Resolved   Patient has hypernatremia which is controlled. The hypernatremia is due to Dehydration. The patient's sodium results have been reviewed and are listed below.        Functional quadriplegia  Due to Parkinsons' and dementia. Continue frequent turns and pressure ulcer care/prevention.       DNR (do not resuscitate)  Per previous documentation and discussions with patient's family. Clarified with daughter Yamileth at bedside (shares POA with other daughter) - patient is DNR.       Severe Lewy body dementia  Monitor with delirium precautions.     Parkinson's disease  Continue home Sinemet.      CKD (chronic kidney disease), stage III  Creatine stable for now. BMP reviewed- noted Estimated Creatinine Clearance: 53.9 mL/min (based on SCr of 0.7 mg/dL). according to latest data. Based on current GFR, CKD stage is stage 3 - GFR 30-59.     - Monitor UOP and serial BMP and adjust therapy as needed.    - Renally dose meds.   - Avoid nephrotoxic medications and procedures.        VTE Risk Mitigation (From admission, onward)           Ordered     enoxaparin injection 30 mg  Daily         12/09/23 1543     IP VTE HIGH RISK PATIENT  Once         12/01/23 2123     Place sequential compression device  Until discontinued         12/01/23 2123     Reason for No Pharmacological VTE Prophylaxis  Once        Question:  Reasons:  Answer:  Physician Provided (leave  comment)    12/01/23 2123                    Discharge Planning   MELCHOR: 12/28/2023     Code Status: DNR   Is the patient medically ready for discharge?: Yes    Reason for patient still in hospital (select all that apply): Pending placement  Discharge Plan A: Long-term acute care facility (LTAC)   Discharge Delays: (!) Post-Acute Set-up              Gordy Smith DO  Department of Hospital Medicine   [facility] - Intensive Care ([place])

## 2023-12-25 NOTE — CONSULTS
Consult acknowledged. Discussed with primary team. 78f with DLB, functional quadriplegia, CKD3 nonverbal and immobile at baseline who was admitted 12/1 with concerns for infected sacral wound. Failed attempts to discharge to LTAC and prolonged stay associated with poor PO intake, stalled dispo planning. Palliative care consulted on day 23 of patient's stay for goals of care. Reviewed previous HM notes on 12/7 which noted palliative to be consulted but consult never placed for unclear reasons.    -encouraged primary team to review prognosis and options with family  -will formally see on 12/26    Jeremy Calvo M.D.  Department of Palliative Medicine  [facility]

## 2023-12-25 NOTE — PLAN OF CARE
Problem: Adult Inpatient Plan of Care  Goal: Plan of Care Review  Outcome: Ongoing, Progressing  Goal: Patient-Specific Goal (Individualized)  Outcome: Ongoing, Progressing  Goal: Absence of Hospital-Acquired Illness or Injury  Outcome: Ongoing, Progressing     Problem: Adult Inpatient Plan of Care  Goal: Plan of Care Review  Outcome: Ongoing, Progressing     Problem: Adult Inpatient Plan of Care  Goal: Patient-Specific Goal (Individualized)  Outcome: Ongoing, Progressing     Problem: Adult Inpatient Plan of Care  Goal: Absence of Hospital-Acquired Illness or Injury  Outcome: Ongoing, Progressing     Problem: Impaired Wound Healing  Goal: Optimal Wound Healing  Outcome: Ongoing, Progressing     Problem: Impaired Wound Healing  Goal: Optimal Wound Healing  Outcome: Ongoing, Progressing     Problem: Skin Injury Risk Increased  Goal: Skin Health and Integrity  Outcome: Ongoing, Progressing     Problem: Skin Injury Risk Increased  Goal: Skin Health and Integrity  Outcome: Ongoing, Progressing     Problem: Fall Injury Risk  Goal: Absence of Fall and Fall-Related Injury  Outcome: Ongoing, Progressing     Problem: Fall Injury Risk  Goal: Absence of Fall and Fall-Related Injury  Outcome: Ongoing, Progressing     Problem: Adjustment to Illness (Sepsis/Septic Shock)  Goal: Optimal Coping  Outcome: Ongoing, Progressing

## 2023-12-25 NOTE — ASSESSMENT & PLAN NOTE
12/25    Had a 20 minute goals of care conversation with daughter, Ymaileth, who is one of the POAs over the phone. We were able to discuss the patient's recurrent infections leading to physical deterioration along with how her mentation has deteriorated over the last year in [place] of progressive Parkinson's, Lewy body dementia. At this time, she is in agreement that it is appropriate to start establishing goals of care conversations with the palliative team, will discuss this with her sister who is the other POA [place]

## 2023-12-26 PROBLEM — Z51.5 PALLIATIVE CARE ENCOUNTER: Status: ACTIVE | Noted: 2023-12-25

## 2023-12-26 LAB
MRSA ID BY PCR: NEGATIVE
POCT GLUCOSE: 105 MG/DL (ref 70–110)
POCT GLUCOSE: 117 MG/DL (ref 70–110)
POCT GLUCOSE: 141 MG/DL (ref 70–110)
POCT GLUCOSE: 145 MG/DL (ref 70–110)
POCT GLUCOSE: 151 MG/DL (ref 70–110)
STAPH AUREUS ID BY PCR: NEGATIVE

## 2023-12-26 PROCEDURE — 99223 PR INITIAL HOSPITAL CARE,LEVL III: ICD-10-PCS | Mod: ,,, | Performed by: EMERGENCY MEDICINE

## 2023-12-26 PROCEDURE — A4216 STERILE WATER/SALINE, 10 ML: HCPCS | Performed by: STUDENT IN AN ORGANIZED HEALTH CARE EDUCATION/TRAINING PROGRAM

## 2023-12-26 PROCEDURE — 99497 PR ADVNCD CARE PLAN 30 MIN: ICD-10-PCS | Mod: 25,,, | Performed by: EMERGENCY MEDICINE

## 2023-12-26 PROCEDURE — 25000003 PHARM REV CODE 250: Performed by: STUDENT IN AN ORGANIZED HEALTH CARE EDUCATION/TRAINING PROGRAM

## 2023-12-26 PROCEDURE — 99223 1ST HOSP IP/OBS HIGH 75: CPT | Mod: ,,, | Performed by: EMERGENCY MEDICINE

## 2023-12-26 PROCEDURE — 63600175 PHARM REV CODE 636 W HCPCS: Performed by: STUDENT IN AN ORGANIZED HEALTH CARE EDUCATION/TRAINING PROGRAM

## 2023-12-26 PROCEDURE — 21400001 HC TELEMETRY ROOM

## 2023-12-26 PROCEDURE — 99497 ADVNCD CARE PLAN 30 MIN: CPT | Mod: 25,,, | Performed by: EMERGENCY MEDICINE

## 2023-12-26 RX ADMIN — CARBIDOPA AND LEVODOPA 1 TABLET: 25; 100 TABLET ORAL at 10:12

## 2023-12-26 RX ADMIN — PIPERACILLIN SODIUM AND TAZOBACTAM SODIUM 4.5 G: 4; .5 INJECTION, POWDER, FOR SOLUTION INTRAVENOUS at 11:12

## 2023-12-26 RX ADMIN — Medication 10 ML: at 12:12

## 2023-12-26 RX ADMIN — ENOXAPARIN SODIUM 30 MG: 30 INJECTION SUBCUTANEOUS at 05:12

## 2023-12-26 RX ADMIN — CETIRIZINE HYDROCHLORIDE 10 MG: 10 TABLET, FILM COATED ORAL at 09:12

## 2023-12-26 RX ADMIN — THERA TABS 1 TABLET: TAB at 09:12

## 2023-12-26 RX ADMIN — CARBIDOPA AND LEVODOPA 1 TABLET: 25; 100 TABLET ORAL at 09:12

## 2023-12-26 RX ADMIN — TAMSULOSIN HYDROCHLORIDE 0.4 MG: 0.4 CAPSULE ORAL at 09:12

## 2023-12-26 RX ADMIN — PIPERACILLIN SODIUM AND TAZOBACTAM SODIUM 4.5 G: 4; .5 INJECTION, POWDER, FOR SOLUTION INTRAVENOUS at 03:12

## 2023-12-26 RX ADMIN — ASPIRIN 81 MG: 81 TABLET, COATED ORAL at 09:12

## 2023-12-26 RX ADMIN — VANCOMYCIN HYDROCHLORIDE 750 MG: 750 INJECTION, POWDER, LYOPHILIZED, FOR SOLUTION INTRAVENOUS at 09:12

## 2023-12-26 RX ADMIN — Medication 250 MG: at 09:12

## 2023-12-26 RX ADMIN — SENNOSIDES AND DOCUSATE SODIUM 1 TABLET: 8.6; 5 TABLET ORAL at 10:12

## 2023-12-26 RX ADMIN — PANTOPRAZOLE SODIUM 20 MG: 20 TABLET, DELAYED RELEASE ORAL at 09:12

## 2023-12-26 RX ADMIN — CARBIDOPA AND LEVODOPA 1 SPLIT TABLET: 25; 100 TABLET ORAL at 10:12

## 2023-12-26 RX ADMIN — Medication 10 ML: at 05:12

## 2023-12-26 RX ADMIN — PIPERACILLIN SODIUM AND TAZOBACTAM SODIUM 4.5 G: 4; .5 INJECTION, POWDER, FOR SOLUTION INTRAVENOUS at 05:12

## 2023-12-26 RX ADMIN — Medication 10 ML: at 06:12

## 2023-12-26 RX ADMIN — SENNOSIDES AND DOCUSATE SODIUM 1 TABLET: 8.6; 5 TABLET ORAL at 09:12

## 2023-12-26 RX ADMIN — CYANOCOBALAMIN TAB 1000 MCG 1000 MCG: 1000 TAB at 09:12

## 2023-12-26 RX ADMIN — CARBIDOPA AND LEVODOPA 1 TABLET: 25; 100 TABLET ORAL at 05:12

## 2023-12-26 RX ADMIN — Medication 10 ML: at 11:12

## 2023-12-26 NOTE — PROGRESS NOTES
[facility] - [facility] ([address] Medicine  Progress Note    Patient Name: Annmarie Gallardo  MRN: [id number]  Patient Class: IP- Inpatient   Admission Date: 12/1/2023  Length of Stay: 25 days  Attending Physician: Gordy Smith DO  Primary Care Provider: Raymond Bess MD        Subjective:     Principal Problem:Pressure injury of sacral region, unstageable        HPI:  Annmarie Gallardo is a 78 y.o. with a PMHx of parkinson's, lewy-body dementia with debility/functional quadriplegia, HTN, GERD, CKD3, and sacral decubitus ulcer who presents today from her nursing home for evaluation of sacral decubitus ulcer.     Due to her dementia and nonverbal status, she is unable to provide a history. No family available at bedside during my exam.     Overview/Hospital Course:  Pt presenting with purulent drainage from sacral wound . S/p debridement 12/3 with general surgery. Patient febrile with leukocytosis overnight 12/3.  Wound cultures with Proteus mirabilis.  Patient treated with ceftriaxone based on sensitivities to treat soft tissue infection.  Patient given IV fluids given poor oral intake.  Patient denied LTAC on peer to peer.  Currently looking for shelter placement. Patient treated for soft tissue infection with ceftriaxone 2 g daily with improvement of leukocytosis.      Has experienced recurrent intermittent fevers since 11/23, septic workup obtained and started on broad spectrum abx. Given recurrent infections and persistent medical deterioration, discussed w daughter (POA) about establishing goals of care conversations w palliative team, who is in agreement. Discussions are ongoing while CM is working on placement    Interval History: Seen at bedside this AM. Remains lethargic however now opening eyes and nodding/shaking head to commands    Review of Systems  Objective:     Vital Signs (Most Recent):  Temp: (!) 101.3 °F (38.5 °C) (12/24/23 0853)  Pulse: 92 (12/24/23 1139)  Resp: 18  (12/24/23 0853)  BP: 107/61 (12/24/23 0853)  SpO2: 96 % (12/24/23 0853) Vital Signs (24h Range):  Temp:  [99 °F (37.2 °C)-101.3 °F (38.5 °C)] 101.3 °F (38.5 °C)  Pulse:  [] 92  Resp:  [16-20] 18  SpO2:  [96 %-100 %] 96 %  BP: (107-138)/(57-68) 107/61     Weight: 52.6 kg (115 lb 15.4 oz)  Body mass index is 20.54 kg/m².    Intake/Output Summary (Last 24 hours) at 12/24/2023 1144  Last data filed at 12/24/2023 0502  Gross per 24 hour   Intake 670 ml   Output 800 ml   Net -130 ml           Physical Exam  Vitals and nursing note reviewed.   Constitutional:       General: She is not in acute distress.     Appearance: She is well-developed. She is ill-appearing (chronically). She is not diaphoretic.   HENT:      Head: Normocephalic and atraumatic.   Eyes:      General: No scleral icterus.     Conjunctiva/sclera: Conjunctivae normal.   Neck:      Vascular: No JVD.   Cardiovascular:      Rate and Rhythm: Normal rate and regular rhythm.      Heart sounds: Normal heart sounds.   Pulmonary:      Effort: Pulmonary effort is normal. No respiratory distress.   Abdominal:      General: There is no distension.      Palpations: Abdomen is soft.      Tenderness: There is no abdominal tenderness.   Musculoskeletal:      Right lower leg: No edema.      Left lower leg: No edema.   Skin:     Coloration: Skin is pale. Skin is not jaundiced.      Findings: Lesion present.   Neurological:      Mental Status: She is alert.      Motor: No abnormal muscle tone.      Comments: Minimally following verbal commands   Psychiatric:      Comments: Unable to evaluate             Significant Labs: All pertinent labs within the past 24 hours have been reviewed.    Significant Imaging: I have reviewed all pertinent imaging results/findings within the past 24 hours.    Assessment/Plan:      * Pressure injury of sacral region, unstageable  - 12/1 Presents for evaluation of recurrent sacral decubitus ulcer infection. Purulent on exam.   - Wound  culture with P mirabilis  Plan:  - ceftriaxone 2g daily x7-10 days (sot 12/9) -- completed  - General surgery consulted, s/p debridement 12/3  - Wound care consulted for recs for discharge wound care  - Reintroduced broad spectrum abx 12/24 due to recurrent fevers  - Palliative care consult per below    Sepsis without acute organ dysfunction  Febrile with new leukocytosis on 12/3. Known infected sacral wound    This patient does have evidence of infective focus  My overall impression is sepsis.  Source: Skin and Soft Tissue (location sacral)  Antibiotics given-   Antibiotics (72h ago, onward)      Start     Stop Route Frequency Ordered    12/25/23 1030  vancomycin 750 mg in dextrose 5 % (D5W) 250 mL IVPB (Vial-Mate)        See Hyperspace for full Linked Orders Report.    -- IV Every 24 hours (non-standard times) 12/24/23 0930    12/24/23 1030  vancomycin (VANCOCIN) 1,000 mg in dextrose 5 % (D5W) 250 mL IVPB (Vial-Mate)        See Hyperspace for full Linked Orders Report.    -- IV Once 12/24/23 0930    12/24/23 1015  piperacillin-tazobactam (ZOSYN) 4.5 g in dextrose 5 % in water (D5W) 100 mL IVPB (MB+)         -- IV Every 8 hours (non-standard times) 12/24/23 0910    12/24/23 1009  vancomycin - pharmacy to dose  (vancomycin IVPB (PEDS and ADULTS))        See Hyperspace for full Linked Orders Report.    -- IV pharmacy to manage frequency 12/24/23 0910            Fluid challenge Not needed - patient is not hypotensive      Source control achieved by: to OR 12/3 for debridement however since 12/23 has been having recurrent fevers once again, lactate reassuring at 1.3 and hemodynamically stable  - Obtained repeat blood cx  - UA negative  - Vancomycin, zosyn 12/24-      ACP (advance care planning)  12/25    Had a 20 minute goals of care conversation with daughter, Yamileth, who is one of the POAs over the phone. We were able to discuss the patient's recurrent infections leading to physical deterioration along with how her  mentation has deteriorated over the last year in marcia of progressive Parkinson's, Lewy body dementia. At this time, she is in agreement that it is appropriate to start establishing goals of care conversations with the palliative team, will discuss this with her sister who is the other POA hardin      Stage IV pressure ulcer of sacral region  - See above      Macrocytic anemia  Chronic, and likely anemia of chronic disease. Currently without indication for transfusion.     Severe protein-calorie malnutrition  Due to Parkinson's and debility. Will continue dietary supplements as recommended by RD during previous hospitalization.   - SLP consulted      Hypernatremia  Resolved   Patient has hypernatremia which is controlled. The hypernatremia is due to Dehydration. The patient's sodium results have been reviewed and are listed below.        Functional quadriplegia  Due to Parkinsons' and dementia. Continue frequent turns and pressure ulcer care/prevention.       DNR (do not resuscitate)  Per previous documentation and discussions with patient's family. Clarified with daughter Yamileth at bedside (shares POA with other daughter) - patient is DNR.       Severe Lewy body dementia  Monitor with delirium precautions.     Parkinson's disease  Continue home Sinemet.      CKD (chronic kidney disease), stage III  Creatine stable for now. BMP reviewed- noted Estimated Creatinine Clearance: 53.9 mL/min (based on SCr of 0.7 mg/dL). according to latest data. Based on current GFR, CKD stage is stage 3 - GFR 30-59.     - Monitor UOP and serial BMP and adjust therapy as needed.    - Renally dose meds.   - Avoid nephrotoxic medications and procedures.        VTE Risk Mitigation (From admission, onward)           Ordered     enoxaparin injection 30 mg  Daily         12/09/23 1543     IP VTE HIGH RISK PATIENT  Once         12/01/23 2123     Place sequential compression device  Until discontinued         12/01/23 2123     Reason for No  Pharmacological VTE Prophylaxis  Once        Question:  Reasons:  Answer:  Physician Provided (leave comment)    12/01/23 2123                    Discharge Planning   MELCHOR: 12/28/2023     Code Status: DNR   Is the patient medically ready for discharge?: Yes    Reason for patient still in hospital (select all that apply): Pending placement  Discharge Plan A: Long-term acute care facility (LTAC)   Discharge Delays: (!) Post-Acute Set-up              Gordy Smith DO  Department of Hospital Medicine   [facility] - Intensive Care ([place])

## 2023-12-26 NOTE — SUBJECTIVE & OBJECTIVE
Interval History: pt seen at the bedside; discussed with the daughter Yamileth by phone    Past Medical History:   Diagnosis Date    HTN (hypertension)     Parkinson's disease     Vitamin D deficiency        Past Surgical History:   Procedure Laterality Date    DEBRIDEMENT OF SACRAL WOUND N/A 12/3/2023    Procedure: DEBRIDEMENT, WOUND, SACRUM;  Surgeon: Stevo Patterson MD;  Location: [facility] OR [address] [place] [address];  Service: General;  Laterality: N/A;  saline soaked kerlix placed to sacrum       Review of patient's allergies indicates:   Allergen Reactions    Diamox sequels [acetazolamide]     Garlic     Namenda [memantine]     Oxybutynin        Medications:  Continuous Infusions:  Scheduled Meds:   ascorbic acid (vitamin C)  250 mg Oral Daily    aspirin  81 mg Oral Daily    carbidopa-levodopa  mg  1 tablet Oral TID    And    carbidopa-levodopa 12.5-50 mg  1 split tablet Oral QHS    cetirizine  10 mg Oral Daily    cyanocobalamin  1,000 mcg Oral Daily    enoxparin  30 mg Subcutaneous Daily    multivitamin  1 tablet Oral Daily    pantoprazole  20 mg Oral Daily    piperacillin-tazobactam (Zosyn) IV (PEDS and ADULTS) (extended infusion is not appropriate)  4.5 g Intravenous Q8H    senna-docusate 8.6-50 mg  1 tablet Oral BID    sodium chloride 0.9%  10 mL Intravenous Q6H    tamsulosin  0.4 mg Oral Daily    vancomycin (VANCOCIN) IV (PEDS and ADULTS)  750 mg Intravenous Q24H     PRN Meds:acetaminophen, aluminum-magnesium hydroxide-simethicone, melatonin, naloxone, ondansetron, polyethylene glycol, simethicone, sodium chloride 0.9%, sodium chloride 0.9%, Flushing PICC/Midline Protocol **AND** sodium chloride 0.9% **AND** sodium chloride 0.9%, Pharmacy to dose Vancomycin consult **AND** vancomycin - pharmacy to dose    Family History    None       Tobacco Use    Smoking status: Never    Smokeless tobacco: Never   Substance and Sexual Activity    Alcohol use: No    Drug use: No    Sexual activity: Not on file       Review of  Systems   Unable to perform ROS: Dementia     Objective:     Vital Signs (Most Recent):  Temp: 99.7 °F (37.6 °C) (12/26/23 0933)  Pulse: 87 (12/26/23 1105)  Resp: (!) 27 (12/26/23 0933)  BP: (!) 92/55 (12/26/23 0933)  SpO2: 95 % (12/26/23 0933) Vital Signs (24h Range):  Temp:  [98.5 °F (36.9 °C)-99.7 °F (37.6 °C)] 99.7 °F (37.6 °C)  Pulse:  [80-95] 87  Resp:  [16-27] 27  SpO2:  [92 %-100 %] 95 %  BP: ()/(55-73) 92/55     Weight: 52.6 kg (115 lb 15.4 oz)  Body mass index is 20.54 kg/m².       Physical Exam  Vitals and nursing note reviewed.   Constitutional:       General: She is not in acute distress.     Appearance: She is ill-appearing.   HENT:      Right Ear: External ear normal.      Left Ear: External ear normal.      Mouth/Throat:      Mouth: Mucous membranes are moist.   Cardiovascular:      Rate and Rhythm: Normal rate.      Pulses: Normal pulses.   Pulmonary:      Effort: Pulmonary effort is normal. No respiratory distress.   Genitourinary:     Comments: Charles cath in place; clear yellow urine  Musculoskeletal:      Comments: sarcopenic   Skin:     Capillary Refill: Capillary refill takes less than 2 seconds.   Neurological:      Mental Status: She is disoriented.      Comments: No attempts at speech; inconsistently tracks or follows commands     Psychiatric:      Comments: calm            Review of Symptoms      Symptom Assessment (ESAS 0-10 Scale)  Unable to complete assessment due to Patient nonverbal         Pain Assessment in Advanced Demential Scale (PAINAD)   Breathing - Independent of vocalization:  0  Negative vocalization:  0  Facial expression:  0  Body language:  0  Consolability:  0  Total:  0    Living Arrangements:  Lives in nursing home    Psychosocial/Cultural:   See Palliative Psychosocial Note: No  Declining functional status    Several hospital stays    Three adult children; unable to care for her at home    Daughter Yamileth is  main local caregiver; she continues to work full  "time, has two teenage children, a 10 month old foster child and also has a family farm in [place] [facility]  **Primary  to Follow**  Palliative Care  Consult: No        Advance Care Planning   Advance Directives:   Living Will: No    LaPOST: No    Do Not Resuscitate Status: Yes    Medical Power of : No    Agent's Name:  Yamileth Gallardo   Agent's Contact Number:  [phone]    Decision Making:  Family answered questions and Patient unable to communicate due to disease severity/cognitive impairment  Goals of Care: The family endorses that what is most important right now is to focus on symptom/pain control, improvement in condition but with limits to invasive therapies, and comfort and QOL     Accordingly, we have decided that the best plan to meet the patient's goals includes continuing with treatment for now and exploring options for ongoing care.      The family is clear that the patient would want to avoid invasive and burdensome forms of treatment at the end of life including NG tubes or PEGs.      The daughter hopes to continue to provide her mother with basic medical needs but also ensure that she has every chance to do as well as possible, while preparing for the final phase of her life.          Significant Labs: All pertinent labs within the past 24 hours have been reviewed.  CBC:   Recent Labs   Lab 12/25/23  0433   WBC 7.82   HGB 9.7*   HCT 31.1*   MCV 92        BMP:  No results for input(s): "GLU", "NA", "K", "CL", "CO2", "BUN", "CREATININE", "CALCIUM", "MG" in the last 24 hours.  LFT:  Lab Results   Component Value Date    AST 13 12/18/2023    ALKPHOS 54 (L) 12/18/2023    BILITOT 0.7 12/18/2023     Albumin:   Albumin   Date Value Ref Range Status   12/18/2023 2.4 (L) 3.5 - 5.2 g/dL Final     Protein:   Total Protein   Date Value Ref Range Status   12/18/2023 6.1 6.0 - 8.4 g/dL Final     Lactic acid:   Lab Results   Component Value Date    LACTATE 1.3 " 12/24/2023    LACTATE 1.5 12/01/2023       Significant Imaging: I have reviewed all pertinent imaging results/findings within the past 24 hours.

## 2023-12-26 NOTE — ASSESSMENT & PLAN NOTE
Assessment: 78f with LewyBody dementia, PD, functional quadriplegia, CKD3 nonverbal and immobile at baseline who was admitted 12/1 with concerns for infected Stage IV sacral wound.     In this setting, palliative medicine was consulted to help with symptom management, medical decision making, and aiding in the formation of goals of care.     1) Symptoms:  none currently    2) Code status: DNR    3) Psychosocial: has three adult children; daughter yamileth is local and very involved; used to share care giving duties with her sister in [place]; was at [facility] as her needs grew too great to care for her at home    4) Medicolegal: no ACP/LW    5) Spiritual: did not discuss    6) Goals of care/discussion:    Discussion by phone with daughter Yamileth to introduce the role of palliative medicine and supportive care in the hospital in the setting of a serious diagnosis of brain failure. The family was able to demonstrate an excellent understanding of the diagnosis, current care plan, and concern for worsening of the pt's condition.    Goals of care:  The family endorses that what is most important right now is to focus on symptom/pain control, improvement in condition but with limits to invasive therapies, and comfort and QOL     Accordingly, we have decided that the best plan to meet the patient's goals includes continuing with treatment for now and exploring options for ongoing care.      The family is clear that the patient would want to avoid invasive and burdensome forms of treatment at the end of life including NG tubes or PEGs.      The daughter hopes to continue to provide her mother with basic medical needs but also ensure that she has every chance to do as well as possible, while preparing for the final phase of her life.     A total of 19 min was spent on advance care planning, goals of care discussion, emotional support, formulating and communicating prognosis and goals of care, exploring burden/benefit of  various approaches of treatment. This discussion occurred on a fully voluntary basis with the verbal consent of the patient and/or family.       7) Disposition plan: LTACH vs care home care with hospice    8)Summary of recommendations and follow up plan:  -Most important goals at this time are improvement in condition but with limits to invasive therapies, comfort and QOL  -Code status: DNR  -Palliative care to provide emotional support and assist with communication regarding disease expectations and trajectory, and with medical decision-making, at the appropriate time  -exploring options for LTACH for best active outcome possible  -will continue to explore expectations and help to align best care possible once she is fit to leave hospital  -complete LaPOST to reflect goals as an outpt  -Most appropriate disposition: continue with the current LOC for now      Thank you for the opportunity to care for this patient and family.     Please call with questions.     Jose Carlos Farrell MD  Palliative Medicine   [facility]  [phone] (cell)

## 2023-12-26 NOTE — CONSULTS
[facility] - Intensive Care ([place] [address])  Palliative Medicine  Consult Note    Patient Name: Annmarie Gallardo  MRN: [id number]  Admission Date: 12/1/2023  Hospital Length of Stay: 25 days  Code Status: DNR   Attending Provider: Gordy Smith DO  Consulting Provider: Jose Carlos Farrell MD  Primary Care Physician: Raymond Bess MD  Principal Problem:Pressure injury of sacral region, unstageable    Patient information was obtained from relative(s), past medical records, and primary team.      Consults  Assessment/Plan:     Palliative Care  Palliative care encounter  Assessment: 78f with LewyBody dementia, PD, functional quadriplegia, CKD3 nonverbal and immobile at baseline who was admitted 12/1 with concerns for infected Stage IV sacral wound.     In this setting, palliative medicine was consulted to help with symptom management, medical decision making, and aiding in the formation of goals of care.     1) Symptoms:  none currently    2) Code status: DNR    3) Psychosocial: has three adult children; daughter aracelis is local and very involved; used to share care giving duties with her sister in [place]; was at [facility] as her needs grew too great to care for her at home    4) Medicolegal: no ACP/LW    5) Spiritual: did not discuss    6) Goals of care/discussion:    Discussion by phone with lynn Carson to introduce the role of palliative medicine and supportive care in the hospital in the setting of a serious diagnosis of brain failure. The family was able to demonstrate an excellent understanding of the diagnosis, current care plan, and concern for worsening of the pt's condition.    Goals of care:  The family endorses that what is most important right now is to focus on symptom/pain control, improvement in condition but with limits to invasive therapies, and comfort and QOL     Accordingly, we have decided that the best plan to meet the patient's goals includes continuing with treatment for now and exploring  options for ongoing care.      The family is clear that the patient would want to avoid invasive and burdensome forms of treatment at the end of life including NG tubes or PEGs.      The daughter hopes to continue to provide her mother with basic medical needs but also ensure that she has every chance to do as well as possible, while preparing for the final phase of her life.     A total of 19 min was spent on advance care planning, goals of care discussion, emotional support, formulating and communicating prognosis and goals of care, exploring burden/benefit of various approaches of treatment. This discussion occurred on a fully voluntary basis with the verbal consent of the patient and/or family.       7) Disposition plan: LTACH vs halfway care with hospice    8)Summary of recommendations and follow up plan:  -Most important goals at this time are improvement in condition but with limits to invasive therapies, comfort and QOL  -Code status: DNR  -Palliative care to provide emotional support and assist with communication regarding disease expectations and trajectory, and with medical decision-making, at the appropriate time  -exploring options for LTACH for best active outcome possible  -will continue to explore expectations and help to align best care possible once she is fit to leave hospital  -complete LaPOST to reflect goals as an outpt  -Most appropriate disposition: continue with the current LOC for now      Thank you for the opportunity to care for this patient and family.     Please call with questions.     Jose Carlos Farrell MD  Palliative Medicine   [facility]  [phone] (cell)          Thank you for your consult. I will follow-up with patient. Please contact us if you have any additional questions.    Subjective:     HPI:   No notes on file    Hospital Course:  No notes on file    Interval History: pt seen at the bedside; discussed with the daughter Yamileth by phone    Past Medical History:    Diagnosis Date    HTN (hypertension)     Parkinson's disease     Vitamin D deficiency        Past Surgical History:   Procedure Laterality Date    DEBRIDEMENT OF SACRAL WOUND N/A 12/3/2023    Procedure: DEBRIDEMENT, WOUND, SACRUM;  Surgeon: Stevo Patterson MD;  Location: [facility] OR [address] [place] [address];  Service: General;  Laterality: N/A;  saline soaked kerlix placed to sacrum       Review of patient's allergies indicates:   Allergen Reactions    Diamox sequels [acetazolamide]     Garlic     Namenda [memantine]     Oxybutynin        Medications:  Continuous Infusions:  Scheduled Meds:   ascorbic acid (vitamin C)  250 mg Oral Daily    aspirin  81 mg Oral Daily    carbidopa-levodopa  mg  1 tablet Oral TID    And    carbidopa-levodopa 12.5-50 mg  1 split tablet Oral QHS    cetirizine  10 mg Oral Daily    cyanocobalamin  1,000 mcg Oral Daily    enoxparin  30 mg Subcutaneous Daily    multivitamin  1 tablet Oral Daily    pantoprazole  20 mg Oral Daily    piperacillin-tazobactam (Zosyn) IV (PEDS and ADULTS) (extended infusion is not appropriate)  4.5 g Intravenous Q8H    senna-docusate 8.6-50 mg  1 tablet Oral BID    sodium chloride 0.9%  10 mL Intravenous Q6H    tamsulosin  0.4 mg Oral Daily    vancomycin (VANCOCIN) IV (PEDS and ADULTS)  750 mg Intravenous Q24H     PRN Meds:acetaminophen, aluminum-magnesium hydroxide-simethicone, melatonin, naloxone, ondansetron, polyethylene glycol, simethicone, sodium chloride 0.9%, sodium chloride 0.9%, Flushing PICC/Midline Protocol **AND** sodium chloride 0.9% **AND** sodium chloride 0.9%, Pharmacy to dose Vancomycin consult **AND** vancomycin - pharmacy to dose    Family History    None       Tobacco Use    Smoking status: Never    Smokeless tobacco: Never   Substance and Sexual Activity    Alcohol use: No    Drug use: No    Sexual activity: Not on file       Review of Systems   Unable to perform ROS: Dementia     Objective:     Vital Signs (Most Recent):  Temp: 99.7 °F (37.6 °C)  (12/26/23 0933)  Pulse: 87 (12/26/23 1105)  Resp: (!) 27 (12/26/23 0933)  BP: (!) 92/55 (12/26/23 0933)  SpO2: 95 % (12/26/23 0933) Vital Signs (24h Range):  Temp:  [98.5 °F (36.9 °C)-99.7 °F (37.6 °C)] 99.7 °F (37.6 °C)  Pulse:  [80-95] 87  Resp:  [16-27] 27  SpO2:  [92 %-100 %] 95 %  BP: ()/(55-73) 92/55     Weight: 52.6 kg (115 lb 15.4 oz)  Body mass index is 20.54 kg/m².       Physical Exam  Vitals and nursing note reviewed.   Constitutional:       General: She is not in acute distress.     Appearance: She is ill-appearing.   HENT:      Right Ear: External ear normal.      Left Ear: External ear normal.      Mouth/Throat:      Mouth: Mucous membranes are moist.   Cardiovascular:      Rate and Rhythm: Normal rate.      Pulses: Normal pulses.   Pulmonary:      Effort: Pulmonary effort is normal. No respiratory distress.   Genitourinary:     Comments: Charles cath in place; clear yellow urine  Musculoskeletal:      Comments: sarcopenic   Skin:     Capillary Refill: Capillary refill takes less than 2 seconds.   Neurological:      Mental Status: She is disoriented.      Comments: No attempts at speech; inconsistently tracks or follows commands     Psychiatric:      Comments: calm            Review of Symptoms      Symptom Assessment (ESAS 0-10 Scale)  Unable to complete assessment due to Patient nonverbal         Pain Assessment in Advanced Demential Scale (PAINAD)   Breathing - Independent of vocalization:  0  Negative vocalization:  0  Facial expression:  0  Body language:  0  Consolability:  0  Total:  0    Living Arrangements:  Lives in nursing home    Psychosocial/Cultural:   See Palliative Psychosocial Note: No  Declining functional status    Several hospital stays    Three adult children; unable to care for her at home    Daughter Yamileth is  main local caregiver; she continues to work full time, has two teenage children, a 10 month old foster child and also has a family farm in [place]  "[place]  **Primary  to Follow**  Palliative Care  Consult: No        Advance Care Planning  Advance Directives:   Living Will: No    LaPOST: No    Do Not Resuscitate Status: Yes    Medical Power of : No    Agent's Name:  Yamileth Gallardo   Agent's Contact Number:  [phone]    Decision Making:  Family answered questions and Patient unable to communicate due to disease severity/cognitive impairment  Goals of Care: The family endorses that what is most important right now is to focus on symptom/pain control, improvement in condition but with limits to invasive therapies, and comfort and QOL     Accordingly, we have decided that the best plan to meet the patient's goals includes continuing with treatment for now and exploring options for ongoing care.      The family is clear that the patient would want to avoid invasive and burdensome forms of treatment at the end of life including NG tubes or PEGs.      The daughter hopes to continue to provide her mother with basic medical needs but also ensure that she has every chance to do as well as possible, while preparing for the final phase of her life.          Significant Labs: All pertinent labs within the past 24 hours have been reviewed.  CBC:   Recent Labs   Lab 12/25/23  0433   WBC 7.82   HGB 9.7*   HCT 31.1*   MCV 92        BMP:  No results for input(s): "GLU", "NA", "K", "CL", "CO2", "BUN", "CREATININE", "CALCIUM", "MG" in the last 24 hours.  LFT:  Lab Results   Component Value Date    AST 13 12/18/2023    ALKPHOS 54 (L) 12/18/2023    BILITOT 0.7 12/18/2023     Albumin:   Albumin   Date Value Ref Range Status   12/18/2023 2.4 (L) 3.5 - 5.2 g/dL Final     Protein:   Total Protein   Date Value Ref Range Status   12/18/2023 6.1 6.0 - 8.4 g/dL Final     Lactic acid:   Lab Results   Component Value Date    LACTATE 1.3 12/24/2023    LACTATE 1.5 12/01/2023       Significant Imaging: I have reviewed all pertinent imaging " results/findings within the past 24 hours.      I spent a total of 90 minutes on the day of the visit. This includes face to face time in discussion of goals of care, symptom assessment, coordination of care and emotional support.  This also includes non-face to face time preparing to see the patient (eg, review of tests/imaging), obtaining and/or reviewing separately obtained history, documenting clinical information in the electronic or other health record, independently interpreting results and communicating results to the patient/family/caregiver, or care coordinator.    Jose Carlos Farrell MD  Palliative Medicine  [facility] - Intensive Care ([place] [address])

## 2023-12-26 NOTE — PROGRESS NOTES
[facility] - Intensive Care ([facility]  Adult Nutrition  Progress Note    SUMMARY       Recommendations    Continue Regular diet with texture per SLP      Continue Boost Plus TID     Continue Jerome BID for promotion of wound healing     Rec'd addition of MVI along w/ Vitamin C for promotion of wound healing     If PO intake remains low, consider alternate nutrition, consult RD if warranted.      RD following     Goals: Meet % EEN, EPN by RD f/u  Nutrition Goal Status: goal met  Communication of RD Recs:  (POC)    Assessment and Plan    Endocrine  Severe protein-calorie malnutrition  Malnutrition Type:  Context: chronic illness  Level: severe    Related to (etiology):   Inadequate energy intake    Signs and Symptoms (as evidenced by):   Malnutrition Characteristic Summary:  Energy Intake (Malnutrition): less than 75% for greater than or equal to 1 month  Subcutaneous Fat (Malnutrition): moderate depletion  Muscle Mass (Malnutrition): severe depletion    Interventions/Recommendations (treatment strategy):  Continue Regular diet with texture per SLP   Continue Boost Plus TID  Continue Jerome BID for promotion of wound healing  Rec'd addition of MVI along w/ Vitamin C for promotion of wound healing  If PO intake remains low, consider alternate nutrition, consult RD if warranted.   RD following     Nutrition Diagnosis Status:   New    Malnutrition Assessment  Malnutrition Context: chronic illness  Malnutrition Level: severe  Skin (Micronutrient): none  Nails (Micronutrient): none  Hair/Scalp (Micronutrient): none  Eyes (Micronutrient): none  Extraoral (Micronutrient): none  Gums (Micronutrient): none  Lips/Mucous Membranes (Micronutrient): none  Teeth (Micronutrient): none  Tongue (Micronutrient): none  Neck/Chest (Micronutrient): muscle wasting  Musculoskeletal/Lower Extremities: muscle wasting, subcutaneous fat loss       Energy Intake (Malnutrition): less than 75% for greater than or equal to 1  "month  Subcutaneous Fat (Malnutrition): moderate depletion  Muscle Mass (Malnutrition): severe depletion   Orbital Region (Subcutaneous Fat Loss): moderate depletion  Upper Arm Region (Subcutaneous Fat Loss): moderate depletion   [place] Region (Muscle Loss): severe depletion  Clavicle Bone Region (Muscle Loss): severe depletion  Clavicle and Acromion Bone Region (Muscle Loss): severe depletion  Dorsal Hand (Muscle Loss): severe depletion  Patellar Region (Muscle Loss): severe depletion  Anterior Thigh Region (Muscle Loss): severe depletion  Posterior Calf Region (Muscle Loss): severe depletion     Reason for Assessment    Reason For Assessment: RD follow-up  Diagnosis:  (PI of sacral region)  Relevant Medical History: CKD, parkinson's, dementia, functinoal quadriplegia, severe malnutrition, macrocytic anemia, sepsis  Interdisciplinary Rounds: did not attend    General Information Comments:   RD following. Pt tolerating 100% of meals. Require assistance with feeding. ONS and Jerome ordered. Wt stable per chart. NFPE completed on 12/14, noted moderate-severe fat and muscle depletion     Nutrition Discharge Planning: Adequate PO intake    Nutrition Risk Screen    Nutrition Risk Screen: no indicators present    Nutrition/Diet History    Spiritual, Cultural Beliefs, Confucianist Practices, Values that Affect Care: no    Anthropometrics    Temp: 99.7 °F (37.6 °C)  Height: 5' 3" (160 cm)  Height (inches): 63 in  Weight Method: Bed Scale  Weight: 52.6 kg (115 lb 15.4 oz)  Weight (lb): 115.97 lb  Ideal Body Weight (IBW), Female: 115 lb  % Ideal Body Weight, Female (lb): 100.84 %  BMI (Calculated): 20.5       Lab/Procedures/Meds    Pertinent Labs Reviewed: reviewed  Pertinent Labs Comments: H/H: 9.7/31.1  Pertinent Medications Reviewed: reviewed  Pertinent Medications Comments: vit C, senna-docusate, MV, pantoprazole, cyanocobalamin    Estimated/Assessed Needs    Weight Used For Calorie Calculations: 52.2 kg (115 lb)  Energy " Calorie Requirements (kcal): 1352 kcal  Energy Need Method: [place] (SF 1.4)  Protein Requirements: 63-78g (1.2-1.5g/kg)  Weight Used For Protein Calculations: 52.2 kg (115 lb)  Fluid Requirements (mL): 1ml/kcal or per MD  Estimated Fluid Requirement Method: RDA Method  RDA Method (mL): 1352     Nutrition Prescription Ordered    Current Diet Order: level 6  Oral Nutrition Supplement: Boost Plus, Jerome    Evaluation of Received Nutrient/Fluid Intake    I/O: -  Energy Calories Required: meeting needs  Protein Required: meeting needs  Comments: LBM 12/25  Tolerance: tolerating  % Intake of Estimated Energy Needs: 75 - 100 %  % Meal Intake: 75 - 100 %    Nutrition Risk    Level of Risk/Frequency of Follow-up:  (1x/week)     Monitor and Evaluation    Food and Nutrient Intake: energy intake, food and beverage intake  Food and Nutrient Adminstration: diet order  Knowledge/Beliefs/Attitudes: food and nutrition knowledge/skill  Physical Activity and Function: nutrition-related ADLs and IADLs  Anthropometric Measurements: height/length, weight, weight change, body mass index  Biochemical Data, Medical Tests and Procedures: electrolyte and renal panel, gastrointestinal profile, glucose/endocrine profile, inflammatory profile, lipid profile  Nutrition-Focused Physical Findings: overall appearance     Nutrition Follow-Up    RD Follow-up?: Yes

## 2023-12-26 NOTE — PLAN OF CARE
[facility] - Intensive Care ([facility])  Discharge Reassessment    Primary Care Provider: Raymond Bess MD    Expected Discharge Date: 12/28/2023    Reassessment (most recent)       Discharge Reassessment - 12/26/23 1241          Discharge Reassessment    Assessment Type Discharge Planning Reassessment (P)      Did the patient's condition or plan change since previous assessment? No (P)      Discharge Plan discussed with: Adult children (P)      Communicated MELCHOR with patient/caregiver Date not available/Unable to determine (P)      Discharge Plan A Long-term acute care facility (LTAC) (P)      Discharge Plan B Skilled Nursing Facility (P)      DME Needed Upon Discharge  none (P)      Transition of Care Barriers Nursing Home rejection (P)      Why the patient remains in the hospital Placement issues;Insurance issues (P)         Post-Acute Status    Post-Acute Authorization Placement (P)      Post-Acute Placement Status Referrals Sent (P)      Discharge Delays Post-Acute Set-up (P)                  CM spoke with pt's son Winston in room.  Informed that we are seeking LTAC for pt; if that isn't authorized, we will pursue SNF.  LTAC previously denied d/t insurance issues, [facility] is looking at the case [facility] since now she has IV abx going again; SNF couldn't take d/t BID wound care.  CG states he had a wound once, and it was treated with rotating cotton gauze changed every couple of hours and it resolved.  He would be willing to come to facility and do that.  Instructed that MD orders must be followed; doing something else would not be allowed.  CG v/u.  He states coming home is not an option for this pt.    KEYUR BenavidesN, BS, RN, CCM

## 2023-12-26 NOTE — PLAN OF CARE
Problem: Adult Inpatient Plan of Care  Goal: Plan of Care Review  Outcome: Ongoing, Progressing  Goal: Patient-Specific Goal (Individualized)  Outcome: Ongoing, Progressing  Goal: Absence of Hospital-Acquired Illness or Injury  Outcome: Ongoing, Progressing     Problem: Adult Inpatient Plan of Care  Goal: Plan of Care Review  Outcome: Ongoing, Progressing     Problem: Adult Inpatient Plan of Care  Goal: Patient-Specific Goal (Individualized)  Outcome: Ongoing, Progressing     Problem: Adult Inpatient Plan of Care  Goal: Absence of Hospital-Acquired Illness or Injury  Outcome: Ongoing, Progressing

## 2023-12-26 NOTE — PLAN OF CARE
Problem: Adult Inpatient Plan of Care  Goal: Plan of Care Review  12/26/2023 0422 by Ameena Honeycutt, LPN  Outcome: Ongoing, Progressing  12/26/2023 0418 by Ameena Honeycutt, LPN  Outcome: Ongoing, Progressing  Goal: Patient-Specific Goal (Individualized)  12/26/2023 0422 by Ameena Honeycutt, LPN  Outcome: Ongoing, Progressing  12/26/2023 0418 by Ameena Honeycutt, LPN  Outcome: Ongoing, Progressing  Goal: Absence of Hospital-Acquired Illness or Injury  12/26/2023 0422 by Ameena Honeycutt, LPN  Outcome: Ongoing, Progressing  12/26/2023 0418 by Ameena Honeycutt, LPN  Outcome: Ongoing, Progressing  Goal: Optimal Comfort and Wellbeing  Outcome: Ongoing, Progressing  Goal: Readiness for Transition of Care  Outcome: Ongoing, Progressing     Problem: Adult Inpatient Plan of Care  Goal: Plan of Care Review  12/26/2023 0422 by Ameena Honeycutt LPN  Outcome: Ongoing, Progressing  12/26/2023 0418 by Ameena Honeycutt, LPN  Outcome: Ongoing, Progressing     Problem: Adult Inpatient Plan of Care  Goal: Patient-Specific Goal (Individualized)  12/26/2023 0422 by Ameena Honeycutt, LPN  Outcome: Ongoing, Progressing  12/26/2023 0418 by Ameena Honeycutt, LPN  Outcome: Ongoing, Progressing     Problem: Adult Inpatient Plan of Care  Goal: Absence of Hospital-Acquired Illness or Injury  12/26/2023 0422 by Ameena Honeycutt, LPN  Outcome: Ongoing, Progressing  12/26/2023 0418 by Ameena Honeycutt, LPN  Outcome: Ongoing, Progressing     Problem: Adult Inpatient Plan of Care  Goal: Optimal Comfort and Wellbeing  Outcome: Ongoing, Progressing     Problem: Adult Inpatient Plan of Care  Goal: Readiness for Transition of Care  Outcome: Ongoing, Progressing

## 2023-12-27 VITALS
WEIGHT: 115.94 LBS | OXYGEN SATURATION: 96 % | HEIGHT: 63 IN | HEART RATE: 82 BPM | RESPIRATION RATE: 18 BRPM | DIASTOLIC BLOOD PRESSURE: 58 MMHG | SYSTOLIC BLOOD PRESSURE: 106 MMHG | BODY MASS INDEX: 20.54 KG/M2 | TEMPERATURE: 99 F

## 2023-12-27 PROBLEM — E87.0 HYPERNATREMIA: Status: RESOLVED | Noted: 2023-12-01 | Resolved: 2023-12-27

## 2023-12-27 LAB
POCT GLUCOSE: 120 MG/DL (ref 70–110)
POCT GLUCOSE: 124 MG/DL (ref 70–110)
VANCOMYCIN TROUGH SERPL-MCNC: 10.7 UG/ML (ref 10–22)

## 2023-12-27 PROCEDURE — A4216 STERILE WATER/SALINE, 10 ML: HCPCS | Performed by: STUDENT IN AN ORGANIZED HEALTH CARE EDUCATION/TRAINING PROGRAM

## 2023-12-27 PROCEDURE — 25000003 PHARM REV CODE 250: Performed by: STUDENT IN AN ORGANIZED HEALTH CARE EDUCATION/TRAINING PROGRAM

## 2023-12-27 PROCEDURE — 80202 ASSAY OF VANCOMYCIN: CPT | Performed by: STUDENT IN AN ORGANIZED HEALTH CARE EDUCATION/TRAINING PROGRAM

## 2023-12-27 PROCEDURE — 87040 BLOOD CULTURE FOR BACTERIA: CPT | Mod: 59 | Performed by: STUDENT IN AN ORGANIZED HEALTH CARE EDUCATION/TRAINING PROGRAM

## 2023-12-27 PROCEDURE — 63600175 PHARM REV CODE 636 W HCPCS: Performed by: STUDENT IN AN ORGANIZED HEALTH CARE EDUCATION/TRAINING PROGRAM

## 2023-12-27 PROCEDURE — 36415 COLL VENOUS BLD VENIPUNCTURE: CPT | Performed by: STUDENT IN AN ORGANIZED HEALTH CARE EDUCATION/TRAINING PROGRAM

## 2023-12-27 RX ORDER — ASPIRIN 81 MG/1
81 TABLET ORAL DAILY
Status: CANCELLED | OUTPATIENT
Start: 2023-12-28

## 2023-12-27 RX ORDER — CARBIDOPA AND LEVODOPA 25; 100 MG/1; MG/1
1 TABLET ORAL 3 TIMES DAILY
Status: CANCELLED | OUTPATIENT
Start: 2023-12-27

## 2023-12-27 RX ORDER — ONDANSETRON 4 MG/1
8 TABLET, ORALLY DISINTEGRATING ORAL EVERY 8 HOURS PRN
Status: CANCELLED | OUTPATIENT
Start: 2023-12-27

## 2023-12-27 RX ORDER — CETIRIZINE HYDROCHLORIDE 10 MG/1
10 TABLET ORAL DAILY
Status: CANCELLED | OUTPATIENT
Start: 2023-12-28

## 2023-12-27 RX ORDER — ENOXAPARIN SODIUM 100 MG/ML
30 INJECTION SUBCUTANEOUS EVERY 24 HOURS
Status: CANCELLED | OUTPATIENT
Start: 2023-12-27

## 2023-12-27 RX ORDER — MAG HYDROX/ALUMINUM HYD/SIMETH 200-200-20
30 SUSPENSION, ORAL (FINAL DOSE FORM) ORAL 4 TIMES DAILY PRN
Status: CANCELLED | OUTPATIENT
Start: 2023-12-27

## 2023-12-27 RX ORDER — ACETAMINOPHEN 500 MG
1000 TABLET ORAL EVERY 8 HOURS PRN
Status: CANCELLED | OUTPATIENT
Start: 2023-12-27

## 2023-12-27 RX ORDER — ASCORBIC ACID 250 MG
250 TABLET ORAL DAILY
Status: CANCELLED | OUTPATIENT
Start: 2023-12-28

## 2023-12-27 RX ORDER — AMOXICILLIN 250 MG
1 CAPSULE ORAL 2 TIMES DAILY
Status: CANCELLED | OUTPATIENT
Start: 2023-12-27

## 2023-12-27 RX ORDER — SODIUM CHLORIDE 0.9 % (FLUSH) 0.9 %
1-10 SYRINGE (ML) INJECTION EVERY 12 HOURS PRN
Status: CANCELLED | OUTPATIENT
Start: 2023-12-27

## 2023-12-27 RX ORDER — SODIUM CHLORIDE 0.9 % (FLUSH) 0.9 %
10 SYRINGE (ML) INJECTION
Status: CANCELLED | OUTPATIENT
Start: 2023-12-27

## 2023-12-27 RX ORDER — LANOLIN ALCOHOL/MO/W.PET/CERES
1000 CREAM (GRAM) TOPICAL DAILY
Status: CANCELLED | OUTPATIENT
Start: 2023-12-28

## 2023-12-27 RX ORDER — SODIUM CHLORIDE 0.9 % (FLUSH) 0.9 %
10 SYRINGE (ML) INJECTION EVERY 6 HOURS
Status: CANCELLED | OUTPATIENT
Start: 2023-12-27

## 2023-12-27 RX ORDER — PANTOPRAZOLE SODIUM 20 MG/1
20 TABLET, DELAYED RELEASE ORAL DAILY
Status: CANCELLED | OUTPATIENT
Start: 2023-12-28

## 2023-12-27 RX ORDER — TALC
6 POWDER (GRAM) TOPICAL NIGHTLY PRN
Status: CANCELLED | OUTPATIENT
Start: 2023-12-27

## 2023-12-27 RX ORDER — SIMETHICONE 80 MG
1 TABLET,CHEWABLE ORAL 4 TIMES DAILY PRN
Status: CANCELLED | OUTPATIENT
Start: 2023-12-27

## 2023-12-27 RX ORDER — NALOXONE HCL 0.4 MG/ML
0.02 VIAL (ML) INJECTION
Status: CANCELLED | OUTPATIENT
Start: 2023-12-27

## 2023-12-27 RX ORDER — TAMSULOSIN HYDROCHLORIDE 0.4 MG/1
0.4 CAPSULE ORAL DAILY
Status: CANCELLED | OUTPATIENT
Start: 2023-12-28

## 2023-12-27 RX ORDER — POLYETHYLENE GLYCOL 3350 17 G/17G
17 POWDER, FOR SOLUTION ORAL DAILY PRN
Status: CANCELLED | OUTPATIENT
Start: 2023-12-27

## 2023-12-27 RX ADMIN — PIPERACILLIN SODIUM AND TAZOBACTAM SODIUM 4.5 G: 4; .5 INJECTION, POWDER, FOR SOLUTION INTRAVENOUS at 09:12

## 2023-12-27 RX ADMIN — Medication 10 ML: at 06:12

## 2023-12-27 RX ADMIN — Medication 10 ML: at 12:12

## 2023-12-27 RX ADMIN — VANCOMYCIN HYDROCHLORIDE 750 MG: 750 INJECTION, POWDER, LYOPHILIZED, FOR SOLUTION INTRAVENOUS at 12:12

## 2023-12-27 RX ADMIN — PANTOPRAZOLE SODIUM 20 MG: 20 TABLET, DELAYED RELEASE ORAL at 09:12

## 2023-12-27 RX ADMIN — ASPIRIN 81 MG: 81 TABLET, COATED ORAL at 09:12

## 2023-12-27 RX ADMIN — CARBIDOPA AND LEVODOPA 1 TABLET: 25; 100 TABLET ORAL at 09:12

## 2023-12-27 RX ADMIN — PIPERACILLIN SODIUM AND TAZOBACTAM SODIUM 4.5 G: 4; .5 INJECTION, POWDER, FOR SOLUTION INTRAVENOUS at 02:12

## 2023-12-27 RX ADMIN — THERA TABS 1 TABLET: TAB at 09:12

## 2023-12-27 RX ADMIN — CETIRIZINE HYDROCHLORIDE 10 MG: 10 TABLET, FILM COATED ORAL at 09:12

## 2023-12-27 RX ADMIN — Medication 250 MG: at 09:12

## 2023-12-27 RX ADMIN — SENNOSIDES AND DOCUSATE SODIUM 1 TABLET: 8.6; 5 TABLET ORAL at 09:12

## 2023-12-27 RX ADMIN — CYANOCOBALAMIN TAB 1000 MCG 1000 MCG: 1000 TAB at 09:12

## 2023-12-27 RX ADMIN — TAMSULOSIN HYDROCHLORIDE 0.4 MG: 0.4 CAPSULE ORAL at 09:12

## 2023-12-27 NOTE — PLAN OF CARE
Problem: Adult Inpatient Plan of Care  Goal: Plan of Care Review  Outcome: Ongoing, Progressing  Goal: Patient-Specific Goal (Individualized)  Outcome: Ongoing, Progressing  Goal: Absence of Hospital-Acquired Illness or Injury  Outcome: Ongoing, Progressing  Goal: Optimal Comfort and Wellbeing  Outcome: Ongoing, Progressing  Goal: Readiness for Transition of Care  Outcome: Ongoing, Progressing     Problem: Adult Inpatient Plan of Care  Goal: Plan of Care Review  Outcome: Ongoing, Progressing     Problem: Adult Inpatient Plan of Care  Goal: Patient-Specific Goal (Individualized)  Outcome: Ongoing, Progressing     Problem: Adult Inpatient Plan of Care  Goal: Absence of Hospital-Acquired Illness or Injury  Outcome: Ongoing, Progressing     Problem: Adult Inpatient Plan of Care  Goal: Optimal Comfort and Wellbeing  Outcome: Ongoing, Progressing     Problem: Adult Inpatient Plan of Care  Goal: Readiness for Transition of Care  Outcome: Ongoing, Progressing     Problem: Impaired Wound Healing  Goal: Optimal Wound Healing  Outcome: Ongoing, Progressing     Problem: Impaired Wound Healing  Goal: Optimal Wound Healing  Outcome: Ongoing, Progressing     Problem: Skin Injury Risk Increased  Goal: Skin Health and Integrity  Outcome: Ongoing, Progressing

## 2023-12-27 NOTE — CONSULTS
VANCOMYCIN DOSING BY PHARMACY DISCONTINUATION NOTE    Annmarie Gallardo is a 78 y.o. female who had been consulted for vancomycin dosing.    The pharmacy consult for vancomycin dosing has been discontinued.     Vancomycin Dosing by Pharmacy Consult will sign-off. Please reconsult if necessary. Thank you for allowing us to participate in this patient's care.       Hazel Carpio, PharmD   [id number]

## 2023-12-27 NOTE — DISCHARGE SUMMARY
[facility] - Intensive Care ([place] [address])  [facility] Medicine  Discharge Summary      Patient Name: Annmarie Gallardo  MRN: [id number]  DAVID: [id number]  Patient Class: IP- Inpatient  Admission Date: 12/1/2023  Hospital Length of Stay: 26 days  Discharge Date and Time:  12/27/2023 3:36 PM  Attending Physician: Gordy Smith DO   Discharging Provider: Gordy Smith DO  Primary Care Provider: Raymond Bess MD  [facility] Medicine Team: [facility]OS[facility] MED B Gordy Smith DO  Primary Care Team: [facility] B    HPI:   Annmarie Gallardo is a 78 y.o. with a PMHx of parkinson's, lewy-body dementia with debility/functional quadriplegia, HTN, GERD, CKD3, and sacral decubitus ulcer who presents today from her nursing home for evaluation of sacral decubitus ulcer.     Due to her dementia and nonverbal status, she is unable to provide a history. No family available at bedside during my exam.     Procedure(s) (LRB):  DEBRIDEMENT, WOUND, SACRUM (N/A)      Hospital Course:   Pt presenting with purulent drainage from sacral wound . S/p debridement 12/3 with general surgery. Patient febrile with leukocytosis overnight 12/3.  Wound cultures with Proteus mirabilis.  Patient treated with ceftriaxone based on sensitivities to treat soft tissue infection.  Patient given IV fluids given poor oral intake.  Patient initially denied LTAC on peer to peer.     Has experienced recurrent intermittent fevers since 11/23, septic workup obtained and started on broad spectrum abx. Workup only positive for gram positive cocci in one bottle, anticipate contaminant. IV antibiotics were discontinued, repeat blood cx drawn. Given recurrent infections and persistent medical deterioration, discussed w daughter (POA) about establishing goals of care conversations w palliative team, who is in agreement. Discussions are ongoing while placement to LTAC is pending     Physical Exam  Vitals and nursing note reviewed.   Constitutional:       General: She is not in  acute distress.     Appearance: She is well-developed. She is ill-appearing (chronically). She is not diaphoretic.   HENT:      Head: Normocephalic and atraumatic.   Eyes:      General: No scleral icterus.     Conjunctiva/sclera: Conjunctivae normal.   Neck:      Vascular: No JVD.   Cardiovascular:      Rate and Rhythm: Normal rate and regular rhythm.      Heart sounds: Normal heart sounds.   Pulmonary:      Effort: Pulmonary effort is normal. No respiratory distress.   Abdominal:      General: There is no distension.      Palpations: Abdomen is soft.      Tenderness: There is no abdominal tenderness.   Musculoskeletal:      Right lower leg: No edema.      Left lower leg: No edema.   Skin:     Coloration: Skin is pale. Skin is not jaundiced.      Findings: Lesion present.   Neurological:      Mental Status: She is alert.      Motor: No abnormal muscle tone.      Comments: Minimally following verbal commands   Psychiatric:      Comments: Unable to evaluate      Goals of Care Treatment Preferences:  Code Status: DNR    Health care agent: Yamileth [employer]  [facility] care agent number: [phone]          What is most important right now is to focus on symptom/pain control, improvement in condition but with limits to invasive therapies, comfort and QOL .  Accordingly, we have decided that the best plan to meet the patient's goals includes continuing with treatment.      Consults:   Consults (From admission, onward)          Status Ordering Provider     Inpatient consult to Palliative Care  Once        Provider:  Jeremy Calvo MD    Completed JUAN PABLO TATUM     Inpatient consult to Midline team  Once        Provider:  (Not yet assigned)    Completed URIAH ERICKSON     Inpatient consult to General Surgery  Once        Provider:  (Not yet assigned)    Completed GARRISON ALBA     Inpatient consult to Registered Dietitian/Nutritionist  Once        Provider:  (Not yet assigned)    Completed LAKEISHA GARDNER  R.            ID  Sepsis without acute organ dysfunction  Febrile with new leukocytosis on 12/3. Known infected sacral wound    This patient does have evidence of infective focus  My overall impression is sepsis.  Source: Skin and Soft Tissue (location sacral)  Antibiotics given-   Antibiotics (72h ago, onward)      Start     Stop Route Frequency Ordered    12/25/23 1030  vancomycin 750 mg in dextrose 5 % (D5W) 250 mL IVPB (Vial-Mate)        See Hyperspace for full Linked Orders Report.    -- IV Every 24 hours (non-standard times) 12/24/23 0930    12/24/23 1030  vancomycin (VANCOCIN) 1,000 mg in dextrose 5 % (D5W) 250 mL IVPB (Vial-Mate)        See Hyperspace for full Linked Orders Report.    -- IV Once 12/24/23 0930    12/24/23 1015  piperacillin-tazobactam (ZOSYN) 4.5 g in dextrose 5 % in water (D5W) 100 mL IVPB (MB+)         -- IV Every 8 hours (non-standard times) 12/24/23 0910    12/24/23 1009  vancomycin - pharmacy to dose  (vancomycin IVPB (PEDS and ADULTS))        See Hyperspace for full Linked Orders Report.    -- IV pharmacy to manage frequency 12/24/23 0910            Fluid challenge Not needed - patient is not hypotensive      Source control achieved by: to OR 12/3 for debridement however since 12/23 has been having recurrent fevers once again, lactate reassuring at 1.3 and hemodynamically stable  - UA negative, no new obvious respiratory symptoms  - Obtained repeat blood cx, gm positive cocci in clusters in one bottle, assume contamination  - Repeat blood cx today 12/27  - Vancomycin, zosyn 12/24-: discontinue at this time      Orthopedic  * Pressure injury of sacral region, unstageable  - 12/1 Presents for evaluation of recurrent sacral decubitus ulcer infection. Purulent on exam.   - Wound culture with P mirabilis  Plan:  - ceftriaxone 2g daily x7-10 days (sot 12/9) -- completed  - General surgery consulted, s/p debridement 12/3  - Wound care consulted for recs for discharge wound care  - Reintroduced  broad spectrum abx 12/24 due to recurrent fevers  - Palliative care consult per below    Palliative Care  Palliative care encounter  12/25    Had a 20 minute goals of care conversation with daughter, Yamileth, who is one of the POAs over the phone. We were able to discuss the patient's recurrent infections leading to physical deterioration along with how her mentation has deteriorated over the last year in [place] of progressive Parkinson's, Lewy body dementia. At this time, she is in agreement that it is appropriate to start establishing goals of care conversations with the palliative team, will discuss this with her sister who is the other POA hardin        Final Active Diagnoses:    Diagnosis Date Noted POA    PRINCIPAL PROBLEM:  Pressure injury of sacral region, unstageable [L89.150] 11/14/2023 Yes    Sepsis without acute organ dysfunction [A41.9] 12/03/2023 No    Palliative care encounter [Z51.5] 12/25/2023 Not Applicable    Stage IV pressure ulcer of sacral region [L89.154] 12/03/2023 Yes    DNR (do not resuscitate) [Z66] 12/01/2023 Yes     Chronic    Functional quadriplegia [R53.2] 12/01/2023 Yes     Chronic    Severe protein-calorie malnutrition [E43] 12/01/2023 Yes     Chronic    Macrocytic anemia [D53.9] 12/01/2023 Yes     Chronic    CKD (chronic kidney disease), stage III [N18.30] 11/12/2023 Yes     Chronic    Parkinson's disease [G20.A1] 11/12/2023 Yes     Chronic    Severe Lewy body dementia [G31.83, F02.C0] 11/12/2023 Yes     Chronic      Problems Resolved During this Admission:    Diagnosis Date Noted Date Resolved POA    Hypernatremia [E87.0] 12/01/2023 12/27/2023 Yes       Discharged Condition: stable    Disposition: LTAC    Follow Up:    Patient Instructions:   No discharge procedures on file.    Significant Diagnostic Studies: N/A    Pending Diagnostic Studies:       Procedure Component Value Units Date/Time    Urinalysis, Reflex to Urine Culture Urine, Clean Catch [[id number]] Collected: 12/09/23 0802     Order Status: Sent Lab Status: In process Updated: 12/09/23 0802    Specimen: Urine            Medications:  Reconciled Home Medications:      Medication List        START taking these medications      acetaminophen 325 MG tablet  Commonly known as: TYLENOL  Take 2 tablets (650 mg total) by mouth every 4 (four) hours as needed for Pain or Temperature greater than (101).     melatonin 3 mg tablet  Commonly known as: MELATIN  Take 2 tablets (6 mg total) by mouth nightly as needed for Insomnia.     multivitamin Tab  Take 1 tablet by mouth once daily.     pantoprazole 20 MG tablet  Commonly known as: PROTONIX  Take 1 tablet (20 mg total) by mouth once daily.  Replaces: OMEPRAZOLE ORAL     polyethylene glycol 17 gram Pwpk  Commonly known as: GLYCOLAX  Take 17 g by mouth daily as needed for Constipation.     senna-docusate 8.6-50 mg 8.6-50 mg per tablet  Commonly known as: PERICOLACE  Take 1 tablet by mouth 2 (two) times daily.     simethicone 80 MG chewable tablet  Commonly known as: MYLICON  Take 1 tablet (80 mg total) by mouth 4 (four) times daily as needed for Flatulence.            CONTINUE taking these medications      aspirin 81 MG EC tablet  Commonly known as: ECOTRIN  Take 81 mg by mouth once daily.     carbidopa-levodopa  mg  mg per tablet  Commonly known as: SINEMET  Take 1 tablet by mouth 3 (three) times daily.     cyanocobalamin 1000 MCG tablet  Commonly known as: VITAMIN B-12  Take 100 mcg by mouth once daily.     loratadine 10 mg tablet  Commonly known as: CLARITIN  Take 10 mg by mouth once daily.     tamsulosin 0.4 mg Cap  Commonly known as: FLOMAX  Take 1 capsule (0.4 mg total) by mouth once daily.     vitamin E 1000 UNIT capsule  Take 1,000 Units by mouth 2 (two) times daily.            STOP taking these medications      LISINOPRIL ORAL     OMEPRAZOLE ORAL  Replaced by: pantoprazole 20 MG tablet            ASK your doctor about these medications      cefTRIAXone 1 gram injection  Commonly  known as: ROCEPHIN  Inject 2 g into the vein once daily. for 10 days  Ask about: Should I take this medication?              Indwelling Lines/Drains at time of discharge:   Lines/Drains/Airways       Drain  Duration                  Urethral Catheter 12/24/23 1719 Silicone 16 Fr. 2 days                    Time spent on the discharge of patient: 50 minutes     Pt deemed appropriate for discharge to LTAC    Gordy Smith DO  Department of Hospital Medicine  [facility] - Intensive Care ([place])

## 2023-12-27 NOTE — PROGRESS NOTES
[facility] ([address] Medicine  Progress Note    Patient Name: Annmarie Gallardo  MRN: [id number]  Patient Class: IP- Inpatient   Admission Date: 12/1/2023  Length of Stay: 26 days  Attending Physician: Gordy Smith DO  Primary Care Provider: Raymond Bess MD        Subjective:     Principal Problem:Pressure injury of sacral region, unstageable        HPI:  Annmarie Gallardo is a 78 y.o. with a PMHx of parkinson's, lewy-body dementia with debility/functional quadriplegia, HTN, GERD, CKD3, and sacral decubitus ulcer who presents today from her nursing home for evaluation of sacral decubitus ulcer.     Due to her dementia and nonverbal status, she is unable to provide a history. No family available at bedside during my exam.     Overview/Hospital Course:  Pt presenting with purulent drainage from sacral wound . S/p debridement 12/3 with general surgery. Patient febrile with leukocytosis overnight 12/3.  Wound cultures with Proteus mirabilis.  Patient treated with ceftriaxone based on sensitivities to treat soft tissue infection.  Patient given IV fluids given poor oral intake.  Patient denied LTAC on peer to peer.  Currently looking for [facility] placement. Patient treated for soft tissue infection with ceftriaxone 2 g daily with improvement of leukocytosis.      Has experienced recurrent intermittent fevers since 11/23, septic workup obtained and started on broad spectrum abx. Given recurrent infections and persistent medical deterioration, discussed w daughter (POA) about establishing goals of care conversations w palliative team, who is in agreement. Discussions are ongoing while CM is working on placement    Interval History: Seen at bedside this AM. Remains lethargic at bedside, minimally opening eyes to commands    Review of Systems  Objective:     Vital Signs (Most Recent):  Temp: (!) 101.3 °F (38.5 °C) (12/24/23 0853)  Pulse: 92 (12/24/23 1139)  Resp: 18 (12/24/23 0853)  BP:  107/61 (12/24/23 0853)  SpO2: 96 % (12/24/23 0853) Vital Signs (24h Range):  Temp:  [99 °F (37.2 °C)-101.3 °F (38.5 °C)] 101.3 °F (38.5 °C)  Pulse:  [] 92  Resp:  [16-20] 18  SpO2:  [96 %-100 %] 96 %  BP: (107-138)/(57-68) 107/61     Weight: 52.6 kg (115 lb 15.4 oz)  Body mass index is 20.54 kg/m².    Intake/Output Summary (Last 24 hours) at 12/24/2023 1144  Last data filed at 12/24/2023 0502  Gross per 24 hour   Intake 670 ml   Output 800 ml   Net -130 ml           Physical Exam  Vitals and nursing note reviewed.   Constitutional:       General: She is not in acute distress.     Appearance: She is well-developed. She is ill-appearing (chronically). She is not diaphoretic.   HENT:      Head: Normocephalic and atraumatic.   Eyes:      General: No scleral icterus.     Conjunctiva/sclera: Conjunctivae normal.   Neck:      Vascular: No JVD.   Cardiovascular:      Rate and Rhythm: Normal rate and regular rhythm.      Heart sounds: Normal heart sounds.   Pulmonary:      Effort: Pulmonary effort is normal. No respiratory distress.   Abdominal:      General: There is no distension.      Palpations: Abdomen is soft.      Tenderness: There is no abdominal tenderness.   Musculoskeletal:      Right lower leg: No edema.      Left lower leg: No edema.   Skin:     Coloration: Skin is pale. Skin is not jaundiced.      Findings: Lesion present.   Neurological:      Mental Status: She is alert.      Motor: No abnormal muscle tone.      Comments: Minimally following verbal commands   Psychiatric:      Comments: Unable to evaluate             Significant Labs: All pertinent labs within the past 24 hours have been reviewed.    Significant Imaging: I have reviewed all pertinent imaging results/findings within the past 24 hours.    Assessment/Plan:      * Pressure injury of sacral region, unstageable  - 12/1 Presents for evaluation of recurrent sacral decubitus ulcer infection. Purulent on exam.   - Wound culture with P  mirabilis  Plan:  - ceftriaxone 2g daily x7-10 days (sot 12/9) -- completed  - General surgery consulted, s/p debridement 12/3  - Wound care consulted for recs for discharge wound care  - Reintroduced broad spectrum abx 12/24 due to recurrent fevers  - Palliative care consult per below    Sepsis without acute organ dysfunction  Febrile with new leukocytosis on 12/3. Known infected sacral wound    This patient does have evidence of infective focus  My overall impression is sepsis.  Source: Skin and Soft Tissue (location sacral)  Antibiotics given-   Antibiotics (72h ago, onward)      Start     Stop Route Frequency Ordered    12/25/23 1030  vancomycin 750 mg in dextrose 5 % (D5W) 250 mL IVPB (Vial-Mate)        See Hyperspace for full Linked Orders Report.    -- IV Every 24 hours (non-standard times) 12/24/23 0930    12/24/23 1030  vancomycin (VANCOCIN) 1,000 mg in dextrose 5 % (D5W) 250 mL IVPB (Vial-Mate)        See Hyperspace for full Linked Orders Report.    -- IV Once 12/24/23 0930    12/24/23 1015  piperacillin-tazobactam (ZOSYN) 4.5 g in dextrose 5 % in water (D5W) 100 mL IVPB (MB+)         -- IV Every 8 hours (non-standard times) 12/24/23 0910    12/24/23 1009  vancomycin - pharmacy to dose  (vancomycin IVPB (PEDS and ADULTS))        See Hyperspace for full Linked Orders Report.    -- IV pharmacy to manage frequency 12/24/23 0910            Fluid challenge Not needed - patient is not hypotensive      Source control achieved by: to OR 12/3 for debridement however since 12/23 has been having recurrent fevers once again, lactate reassuring at 1.3 and hemodynamically stable  - UA negative, no new obvious respiratory symptoms  - Obtained repeat blood cx, gm positive cocci in clusters in one bottle, assume contamination  - Repeat blood cx today 12/27  - Vancomycin, zosyn 12/24-: likely d/c depending on blood cx      Palliative care encounter  12/25    Had a 20 minute goals of care conversation with daughterYamileth,  who is one of the POAs over the phone. We were able to discuss the patient's recurrent infections leading to physical deterioration along with how her mentation has deteriorated over the last year in [place] of progressive Parkinson's, Lewy body dementia. At this time, she is in agreement that it is appropriate to start establishing goals of care conversations with the palliative team, will discuss this with her sister who is the other POA hardin      Stage IV pressure ulcer of sacral region  - See above      Macrocytic anemia  Chronic, and likely anemia of chronic disease. Currently without indication for transfusion.     Severe protein-calorie malnutrition  Due to Parkinson's and debility. Will continue dietary supplements as recommended by RD during previous hospitalization.   - SLP consulted      Hypernatremia  Resolved   Patient has hypernatremia which is controlled. The hypernatremia is due to Dehydration. The patient's sodium results have been reviewed and are listed below.        Functional quadriplegia  Due to Parkinsons' and dementia. Continue frequent turns and pressure ulcer care/prevention.       DNR (do not resuscitate)  Per previous documentation and discussions with patient's family. Clarified with daughter Yamileth at bedside (shares POA with other daughter) - patient is DNR.       Severe Lewy body dementia  Monitor with delirium precautions.     Parkinson's disease  Continue home Sinemet.      CKD (chronic kidney disease), stage III  Creatine stable for now. BMP reviewed- noted Estimated Creatinine Clearance: 53.9 mL/min (based on SCr of 0.7 mg/dL). according to latest data. Based on current GFR, CKD stage is stage 3 - GFR 30-59.     - Monitor UOP and serial BMP and adjust therapy as needed.    - Renally dose meds.   - Avoid nephrotoxic medications and procedures.        VTE Risk Mitigation (From admission, onward)           Ordered     enoxaparin injection 30 mg  Daily         12/09/23 1543     IP VTE  HIGH RISK PATIENT  Once         12/01/23 2123     Place sequential compression device  Until discontinued         12/01/23 2123     Reason for No Pharmacological VTE Prophylaxis  Once        Question:  Reasons:  Answer:  Physician Provided (leave comment)    12/01/23 2123                    Discharge Planning   MELCHOR: 12/30/2023     Code Status: DNR   Is the patient medically ready for discharge?: No    Reason for patient still in hospital (select all that apply): Pending placement  Discharge Plan A: Long-term acute care facility (LTAC)   Discharge Delays: (!) Post-Acute Set-up              Gordy Smith DO  Department of Hospital Medicine   [facility] - Intensive Care ([address])

## 2023-12-27 NOTE — CONSULTS
Pharmacokinetic Assessment Follow Up: IV Vancomycin    Vancomycin serum concentration assessment(s):    The trough level was drawn correctly and can be used to guide therapy at this time. The measurement is within the desired definitive target range of 10 to 20 mcg/mL.    Vancomycin Regimen Plan:    Continue regimen to Vancomycin 750 mg IV every 24 hours with next serum trough concentration measured at 1200 prior to next dose on 12/29/23    Drug levels (last 3 results):  Recent Labs   Lab Result Units 12/27/23  0957   Vancomycin-Trough ug/mL 10.7       Pharmacy will continue to follow and monitor vancomycin.    Please contact pharmacy at extension [phone] for questions regarding this assessment.    Thank you for the consult,   Radha Mann, PharmD       Patient brief summary:  Annmarie Gallardo is a 78 y.o. female initiated on antimicrobial therapy with IV Vancomycin for treatment of sepsis    Drug Allergies:   Review of patient's allergies indicates:   Allergen Reactions    Diamox sequels [acetazolamide]     Garlic     Namenda [memantine]     Oxybutynin        Actual Body Weight:   52.6 kg    Renal Function:   Estimated Creatinine Clearance: 47.2 mL/min (based on SCr of 0.8 mg/dL).,     Dialysis Method (if applicable):  N/A    CBC (last 72 hours):  Recent Labs   Lab Result Units 12/25/23  0433   WBC K/uL 7.82   Hemoglobin g/dL 9.7*   Hematocrit % 31.1*   Platelets K/uL 291   Gran % % 79.6*   Lymph % % 6.4*   Mono % % 10.0   Eosinophil % % 2.9   Basophil % % 0.5   Differential Method  Automated       Metabolic Panel (last 72 hours):  Recent Labs   Lab Result Units 12/24/23  1627 12/25/23  0433   Sodium mmol/L  --  137   Potassium mmol/L  --  4.1   Chloride mmol/L  --  103   CO2 mmol/L  --  25   Glucose mg/dL  --  95   Glucose, UA  Negative  --    BUN mg/dL  --  17   Creatinine mg/dL  --  0.8       Vancomycin Administrations:  vancomycin given in the last 96 hours                     vancomycin 750 mg in dextrose 5 %  (D5W) 250 mL IVPB (Vial-Mate) (mg) 750 mg New Bag 12/27/23 1235     750 mg New Bag 12/26/23 0939     750 mg New Bag 12/25/23 1046    vancomycin (VANCOCIN) 1,000 mg in dextrose 5 % (D5W) 250 mL IVPB (Vial-Mate) (mg) 1,000 mg New Bag 12/24/23 1014                    Microbiologic Results:  Microbiology Results (last 7 days)       Procedure Component Value Units Date/Time    Blood culture [[id number]] Collected: 12/24/23 0904    Order Status: Completed Specimen: Blood Updated: 12/27/23 1048     Blood Culture, Routine Gram stain aer bottle: Gram positive cocci in clusters resembling Staph      Results called to and read back by:Tory Cortez RN 12/26/2023  16:09    Narrative:      Two different sites    Blood culture [[id number]] Collected: 12/24/23 0904    Order Status: Completed Specimen: Blood Updated: 12/27/23 1022     Blood Culture, Routine No Growth to date      No Growth to date      No Growth to date      No Growth to date    Narrative:      Two different sites    MRSA/SA Rapid ID by PCR from Blood culture [[id number]] Collected: 12/24/23 0904    Order Status: Completed Updated: 12/26/23 1718     Staph aureus ID by PCR Negative     Methicillin Resistant ID by PCR Negative    Narrative:      Two different sites

## 2023-12-27 NOTE — PLAN OF CARE
Per Dalila Godfrey of \[facility]\"", they can accept pt today.  CM called dtr and informed that LTAC can take her today.  She requests that \[facility]\"" call her once patient arrives to give them some info about her.  Dalila MAXWELL notified.      DAYAN set up ambulance transport.  Nurse messaged with report info.  ETA for ambulance transport is between 4:55 and 6:55; Dalila MAXWELL notified.    CELINA Benavides, BS, RN, CCM

## 2023-12-27 NOTE — CHAPLAIN
Palliative Care     Patient: Annmarie Gallardo  MRN: [id number]  : 1945  Age: 78 y.o.  Hospital Length of Stay: 26  Code Status: Code Status Discussion Note  Attending Provider: Gordy Smith DO  Principal Problem: Pressure injury of sacral region, unstageable    Non-clinical observations of patient/room: Visited [facility] med pt, she was alone, no family, awake, non-verbal; 20 min    Spent time alone with pt, talking with her softly, caressing her forehead; she tried to say something, but was unable to understand. Gave pt moist sponge stick swab and put chap stick on her lips; Played instrumental Jalbum music on the computer for pt; pregnant pauses, silence, compassionate presence.     Reminded pt she is loved and being well taken care of. Epic says pt is Yazidi; could not get confirmation from pt or a nod of yes/no for prayer; said a general blessing over her.     Conversed with bedside nurse; left my card and the [facility] team card with Dr. Farrell's name on it and mine. Lord, in your mercy.      Future (Spiritual Care Plan of Action):  Palliative  will continue to follow and provide emotional and spiritual support for pt and/or her 3 adult children.        **Spiritual Care Dept. Chaplains are available evenings, overnight and weekends **    In Peace,  Rev. Lesly Jernigan MDiv, [facility]  Board Certified   Palliative Medicine Department  [phone]

## 2023-12-27 NOTE — PROGRESS NOTES
[facility] - Intensive Care ([address] Medicine  Progress Note    Patient Name: Annmarie Gallardo  MRN: [id number]  Patient Class: IP- Inpatient   Admission Date: 12/1/2023  Length of Stay: 26 days  Attending Physician: Gordy Smith DO  Primary Care Provider: Raymond Bess MD        Subjective:     Principal Problem:Pressure injury of sacral region, unstageable        HPI:  Annmarie Gallardo is a 78 y.o. with a PMHx of parkinson's, lewy-body dementia with debility/functional quadriplegia, HTN, GERD, CKD3, and sacral decubitus ulcer who presents today from her nursing home for evaluation of sacral decubitus ulcer.     Due to her dementia and nonverbal status, she is unable to provide a history. No family available at bedside during my exam.     Overview/Hospital Course:  Pt presenting with purulent drainage from sacral wound . S/p debridement 12/3 with general surgery. Patient febrile with leukocytosis overnight 12/3.  Wound cultures with Proteus mirabilis.  Patient treated with ceftriaxone based on sensitivities to treat soft tissue infection.  Patient given IV fluids given poor oral intake.  Patient denied LTAC on peer to peer.  Currently [facility] for [facility]. Patient treated for soft tissue infection with ceftriaxone 2 g daily with improvement of leukocytosis.      Has experienced recurrent intermittent fevers since 11/23, septic workup obtained and started on broad spectrum abx. Given recurrent infections and persistent medical deterioration, discussed w daughter (POA) about establishing goals of care conversations w palliative team, who is in agreement. Discussions are ongoing while CM is working on placement    Interval History: Seen at bedside this AM. Remains lethargic at bedside, minimally opening eyes to commands    Review of Systems  Objective:     Vital Signs (Most Recent):  Temp: (!) 101.3 °F (38.5 °C) (12/24/23 0853)  Pulse: 92 (12/24/23 1139)  Resp: 18 (12/24/23 0853)  BP:  107/61 (12/24/23 0853)  SpO2: 96 % (12/24/23 0853) Vital Signs (24h Range):  Temp:  [99 °F (37.2 °C)-101.3 °F (38.5 °C)] 101.3 °F (38.5 °C)  Pulse:  [] 92  Resp:  [16-20] 18  SpO2:  [96 %-100 %] 96 %  BP: (107-138)/(57-68) 107/61     Weight: 52.6 kg (115 lb 15.4 oz)  Body mass index is 20.54 kg/m².    Intake/Output Summary (Last 24 hours) at 12/24/2023 1144  Last data filed at 12/24/2023 0502  Gross per 24 hour   Intake 670 ml   Output 800 ml   Net -130 ml           Physical Exam  Vitals and nursing note reviewed.   Constitutional:       General: She is not in acute distress.     Appearance: She is well-developed. She is ill-appearing (chronically). She is not diaphoretic.   HENT:      Head: Normocephalic and atraumatic.   Eyes:      General: No scleral icterus.     Conjunctiva/sclera: Conjunctivae normal.   Neck:      Vascular: No JVD.   Cardiovascular:      Rate and Rhythm: Normal rate and regular rhythm.      Heart sounds: Normal heart sounds.   Pulmonary:      Effort: Pulmonary effort is normal. No respiratory distress.   Abdominal:      General: There is no distension.      Palpations: Abdomen is soft.      Tenderness: There is no abdominal tenderness.   Musculoskeletal:      Right lower leg: No edema.      Left lower leg: No edema.   Skin:     Coloration: Skin is pale. Skin is not jaundiced.      Findings: Lesion present.   Neurological:      Mental Status: She is alert.      Motor: No abnormal muscle tone.      Comments: Minimally following verbal commands   Psychiatric:      Comments: Unable to evaluate             Significant Labs: All pertinent labs within the past 24 hours have been reviewed.    Significant Imaging: I have reviewed all pertinent imaging results/findings within the past 24 hours.    Assessment/Plan:      * Pressure injury of sacral region, unstageable  - 12/1 Presents for evaluation of recurrent sacral decubitus ulcer infection. Purulent on exam.   - Wound culture with P  mirabilis  Plan:  - ceftriaxone 2g daily x7-10 days (sot 12/9) -- completed  - General surgery consulted, s/p debridement 12/3  - Wound care consulted for recs for discharge wound care  - Reintroduced broad spectrum abx 12/24 due to recurrent fevers  - Palliative care consult per below    Sepsis without acute organ dysfunction  Febrile with new leukocytosis on 12/3. Known infected sacral wound    This patient does have evidence of infective focus  My overall impression is sepsis.  Source: Skin and Soft Tissue (location sacral)  Antibiotics given-   Antibiotics (72h ago, onward)      Start     Stop Route Frequency Ordered    12/25/23 1030  vancomycin 750 mg in dextrose 5 % (D5W) 250 mL IVPB (Vial-Mate)        See Hyperspace for full Linked Orders Report.    -- IV Every 24 hours (non-standard times) 12/24/23 0930    12/24/23 1030  vancomycin (VANCOCIN) 1,000 mg in dextrose 5 % (D5W) 250 mL IVPB (Vial-Mate)        See Hyperspace for full Linked Orders Report.    -- IV Once 12/24/23 0930    12/24/23 1015  piperacillin-tazobactam (ZOSYN) 4.5 g in dextrose 5 % in water (D5W) 100 mL IVPB (MB+)         -- IV Every 8 hours (non-standard times) 12/24/23 0910    12/24/23 1009  vancomycin - pharmacy to dose  (vancomycin IVPB (PEDS and ADULTS))        See Hyperspace for full Linked Orders Report.    -- IV pharmacy to manage frequency 12/24/23 0910            Fluid challenge Not needed - patient is not hypotensive      Source control achieved by: to OR 12/3 for debridement however since 12/23 has been having recurrent fevers once again, lactate reassuring at 1.3 and hemodynamically stable  - UA negative, no new obvious respiratory symptoms  - Obtained repeat blood cx, gm positive cocci in clusters in one bottle, assume contamination  - Repeat blood cx today 12/27  - Vancomycin, zosyn 12/24-: discontinue at this time      Palliative care encounter  12/25    Had a 20 minute goals of care conversation with daughter, Yamileth, who is  one of the POAs over the phone. We were able to discuss the patient's recurrent infections leading to physical deterioration along with how her mentation has deteriorated over the last year in marcia of progressive Parkinson's, Lewy body dementia. At this time, she is in agreement that it is appropriate to start establishing goals of care conversations with the palliative team, will discuss this with her sister who is the other POA hardin      Stage IV pressure ulcer of sacral region  - See above      Macrocytic anemia  Chronic, and likely anemia of chronic disease. Currently without indication for transfusion.     Severe protein-calorie malnutrition  Due to Parkinson's and debility. Will continue dietary supplements as recommended by RD during previous hospitalization.   - SLP consulted      Hypernatremia  Resolved   Patient has hypernatremia which is controlled. The hypernatremia is due to Dehydration. The patient's sodium results have been reviewed and are listed below.        Functional quadriplegia  Due to Parkinsons' and dementia. Continue frequent turns and pressure ulcer care/prevention.       DNR (do not resuscitate)  Per previous documentation and discussions with patient's family. Clarified with daughter Yamileth at bedside (shares POA with other daughter) - patient is DNR.       Severe Lewy body dementia  Monitor with delirium precautions.     Parkinson's disease  Continue home Sinemet.      CKD (chronic kidney disease), stage III  Creatine stable for now. BMP reviewed- noted Estimated Creatinine Clearance: 53.9 mL/min (based on SCr of 0.7 mg/dL). according to latest data. Based on current GFR, CKD stage is stage 3 - GFR 30-59.     - Monitor UOP and serial BMP and adjust therapy as needed.    - Renally dose meds.   - Avoid nephrotoxic medications and procedures.        VTE Risk Mitigation (From admission, onward)           Ordered     enoxaparin injection 30 mg  Daily         12/09/23 1543     IP VTE HIGH  RISK PATIENT  Once         12/01/23 2123     Place sequential compression device  Until discontinued         12/01/23 2123     Reason for No Pharmacological VTE Prophylaxis  Once        Question:  Reasons:  Answer:  Physician Provided (leave comment)    12/01/23 2123                    Discharge Planning   MELCHOR: 12/30/2023     Code Status: DNR   Is the patient medically ready for discharge?: No    Reason for patient still in hospital (select all that apply): Pending placement  Discharge Plan A: Long-term acute care facility (LTAC)   Discharge Delays: (!) Post-Acute Set-up              Gordy Smith DO  Department of Hospital Medicine   [facility] - Intensive Care ([place]-[facility])

## 2023-12-28 PROBLEM — L89.626 PRESSURE INJURY OF DEEP TISSUE OF LEFT HEEL: Status: ACTIVE | Noted: 2023-12-28

## 2023-12-28 LAB
BACTERIA BLD CULT: ABNORMAL

## 2023-12-29 LAB — BACTERIA BLD CULT: NORMAL

## 2024-01-01 LAB
BACTERIA BLD CULT: NORMAL
BACTERIA BLD CULT: NORMAL

## 2024-02-14 ENCOUNTER — HOSPITAL ENCOUNTER (INPATIENT)
Facility: HOSPITAL | Age: 79
LOS: 1 days | Discharge: HOSPICE | DRG: 951 | End: 2024-02-16
Attending: EMERGENCY MEDICINE | Admitting: STUDENT IN AN ORGANIZED HEALTH CARE EDUCATION/TRAINING PROGRAM
Payer: MEDICARE

## 2024-02-14 DIAGNOSIS — Z51.5 PALLIATIVE CARE ENCOUNTER: ICD-10-CM

## 2024-02-14 DIAGNOSIS — R07.9 CHEST PAIN: ICD-10-CM

## 2024-02-14 DIAGNOSIS — I48.91 ATRIAL FIBRILLATION WITH TACHYCARDIC VENTRICULAR RATE: ICD-10-CM

## 2024-02-14 PROBLEM — R13.19 OTHER DYSPHAGIA: Chronic | Status: ACTIVE | Noted: 2024-02-14

## 2024-02-14 PROBLEM — L89.154 STAGE IV PRESSURE ULCER OF SACRAL REGION: Chronic | Status: ACTIVE | Noted: 2023-12-03

## 2024-02-14 PROBLEM — R53.81 DEBILITY: Chronic | Status: ACTIVE | Noted: 2020-01-22

## 2024-02-14 LAB
ALBUMIN SERPL BCP-MCNC: 1.3 G/DL (ref 3.5–5.2)
ALP SERPL-CCNC: 48 U/L (ref 55–135)
ALT SERPL W/O P-5'-P-CCNC: <5 U/L (ref 10–44)
ANION GAP SERPL CALC-SCNC: 5 MMOL/L (ref 8–16)
AST SERPL-CCNC: 12 U/L (ref 10–40)
BASOPHILS # BLD AUTO: 0.02 K/UL (ref 0–0.2)
BASOPHILS NFR BLD: 0.2 % (ref 0–1.9)
BILIRUB SERPL-MCNC: 0.3 MG/DL (ref 0.1–1)
BUN SERPL-MCNC: 23 MG/DL (ref 8–23)
CALCIUM SERPL-MCNC: 7 MG/DL (ref 8.7–10.5)
CHLORIDE SERPL-SCNC: 116 MMOL/L (ref 95–110)
CK SERPL-CCNC: 122 U/L (ref 20–180)
CO2 SERPL-SCNC: 22 MMOL/L (ref 23–29)
CREAT SERPL-MCNC: 0.6 MG/DL (ref 0.5–1.4)
DIFFERENTIAL METHOD BLD: ABNORMAL
EOSINOPHIL # BLD AUTO: 0 K/UL (ref 0–0.5)
EOSINOPHIL NFR BLD: 0.2 % (ref 0–8)
ERYTHROCYTE [DISTWIDTH] IN BLOOD BY AUTOMATED COUNT: 15.9 % (ref 11.5–14.5)
EST. GFR  (NO RACE VARIABLE): >60 ML/MIN/1.73 M^2
GLUCOSE SERPL-MCNC: 113 MG/DL (ref 70–110)
HCT VFR BLD AUTO: 21.5 % (ref 37–48.5)
HGB BLD-MCNC: 6 G/DL (ref 12–16)
IMM GRANULOCYTES # BLD AUTO: 0.31 K/UL (ref 0–0.04)
IMM GRANULOCYTES NFR BLD AUTO: 2.4 % (ref 0–0.5)
LYMPHOCYTES # BLD AUTO: 0.8 K/UL (ref 1–4.8)
LYMPHOCYTES NFR BLD: 6.4 % (ref 18–48)
MAGNESIUM SERPL-MCNC: 1.8 MG/DL (ref 1.6–2.6)
MCH RBC QN AUTO: 25.9 PG (ref 27–31)
MCHC RBC AUTO-ENTMCNC: 27.9 G/DL (ref 32–36)
MCV RBC AUTO: 93 FL (ref 82–98)
MONOCYTES # BLD AUTO: 0.8 K/UL (ref 0.3–1)
MONOCYTES NFR BLD: 5.8 % (ref 4–15)
NEUTROPHILS # BLD AUTO: 11 K/UL (ref 1.8–7.7)
NEUTROPHILS NFR BLD: 85 % (ref 38–73)
NRBC BLD-RTO: 0 /100 WBC
PLATELET # BLD AUTO: 181 K/UL (ref 150–450)
PMV BLD AUTO: 9.8 FL (ref 9.2–12.9)
POTASSIUM SERPL-SCNC: 4.2 MMOL/L (ref 3.5–5.1)
PROT SERPL-MCNC: 3.7 G/DL (ref 6–8.4)
RBC # BLD AUTO: 2.32 M/UL (ref 4–5.4)
SODIUM SERPL-SCNC: 143 MMOL/L (ref 136–145)
WBC # BLD AUTO: 12.87 K/UL (ref 3.9–12.7)

## 2024-02-14 PROCEDURE — 80053 COMPREHEN METABOLIC PANEL: CPT | Performed by: EMERGENCY MEDICINE

## 2024-02-14 PROCEDURE — 87077 CULTURE AEROBIC IDENTIFY: CPT | Mod: 59 | Performed by: EMERGENCY MEDICINE

## 2024-02-14 PROCEDURE — 87040 BLOOD CULTURE FOR BACTERIA: CPT | Performed by: EMERGENCY MEDICINE

## 2024-02-14 PROCEDURE — 96360 HYDRATION IV INFUSION INIT: CPT

## 2024-02-14 PROCEDURE — G0378 HOSPITAL OBSERVATION PER HR: HCPCS

## 2024-02-14 PROCEDURE — 87186 SC STD MICRODIL/AGAR DIL: CPT | Mod: 59 | Performed by: EMERGENCY MEDICINE

## 2024-02-14 PROCEDURE — 87154 CUL TYP ID BLD PTHGN 6+ TRGT: CPT | Performed by: EMERGENCY MEDICINE

## 2024-02-14 PROCEDURE — 99285 EMERGENCY DEPT VISIT HI MDM: CPT | Mod: 25

## 2024-02-14 PROCEDURE — 85025 COMPLETE CBC W/AUTO DIFF WBC: CPT | Performed by: EMERGENCY MEDICINE

## 2024-02-14 PROCEDURE — 83735 ASSAY OF MAGNESIUM: CPT | Performed by: EMERGENCY MEDICINE

## 2024-02-14 PROCEDURE — 82550 ASSAY OF CK (CPK): CPT | Performed by: EMERGENCY MEDICINE

## 2024-02-14 PROCEDURE — 63600175 PHARM REV CODE 636 W HCPCS: Performed by: EMERGENCY MEDICINE

## 2024-02-14 RX ORDER — ALUMINUM HYDROXIDE, MAGNESIUM HYDROXIDE, AND SIMETHICONE 1200; 120; 1200 MG/30ML; MG/30ML; MG/30ML
30 SUSPENSION ORAL 4 TIMES DAILY PRN
Status: DISCONTINUED | OUTPATIENT
Start: 2024-02-14 | End: 2024-02-16 | Stop reason: HOSPADM

## 2024-02-14 RX ORDER — ONDANSETRON 8 MG/1
8 TABLET, ORALLY DISINTEGRATING ORAL EVERY 8 HOURS PRN
Status: DISCONTINUED | OUTPATIENT
Start: 2024-02-14 | End: 2024-02-16 | Stop reason: HOSPADM

## 2024-02-14 RX ORDER — POLYETHYLENE GLYCOL 3350 17 G/17G
17 POWDER, FOR SOLUTION ORAL DAILY PRN
Status: DISCONTINUED | OUTPATIENT
Start: 2024-02-14 | End: 2024-02-16 | Stop reason: HOSPADM

## 2024-02-14 RX ORDER — ACETAMINOPHEN 325 MG/1
650 TABLET ORAL EVERY 8 HOURS PRN
Status: DISCONTINUED | OUTPATIENT
Start: 2024-02-14 | End: 2024-02-16 | Stop reason: HOSPADM

## 2024-02-14 RX ORDER — SODIUM CHLORIDE 0.9 % (FLUSH) 0.9 %
1-10 SYRINGE (ML) INJECTION EVERY 12 HOURS PRN
Status: DISCONTINUED | OUTPATIENT
Start: 2024-02-14 | End: 2024-02-16 | Stop reason: HOSPADM

## 2024-02-14 RX ORDER — GLUCAGON 1 MG
1 KIT INJECTION
Status: DISCONTINUED | OUTPATIENT
Start: 2024-02-14 | End: 2024-02-16 | Stop reason: HOSPADM

## 2024-02-14 RX ORDER — TALC
6 POWDER (GRAM) TOPICAL NIGHTLY PRN
Status: DISCONTINUED | OUTPATIENT
Start: 2024-02-14 | End: 2024-02-16 | Stop reason: HOSPADM

## 2024-02-14 RX ORDER — IBUPROFEN 200 MG
24 TABLET ORAL
Status: DISCONTINUED | OUTPATIENT
Start: 2024-02-14 | End: 2024-02-16 | Stop reason: HOSPADM

## 2024-02-14 RX ORDER — IBUPROFEN 200 MG
16 TABLET ORAL
Status: DISCONTINUED | OUTPATIENT
Start: 2024-02-14 | End: 2024-02-16 | Stop reason: HOSPADM

## 2024-02-14 RX ORDER — ACETAMINOPHEN 325 MG/1
650 TABLET ORAL EVERY 4 HOURS PRN
Status: DISCONTINUED | OUTPATIENT
Start: 2024-02-14 | End: 2024-02-16 | Stop reason: HOSPADM

## 2024-02-14 RX ORDER — NALOXONE HCL 0.4 MG/ML
0.02 VIAL (ML) INJECTION
Status: DISCONTINUED | OUTPATIENT
Start: 2024-02-14 | End: 2024-02-16 | Stop reason: HOSPADM

## 2024-02-14 RX ORDER — SIMETHICONE 80 MG
1 TABLET,CHEWABLE ORAL 4 TIMES DAILY PRN
Status: DISCONTINUED | OUTPATIENT
Start: 2024-02-14 | End: 2024-02-16 | Stop reason: HOSPADM

## 2024-02-14 RX ADMIN — SODIUM CHLORIDE, POTASSIUM CHLORIDE, SODIUM LACTATE AND CALCIUM CHLORIDE 1389 ML: 600; 310; 30; 20 INJECTION, SOLUTION INTRAVENOUS at 04:02

## 2024-02-14 NOTE — ED NOTES
Nurses Note -- 4 Eyes      2/14/2024   5:29 PM      Skin assessed during: Admit      [] No Altered Skin Integrity Present    []Prevention Measures Documented      [x] Yes- Altered Skin Integrity Present or Discovered   [x] LDA Added if Not in Epic (Describe Wound)   [x] New Altered Skin Integrity was Present on Admit and Documented in LDA   [x] Wound Image Taken    Wound Care Consulted? Yes    Attending Nurse:  Kya Mast RN/Staff Member:     Park

## 2024-02-14 NOTE — ED PROVIDER NOTES
Encounter Date: 2/14/2024       History     Chief Complaint   Patient presents with    Altered Mental Status     Arrived via ems from [facility] with c/o AMS and nonverbal, staff states also not eating x3 days, last seen normal x7 days ago by NP     78-year-old female with PMH Parkinson's, Lewy body dementia, CKD brought in for evaluation of altered mental status.  Per family, patient has been eating less over the past week.  Family was able to get patient to eat for days ago and 2 days ago, but nursing home staff has been able to get patient eating during the past approximately 4-5 days.  Family denies any history of recent fevers, coughing, falls.    The history is provided by a relative. The history is limited by the condition of the patient.     Review of patient's allergies indicates:   Allergen Reactions    Diamox sequels [acetazolamide]     Garlic     Namenda [memantine]     Oxybutynin      Past Medical History:   Diagnosis Date    HTN (hypertension)     Parkinson's disease     Vitamin D deficiency      Past Surgical History:   Procedure Laterality Date    DEBRIDEMENT OF SACRAL WOUND N/A 12/3/2023    Procedure: DEBRIDEMENT, WOUND, SACRUM;  Surgeon: Stevo Patterson MD;  Location: [facility] OR [address] [place] [address];  Service: General;  Laterality: N/A;  saline soaked kerlix placed to sacrum     No family history on file.  Social History     Tobacco Use    Smoking status: Never    Smokeless tobacco: Never   Substance Use Topics    Alcohol use: No    Drug use: No         Physical Exam     Initial Vitals [02/14/24 1309]   BP Pulse Resp Temp SpO2   107/80 (!) 119 (!) 28 97 °F (36.1 °C) 98 %      MAP       --         Physical Exam    Nursing note and vitals reviewed.  Constitutional: She appears cachectic.   Cardiovascular:  Normal heart sounds.   Tachycardia present.         Pulmonary/Chest: She has decreased breath sounds in the right upper field, the right middle field, the right lower field, the left upper field,  the left middle field and the left lower field.   Abdominal: Abdomen is soft. She exhibits no distension.     Neurological:   Contracted  Eyes open spontaneously   Skin: Capillary refill takes 2 to 3 seconds.   Stage III sacral ulcer.  See media tab for photograph.  Bilateral heel wounds that are covered with protective foam and Mepilex         ED Course   Procedures  Labs Reviewed   COMPREHENSIVE METABOLIC PANEL - Abnormal; Notable for the following components:       Result Value    Chloride 116 (*)     CO2 22 (*)     Glucose 113 (*)     Calcium 7.0 (*)     Total Protein 3.7 (*)     Albumin 1.3 (*)     Alkaline Phosphatase 48 (*)     ALT <5 (*)     Anion Gap 5 (*)     All other components within normal limits   CBC W/ AUTO DIFFERENTIAL - Abnormal; Notable for the following components:    WBC 12.87 (*)     RBC 2.32 (*)     Hemoglobin 6.0 (*)     Hematocrit 21.5 (*)     MCH 25.9 (*)     MCHC 27.9 (*)     RDW 15.9 (*)     Immature Granulocytes 2.4 (*)     Gran # (ANC) 11.0 (*)     Immature Grans (Abs) 0.31 (*)     Lymph # 0.8 (*)     Gran % 85.0 (*)     Lymph % 6.4 (*)     All other components within normal limits   CULTURE, BLOOD   CULTURE, BLOOD   MAGNESIUM   CK   URINALYSIS, REFLEX TO URINE CULTURE          Imaging Results              X-Ray Chest AP Portable (In process)                      Medications   lactated ringers bolus 1,389 mL (0 mLs Intravenous Stopped 2/14/24 1746)     Medical Decision Making  78-year-old female with PMH Parkinson's and Lewy body dementia presents for evaluation of altered mental status beginning approximately 4-5 days ago.    Pathologies I have considered my differential include, but are not limited to, stroke, dehydration, pneumonia, UTI, progression of dementia.    Given patient history of Parkinson's and Lewy body dementia as well as DNR/DNI status, do not believe a CT head to rule out stroke is indicated at this time, as such findings are unlikely to .    CBC,  urinalysis, cultures pending at time of sign-out.  Care of patient is assumed by Dr. Martha Chen.    Amount and/or Complexity of Data Reviewed  Labs: ordered. Decision-making details documented in ED Course.               ED Course as of 02/14/24 1903 Wed Feb 14, 2024   1734 Magnesium  Magnesium within normal limits [BH]   1735 Comprehensive metabolic panel(!)  Hypoalbuminemia  Corrected calcium 9.2 [BH]   1841 Hemoglobin(!): 6.0  Decreased from previous [HB]   1841 WBC(!): 12.87  Improved from previous [HB]   1842 CPK: 122  wnl [HB]   1842 Magnesium : 1.8  wnl [HB]      ED Course User Index  [BH] Rocael Clemente MD  [HB] Martha Chen MD                             Clinical Impression:  Final diagnoses:  [I48.91] Atrial fibrillation with tachycardic ventricular rate                 Rocael Clemente MD  Resident  02/14/24 1903

## 2024-02-14 NOTE — ED TRIAGE NOTES
Annmarie Gallardo, a 78 y.o. female presents to the ED w/ complaint of AMS    Triage note:  Chief Complaint   Patient presents with    Altered Mental Status     Arrived via ems from [facility] with c/o AMS and nonverbal, staff states also not eating x3 days, last seen normal x7 days ago by NP     Review of patient's allergies indicates:   Allergen Reactions    Diamox sequels [acetazolamide]     Garlic     Namenda [memantine]     Oxybutynin      Past Medical History:   Diagnosis Date    HTN (hypertension)     Parkinson's disease     Vitamin D deficiency

## 2024-02-14 NOTE — ED NOTES
Pt placed on wedge on rt, lying on left side.  Sacral dressing and packing changed, wounds assessed and photos taken.

## 2024-02-15 PROBLEM — R78.81 BACTEREMIA: Status: ACTIVE | Noted: 2024-02-15

## 2024-02-15 LAB
ACINETOBACTER CALCOACETICUS/BAUMANNII COMPLEX: DETECTED
BACTEROIDES FRAGILIS: NOT DETECTED
BASOPHILS # BLD AUTO: 0.06 K/UL (ref 0–0.2)
BASOPHILS NFR BLD: 0.4 % (ref 0–1.9)
CANDIDA ALBICANS: NOT DETECTED
CANDIDA AURIS: NOT DETECTED
CANDIDA GLABRATA: NOT DETECTED
CANDIDA KRUSEI: NOT DETECTED
CANDIDA PARAPSILOSIS: NOT DETECTED
CANDIDA TROPICALIS: NOT DETECTED
CRYPTOCOCCUS NEOFORMANS/GATTII: NOT DETECTED
CTX-M GENE (ESBL PRODUCER): NOT DETECTED
DIFFERENTIAL METHOD BLD: ABNORMAL
ENTEROBACTER CLOACAE COMPLEX: NOT DETECTED
ENTEROBACTERALES: NOT DETECTED
ENTEROCOCCUS FAECALIS: NOT DETECTED
ENTEROCOCCUS FAECIUM: NOT DETECTED
EOSINOPHIL # BLD AUTO: 0.2 K/UL (ref 0–0.5)
EOSINOPHIL NFR BLD: 1.1 % (ref 0–8)
ERYTHROCYTE [DISTWIDTH] IN BLOOD BY AUTOMATED COUNT: 15.9 % (ref 11.5–14.5)
ESCHERICHIA COLI: NOT DETECTED
HAEMOPHILUS INFLUENZAE: NOT DETECTED
HCT VFR BLD AUTO: 36.3 % (ref 37–48.5)
HGB BLD-MCNC: 10.3 G/DL (ref 12–16)
IMM GRANULOCYTES # BLD AUTO: 0.3 K/UL (ref 0–0.04)
IMM GRANULOCYTES NFR BLD AUTO: 2.1 % (ref 0–0.5)
IMP GENE (CARBAPENEM RESISTANT): NOT DETECTED
KLEBSIELLA AEROGENES: NOT DETECTED
KLEBSIELLA OXYTOCA: NOT DETECTED
KLEBSIELLA PNEUMONIAE GROUP: NOT DETECTED
KPC RESISTANCE GENE (CARBAPENEM): NOT DETECTED
LISTERIA MONOCYTOGENES: NOT DETECTED
LYMPHOCYTES # BLD AUTO: 1 K/UL (ref 1–4.8)
LYMPHOCYTES NFR BLD: 6.9 % (ref 18–48)
MCH RBC QN AUTO: 26.2 PG (ref 27–31)
MCHC RBC AUTO-ENTMCNC: 28.4 G/DL (ref 32–36)
MCR-1: ABNORMAL
MCV RBC AUTO: 92 FL (ref 82–98)
MEC A/C AND MREJ (MRSA): ABNORMAL
MEC A/C: DETECTED
MONOCYTES # BLD AUTO: 0.9 K/UL (ref 0.3–1)
MONOCYTES NFR BLD: 6.2 % (ref 4–15)
NDM GENE (CARBAPENEM RESISTANT): NOT DETECTED
NEISSERIA MENINGITIDIS: NOT DETECTED
NEUTROPHILS # BLD AUTO: 11.9 K/UL (ref 1.8–7.7)
NEUTROPHILS NFR BLD: 83.3 % (ref 38–73)
NRBC BLD-RTO: 0 /100 WBC
OXA-48-LIKE (CARBAPENEM RESISTANT): ABNORMAL
PLATELET # BLD AUTO: 289 K/UL (ref 150–450)
PMV BLD AUTO: 10.5 FL (ref 9.2–12.9)
PROTEUS SPECIES: NOT DETECTED
PSEUDOMONAS AERUGINOSA: NOT DETECTED
RBC # BLD AUTO: 3.93 M/UL (ref 4–5.4)
SALMONELLA SP: NOT DETECTED
SERRATIA MARCESCENS: NOT DETECTED
STAPHYLOCOCCUS AUREUS: NOT DETECTED
STAPHYLOCOCCUS EPIDERMIDIS: DETECTED
STAPHYLOCOCCUS LUGDUNESIS: NOT DETECTED
STAPHYLOCOCCUS SPECIES: ABNORMAL
STENOTROPHOMONAS MALTOPHILIA: NOT DETECTED
STREPTOCOCCUS AGALACTIAE: NOT DETECTED
STREPTOCOCCUS PNEUMONIAE: NOT DETECTED
STREPTOCOCCUS PYOGENES: NOT DETECTED
STREPTOCOCCUS SPECIES: NOT DETECTED
VAN A/B (VRE GENE): ABNORMAL
VIM GENE (CARBAPENEM RESISTANT): NOT DETECTED
WBC # BLD AUTO: 14.23 K/UL (ref 3.9–12.7)

## 2024-02-15 PROCEDURE — 36415 COLL VENOUS BLD VENIPUNCTURE: CPT | Performed by: STUDENT IN AN ORGANIZED HEALTH CARE EDUCATION/TRAINING PROGRAM

## 2024-02-15 PROCEDURE — 21400001 HC TELEMETRY ROOM

## 2024-02-15 PROCEDURE — 99497 ADVNCD CARE PLAN 30 MIN: CPT | Mod: 25,,, | Performed by: EMERGENCY MEDICINE

## 2024-02-15 PROCEDURE — 85025 COMPLETE CBC W/AUTO DIFF WBC: CPT | Performed by: STUDENT IN AN ORGANIZED HEALTH CARE EDUCATION/TRAINING PROGRAM

## 2024-02-15 PROCEDURE — 63600175 PHARM REV CODE 636 W HCPCS: Performed by: STUDENT IN AN ORGANIZED HEALTH CARE EDUCATION/TRAINING PROGRAM

## 2024-02-15 PROCEDURE — 99223 1ST HOSP IP/OBS HIGH 75: CPT | Mod: 25,,, | Performed by: EMERGENCY MEDICINE

## 2024-02-15 PROCEDURE — 25000003 PHARM REV CODE 250: Performed by: STUDENT IN AN ORGANIZED HEALTH CARE EDUCATION/TRAINING PROGRAM

## 2024-02-15 RX ORDER — SODIUM CHLORIDE 9 MG/ML
INJECTION, SOLUTION INTRAVENOUS CONTINUOUS
Status: ACTIVE | OUTPATIENT
Start: 2024-02-15 | End: 2024-02-16

## 2024-02-15 RX ADMIN — VANCOMYCIN HYDROCHLORIDE 1000 MG: 1 INJECTION, POWDER, LYOPHILIZED, FOR SOLUTION INTRAVENOUS at 01:02

## 2024-02-15 RX ADMIN — SODIUM CHLORIDE: 9 INJECTION, SOLUTION INTRAVENOUS at 01:02

## 2024-02-15 NOTE — NURSING
Patient going to hospice care . Daughter grandaushyann and sister at bedside with patient . Status remains unchanged . Willl continue care .

## 2024-02-15 NOTE — HPI
"Annmarie Gallardo is a 78 y.o. with a PMHx of parkinson's, lewy-body dementia with debility/functional quadriplegia, HTN, GERD, CKD3, and sacral decubitus ulcer who presents today from her nursing home for refusal to eat.     Of note, she was recently discharged from here then LTAC for wound care after debridement of sacral decubitus ulcer. She received an appropriate IV antibiotic course, and she was discharged back to her nursing home with local wound care.     No family available at bedside, and patient does not attempt to speak. Therefore, history obtained via chart review and ED.     At baseline, she is bedbound and with poor PO intake, and family will give her liquids via syringe (essentially pleasure feeds per DC summary). Usually able to tolerate her medications. Over the past few days, she has been refusing to eat. Given concern for hydration and "inability to maintain hydration" at her nursing home, she was sent here for evaluation.     "

## 2024-02-15 NOTE — PROGRESS NOTES
VANCOMYCIN DOSING BY PHARMACY DISCONTINUATION NOTE    Annmarie Gallardo is a 78 y.o. female who had been consulted for vancomycin dosing.    The pharmacy consult for vancomycin dosing has been discontinued.     Vancomycin Dosing by Pharmacy Consult will sign-off. Please reconsult if necessary. Thank you for allowing us to participate in this patient's care.     Thank you for the consult,   Praveen Harry, Pharm.D.  Inpatient Pharmacist  EXT [phone]

## 2024-02-15 NOTE — SUBJECTIVE & OBJECTIVE
Interval History:   Admitted overnight. Patient non verbal or responsive to questioning. Palliative consulted. Blood cultures positive and IV abx started.       Review of Systems   Unable to perform ROS: Dementia     Objective:     Vital Signs (Most Recent):  Temp: 97.8 °F (36.6 °C) (02/15/24 1212)  Pulse: 95 (02/15/24 1212)  Resp: 16 (02/15/24 1212)  BP: 112/71 (02/15/24 1212)  SpO2: 98 % (02/15/24 0504) Vital Signs (24h Range):  Temp:  [97 °F (36.1 °C)-98.7 °F (37.1 °C)] 97.8 °F (36.6 °C)  Pulse:  [] 95  Resp:  [14-28] 16  SpO2:  [97 %-99 %] 98 %  BP: ()/(55-80) 112/71     Weight: 46.3 kg (102 lb)  Body mass index is 18.66 kg/m².    Intake/Output Summary (Last 24 hours) at 2/15/2024 1309  Last data filed at 2/15/2024 0553  Gross per 24 hour   Intake --   Output 300 ml   Net -300 ml         Physical Exam  Vitals and nursing note reviewed.   Constitutional:       General: She is not in acute distress.     Appearance: She is well-developed. She is ill-appearing (chronically). She is not diaphoretic.      Comments: Arouses to tactile stimulation, does not attempt to follow commands or speak   HENT:      Head: Normocephalic and atraumatic.      Mouth/Throat:      Mouth: Mucous membranes are moist.   Eyes:      General: No scleral icterus.     Conjunctiva/sclera: Conjunctivae normal.   Neck:      Vascular: No JVD.   Cardiovascular:      Rate and Rhythm: Normal rate and regular rhythm.   Pulmonary:      Effort: Pulmonary effort is normal. No respiratory distress.   Genitourinary:     Comments: Charles in place  Skin:     Coloration: Skin is pale. Skin is not jaundiced.   Neurological:      Motor: Weakness present. No abnormal muscle tone.      Comments: Contractured UEs, no significant tremor    Psychiatric:         Speech: She is noncommunicative.         Behavior: Behavior is uncooperative.             Significant Labs: All pertinent labs within the past 24 hours have been reviewed.  CBC:   Recent Labs   Lab  02/14/24  1827 02/15/24  0534   WBC 12.87* 14.23*   HGB 6.0* 10.3*   HCT 21.5* 36.3*    289     CMP:   Recent Labs   Lab 02/14/24  1645      K 4.2   *   CO2 22*   *   BUN 23   CREATININE 0.6   CALCIUM 7.0*   PROT 3.7*   ALBUMIN 1.3*   BILITOT 0.3   ALKPHOS 48*   AST 12   ALT <5*   ANIONGAP 5*       Significant Imaging: I have reviewed all pertinent imaging results/findings within the past 24 hours.

## 2024-02-15 NOTE — CONSULTS
Brief pal med note:     Full note to follow.      Discussed with both daughters at bedside and Dr. Hills.  Updated on clinical condition and expected future course of her disease.     Ultimately, both daughters agree to focus on high quality end of life care with out the use of antibiotics or artificial hydration and nutrition.    They are open to arranging hospice at the nursing home.      Communicated with care coordinators.     Will continue to help where needed.     Jose Carlos Farrell MD  Palliative Medicine   [facility]  [phone] (cell)

## 2024-02-15 NOTE — ASSESSMENT & PLAN NOTE
Renal function remains around baseline. Monitor. Avoid nephrotoxic agents as able, and renally dose all meds as applicable.

## 2024-02-15 NOTE — PROGRESS NOTES
"[facility]ation [address] [place] [address] Medicine  Progress Note    Patient Name: Annmarie Gallardo  MRN: [id number]  Patient Class: OP- Observation   Admission Date: 2/14/2024  Length of Stay: 0 days  Attending Physician: Yuniel Hills MD  Primary Care Provider: Raymond Bess MD        Subjective:     Principal Problem:Palliative care encounter        HPI:  Annmarie Gallardo is a 78 y.o. with a PMHx of parkinson's, lewy-body dementia with debility/functional quadriplegia, HTN, GERD, CKD3, and sacral decubitus ulcer who presents today from her nursing home for refusal to eat.     Of note, she was recently discharged from here then LTAC for wound care after debridement of sacral decubitus ulcer. She received an appropriate IV antibiotic course, and she was discharged back to her nursing home with local wound care.     No family available at bedside, and patient does not attempt to speak. Therefore, history obtained via chart review and ED.     At baseline, she is bedbound and with poor PO intake, and family will give her liquids via syringe (essentially pleasure feeds per DC summary). Usually able to tolerate her medications. Over the past few days, she has been refusing to eat. Given concern for hydration and "inability to maintain hydration" at her nursing home, she was sent here for evaluation.       Overview/Hospital Course:  No notes on file    Interval History:   Admitted overnight. Patient non verbal or responsive to questioning. Palliative consulted. Blood cultures positive and IV abx started.       Review of Systems   Unable to perform ROS: Dementia     Objective:     Vital Signs (Most Recent):  Temp: 97.8 °F (36.6 °C) (02/15/24 1212)  Pulse: 95 (02/15/24 1212)  Resp: 16 (02/15/24 1212)  BP: 112/71 (02/15/24 1212)  SpO2: 98 % (02/15/24 0504) Vital Signs (24h Range):  Temp:  [97 °F (36.1 °C)-98.7 °F (37.1 °C)] 97.8 °F (36.6 °C)  Pulse:  [] 95  Resp:  [14-28] 16  SpO2:  [97 %-99 %] 98 %  BP: " ()/(55-80) 112/71     Weight: 46.3 kg (102 lb)  Body mass index is 18.66 kg/m².    Intake/Output Summary (Last 24 hours) at 2/15/2024 1309  Last data filed at 2/15/2024 0553  Gross per 24 hour   Intake --   Output 300 ml   Net -300 ml         Physical Exam  Vitals and nursing note reviewed.   Constitutional:       General: She is not in acute distress.     Appearance: She is well-developed. She is ill-appearing (chronically). She is not diaphoretic.      Comments: Arouses to tactile stimulation, does not attempt to follow commands or speak   HENT:      Head: Normocephalic and atraumatic.      Mouth/Throat:      Mouth: Mucous membranes are moist.   Eyes:      General: No scleral icterus.     Conjunctiva/sclera: Conjunctivae normal.   Neck:      Vascular: No JVD.   Cardiovascular:      Rate and Rhythm: Normal rate and regular rhythm.   Pulmonary:      Effort: Pulmonary effort is normal. No respiratory distress.   Genitourinary:     Comments: Charles in place  Skin:     Coloration: Skin is pale. Skin is not jaundiced.   Neurological:      Motor: Weakness present. No abnormal muscle tone.      Comments: Contractured UEs, no significant tremor    Psychiatric:         Speech: She is noncommunicative.         Behavior: Behavior is uncooperative.             Significant Labs: All pertinent labs within the past 24 hours have been reviewed.  CBC:   Recent Labs   Lab 02/14/24  1827 02/15/24  0534   WBC 12.87* 14.23*   HGB 6.0* 10.3*   HCT 21.5* 36.3*    289     CMP:   Recent Labs   Lab 02/14/24  1645      K 4.2   *   CO2 22*   *   BUN 23   CREATININE 0.6   CALCIUM 7.0*   PROT 3.7*   ALBUMIN 1.3*   BILITOT 0.3   ALKPHOS 48*   AST 12   ALT <5*   ANIONGAP 5*       Significant Imaging: I have reviewed all pertinent imaging results/findings within the past 24 hours.    Assessment/Plan:      * Palliative care encounter  Patient presents from nursing home with essentially poor PO intake/refusal to eat.  She appears somewhat dry but without new significant lab abnormalities of dehydration. Suspect progression of her dementia/debility leading to decline. Per ED discussion with patient's daughter and LAPOST sent with her, she would not want tube feeding and advanced/drastic measures.     - Palliative Care consulted for goals of care discussions    Bacteremia  - BCx with GPC and GNR  - Rapid PCR with S epi and Acinetobacter   - Follow up blood cultures  - BCx with likely contaminant, but will f/u culture data  - Continue vancomycin and Zosyn for now  - Follow up palliative care consult    CKD (chronic kidney disease), stage III  Renal function remains around baseline. Monitor. Avoid nephrotoxic agents as able, and renally dose all meds as applicable.    Macrocytic anemia  Worsened over recent baseline, without obvious signs of bleeding. Suspect multifactorial with chronic disease, malnutrition, and possible GI bleed given history of gastric ulcer. Family would decline endoscopy, appropriately given her terminal disease. For now, will monitor and discuss goals of care with family as she would likely benefit from hospice care rather than repeated blood transfusions.     Other dysphagia  Continue IDDSI level 5 diet per prior ST recs, although has poor PO intake. Would not want a feeding tube per LAPOST.     Stage IV pressure ulcer of sacral region  Continue local wound care.     Severe protein-calorie malnutrition  Due to chronic disease with poor PO intake. Will continue supplements as she will tolerate.     Functional quadriplegia  Continue supportive care with frequent turns.       DNR (do not resuscitate)  Advance Care Planning  DNR status noted on LAPOST which is present with patient. Her daughter is her surrogate decision maker.         Parkinson's disease  Continue home Sinemet as able.     Debility  Leading to functional quadriplegia and sacral/decubitus ulcer      VTE Risk Mitigation (From admission, onward)            Ordered     IP VTE HIGH RISK PATIENT  Once         02/14/24 2146     Place sequential compression device  Until discontinued         02/14/24 2146     Reason for No Pharmacological VTE Prophylaxis  Once        Question:  Reasons:  Answer:  Physician Provided (leave comment)    02/14/24 2146                    Discharge Planning   MELCHOR:      Code Status: DNR   Is the patient medically ready for discharge?: No    Reason for patient still in hospital (select all that apply): Patient trending condition, Laboratory test, Treatment, Consult recommendations, and Pending disposition                     Yuniel Hills MD  Department of Hospital Medicine   [facility] - Observation 11H

## 2024-02-15 NOTE — HPI
"Annmarie Gallardo is a 78 y.o. with a PMHx of parkinson's, lewy-body dementia with debility/functional quadriplegia, HTN, GERD, CKD3, and sacral decubitus ulcer who presents today from her nursing home for refusal to eat.      Of note, she was recently discharged from here then LTAC for wound care after debridement of sacral decubitus ulcer. She received an appropriate IV antibiotic course, and she was discharged back to her nursing home with local wound care.      No family available at bedside, and patient does not attempt to speak. Therefore, history obtained via chart review and ED.      At baseline, she is bedbound and with poor PO intake, and family will give her liquids via syringe (essentially pleasure feeds per DC summary). Usually able to tolerate her medications. Over the past few days, she has been refusing to eat. Given concern for hydration and "inability to maintain hydration" at her nursing home, she was sent here for evaluation.     She was recently admitted to [facility] from the LTACH and returned for sacral wounds and need for long term antibiotics.     In this setting, palliative medicine was consulted to help with symptom management, medical decision making, and aiding in the formation of goals of care.        "

## 2024-02-15 NOTE — ASSESSMENT & PLAN NOTE
Advance Care Planning   DNR status noted on LAPOST which is present with patient. Her daughter is her surrogate decision maker.

## 2024-02-15 NOTE — ED NOTES
Pt care assumed. Report received by EVERARDO Boland. Pt lying in stretcher in low and locked position and side rails raised x2. Call light, pt's belongings, and bedside table within pt's reach. Pt on continuous cardiac monitoring, pulse oximetry, and BP cycling every 30 minutes. Pt in NAD and verbalized no needs at this time.

## 2024-02-15 NOTE — ASSESSMENT & PLAN NOTE
Patient presents from nursing home with essentially poor PO intake/refusal to eat. She appears somewhat dry but without new significant lab abnormalities of dehydration. Suspect progression of her dementia/debility leading to decline. Per ED discussion with patient's daughter and LAPOST sent with her, she would not want tube feeding and advanced/drastic measures. At this point, she would benefit from comfort and hospice services. Given this and a clear delineation of her previous wishes, will consult Palliative Care for goals of care discussions and likely hospice at her nursing home.

## 2024-02-15 NOTE — ASSESSMENT & PLAN NOTE
Assessment: 78f with LewyBody dementia, PD, functional quadriplegia, CKD3 nonverbal and immobile at baseline who was admitted 12/1 with concerns for infected Stage IV sacral wound.      In this setting, palliative medicine was consulted to help with symptom management, medical decision making, and aiding in the formation of goals of care.      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 in [place]; was at nursing home/[facility] as her needs grew too great to care for her at home     4) Medicolegal: [employer]     5) Spiritual: did not discuss     6) Goals of care/discussion:     Discussion by phone with lynn Carson to introduce the role of palliative medicine and supportive care in the hospital in the setting of a serious diagnosis of brain failure, recurrent sepsis, skin failure, and poor oral intake, The family was able to demonstrate an excellent understanding of the diagnosis, current care plan, and concern for worsening of the pt's condition.     Goals of care:  The family endorses that what is most important right now is to focus on symptom/pain control at the end of life     Accordingly, we have decided that the best plan to meet the patient's goals includes focusing on comfort and meeting her end of life needs     The family is clear that the patient would want to avoid invasive and burdensome forms of treatment at the end of life including NG tubes or PEGs.   DNR     Open to hospice upon returning to the nursing home    A total of 19 min was spent on advance care planning, goals of care discussion, emotional support, formulating and communicating prognosis and goals of care, exploring burden/benefit of various approaches of treatment. This discussion occurred on a fully voluntary basis with the verbal consent of the patient and/or family.         7)  Disposition plan: nursing home with hospice     8)Summary of recommendations and follow up plan:  Discussed with both daughters at bedside and Dr. Hills.  Updated on clinical condition and expected future course of her disease.      Ultimately, both daughters agree to focus on high quality end of life care with out the use of antibiotics or artificial hydration and nutrition.     They are open to arranging hospice at the nursing home.       Communicated with care coordinators.      Will continue to help where needed.      Jose Carlos Farrell MD  Palliative Medicine   [facility]  [phone] (cell)        Thank you for the opportunity to care for this patient and family.      Please call with questions.      Jose Carlos Farrell MD  Palliative Medicine   [facility]  [phone] (cell)

## 2024-02-15 NOTE — H&P
"[facility] - Emergency [facility] Medicine  History & Physical    Patient Name: Annmarie Gallardo  MRN: [id number]  Patient Class: Emergency  Admission Date: 2/14/2024  Attending Physician: Jitendra Monzon MD   Primary Care Provider: Raymond Bess MD         Patient information was obtained from patient and ER records.     Subjective:     Principal Problem:Palliative care encounter    Chief Complaint:   Chief Complaint   Patient presents with    Altered Mental Status     [facility] ems [facility] with c/o AMS and nonverbal, staff states also not eating x3 days, last seen normal x7 days ago by NP        HPI:   Annmarie Gallardo is a 78 y.o. with a PMHx of parkinson's, lewy-body dementia with debility/functional quadriplegia, HTN, GERD, CKD3, and sacral decubitus ulcer who presents today from her nursing home for refusal to eat.     Of note, she was recently discharged from here then LTAC for wound care after debridement of sacral decubitus ulcer. She received an appropriate IV antibiotic course, and she was discharged back to her nursing home with local wound care.     No family available at bedside, and patient does not attempt to speak. Therefore, history obtained via chart review and ED.     At baseline, she is bedbound and with poor PO intake, and family will give her liquids via syringe (essentially pleasure feeds per DC summary). Usually able to tolerate her medications. Over the past few days, she has been refusing to eat. Given concern for hydration and "inability to maintain hydration" at her nursing home, she was sent here for evaluation.       Past Medical History:   Diagnosis Date    HTN (hypertension)     Parkinson's disease     Vitamin D deficiency        Past Surgical History:   Procedure Laterality Date    DEBRIDEMENT OF SACRAL WOUND N/A 12/3/2023    Procedure: DEBRIDEMENT, WOUND, SACRUM;  Surgeon: Stevo Patterson MD;  Location: [facility] OR [address] [place] [address];  Service: General;  " Laterality: N/A;  saline soaked kerlix placed to sacrum       Review of patient's allergies indicates:   Allergen Reactions    Diamox sequels [acetazolamide]     Garlic     Namenda [memantine]     Oxybutynin        No current facility-administered medications on file prior to encounter.     Current Outpatient Medications on File Prior to Encounter   Medication Sig    acetaminophen (TYLENOL) 325 MG tablet Take 2 tablets (650 mg total) by mouth every 4 (four) hours as needed for Pain or Temperature greater than (101).    aspirin (ECOTRIN) 81 MG EC tablet Take 81 mg by mouth once daily.    carbidopa-levodopa  mg (SINEMET)  mg per tablet Take 1 tablet by mouth 3 (three) times daily.    cyanocobalamin (VITAMIN B-12) 1000 MCG tablet Take 100 mcg by mouth once daily.    loratadine (CLARITIN) 10 mg tablet Take 10 mg by mouth once daily.    melatonin (MELATIN) 3 mg tablet Take 2 tablets (6 mg total) by mouth nightly as needed for Insomnia.    multivitamin Tab Take 1 tablet by mouth once daily.    polyethylene glycol (GLYCOLAX) 17 gram PwPk Take 17 g by mouth daily as needed for Constipation.    simethicone (MYLICON) 80 MG chewable tablet Take 1 tablet (80 mg total) by mouth 4 (four) times daily as needed for Flatulence.    tamsulosin (FLOMAX) 0.4 mg Cap Take 1 capsule (0.4 mg total) by mouth once daily.     Family History    None       Tobacco Use    Smoking status: Never    Smokeless tobacco: Never   Substance and Sexual Activity    Alcohol use: No    Drug use: No    Sexual activity: Not on file     Review of Systems   Unable to perform ROS: Dementia     Objective:     Vital Signs (Most Recent):  Temp: 97.2 °F (36.2 °C) (02/14/24 1700)  Pulse: 91 (02/14/24 1700)  Resp: 20 (02/14/24 1700)  BP: (!) 109/58 (02/14/24 1700)  SpO2: 98 % (02/14/24 1700) Vital Signs (24h Range):  Temp:  [97 °F (36.1 °C)-97.2 °F (36.2 °C)] 97.2 °F (36.2 °C)  Pulse:  [] 91  Resp:  [20-28] 20  SpO2:  [98 %] 98 %  BP:  (107-109)/(58-80) 109/58     Weight: 46.3 kg (102 lb)  Body mass index is 18.66 kg/m².     Physical Exam  Vitals and nursing note reviewed.   Constitutional:       General: She is not in acute distress.     Appearance: She is well-developed. She is ill-appearing (chronically). She is not diaphoretic.      Comments: Arouses to tactile stimulation, does not attempt to follow commands or speak   HENT:      Head: Normocephalic and atraumatic.      Mouth/Throat:      Mouth: Mucous membranes are moist.   Eyes:      General: No scleral icterus.     Conjunctiva/sclera: Conjunctivae normal.   Neck:      Vascular: No JVD.   Cardiovascular:      Rate and Rhythm: Normal rate and regular rhythm.   Pulmonary:      Effort: Pulmonary effort is normal. No respiratory distress.   Genitourinary:     Comments: Charles in place on presentation  Skin:     Coloration: Skin is pale. Skin is not jaundiced.   Neurological:      Motor: Weakness present. No abnormal muscle tone.      Comments: Contractured UEs, no significant tremor    Psychiatric:         Speech: She is noncommunicative.         Behavior: Behavior is uncooperative.                Significant Labs: All pertinent labs within the past 24 hours have been reviewed.  CBC:   Recent Labs   Lab 02/14/24  1827   WBC 12.87*   HGB 6.0*   HCT 21.5*        CMP:   Recent Labs   Lab 02/14/24  1645      K 4.2   *   CO2 22*   *   BUN 23   CREATININE 0.6   CALCIUM 7.0*   PROT 3.7*   ALBUMIN 1.3*   BILITOT 0.3   ALKPHOS 48*   AST 12   ALT <5*   ANIONGAP 5*       Significant Imaging: I have reviewed all pertinent imaging results/findings within the past 24 hours.  Assessment/Plan:     * Palliative care encounter  Patient presents from nursing home with essentially poor PO intake/refusal to eat. She appears somewhat dry but without new significant lab abnormalities of dehydration. Suspect progression of her dementia/debility leading to decline. Per ED discussion with  patient's daughter and LAPOST sent with her, she would not want tube feeding and advanced/drastic measures. At this point, she would benefit from comfort and hospice services. Given this and a clear delineation of her previous wishes, will consult Palliative Care for goals of care discussions and likely hospice at her nursing home.       Stage IV pressure ulcer of sacral region  Continue local wound care.     Macrocytic anemia  Worsened over recent baseline, without obvious signs of bleeding. Suspect multifactorial with chronic disease, malnutrition, and possible GI bleed given history of gastric ulcer. Family would decline endoscopy, appropriately given her terminal disease. For now, will monitor and discuss goals of care with family as she would likely benefit from hospice care rather than repeated blood transfusions.     DNR (do not resuscitate)  Advance Care Planning  DNR status noted on LAPOST which is present with patient. Her daughter is her surrogate decision maker.          Other dysphagia  Continue IDDSI level 5 diet per prior ST recs, although has poor PO intake. Would not want a feeding tube per LAPOST.     Severe protein-calorie malnutrition  Due to chronic disease with poor PO intake. Will continue supplements as she will tolerate.     Functional quadriplegia  Continue supportive care with frequent turns.       Parkinson's disease  Continue home Sinemet as able.     CKD (chronic kidney disease), stage III  Renal function remains around baseline. Monitor. Avoid nephrotoxic agents as able, and renally dose all meds as applicable.    Debility  Leading to functional quadriplegia and sacral/decubitus ulcer      VTE Risk Mitigation (From admission, onward)      None            As clarification, the patient should be placed in hospital observation services under my care.                   Modesto Foster MD  Department of Hospital Medicine  [facility] - Emergency Dept

## 2024-02-15 NOTE — PROVIDER PROGRESS NOTES - EMERGENCY DEPT.
Encounter Date: 2/14/2024    ED Physician Progress Notes        Physician Note:   Long discussion with daughter and decision maker, Yamileth. Greatest concern is that patient has had no po intake for last week. Her daughter reports that she must be hydrated with a dropper because her 'suck' and 'swallow' are so weak. [facility] unable to provide this level of hydration. Likely plan for transition to comfort care but family is not quite ready to make this decision. They consent to IV fluids and antibiotics. She is DNR/DNI. She did request no PEG or NG feeds.Plan for admission for serial CBC d/t hemoglobin 6.0. family does not consent to EGD or colonoscopy as part of workup of anemia. They will discuss transfusion amongst themselves and decide. D/w hospital medicine for admission. They request the 16th floor but unsure if beds available

## 2024-02-15 NOTE — NURSING
Nurses Note -- 4 Eyes      2/15/2024   6:43 AM      Skin assessed during: Admit      [] No Altered Skin Integrity Present    []Prevention Measures Documented      [x] Yes- Altered Skin Integrity Present or Discovered   [x] LDA Added if Not in Epic (Describe Wound)   [] New Altered Skin Integrity was Present on Admit and Documented in LDA   [] Wound Image Taken    Wound Care Consulted? Yes    Attending Nurse:  Joceline Mast RN/Staff Member: Maria Elena DELUCA

## 2024-02-15 NOTE — SUBJECTIVE & OBJECTIVE
Interval History: pt seen at the bedside; chart reviewed;  discussed with the daughter Yamileth at bedside and Dayna by phone    Blood cultures positive    Past Medical History:   Diagnosis Date    HTN (hypertension)     Parkinson's disease     Vitamin D deficiency        Past Surgical History:   Procedure Laterality Date    DEBRIDEMENT OF SACRAL WOUND N/A 12/3/2023    Procedure: DEBRIDEMENT, WOUND, SACRUM;  Surgeon: Stevo Patterson MD;  Location: [facility] OR [address] [place] [address];  Service: General;  Laterality: N/A;  saline soaked kerlix placed to sacrum       Review of patient's allergies indicates:   Allergen Reactions    Diamox sequels [acetazolamide]     Garlic     Namenda [memantine]     Oxybutynin        Medications:  Continuous Infusions:   sodium chloride 0.9%       Scheduled Meds:   piperacillin-tazobactam (Zosyn) IV (PEDS and ADULTS) (extended infusion is not appropriate)  4.5 g Intravenous Q8H    vancomycin (VANCOCIN) IV (PEDS and ADULTS)  20 mg/kg Intravenous Once     PRN Meds:acetaminophen, acetaminophen, aluminum-magnesium hydroxide-simethicone, dextrose 10%, dextrose 10%, glucagon (human recombinant), glucose, glucose, melatonin, naloxone, ondansetron, polyethylene glycol, simethicone, sodium chloride 0.9%, Pharmacy to dose Vancomycin consult **AND** vancomycin - pharmacy to dose    Family History    None       Tobacco Use    Smoking status: Never    Smokeless tobacco: Never   Substance and Sexual Activity    Alcohol use: No    Drug use: No    Sexual activity: Not on file       Review of Systems   Unable to perform ROS: Dementia     Objective:     Vital Signs (Most Recent):  Temp: 98 °F (36.7 °C) (02/15/24 0750)  Pulse: 92 (02/15/24 1103)  Resp: 14 (02/15/24 0750)  BP: 118/69 (02/15/24 0750)  SpO2: 98 % (02/15/24 0504) Vital Signs (24h Range):  Temp:  [97 °F (36.1 °C)-98.7 °F (37.1 °C)] 98 °F (36.7 °C)  Pulse:  [] 92  Resp:  [14-28] 14  SpO2:  [97 %-99 %] 98 %  BP: ()/(55-80) 118/69     Weight:  46.3 kg (102 lb)  Body mass index is 18.66 kg/m².       Physical Exam  Vitals and nursing note reviewed.   Constitutional:       General: She is not in acute distress.     Appearance: She is ill-appearing.   HENT:      Right Ear: External ear normal.      Left Ear: External ear normal.      Mouth/Throat:      Mouth: Mucous membranes are moist.   Cardiovascular:      Rate and Rhythm: Normal rate.      Pulses: Normal pulses.   Pulmonary:      Effort: Pulmonary effort is normal. No respiratory distress.   Genitourinary:     Comments: Charles cath in place; clear yellow urine  Musculoskeletal:      Comments: sarcopenic   Skin:     Capillary Refill: Capillary refill takes less than 2 seconds.   Neurological:      Mental Status: She is disoriented.      Comments: No attempts at speech; inconsistently tracks or follows commands     Psychiatric:      Comments: calm          Review of Symptoms      Symptom Assessment (ESAS 0-10 Scale)  Pain:  0  Dyspnea:  0  Anxiety:  0  Nausea:  0  Depression:  0  Anorexia:  0  Fatigue:  0  Insomnia:  0  Restlessness:  0  Agitation:  0 due to Patient nonverbal         Pain Assessment in Advanced Demential Scale (PAINAD)   Breathing - Independent of vocalization:  0  Negative vocalization:  0  Facial expression:  0  Body language:  0  Consolability:  0  Total:  0    Living Arrangements:  Lives in nursing home    Psychosocial/Cultural:   See Palliative Psychosocial Note: No  Declining functional status    Several hospital stays including LTACH for wound care and prolonged antibiotic course    Three adult children; unable to care for her at home    Daughter Yamileth is  main local caregiver; she continues to work full time, has two teenage children, a 10 month old foster child and also has a family farm in [place]  **Primary  to Follow**  Palliative Care  Consult: No      Advance Care Planning   Advance Directives:   Living Will: No    LaPOST: No    Do Not  Resuscitate Status: Yes    Medical Power of : No    Agent's Name:  aYmileth Gallardo   Agent's Contact Number:  [phone]    Decision Making:  Family answered questions and Patient unable to communicate due to disease severity/cognitive impairment  Goals of Care: What is most important right now is to focus on symptom/pain control, improvement in condition but with limits to invasive therapies, comfort and QOL     We have recommended a more focused approach on maximizing symptom focused care at the end of life.          Significant Labs: All pertinent labs within the past 24 hours have been reviewed.  CBC:   Recent Labs   Lab 02/15/24  0534   WBC 14.23*   HGB 10.3*   HCT 36.3*   MCV 92          BMP:  Recent Labs   Lab 02/14/24  1645   *      K 4.2   *   CO2 22*   BUN 23   CREATININE 0.6   CALCIUM 7.0*   MG 1.8     LFT:  Lab Results   Component Value Date    AST 12 02/14/2024    ALKPHOS 48 (L) 02/14/2024    BILITOT 0.3 02/14/2024     Albumin:   Albumin   Date Value Ref Range Status   02/14/2024 1.3 (L) 3.5 - 5.2 g/dL Final     Protein:   Total Protein   Date Value Ref Range Status   02/14/2024 3.7 (L) 6.0 - 8.4 g/dL Final     Lactic acid:   Lab Results   Component Value Date    LACTATE 1.3 12/24/2023    LACTATE 1.5 12/01/2023       Significant Imaging: I have reviewed all pertinent imaging results/findings within the past 24 hours.

## 2024-02-15 NOTE — PLAN OF CARE
[facility] - Observation [facility]  Initial Discharge Assessment       Primary Care Provider: Raymnod Bess MD    Admission Diagnosis: Palliative care encounter [Z51.5]  Chest pain [R07.9]  Atrial fibrillation with tachycardic ventricular rate [I48.91]    Admission Date: 2/14/2024  Expected Discharge Date: 2/16/2024         Payor: MEDICARE / Plan: MEDICARE PART A & B / Product Type: Government /     Extended Emergency Contact Information  Primary Emergency Contact: Yamileth Gallardo  Mobile Phone: [phone]  Relation: Daughter   needed? No    Discharge Plan A: Return to nursing home  Discharge Plan B: Return to Nursing Home      [id number] [place] - [address]  [address] [place] [address]  Phone: [phone] Fax: [phone]      Initial Assessment (most recent)       Adult Discharge Assessment - 02/15/24 1619          Discharge Assessment    Assessment Type Discharge Planning Assessment     Confirmed/corrected address, phone number and insurance Yes     Confirmed Demographics Correct on Facesheet     Source of Information family     Reason For Admission Palliative care encounter     People in Home facility resident     Facility Arrived From: [facility]     Do you expect to return to your current living situation? Yes     Prior to hospitilization cognitive status: Unable to Assess     Current cognitive status: Unable to Assess     Equipment Currently Used at Home hospital bed     Readmission within 30 days? Yes     Patient currently being followed by outpatient case management? No     Do you take prescription medications? Yes     Do you have prescription coverage? Yes     Do you have any problems affording any of your prescribed medications? TBD     Are you on dialysis? No     Do you take coumadin? No     Discharge Plan A Return to nursing home     Discharge Plan B Return to Nursing Home                     Readmission Assessment (most recent)        Readmission Assessment - 02/15/24 1624          Readmission    Why were you hospitalized in the last 30 days? Sacral decubitus ulcer (P)      Why were you readmitted? New medical problem (P)      When you left the hospital where did you go? SNF (P)      Was this a planned readmission? No (P)                           SW completed Discharge Planning Assessment with patient's daughter, Yamileth via telephone. Discharge planning booklet given to patient/family and whiteboard updated with MELCHOR and phone #. All questions answered.    Patient will need assistance with transportation upon discharge.     Sue reported that patient is currently at [facility] for SNF and is supposed to transition to prison there. Prior to hospitalization patient required assistance with her ADL's. Sue reported that patient is not on dialysis and does not go to a Coumadin clinic.     Discharge Plan A and Plan B have been determined by review of patient's clinical status, future medical and therapeutic needs, and coverage/benefits for post-acute care in coordination with multidisciplinary team members.      Luciana Edward LMSW  [facility]  Ext. [phone]

## 2024-02-15 NOTE — NURSING
Patient consuming small amount of food , starts to cough HOB eleveted HOB while patiets son feeding her small bites of food , stated that this has been happening for quite awhile , almost a year , patient only tolrates a small amount of food. Message sent to MD , Patients son remains at bedside , instructed to wait before any more food or liquids given to patient . Stated he understands .

## 2024-02-15 NOTE — PROVIDER PROGRESS NOTES - EMERGENCY DEPT.
Encounter Date: 2/14/2024    ED Physician Progress Notes          Physician Note:   Signout Note  I received signout from the previous providers.      Chief complaint: AMS     Per sign out and chart review:  Annmarie Gallardo is a 78 y.o. female , PMH Parkinson's, Lewy body dementia, CKD brought in for evaluation of altered mental status. Per family, patient has been eating less over the past week. Family was able to get patient to eat for days ago and 2 days ago, but nursing home staff has been able to get patient eating during the past approximately 4-5 days family denies any history of recent fevers, coughing, falls.     During ED stay patient started on 1L IVF. Pt found to have sacral ulcer as seen below. Pt previously completed course of Abx for this.         Pt signed out to me pending: Labs, CXR.      Update/ Disposition: H/H decreased from previous with hgb 6, discussed with family per phone call and they do not wish to pursue RBC transfusion at this time. Interested in discussions about transition to comfort care during this hospitalization. WBC improved from previous. Discussed with HM who agree to evaluate and admit this patient. Patient, caregiver and/or family understands the plan and verbalized agreement. All questions answered.      Diagnostic Impression:    AMS    Martha Chen MD  [facility], PGY1

## 2024-02-15 NOTE — SUBJECTIVE & OBJECTIVE
Past Medical History:   Diagnosis Date    HTN (hypertension)     Parkinson's disease     Vitamin D deficiency        Past Surgical History:   Procedure Laterality Date    DEBRIDEMENT OF SACRAL WOUND N/A 12/3/2023    Procedure: DEBRIDEMENT, WOUND, SACRUM;  Surgeon: Stevo Patterson MD;  Location: [facility] OR [address] [place] [address];  Service: General;  Laterality: N/A;  saline soaked kerlix placed to sacrum       Review of patient's allergies indicates:   Allergen Reactions    Diamox sequels [acetazolamide]     Garlic     Namenda [memantine]     Oxybutynin        No current facility-administered medications on file prior to encounter.     Current Outpatient Medications on File Prior to Encounter   Medication Sig    acetaminophen (TYLENOL) 325 MG tablet Take 2 tablets (650 mg total) by mouth every 4 (four) hours as needed for Pain or Temperature greater than (101).    aspirin (ECOTRIN) 81 MG EC tablet Take 81 mg by mouth once daily.    carbidopa-levodopa  mg (SINEMET)  mg per tablet Take 1 tablet by mouth 3 (three) times daily.    cyanocobalamin (VITAMIN B-12) 1000 MCG tablet Take 100 mcg by mouth once daily.    loratadine (CLARITIN) 10 mg tablet Take 10 mg by mouth once daily.    melatonin (MELATIN) 3 mg tablet Take 2 tablets (6 mg total) by mouth nightly as needed for Insomnia.    multivitamin Tab Take 1 tablet by mouth once daily.    polyethylene glycol (GLYCOLAX) 17 gram PwPk Take 17 g by mouth daily as needed for Constipation.    simethicone (MYLICON) 80 MG chewable tablet Take 1 tablet (80 mg total) by mouth 4 (four) times daily as needed for Flatulence.    tamsulosin (FLOMAX) 0.4 mg Cap Take 1 capsule (0.4 mg total) by mouth once daily.     Family History    None       Tobacco Use    Smoking status: Never    Smokeless tobacco: Never   Substance and Sexual Activity    Alcohol use: No    Drug use: No    Sexual activity: Not on file     Review of Systems   Unable to perform ROS: Dementia     Objective:     Vital Signs  (Most Recent):  Temp: 97.2 °F (36.2 °C) (02/14/24 1700)  Pulse: 91 (02/14/24 1700)  Resp: 20 (02/14/24 1700)  BP: (!) 109/58 (02/14/24 1700)  SpO2: 98 % (02/14/24 1700) Vital Signs (24h Range):  Temp:  [97 °F (36.1 °C)-97.2 °F (36.2 °C)] 97.2 °F (36.2 °C)  Pulse:  [] 91  Resp:  [20-28] 20  SpO2:  [98 %] 98 %  BP: (107-109)/(58-80) 109/58     Weight: 46.3 kg (102 lb)  Body mass index is 18.66 kg/m².     Physical Exam  Vitals and nursing note reviewed.   Constitutional:       General: She is not in acute distress.     Appearance: She is well-developed. She is ill-appearing (chronically). She is not diaphoretic.      Comments: Arouses to tactile stimulation, does not attempt to follow commands or speak   HENT:      Head: Normocephalic and atraumatic.      Mouth/Throat:      Mouth: Mucous membranes are moist.   Eyes:      General: No scleral icterus.     Conjunctiva/sclera: Conjunctivae normal.   Neck:      Vascular: No JVD.   Cardiovascular:      Rate and Rhythm: Normal rate and regular rhythm.   Pulmonary:      Effort: Pulmonary effort is normal. No respiratory distress.   Genitourinary:     Comments: Charles in place on presentation  Skin:     Coloration: Skin is pale. Skin is not jaundiced.   Neurological:      Motor: Weakness present. No abnormal muscle tone.      Comments: Contractured UEs, no significant tremor    Psychiatric:         Speech: She is noncommunicative.         Behavior: Behavior is uncooperative.                Significant Labs: All pertinent labs within the past 24 hours have been reviewed.  CBC:   Recent Labs   Lab 02/14/24  1827   WBC 12.87*   HGB 6.0*   HCT 21.5*        CMP:   Recent Labs   Lab 02/14/24  1645      K 4.2   *   CO2 22*   *   BUN 23   CREATININE 0.6   CALCIUM 7.0*   PROT 3.7*   ALBUMIN 1.3*   BILITOT 0.3   ALKPHOS 48*   AST 12   ALT <5*   ANIONGAP 5*       Significant Imaging: I have reviewed all pertinent imaging results/findings within the past 24  hours.

## 2024-02-15 NOTE — PROGRESS NOTES
"Pharmacokinetic Initial Assessment: IV Vancomycin    Assessment/Plan:    Initiate intravenous vancomycin with loading dose of 1000 mg once followed by a maintenance dose of vancomycin 500mg IV every 24 hours  Desired empiric serum trough concentration is 15 to 20 mcg/mL  Draw vancomycin trough level 60 min prior to third dose on 03/17 at approximately 12:30  Pharmacy will continue to follow and monitor vancomycin.      Please contact pharmacy at extension [phone] with any questions regarding this assessment.     Thank you for the consult,   Irlanda Barrera       Patient brief summary:  Annmarie Gallardo is a 78 y.o. female initiated on antimicrobial therapy with IV Vancomycin for treatment of suspected bacteremia    Drug Allergies:   Review of patient's allergies indicates:   Allergen Reactions    Diamox sequels [acetazolamide]     Garlic     Namenda [memantine]     Oxybutynin        Actual Body Weight:   46.3 Kg    Renal Function:   Estimated Creatinine Clearance: 55.6 mL/min (based on SCr of 0.6 mg/dL).,     Dialysis Method (if applicable):  N/A    CBC (last 72 hours):  Recent Labs   Lab Result Units 02/14/24  1827 02/15/24  0534   WBC K/uL 12.87* 14.23*   Hemoglobin g/dL 6.0* 10.3*   Hematocrit % 21.5* 36.3*   Platelets K/uL 181 289   Gran % % 85.0* 83.3*   Lymph % % 6.4* 6.9*   Mono % % 5.8 6.2   Eosinophil % % 0.2 1.1   Basophil % % 0.2 0.4   Differential Method  Automated Automated       Metabolic Panel (last 72 hours):  Recent Labs   Lab Result Units 02/14/24  1645   Sodium mmol/L 143   Potassium mmol/L 4.2   Chloride mmol/L 116*   CO2 mmol/L 22*   Glucose mg/dL 113*   BUN mg/dL 23   Creatinine mg/dL 0.6   Albumin g/dL 1.3*   Total Bilirubin mg/dL 0.3   Alkaline Phosphatase U/L 48*   AST U/L 12   ALT U/L <5*   Magnesium mg/dL 1.8       Drug levels (last 3 results):  No results for input(s): "VANCOMYCINRA", "VANCORANDOM", "VANCOMYCINPE", "VANCOPEAK", "VANCOMYCINTR", "VANCOTROUGH" in the last 72 " hours.    Microbiologic Results:  Microbiology Results (last 7 days)       Procedure Component Value Units Date/Time    Rapid Organism ID by PCR (from Blood culture) [[id number]]  (Abnormal) Collected: 02/14/24 1646    Order Status: Completed Updated: 02/15/24 1206     Enterococcus faecalis Not Detected     Enterococcus faecium Not Detected     Listeria monocytogenes Not Detected     Staphylococcus spp. See species for ID     Staphylococcus aureus Not Detected     Staphylococcus epidermidis Detected     Staphylococcus lugdunensis Not Detected     Streptococcus species Not Detected     Streptococcus agalactiae Not Detected     Streptococcus pneumoniae Not Detected     Streptococcus pyogenes Not Detected     Acinetobacter calcoaceticus/baumannii complex Detected     Bacteroides fragilis Not Detected     Enterobacterales Not Detected     Enterobacter cloacae complex Not Detected     Escherichia coli Not Detected     Klebsiella aerogenes Not Detected     Klebsiella oxytoca Not Detected     Klebsiella pneumoniae group Not Detected     Proteus Not Detected     Salmonella sp Not Detected     Serratia marcescens Not Detected     Haemophilus influenzae Not Detected     Neisseria meningtidis Not Detected     Pseudomonas aeruginosa Not Detected     Stenotrophomonas maltophilia Not Detected     Candida albicans Not Detected     Candida auris Not Detected     Candida glabrata Not Detected     Candida krusei Not Detected     Candida parapsilosis Not Detected     Candida tropicalis Not Detected     Cryptococcus neoformans/gattii Not Detected     CTX-M (ESBL ) Not Detected     IMP (Carbapenem resistant) Not Detected     KPC resistance gene (Carbapenem resistant) Not Detected     mcr-1  Test Not Applicable     mec A/C  Detected     mec A/C and MREJ (MRSA) gene Test Not Applicable     NDM (Carbapenem resistant) Not Detected     OXA-48-like (Carbapenem resistant) Test Not Applicable     van A/B (VRE gene) Test Not Applicable      VIM (Carbapenem resistant) Not Detected    Narrative:      Aerobic and anaerobic    Blood culture x two cultures. Draw prior to antibiotics. [[id number]] Collected: 02/14/24 1541    Order Status: Completed Specimen: Blood from Peripheral, Hand, Right Updated: 02/15/24 1036     Blood Culture, Routine Gram stain inez bottle: Gram positive cocci in clusters resembling Staph      Results called to and read back by: Yamila Foster RN 02/15/2024  10:28    Narrative:      Aerobic and anaerobic    Blood culture x two cultures. Draw prior to antibiotics. [[id number]] Collected: 02/14/24 1646    Order Status: Completed Specimen: Blood from Peripheral, Forearm, Right Updated: 02/15/24 1030     Blood Culture, Routine Gram stain aer bottle: Gram negative rods      Gram stain aer bottle: Gram positive cocci in clusters resembling Staph      Results called to and read back by: Yamila Foster RN 02/15/2024  10:28    Narrative:      Aerobic and anaerobic

## 2024-02-15 NOTE — PLAN OF CARE
Problem: Coping Ineffective  Goal: Effective Coping  2/15/2024 0737 by Joceline Post RN  Outcome: Ongoing, Progressing  2/15/2024 0736 by Joceline Post RN  Outcome: Ongoing, Progressing  2/15/2024 0733 by Joceline oPst RN  Outcome: Ongoing, Progressing     Problem: Adult Inpatient Plan of Care  Goal: Plan of Care Review  2/15/2024 0737 by Joceline Post RN  Outcome: Ongoing, Progressing  2/15/2024 0736 by Joceline Post RN  Outcome: Ongoing, Progressing  2/15/2024 0734 by Joceline Post RN  Outcome: Ongoing, Progressing  Goal: Patient-Specific Goal (Individualized)  2/15/2024 0737 by Joceline Post RN  Outcome: Ongoing, Progressing  2/15/2024 0736 by Joceline Post RN  Outcome: Ongoing, Progressing  2/15/2024 0734 by Joceline Post RN  Outcome: Ongoing, Progressing  Goal: Absence of Hospital-Acquired Illness or Injury  2/15/2024 0737 by Joceline Post RN  Outcome: Ongoing, Progressing  2/15/2024 0736 by Joceline Post RN  Outcome: Ongoing, Progressing  2/15/2024 0734 by Joceline Post RN  Outcome: Ongoing, Progressing  Goal: Optimal Comfort and Wellbeing  2/15/2024 0737 by Joceline Psot RN  Outcome: Ongoing, Progressing  2/15/2024 0736 by Joceline Post RN  Outcome: Ongoing, Progressing  2/15/2024 0734 by Joceline Post RN  Outcome: Ongoing, Progressing  Goal: Readiness for Transition of Care  2/15/2024 0737 by Joceline Post RN  Outcome: Ongoing, Progressing  2/15/2024 0736 by Joceline Post RN  Outcome: Ongoing, Progressing     Problem: Adjustment to Illness (Sepsis/Septic Shock)  Goal: Optimal Coping  2/15/2024 0737 by Joceline Post RN  Outcome: Ongoing, Progressing  2/15/2024 0736 by Joceline Post RN  Outcome: Ongoing, Progressing  2/15/2024 0734 by Joceline Post RN  Outcome: Ongoing, Progressing     Problem: Bleeding (Sepsis/Septic Shock)  Goal: Absence of Bleeding  2/15/2024 0737 by Joceline Post RN  Outcome: Ongoing,  Progressing  2/15/2024 0736 by Joceline Post RN  Outcome: Ongoing, Progressing  2/15/2024 0734 by Joceline Post RN  Outcome: Ongoing, Progressing     Problem: Glycemic Control Impaired (Sepsis/Septic Shock)  Goal: Blood Glucose Level Within Desired Range  2/15/2024 0737 by Joceline Post RN  Outcome: Ongoing, Progressing  2/15/2024 0736 by Joceline Post RN  Outcome: Ongoing, Progressing  2/15/2024 0734 by Joceline Post RN  Outcome: Ongoing, Progressing     Problem: Infection Progression (Sepsis/Septic Shock)  Goal: Absence of Infection Signs and Symptoms  2/15/2024 0737 by Joceline Post RN  Outcome: Ongoing, Progressing  2/15/2024 0736 by Joceline Post RN  Outcome: Ongoing, Progressing  2/15/2024 0734 by Joceline Post RN  Outcome: Ongoing, Progressing     Problem: Nutrition Impaired (Sepsis/Septic Shock)  Goal: Optimal Nutrition Intake  2/15/2024 0737 by Joceline Post RN  Outcome: Ongoing, Progressing  2/15/2024 0736 by Joceline Post RN  Outcome: Ongoing, Progressing     Problem: Impaired Wound Healing  Goal: Optimal Wound Healing  2/15/2024 0737 by Joceline Post RN  Outcome: Ongoing, Progressing  2/15/2024 0736 by Joceline Post RN  Outcome: Ongoing, Progressing     Problem: Skin Injury Risk Increased  Goal: Skin Health and Integrity  2/15/2024 0737 by Joceline Post RN  Outcome: Ongoing, Progressing  2/15/2024 0736 by Joceline Post RN  Outcome: Ongoing, Progressing

## 2024-02-15 NOTE — ASSESSMENT & PLAN NOTE
Continue IDDSI level 5 diet per prior ST recs, although has poor PO intake. Would not want a feeding tube per LAPOST.

## 2024-02-15 NOTE — ASSESSMENT & PLAN NOTE
Patient presents from nursing home with essentially poor PO intake/refusal to eat. She appears somewhat dry but without new significant lab abnormalities of dehydration. Suspect progression of her dementia/debility leading to decline. Per ED discussion with patient's daughter and LAPOST sent with her, she would not want tube feeding and advanced/drastic measures.     - Palliative Care consulted for goals of care discussions

## 2024-02-15 NOTE — PLAN OF CARE
Advance Care Planning   [facility]  Palliative Care       Patient Name: Annmarie Gallardo  MRN: [id number]  Admission Date: 2/14/2024  Hospital Length of Stay: 0 days  Code Status: DNR   Attending Provider: Yuniel Hills MD  Palliative Care Provider: Jose Carlos Farrell MD   Primary Care Physician: Raymond Bess MD  Principal Problem:Palliative care encounter     This  visited patient's room, patient's son Winston at bedside. Winston stated his sister Yamileth makes decisions about patient's care. Winston stated patient does eat a little with coaxing. This  called patient's daughter Yamileth. Yamileth stated that after this hospitalization there will likely be no more hospitalizations. This  inquired if this means that patient will have hosp[facility] [place]. Yamileth stated not being sure right now as patient was skilled at nursing home. This  explained the role of hospice in the nursing home and that hospice will provide an end of life perspective. Yamileth stated preference for patient to be skilled and then transition to hospice. Yamileth stated she would be at the hospital briefly at lunch today. Yamileth stated hoping to speak with a provider before making any decisions.  will follow for support.     Bharat Hidalgo, [facility]  Palliative Medicine

## 2024-02-15 NOTE — ASSESSMENT & PLAN NOTE
Worsened over recent baseline, without obvious signs of bleeding. Suspect multifactorial with chronic disease, malnutrition, and possible GI bleed given history of gastric ulcer. Family would decline endoscopy, appropriately given her terminal disease. For now, will monitor and discuss goals of care with family as she would likely benefit from hospice care rather than repeated blood transfusions.

## 2024-02-15 NOTE — ASSESSMENT & PLAN NOTE
- BCx with GPC and GNR  - Rapid PCR with S epi and Acinetobacter   - Follow up blood cultures  - BCx with likely contaminant, but will f/u culture data  - Continue vancomycin and Zosyn for now  - Follow up palliative care consult

## 2024-02-16 VITALS
SYSTOLIC BLOOD PRESSURE: 115 MMHG | DIASTOLIC BLOOD PRESSURE: 65 MMHG | HEIGHT: 62 IN | WEIGHT: 102 LBS | HEART RATE: 89 BPM | OXYGEN SATURATION: 97 % | TEMPERATURE: 98 F | RESPIRATION RATE: 17 BRPM | BODY MASS INDEX: 18.77 KG/M2

## 2024-02-16 PROBLEM — Z51.5 COMFORT MEASURES ONLY STATUS: Status: ACTIVE | Noted: 2024-02-16

## 2024-02-16 PROCEDURE — 25000003 PHARM REV CODE 250: Performed by: STUDENT IN AN ORGANIZED HEALTH CARE EDUCATION/TRAINING PROGRAM

## 2024-02-16 RX ADMIN — SODIUM CHLORIDE: 9 INJECTION, SOLUTION INTRAVENOUS at 06:02

## 2024-02-16 NOTE — PROGRESS NOTES
"[facility] [address] [place] [address] Medicine  Progress Note    Patient Name: Annmarie Gallardo  MRN: [id number]  Patient Class: IP- Inpatient   Admission Date: 2/14/2024  Length of Stay: 1 days  Attending Physician: Yuniel Hills MD  Primary Care Provider: Raymond Bess MD        Subjective:     Principal Problem:Palliative care encounter        HPI:  Annmarie Gallardo is a 78 y.o. with a PMHx of parkinson's, lewy-body dementia with debility/functional quadriplegia, HTN, GERD, CKD3, and sacral decubitus ulcer who presents today from her nursing home for refusal to eat.     Of note, she was recently discharged from here then LTAC for wound care after debridement of sacral decubitus ulcer. She received an appropriate IV antibiotic course, and she was discharged back to her nursing home with local wound care.     No family available at bedside, and patient does not attempt to speak. Therefore, history obtained via chart review and ED.     At baseline, she is bedbound and with poor PO intake, and family will give her liquids via syringe (essentially pleasure feeds per DC summary). Usually able to tolerate her medications. Over the past few days, she has been refusing to eat. Given concern for hydration and "inability to maintain hydration" at her nursing home, she was sent here for evaluation.       Overview/Hospital Course:  No notes on file    Interval History:   No events overnight. Pending placement to hospice with aid of CM.       Review of Systems   Unable to perform ROS: Dementia     Objective:     Vital Signs (Most Recent):  Temp: 97.7 °F (36.5 °C) (02/16/24 0700)  Pulse: 82 (02/16/24 1458)  Resp: 17 (02/16/24 0700)  BP: 124/77 (02/16/24 0700)  SpO2: 96 % (02/16/24 0700) Vital Signs (24h Range):  Temp:  [97.5 °F (36.4 °C)-98 °F (36.7 °C)] 97.7 °F (36.5 °C)  Pulse:  [81-92] 82  Resp:  [16-17] 17  SpO2:  [96 %-97 %] 96 %  BP: (119-134)/(68-77) 124/77     Weight: 46.3 kg (102 lb 0.1 oz)  Body mass index is " 18.66 kg/m².    Intake/Output Summary (Last 24 hours) at 2/16/2024 1554  Last data filed at 2/16/2024 0519  Gross per 24 hour   Intake --   Output 400 ml   Net -400 ml         Physical Exam  Vitals and nursing note reviewed.   Constitutional:       General: She is not in acute distress.     Appearance: She is well-developed. She is ill-appearing (chronically). She is not diaphoretic.      Comments: Arouses to tactile stimulation, does not attempt to follow commands or speak   HENT:      Head: Normocephalic and atraumatic.      Mouth/Throat:      Mouth: Mucous membranes are moist.   Eyes:      General: No scleral icterus.     Conjunctiva/sclera: Conjunctivae normal.   Neck:      Vascular: No JVD.   Cardiovascular:      Rate and Rhythm: Normal rate and regular rhythm.   Pulmonary:      Effort: Pulmonary effort is normal. No respiratory distress.   Genitourinary:     Comments: Charles in place  Skin:     Coloration: Skin is pale. Skin is not jaundiced.   Neurological:      Motor: Weakness present. No abnormal muscle tone.      Comments: Contractured UEs, no significant tremor    Psychiatric:         Speech: She is noncommunicative.         Behavior: Behavior is uncooperative.             Significant Labs: All pertinent labs within the past 24 hours have been reviewed.  CBC:   Recent Labs   Lab 02/14/24  1827 02/15/24  0534   WBC 12.87* 14.23*   HGB 6.0* 10.3*   HCT 21.5* 36.3*    289     CMP:   Recent Labs   Lab 02/14/24  1645      K 4.2   *   CO2 22*   *   BUN 23   CREATININE 0.6   CALCIUM 7.0*   PROT 3.7*   ALBUMIN 1.3*   BILITOT 0.3   ALKPHOS 48*   AST 12   ALT <5*   ANIONGAP 5*       Significant Imaging: I have reviewed all pertinent imaging results/findings within the past 24 hours.    Assessment/Plan:      * Palliative care encounter  Patient presents from nursing home with essentially poor PO intake/refusal to eat. She appears somewhat dry but without new significant lab abnormalities of  dehydration. Suspect progression of her dementia/debility leading to decline. Per ED discussion with patient's daughter and LAPOST sent with her, she would not want tube feeding and advanced/drastic measures.     - Palliative Care consulted for goals of care discussions  -- Family agreeable to comfort care  - CM working on placement to NH with hospice    Bacteremia  - No antibiotic treatment as patient comfort care    CKD (chronic kidney disease), stage III  Renal function remains around baseline. Monitor. Avoid nephrotoxic agents as able, and renally dose all meds as applicable.    Macrocytic anemia  Worsened over recent baseline, without obvious signs of bleeding. Suspect multifactorial with chronic disease, malnutrition, and possible GI bleed given history of gastric ulcer. Family would decline endoscopy, appropriately given her terminal disease. For now, will monitor and discuss goals of care with family as she would likely benefit from hospice care rather than repeated blood transfusions.     Comfort measures only status  See above      Other dysphagia  Continue IDDSI level 5 diet per prior ST recs, although has poor PO intake. Would not want a feeding tube per LAPOST.     Stage IV pressure ulcer of sacral region  Continue local wound care.     Severe protein-calorie malnutrition  Due to chronic disease with poor PO intake. Will continue supplements as she will tolerate.     Functional quadriplegia  Continue supportive care with frequent turns.       DNR (do not resuscitate)  Advance Care Planning  DNR status noted on LAPOST which is present with patient. Her daughter is her surrogate decision maker.         Parkinson's disease  Continue home Sinemet as able.     Debility  Leading to functional quadriplegia and sacral/decubitus ulcer  - Wound care      VTE Risk Mitigation (From admission, onward)           Ordered     IP VTE HIGH RISK PATIENT  Once         02/14/24 2146     Place sequential compression device   Until discontinued         02/14/24 2146     Reason for No Pharmacological VTE Prophylaxis  Once        Question:  Reasons:  Answer:  Physician Provided (leave comment)    02/14/24 2146                    Discharge Planning   MELCHOR: 2/16/2024     Code Status: DNR   Is the patient medically ready for discharge?: No    Reason for patient still in hospital (select all that apply): Patient trending condition and Pending disposition  Discharge Plan A: Return to nursing home                  Yuniel Hills MD  Department of Hospital Medicine   [facility] 11H

## 2024-02-16 NOTE — PLAN OF CARE
[facility]  Department of Hospital Medicine  [address] [place] [address]  [phone] after hours  [phone] fax    HOSPICE  ORDERS    02/16/2024    Admit to Hospice:  Home Service at Nursing Home    Diagnoses:   Active Hospital Problems    Diagnosis  POA    *Palliative care encounter [Z51.5]  Not Applicable     Priority: 1 - High    Bacteremia [R78.81]  Yes     Priority: 2     CKD (chronic kidney disease), stage III [N18.30]  Yes     Priority: 3      Chronic    Macrocytic anemia [D53.9]  Yes     Priority: 4      Chronic    Other dysphagia [R13.19]  Yes     Chronic    Stage IV pressure ulcer of sacral region [L89.154]  Yes     Chronic    Functional quadriplegia [R53.2]  Yes     Chronic    DNR (do not resuscitate) [Z66]  Yes     Chronic    Severe protein-calorie malnutrition [E43]  Yes     Chronic    Parkinson's disease [G20.A1]  Yes     Chronic    Debility [R53.81]  Yes     Chronic      Resolved Hospital Problems   No resolved problems to display.       Hospice Qualifying Diagnoses:        Patient has a life expectancy < 6 months due to:  Primary Hospice Diagnosis:  Bacteremia     Comorbid Conditions Contributing to Decline:  Parkinson's Diease, Lewy Body Dementia, CKD 3, Sacral Decubitus Ulcer     Vital Signs: Routine per Hospice Protocol.    Code Status: DNR    Allergies:   Review of patient's allergies indicates:   Allergen Reactions    Diamox sequels [acetazolamide]     Garlic     Namenda [memantine]     Oxybutynin        Diet: Mechanical Soft, Nectar Thick Liquids     Activities: As tolerated    Goals of Care Treatment Preferences:  Code Status: DNR    Health care agent: Yamileth Gallardo  [facility] care agent number: [phone]          What is most important right now is to focus on symptom/pain control, improvement in condition but with limits to invasive therapies, comfort and QOL .  Accordingly, we have decided that the best plan to meet the patient's  goals includes continuing with treatment.      Nursing: Per Hospice Routine.    Charles Care: Empty Charles bag Q shift and PRN.  Change Charles every month.    Routine Skin for Bedridden Patients: Apply moisture barrier cream to all skin folds and   wet areas in perineal area daily and after baths and all bowel movements.      Oxygen:  Room air    Other Miscellaneous Care:     WOUND CARE:  Wound spray or saline for wound cleaning with all dressing changes.    All wounds to be measured with first dressing changes and every week.    Pressure Ulcer(s) Stage II   Location: Sacrum         Apply Miconzazole:   2% powder                          Frequency:  Daily           If incontinent of stool or urine, apply thin layer Barrier cream                   twice daily and PRN to wound         Pressure relief measure:  for pressure redistribution                                                        Medications:      Medication List        STOP taking these medications      aspirin 81 MG EC tablet  Commonly known as: ECOTRIN     carbidopa-levodopa  mg  mg per tablet  Commonly known as: SINEMET     cyanocobalamin 1000 MCG tablet  Commonly known as: VITAMIN B-12     multivitamin Tab     polyethylene glycol 17 gram Pwpk  Commonly known as: GLYCOLAX     simethicone 80 MG chewable tablet  Commonly known as: MYLICON     tamsulosin 0.4 mg Cap  Commonly known as: FLOMAX                DIABETES CARE:  None    Future Orders:  Hospice Medical Director may dictate new orders for comfortable care measures & sign death certificate.        _________________________________  Yuniel Hills MD  02/16/2024

## 2024-02-16 NOTE — SUBJECTIVE & OBJECTIVE
Interval History:   No events overnight. Pending placement to hospice with aid of CM.       Review of Systems   Unable to perform ROS: Dementia     Objective:     Vital Signs (Most Recent):  Temp: 97.7 °F (36.5 °C) (02/16/24 0700)  Pulse: 82 (02/16/24 1458)  Resp: 17 (02/16/24 0700)  BP: 124/77 (02/16/24 0700)  SpO2: 96 % (02/16/24 0700) Vital Signs (24h Range):  Temp:  [97.5 °F (36.4 °C)-98 °F (36.7 °C)] 97.7 °F (36.5 °C)  Pulse:  [81-92] 82  Resp:  [16-17] 17  SpO2:  [96 %-97 %] 96 %  BP: (119-134)/(68-77) 124/77     Weight: 46.3 kg (102 lb 0.1 oz)  Body mass index is 18.66 kg/m².    Intake/Output Summary (Last 24 hours) at 2/16/2024 1554  Last data filed at 2/16/2024 0519  Gross per 24 hour   Intake --   Output 400 ml   Net -400 ml         Physical Exam  Vitals and nursing note reviewed.   Constitutional:       General: She is not in acute distress.     Appearance: She is well-developed. She is ill-appearing (chronically). She is not diaphoretic.      Comments: Arouses to tactile stimulation, does not attempt to follow commands or speak   HENT:      Head: Normocephalic and atraumatic.      Mouth/Throat:      Mouth: Mucous membranes are moist.   Eyes:      General: No scleral icterus.     Conjunctiva/sclera: Conjunctivae normal.   Neck:      Vascular: No JVD.   Cardiovascular:      Rate and Rhythm: Normal rate and regular rhythm.   Pulmonary:      Effort: Pulmonary effort is normal. No respiratory distress.   Genitourinary:     Comments: Charles in place  Skin:     Coloration: Skin is pale. Skin is not jaundiced.   Neurological:      Motor: Weakness present. No abnormal muscle tone.      Comments: Contractured UEs, no significant tremor    Psychiatric:         Speech: She is noncommunicative.         Behavior: Behavior is uncooperative.             Significant Labs: All pertinent labs within the past 24 hours have been reviewed.  CBC:   Recent Labs   Lab 02/14/24  1827 02/15/24  0534   WBC 12.87* 14.23*   HGB 6.0*  10.3*   HCT 21.5* 36.3*    289     CMP:   Recent Labs   Lab 02/14/24  1645      K 4.2   *   CO2 22*   *   BUN 23   CREATININE 0.6   CALCIUM 7.0*   PROT 3.7*   ALBUMIN 1.3*   BILITOT 0.3   ALKPHOS 48*   AST 12   ALT <5*   ANIONGAP 5*       Significant Imaging: I have reviewed all pertinent imaging results/findings within the past 24 hours.

## 2024-02-16 NOTE — NURSING
Report called to [facility] . Spoke to Jil Churchill LPN @ [phone]. Explained transport set up for approximately 7;10 pm .

## 2024-02-16 NOTE — PLAN OF CARE
02/16/24 1327   Post-Acute Status   Post-Acute Authorization Placement   Post-Acute Placement Status Referrals Sent     Pt is expected to discharge back to [facility]. SW sent referral via [facility] for review and is waiting to hear back from facility.    SW will continue to follow up.      Luciana Edward LMSW  [facility]  Ext. [phone]

## 2024-02-16 NOTE — ASSESSMENT & PLAN NOTE
Patient presents from nursing home with essentially poor PO intake/refusal to eat. She appears somewhat dry but without new significant lab abnormalities of dehydration. Suspect progression of her dementia/debility leading to decline. Per ED discussion with patient's daughter and LAPOST sent with her, she would not want tube feeding and advanced/drastic measures.     - Palliative Care consulted for goals of care discussions  -- Family agreeable to comfort care  - CM working on placement to NH with hospice

## 2024-02-16 NOTE — NURSING
Laying in bed ,on right side IVF@ 75cc/hr sacral wounds , mepiles applied heel boots in place . RA sats @ 100% . Condition remaijns unchanged . Will continue care .

## 2024-02-16 NOTE — PLAN OF CARE
Palliative  inquired with Yohana at [facility] about the potential for patient to return to nursing home with hospice. Yohana asked if patient's daughter is agreeable and palliative  informed Yohana that per palliative provider daughters are agreeable. This  received a call from Jade at [facility] who stated she is meeting with patient's daughter Dayna today. CM  informed of above via secure chat. Palliative  will follow for support.     Bharat Hidalgo, [facility]  Palliative Medicine

## 2024-02-16 NOTE — NURSING
Nurses Note -- 4 Eyes      2/16/2024   5:04 AM      Skin assessed during: Daily Assessment      [] No Altered Skin Integrity Present    []Prevention Measures Documented      [x] Yes- Altered Skin Integrity Present or Discovered   [] LDA Added if Not in Epic (Describe Wound)   [] New Altered Skin Integrity was Present on Admit and Documented in LDA   [] Wound Image Taken    Wound Care Consulted? No    Attending Nurse:  Kadie Mast RN/Staff Member:   Maria Elena DELUCA    Wound care already consulted, ongoing assessment

## 2024-02-16 NOTE — CONSULTS
[facility]  Palliative Medicine  Consult Note    Patient Name: Annmarie Gallardo  MRN: [id number]  Admission Date: 2/14/2024  Hospital Length of Stay: 0days  Code Status: DNR   Attending Provider: Yuniel Hills MD  Consulting Provider: Jose Carlos Farrell MD  Primary Care Physician: Raymond Bess MD  Principal Problem:Palliative care encounter    Patient information was obtained from relative(s) and primary team.      Consults  Assessment/Plan:     Palliative Care  * Palliative care encounter  Assessment: 78f with LewyBody dementia, PD, functional quadriplegia, CKD3 nonverbal and immobile at baseline who was admitted 12/1 with concerns for infected Stage IV sacral wound.      In this setting, palliative medicine was consulted to help with symptom management, medical decision making, and aiding in the formation of goals of care.      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 in [place]; was at nursing home/[facility] as her needs grew too great to care for her at home     4) Medicolegal: LaPOST     5) Spiritual: did not discuss     6) Goals of care/discussion:     Discussion by phone with lynn Carson to introduce the role of palliative medicine and supportive care in the hospital in the setting of a serious diagnosis of brain failure, recurrent sepsis, skin failure, and poor oral intake, The family was able to demonstrate an excellent understanding of the diagnosis, current care plan, and concern for worsening of the pt's condition.     Goals of care:  The family endorses that what is most important right now is to focus on symptom/pain control at the end of life     Accordingly, we have decided that the best plan to meet the patient's goals includes focusing on comfort and meeting her end of life needs     The family is clear that the  patient would want to avoid invasive and burdensome forms of treatment at the end of life including NG tubes or PEGs.   DNR     Open to hospice upon returning to the nursing home    A total of 19 min was spent on advance care planning, goals of care discussion, emotional support, formulating and communicating prognosis and goals of care, exploring burden/benefit of various approaches of treatment. This discussion occurred on a fully voluntary basis with the verbal consent of the patient and/or family.         7) Disposition plan: nursing home with hospice     8)Summary of recommendations and follow up plan:  Discussed with both daughters at bedside and Dr. Hills.  Updated on clinical condition and expected future course of her disease.      Ultimately, both daughters agree to focus on high quality end of life care with out the use of antibiotics or artificial hydration and nutrition.     They are open to arranging hospice at the nursing home.       Communicated with care coordinators.      Will continue to help where needed.      Jose Carlos Farrell MD  Palliative Medicine   [facility]  [phone] (cell)        Thank you for the opportunity to care for this patient and family.      Please call with questions.      Jose Carlos Farrell MD  Palliative Medicine   [facility]  [phone] (cell)        Thank you for your consult. I will follow-up with patient. Please contact us if you have any additional questions.    Subjective:     HPI:   Annmarie Gallardo is a 78 y.o. with a PMHx of parkinson's, lewy-body dementia with debility/functional quadriplegia, HTN, GERD, CKD3, and sacral decubitus ulcer who presents today from her nursing home for refusal to eat.      Of note, she was recently discharged from here then LTAC for wound care after debridement of sacral decubitus ulcer. She received an appropriate IV antibiotic course, and she was discharged back to her nursing home with local wound care.      No  "family available at bedside, and patient does not attempt to speak. Therefore, history obtained via chart review and ED.      At baseline, she is bedbound and with poor PO intake, and family will give her liquids via syringe (essentially pleasure feeds per DC summary). Usually able to tolerate her medications. Over the past few days, she has been refusing to eat. Given concern for hydration and "inability to maintain hydration" at her nursing home, she was sent here for evaluation.     She was recently admitted to [facility] the [facility] and returned for sacral wounds and need for long term antibiotics.     In this setting, palliative medicine was consulted to help with symptom management, medical decision making, and aiding in the formation of goals of care.          Hospital Course:  No notes on file    Interval History: pt seen at the bedside; chart reviewed;  discussed with the daughter Yamileth at bedside and Dayna by phone    Blood cultures positive    Past Medical History:   Diagnosis Date    HTN (hypertension)     Parkinson's disease     Vitamin D deficiency        Past Surgical History:   Procedure Laterality Date    DEBRIDEMENT OF SACRAL WOUND N/A 12/3/2023    Procedure: DEBRIDEMENT, WOUND, SACRUM;  Surgeon: Stevo Patterson MD;  Location: [facility] OR [address] [place] [address];  Service: General;  Laterality: N/A;  saline soaked kerlix placed to sacrum       Review of patient's allergies indicates:   Allergen Reactions    Diamox sequels [acetazolamide]     Garlic     Namenda [memantine]     Oxybutynin        Medications:  Continuous Infusions:   sodium chloride 0.9%       Scheduled Meds:   piperacillin-tazobactam (Zosyn) IV (PEDS and ADULTS) (extended infusion is not appropriate)  4.5 g Intravenous Q8H    vancomycin (VANCOCIN) IV (PEDS and ADULTS)  20 mg/kg Intravenous Once     PRN Meds:acetaminophen, acetaminophen, aluminum-magnesium hydroxide-simethicone, dextrose 10%, dextrose 10%, glucagon (human recombinant), glucose, " glucose, melatonin, naloxone, ondansetron, polyethylene glycol, simethicone, sodium chloride 0.9%, Pharmacy to dose Vancomycin consult **AND** vancomycin - pharmacy to dose    Family History    None       Tobacco Use    Smoking status: Never    Smokeless tobacco: Never   Substance and Sexual Activity    Alcohol use: No    Drug use: No    Sexual activity: Not on file       Review of Systems   Unable to perform ROS: Dementia     Objective:     Vital Signs (Most Recent):  Temp: 98 °F (36.7 °C) (02/15/24 0750)  Pulse: 92 (02/15/24 1103)  Resp: 14 (02/15/24 0750)  BP: 118/69 (02/15/24 0750)  SpO2: 98 % (02/15/24 0504) Vital Signs (24h Range):  Temp:  [97 °F (36.1 °C)-98.7 °F (37.1 °C)] 98 °F (36.7 °C)  Pulse:  [] 92  Resp:  [14-28] 14  SpO2:  [97 %-99 %] 98 %  BP: ()/(55-80) 118/69     Weight: 46.3 kg (102 lb)  Body mass index is 18.66 kg/m².       Physical Exam  Vitals and nursing note reviewed.   Constitutional:       General: She is not in acute distress.     Appearance: She is ill-appearing.   HENT:      Right Ear: External ear normal.      Left Ear: External ear normal.      Mouth/Throat:      Mouth: Mucous membranes are moist.   Cardiovascular:      Rate and Rhythm: Normal rate.      Pulses: Normal pulses.   Pulmonary:      Effort: Pulmonary effort is normal. No respiratory distress.   Genitourinary:     Comments: Charles cath in place; clear yellow urine  Musculoskeletal:      Comments: sarcopenic   Skin:     Capillary Refill: Capillary refill takes less than 2 seconds.   Neurological:      Mental Status: She is disoriented.      Comments: No attempts at speech; inconsistently tracks or follows commands     Psychiatric:      Comments: calm          Review of Symptoms      Symptom Assessment (ESAS 0-10 Scale)  Pain:  0  Dyspnea:  0  Anxiety:  0  Nausea:  0  Depression:  0  Anorexia:  0  Fatigue:  0  Insomnia:  0  Restlessness:  0  Agitation:  0 due to Patient nonverbal         Pain Assessment in Advanced  Demential Scale (PAINAD)   Breathing - Independent of vocalization:  0  Negative vocalization:  0  Facial expression:  0  Body language:  0  Consolability:  0  Total:  0    Living Arrangements:  Lives in nursing home    Psychosocial/Cultural:   See Palliative Psychosocial Note: No  Declining functional status    Several hospital stays including LTACH for wound care and prolonged antibiotic course    Three adult children; unable to care for her at home    Daughter Yamileth is  main local caregiver; she continues to work full time, has two teenage children, a 10 month old foster child and also has a family farm in [place]  **Primary  to Follow**  Palliative Care  Consult: No      Advance Care Planning   Advance Directives:   Living Will: No    LaPOST: No    Do Not Resuscitate Status: Yes    Medical Power of : No    Agent's Name:  Yamileth Woodsiudice   Agent's Contact Number:  [phone]    Decision Making:  Family answered questions and Patient unable to communicate due to disease severity/cognitive impairment  Goals of Care: What is most important right now is to focus on symptom/pain control, improvement in condition but with limits to invasive therapies, comfort and QOL     We have recommended a more focused approach on maximizing symptom focused care at the end of life.          Significant Labs: All pertinent labs within the past 24 hours have been reviewed.  CBC:   Recent Labs   Lab 02/15/24  0534   WBC 14.23*   HGB 10.3*   HCT 36.3*   MCV 92          BMP:  Recent Labs   Lab 02/14/24  1645   *      K 4.2   *   CO2 22*   BUN 23   CREATININE 0.6   CALCIUM 7.0*   MG 1.8     LFT:  Lab Results   Component Value Date    AST 12 02/14/2024    ALKPHOS 48 (L) 02/14/2024    BILITOT 0.3 02/14/2024     Albumin:   Albumin   Date Value Ref Range Status   02/14/2024 1.3 (L) 3.5 - 5.2 g/dL Final     Protein:   Total Protein   Date Value Ref Range Status    02/14/2024 3.7 (L) 6.0 - 8.4 g/dL Final     Lactic acid:   Lab Results   Component Value Date    LACTATE 1.3 12/24/2023    LACTATE 1.5 12/01/2023       Significant Imaging: I have reviewed all pertinent imaging results/findings within the past 24 hours.      Jose Carlos Farrell MD  Palliative Medicine  [facility] - Observation 11H

## 2024-02-16 NOTE — CONSULTS
[facility]  Wound Care    Patient Name:  Annmarie Gallardo   MRN:  [id number]  Date: 2/16/2024  Diagnosis: Palliative care encounter    History:     Past Medical History:   Diagnosis Date    HTN (hypertension)     Parkinson's disease     Vitamin D deficiency        Social History     Socioeconomic History    Marital status:    Tobacco Use    Smoking status: Never    Smokeless tobacco: Never   Substance and Sexual Activity    Alcohol use: No    Drug use: No     Social Determinants of Health     Financial Resource Strain: Low Risk  (12/1/2023)    Overall Financial Resource Strain (CARDIA)     Difficulty of Paying Living Expenses: Not hard at all   Recent Concern: Financial Resource Strain - Medium Risk (11/12/2023)    Overall Financial Resource Strain (CARDIA)     Difficulty of Paying Living Expenses: Somewhat hard   Food Insecurity: No Food Insecurity (12/1/2023)    Hunger Vital Sign     Worried About Running Out of Food in the Last Year: Never true     Ran Out of Food in the Last Year: Never true   Transportation Needs: No Transportation Needs (12/1/2023)    PRAPARE - Transportation     Lack of Transportation (Medical): No     Lack of Transportation (Non-Medical): No   Physical Activity: Inactive (12/1/2023)    Exercise Vital Sign     Days of Exercise per Week: 0 days     Minutes of Exercise per Session: 0 min   Stress: No Stress Concern Present (12/1/2023)    [place] [employer] - Occupational Stress Questionnaire     Feeling of Stress : Not at all   Social Connections: Socially Isolated (12/1/2023)    Social Connection and Isolation Panel [NHANES]     Frequency of Communication with Friends and Family: Never     Frequency of Social Gatherings with Friends and Family: Three times a week     Attends Temple Services: Never     Active Member of Clubs or Organizations: No     Attends Club or Organization Meetings: Never     Marital Status:    Housing Stability: Low  Risk  (12/1/2023)    Housing Stability Vital Sign     Unable to Pay for Housing in the Last Year: No     Number of Places Lived in the Last Year: 2     Unstable Housing in the Last Year: No       Precautions:     Allergies as of 02/14/2024 - Reviewed 02/14/2024   Allergen Reaction Noted    Diamox sequels [acetazolamide]  12/01/2023    Garlic  11/12/2023    Namenda [memantine]  11/12/2023    Oxybutynin  11/12/2023       WOC Assessment Details/Treatment   Patient seen for wound care consultation.   Reviewed chart for this encounter.   See Flow Sheet for findings.    All wounds are POA.   Pt laying in bed with friends at bedside.   Pt on waffle overlay.   Removed foam border from left heel revealing blackened hard eschar to heel, applied Betadine.  Foam border removed from left lateral LL revealing yellow well adherent slough with pink scar tissue to periwound, cleansed with NS, applied Aquacel AG, covered with foam border.   Foam border removed from left lower buttock revealing scar tissue and dry flaky skin, re-applied foam border.   Foam border removed from left buttock, revealing what appears to be DTI. Re-applied foam border.   Sacral wound foam border removed, gauze packing removed, SS malodorous drainage noted, applied Dakins moistened kerlix, wrung out for excess moisture, applied large sacral foam border.     RECOMMENDATIONS:  See above   Secure chat send to MD     Discussed POC with patient and primary nurse.   See EMR for orders & patient education.    Discussed nutrition and the role of protein in wound healing with the patient. Instructed patient to optimize protein for wound healing.    Bedside nursing to continue care & monitoring.  Bedside nursing to maintain pressure injury prevention interventions.     02/16/24 1100   WOCN Assessment   WOCN Total Time (mins) 45   Visit Date 02/16/24   Visit Time 1100   Consult Type New   WOCN Speciality Wound   Wound pressure;moisture   Intervention  assessed;changed;applied;chart review;orders;consult other service   Teaching complication        Altered Skin Integrity 11/12/23 0254 Sacral spine   Date First Assessed/Time First Assessed: 11/12/23 0254   Altered Skin Integrity Present on Admission - Did Patient arrive to the hospital with altered skin?: yes  Location: Sacral spine   Wound Image    Drainage Amount Copious   Drainage Characteristics/Odor Malodorous;Serosanguineous   Tissue loss description Full thickness   Wound Edges Other (see comments)        Altered Skin Integrity 12/28/23 0900 Left Heel   Date First Assessed/Time First Assessed: 12/28/23 0900   Altered Skin Integrity Present on Admission - Did Patient arrive to the hospital with altered skin?: yes  Side: Left  Location: Heel   Wound Image      Right buttock DTI    Left lateral LL    Left lower buttock

## 2024-02-17 NOTE — DISCHARGE SUMMARY
"[facility] [address] [place] [address] Medicine  Discharge Summary      Patient Name: Annmarie Gallardo  MRN: [id number]  DAVID: [id number]  Patient Class: IP- Inpatient  Admission Date: 2/14/2024  Hospital Length of Stay: 1 days  Discharge Date and Time: 2/16/2024  6:49 PM  Attending Physician: Margaret att. providers found   Discharging Provider: Yuniel Hills MD  Primary Care Provider: Raymond Bess MD  Hospital Medicine Team: [facility] HOSP MED  Yuniel Hills MD  Primary Care Team: [facility]    HPI:   Annmarie Gallardo is a 78 y.o. with a PMHx of parkinson's, lewy-body dementia with debility/functional quadriplegia, HTN, GERD, CKD3, and sacral decubitus ulcer who presents today from her nursing home for refusal to eat.     Of note, she was recently discharged from here then LTAC for wound care after debridement of sacral decubitus ulcer. She received an appropriate IV antibiotic course, and she was discharged back to her nursing home with local wound care.     No family available at bedside, and patient does not attempt to speak. Therefore, history obtained via chart review and ED.     At baseline, she is bedbound and with poor PO intake, and family will give her liquids via syringe (essentially pleasure feeds per DC summary). Usually able to tolerate her medications. Over the past few days, she has been refusing to eat. Given concern for hydration and "inability to maintain hydration" at her nursing home, she was sent here for evaluation.       * No surgery found *      Hospital Course:   Palliative consulted and had GOC discussion with patient's daughters. Family decided on comfort care and hospice. Patient discharged to NH with hospice.      Goals of Care Treatment Preferences:  Code Status: DNR    Health care agent: Yamileth Gallardo  [facility] care agent number: [phone]          What is most important right now is to focus on symptom/pain control, improvement in condition but with limits to invasive " therapies, comfort and QOL .  Accordingly, we have decided that the best plan to meet the patient's goals includes continuing with treatment.      Consults:   Consults (From admission, onward)          Status Ordering Provider     Inpatient consult to Palliative Care  Once        Provider:  (Not yet assigned)    GARRISON Walden            No new Assessment & Plan notes have been filed under this hospital service since the last note was generated.  Service: Hospital Medicine    Final Active Diagnoses:    Diagnosis Date Noted POA    PRINCIPAL PROBLEM:  Palliative care encounter [Z51.5] 12/25/2023 Not Applicable    Bacteremia [R78.81] 02/15/2024 Yes    CKD (chronic kidney disease), stage III [N18.30] 11/12/2023 Yes     Chronic    Macrocytic anemia [D53.9] 12/01/2023 Yes     Chronic    Comfort measures only status [Z51.5] 02/16/2024 Not Applicable    Other dysphagia [R13.19] 02/14/2024 Yes     Chronic    Stage IV pressure ulcer of sacral region [L89.154] 12/03/2023 Yes     Chronic    Functional quadriplegia [R53.2] 12/01/2023 Yes     Chronic    DNR (do not resuscitate) [Z66] 12/01/2023 Yes     Chronic    Severe protein-calorie malnutrition [E43] 12/01/2023 Yes     Chronic    Parkinson's disease [G20.A1] 11/12/2023 Yes     Chronic    Debility [R53.81] 01/22/2020 Yes     Chronic      Problems Resolved During this Admission:       Discharged Condition: good    Disposition: Hospice/Home    Follow Up:    Patient Instructions:   No discharge procedures on file.    Significant Diagnostic Studies: Labs: All labs within the past 24 hours have been reviewed    Pending Diagnostic Studies:       Procedure Component Value Units Date/Time    EKG 12-lead [[id number]]     Order Status: Sent Lab Status: No result            Medications:  Reconciled Home Medications:      Medication List        STOP taking these medications      aspirin 81 MG EC tablet  Commonly known as: ECOTRIN     carbidopa-levodopa  mg   mg per tablet  Commonly known as: SINEMET     cyanocobalamin 1000 MCG tablet  Commonly known as: VITAMIN B-12     multivitamin Tab     polyethylene glycol 17 gram Pwpk  Commonly known as: GLYCOLAX     simethicone 80 MG chewable tablet  Commonly known as: MYLICON     tamsulosin 0.4 mg Cap  Commonly known as: FLOMAX              Indwelling Lines/Drains at time of discharge:   Lines/Drains/Airways       None                   Time spent on the discharge of patient: 35 minutes         Yuniel Hills MD  Department of Hospital Medicine  [facility] - Observation 11H

## 2024-02-17 NOTE — NURSING
Patient discharged back to [facility] , with Hospice .Pickedup by [facility] IV removed cannula intact , telemetry removed . Report called to nurse .

## 2024-02-17 NOTE — HOSPITAL COURSE
Palliative consulted and had C discussion with patient's daughters. Family decided on comfort care and hospice. Patient discharged to NH with hospice.

## 2024-02-18 LAB
BACTERIA BLD CULT: ABNORMAL

## 2024-02-19 NOTE — PLAN OF CARE
[facility]  Discharge Final Note    Primary Care Provider: Raymond Bess MD    Expected Discharge Date: 2/16/2024    Patient discharged to [facility] via ambulance transportation.     Patient's bedside nurse and patient/family notified of the above.    Discharge Plan A and Plan B have been determined by review of patient's clinical status, future medical and therapeutic needs, and coverage/benefits for post-acute care in coordination with multidisciplinary team members.        Final Discharge Note (most recent)       Final Note - 02/19/24 0935          Final Note    Assessment Type Final Discharge Note (P)      Anticipated Discharge Disposition [facility] (P)         Post-Acute Status    Post-Acute Authorization Placement (P)      Post-Acute Placement Status Set-up Complete/Auth obtained (P)                      Important Message from Medicare                 No future appointments.    SW scheduled post-discharge follow-up appointment and information added to AVS.     Patient discharged back to nursing home with hospice care.      Luciana Edward LMSW  [facility]  Ext. [phone]

## 2024-03-04 PROBLEM — A41.9 SEPSIS WITHOUT ACUTE ORGAN DYSFUNCTION: Status: RESOLVED | Noted: 2023-12-03 | Resolved: 2024-03-04

## 2024-07-18 NOTE — ASSESSMENT & PLAN NOTE
Dementia is controlled currently. Continue home dementia meds and non-pharmacologic interventions to prevent delirium (Activity during day, opening blinds, providing glasses/hearing aids, and up in chair during daytime). Use PRN anti-psychotics to prevent behavior of self harm during sundowning, and avoid narcotics and benzos unless absolutely necessary. PRN anti-psychotics prescribed to avoid self harm behaviors.   Time-based billing (NON-critical care) Time-based billing (NON-critical care) Time-based billing (NON-critical care) Time-based billing (NON-critical care)

## 2024-12-04 NOTE — CONSULTS
[facility] - Emergency Dept  General Surgery  Consult Note    Patient Name: Annmarie Gallardo  MRN: [id number]  Code Status: Full Code  Admission Date: 12/1/2023  Hospital Length of Stay: 0 days  Attending Physician: Franc Fernandez MD  Primary Care Provider: Raymond Bess MD    Patient information was obtained from patient, past medical records, and ER records.     Inpatient consult to General Surgery  Consult performed by: Armida Arthur MD  Consult ordered by: Modesto Foster MD        Subjective:     Principal Problem: Pressure injury of sacral region, unstageable    History of Present Illness: 78 y.o. with a PMHx of parkinson's, lewy-body dementia with debility/functional quadriplegia, HTN, GERD, CKD3, and sacral decubitus ulcer who presents today from her nursing home for evaluation of sacral decubitus ulcer. She is nonverbal and not interactive. Daughter previously at bedside per nurse, however gone during our encounter. Did attempt to contact her and was unable to reach her by cellphone.     On arrival to ED she . Labs demonstrate leukocytosis of 12.8, elevated CRP, normal lactate and procalcitonin. No imaging has been obtained.     No current facility-administered medications on file prior to encounter.     Current Outpatient Medications on File Prior to Encounter   Medication Sig    aspirin (ECOTRIN) 81 MG EC tablet Take 81 mg by mouth once daily.    carbidopa-levodopa  mg (SINEMET)  mg per tablet Take 1 tablet by mouth 3 (three) times daily.    cyanocobalamin (VITAMIN B-12) 1000 MCG tablet Take 100 mcg by mouth once daily.    LISINOPRIL ORAL Take by mouth.    loratadine (CLARITIN) 10 mg tablet Take 10 mg by mouth once daily.    OMEPRAZOLE ORAL Take by mouth.    tamsulosin (FLOMAX) 0.4 mg Cap Take 1 capsule (0.4 mg total) by mouth once daily.    vitamin E 1000 UNIT capsule Take 1,000 Units by mouth 2 (two) times daily.       Review of patient's allergies indicates:   Allergen  Reactions    Diamox sequels [acetazolamide]     Garlic     Namenda [memantine]     Oxybutynin        Past Medical History:   Diagnosis Date    HTN (hypertension)     Parkinson's disease     Vitamin D deficiency      No past surgical history on file.  Family History    None       Tobacco Use    Smoking status: Never    Smokeless tobacco: Never   Substance and Sexual Activity    Alcohol use: No    Drug use: No    Sexual activity: Not on file     Review of Systems   Unable to perform ROS: Dementia     Objective:     Vital Signs (Most Recent):  Temp: 99.8 °F (37.7 °C) (12/01/23 2051)  Pulse: 75 (12/01/23 2051)  Resp: 15 (12/01/23 2051)  BP: (!) 113/58 (12/01/23 2051)  SpO2: 97 % (12/01/23 2051) Vital Signs (24h Range):  Temp:  [98.9 °F (37.2 °C)-99.8 °F (37.7 °C)] 99.8 °F (37.7 °C)  Pulse:  [70-85] 75  Resp:  [15-26] 15  SpO2:  [96 %-97 %] 97 %  BP: (106-130)/(56-68) 113/58     Weight: 52.6 kg (116 lb)  Body mass index is 20.55 kg/m².     Physical Exam  Vitals and nursing note reviewed. Exam conducted with a chaperone present.   Constitutional:       Comments: Somnolent, non-interactive/responsive. Does wince to painful stimuli.    HENT:      Head: Normocephalic and atraumatic.   Cardiovascular:      Rate and Rhythm: Normal rate and regular rhythm.   Pulmonary:      Effort: Pulmonary effort is normal. No respiratory distress.   Abdominal:      General: Abdomen is flat.      Palpations: Abdomen is soft.   Musculoskeletal:         General: No swelling or signs of injury.   Skin:     Coloration: Skin is not jaundiced.      Comments: unstageable sacral decubitus ulcer with foul smelling purulent output, probes right inferior laterally into buttock. Unable to determine full extent of cavity. Copious drainage easily expressible. Eschar overlying cavity.    Neurological:      Mental Status: She is disoriented.      Comments: Contracted, stiff            CBC:   Recent Labs   Lab 12/01/23  1731   WBC 12.86*   RBC 2.81*   HGB  8.5*   HCT 28.7*      *   MCH 30.2   MCHC 29.6*       Significant Diagnostics:  I have reviewed all pertinent imaging results/findings within the past 24 hours.    Assessment/Plan:     * Pressure injury of sacral region, unstageable  78 year old F with above history presenting with purulent drainage from sacral wound. Non ambulatory, nonverbal, noninteractive with infected sacral decubitus ulcer. Unable to reach daughter who had left bedside per nurse. Given extent of probing, ulcer would likely be best suited for OR debridement. Could unroof eschar at bedside at the very least, however no one available in person or phone to provide consent for this and patient winces at manipulation.     - Recommend palliative care consult to discuss hospice options  - Will try to re-engage family tomorrow   - Agree with wound care consult  - Would consult speech and swallow, SLP prior to oral intake  - Remainder of care per primary         VTE Risk Mitigation (From admission, onward)           Ordered     enoxaparin injection 40 mg  Daily         12/01/23 2123     IP VTE HIGH RISK PATIENT  Once         12/01/23 2123     Place sequential compression device  Until discontinued         12/01/23 2123     Reason for No Pharmacological VTE Prophylaxis  Once        Question:  Reasons:  Answer:  Physician Provided (leave comment)    12/01/23 2123                    Thank you for your consult. I will follow-up with patient. Please contact us if you have any additional questions.    Armida Arthur MD  General Surgery  [facility] - Emergency Dept   MED

## (undated) DEVICE — BLADE SURG CARBON STEEL SZ11

## (undated) DEVICE — DRAPE LAP T SHT W/ INSTR PAD

## (undated) DEVICE — TRAY MINOR GEN SURG OMC

## (undated) DEVICE — GLOVE GAMMEX SURG LF PI SZ 7.5

## (undated) DEVICE — BOVIE SUCTION

## (undated) DEVICE — BANDAGE KERLIX AMD

## (undated) DEVICE — GOWN POLY REINF BRTH SLV XL

## (undated) DEVICE — ELECTRODE REM PLYHSV RETURN 9

## (undated) DEVICE — PAD ABD TNDRSRB 7.5X8 STRL